# Patient Record
Sex: MALE | Race: WHITE | NOT HISPANIC OR LATINO | Employment: OTHER | URBAN - METROPOLITAN AREA
[De-identification: names, ages, dates, MRNs, and addresses within clinical notes are randomized per-mention and may not be internally consistent; named-entity substitution may affect disease eponyms.]

---

## 2017-07-05 ENCOUNTER — HOSPITAL ENCOUNTER (EMERGENCY)
Facility: MEDICAL CENTER | Age: 72
End: 2017-07-05
Attending: EMERGENCY MEDICINE
Payer: MEDICARE

## 2017-07-05 VITALS
HEART RATE: 86 BPM | BODY MASS INDEX: 20.2 KG/M2 | SYSTOLIC BLOOD PRESSURE: 113 MMHG | WEIGHT: 141.09 LBS | DIASTOLIC BLOOD PRESSURE: 68 MMHG | RESPIRATION RATE: 16 BRPM | HEIGHT: 70 IN | OXYGEN SATURATION: 96 % | TEMPERATURE: 98 F

## 2017-07-05 DIAGNOSIS — T83.511A URINARY TRACT INFECTION ASSOCIATED WITH INDWELLING URETHRAL CATHETER, INITIAL ENCOUNTER (HCC): ICD-10-CM

## 2017-07-05 DIAGNOSIS — N39.0 URINARY TRACT INFECTION ASSOCIATED WITH INDWELLING URETHRAL CATHETER, INITIAL ENCOUNTER (HCC): ICD-10-CM

## 2017-07-05 LAB
APPEARANCE UR: ABNORMAL
COLOR UR AUTO: ABNORMAL
GLUCOSE UR QL STRIP.AUTO: NEGATIVE MG/DL
KETONES UR QL STRIP.AUTO: 15 MG/DL
LEUKOCYTE ESTERASE UR QL STRIP.AUTO: ABNORMAL
NITRITE UR QL STRIP.AUTO: POSITIVE
PH UR STRIP.AUTO: 8.5 [PH]
PROT UR QL STRIP: 100 MG/DL
RBC UR QL AUTO: ABNORMAL
SP GR UR: 1.01

## 2017-07-05 PROCEDURE — 81002 URINALYSIS NONAUTO W/O SCOPE: CPT

## 2017-07-05 PROCEDURE — A9270 NON-COVERED ITEM OR SERVICE: HCPCS | Performed by: EMERGENCY MEDICINE

## 2017-07-05 PROCEDURE — 99284 EMERGENCY DEPT VISIT MOD MDM: CPT

## 2017-07-05 PROCEDURE — 303105 HCHG CATHETER EXTRA

## 2017-07-05 PROCEDURE — 51702 INSERT TEMP BLADDER CATH: CPT

## 2017-07-05 PROCEDURE — 700102 HCHG RX REV CODE 250 W/ 637 OVERRIDE(OP): Performed by: EMERGENCY MEDICINE

## 2017-07-05 RX ORDER — CIPROFLOXACIN 500 MG/1
500 TABLET, FILM COATED ORAL ONCE
Status: COMPLETED | OUTPATIENT
Start: 2017-07-05 | End: 2017-07-05

## 2017-07-05 RX ORDER — CIPROFLOXACIN 500 MG/1
500 TABLET, FILM COATED ORAL 2 TIMES DAILY
Qty: 20 TAB | Refills: 0 | Status: SHIPPED | OUTPATIENT
Start: 2017-07-05 | End: 2017-07-15

## 2017-07-05 RX ADMIN — CIPROFLOXACIN HYDROCHLORIDE 500 MG: 500 TABLET, FILM COATED ORAL at 05:21

## 2017-07-05 ASSESSMENT — LIFESTYLE VARIABLES: DO YOU DRINK ALCOHOL: NO

## 2017-07-05 NOTE — ED AVS SNAPSHOT
Home Care Instructions                                                                                                                Hugh Núñez   MRN: [id number]    Department:  [facility], Emergency Dept   Date of Visit:  7/5/2017            [facility], Emergency Dept    [address]    [place] [address]    Phone:  [phone]      You were seen by     Curtis Garcia M.D.      Your Diagnosis Was     Urinary tract infection associated with indwelling urethral catheter, initial encounter (CMS-[facility])     T83.511A, N39.0       These are the medications you received during your hospitalization from 07/05/2017 0311 to 07/05/2017 0527     Date/Time Order Dose Route Action    07/05/2017 0521 ciprofloxacin (CIPRO) tablet 500 mg 500 mg Oral Given      Follow-up Information     1. Please follow up.    Why:  [place] [facility] as planned      Medication Information     Review all of your home medications and newly ordered medications with your primary doctor and/or pharmacist as soon as possible. Follow medication instructions as directed by your doctor and/or pharmacist.     Please keep your complete medication list with you and share with your physician. Update the information when medications are discontinued, doses are changed, or new medications (including over-the-counter products) are added; and carry medication information at all times in the event of emergency situations.               Medication List      START taking these medications        Instructions    Morning Afternoon Evening Bedtime    ciprofloxacin 500 MG Tabs   Last time this was given:  500 mg on 7/5/2017  5:21 AM   Commonly known as:  CIPRO        Take 1 Tab by mouth 2 times a day for 10 days.   Dose:  500 mg                          ASK your doctor about these medications        Instructions    Morning Afternoon Evening Bedtime    cefdinir 300 MG Caps   Commonly known as:  OMNICEF        Take 1 Cap by mouth  "2 times a day.   Dose:  300 mg                        finasteride 5 MG Tabs   Commonly known as:  PROSCAR        Take 1 Tab by mouth every day.   Dose:  5 mg                        * metformin 850 MG Tabs   Commonly known as:  GLUCOPHAGE        Take 1 Tab by mouth 2 times a day, with meals.   Dose:  850 mg                        * metformin 850 MG Tabs   Commonly known as:  GLUCOPHAGE        Take 1 Tab by mouth 2 times a day, with meals.   Dose:  850 mg                        tamsulosin 0.4 MG capsule   Commonly known as:  FLOMAX        Take 1 Cap by mouth ONE-HALF HOUR AFTER BREAKFAST.   Dose:  0.4 mg                        * Notice:  This list has 2 medication(s) that are the same as other medications prescribed for you. Read the directions carefully, and ask your doctor or other care provider to review them with you.         Where to Get Your Medications      You can get these medications from any pharmacy     Bring a paper prescription for each of these medications    - ciprofloxacin 500 MG Tabs            Procedures and tests performed during your visit     INSERTION CATHETER HERNANDEZ    NURSING COMMUNICATION    POC UA        Discharge Instructions       Catheter-Associated Urinary Tract Infection FAQs  What is \"catheter-associated urinary tract infection\"?  A urinary tract infection (also called \"UTI\") is an infection in the urinary system, which includes the bladder (which stores the urine) and the kidneys (which filter the blood to make urine). Germs (for example, bacteria or yeasts) do not normally live in these areas; but if germs are introduced, an infection can occur.  If you have a urinary catheter, germs can travel along the catheter and cause an infection in your bladder or your kidney; in that case it is called a catheter-associated urinary tract infection (or \"CA-UTI\").   What is a urinary catheter?  A urinary catheter is a thin tube placed in the bladder to drain urine. Urine drains through the tube " into a bag that collects the urine. A urinary catheter may be used:  · If you are not able to urinate on your own  · To measure the amount of urine that you make, for example, during intensive care  · During and after some types of surgery  · During some tests of the kidneys and bladder  People with urinary catheters have a much higher chance of getting a urinary tract infection than people who don't have a catheter.  How do I get a catheter-associated urinary tract infection (CA-UTI)?  If germs enter the urinary tract, they may cause an infection. Many of the germs that cause a catheter-associated urinary tract infection are common germs found in your intestines that do not usually cause an infection there. Germs can enter the urinary tract when the catheter is being put in or while the catheter remains in the bladder.   What are the symptoms of a urinary tract infection?  Some of the common symptoms of a urinary tract infection are:  · Burning or pain in the lower abdomen (that is, below the stomach)  · Fever  · Bloody urine may be a sign of infection, but is also caused by other problems  · Burning during urination or an increase in the frequency of urination after the catheter is removed.  Sometimes people with catheter-associated urinary tract infections do not have these symptoms of infection.  Can catheter-associated urinary tract infections be treated?  Yes, most catheter-associated urinary tract infections can be treated with antibiotics and removal or change of the catheter. Your doctor will determine which antibiotic is best for you.   What are some of the things that hospitals are doing to prevent catheter-associated urinary tract infections?  To prevent urinary tract infections, doctors and nurses take the following actions.   Catheter insertion  · External catheters in men (these look like condoms and are placed over the penis rather than into the penis)  · Putting a temporary catheter in to drain the  urine and removing it right away. This is called intermittent urethral catheterization.  Catheter care  What can I do to help prevent catheter-associated urinary tract infections if I have a catheter?  · Always clean your hands before and after doing catheter care.  · Always keep your urine bag below the level of your bladder.  · Do not tug or pull on the tubing.  · Do not twist or kink the catheter tubing.  · Ask your healthcare provider each day if you still need the catheter.  What do I need to do when I go home from the hospital?  · If you will be going home with a catheter, your doctor or nurse should explain everything you need to know about taking care of the catheter. Make sure you understand how to care for it before you leave the hospital.  · If you develop any of the symptoms of a urinary tract infection, such as burning or pain in the lower abdomen, fever, or an increase in the frequency of urination, contact your doctor or nurse immediately.  · Before you go home, make sure you know who to contact if you have questions or problems after you get home.  If you have questions, please ask your doctor or nurse.  Developed and co-sponsored by The Society for Healthcare Epidemiology of [place] (SHEA); Infectious Diseases Society of [place] (IDSA); American Hospital Association; Association for Professionals in Infection Control and Epidemiology (APIC); Centers for Disease Control and Prevention (CDC); and The Joint Commission.     This information is not intended to replace advice given to you by your health care provider. Make sure you discuss any questions you have with your health care provider.     Document Released: 09/11/2013 Document Revised: 05/03/2016 Document Reviewed: 03/02/2016  Elsevier Interactive Patient Education ©2016 [employer]            Patient Information     Patient Information    Following emergency treatment: all patient requiring follow-up care must return either to a private  physician or a clinic if your condition worsens before you are able to obtain further medical attention, please return to the emergency room.     Billing Information    At [facility], we work to make the billing process streamlined for our patients.  Our Representatives are here to answer any questions you may have regarding your hospital bill.  If you have insurance coverage and have supplied your insurance information to us, we will submit a claim to your insurer on your behalf.  Should you have any questions regarding your bill, we can be reached online or by phone as follows:  Online: You are able pay your bills online or live chat with our representatives about any billing questions you may have. We are here to help Monday - Friday from 8:00am to 7:30pm and 9:00am - 12:00pm on Saturdays.  Please visit [email or web address]  for more information.   Phone:  [phone] or [phone]    Please note that your emergency physician, surgeon, pathologist, radiologist, anesthesiologist, and other specialists are not employed by [facility] and will therefore bill separately for their services.  Please contact them directly for any questions concerning their bills at the numbers below:     Emergency Physician Services:  [phone]  [facility]:  [phone]  [facility]:  [phone]  [facility]:  [phone]    1. Your final bill may vary from the amount quoted upon discharge if all procedures are not complete at that time, or if your doctor has additional procedures of which we are not aware. You will receive an additional bill if you return to the Emergency Department at [facility] for suture removal regardless of the facility of which the sutures were placed.     2. Please arrange for settlement of this account at the emergency registration.    3. All self-pay accounts are due in full at the time of treatment.  If you are  unable to meet this obligation then payment is expected within 4-5 days.     4. If you have had radiology studies (CT, X-ray, Ultrasound, MRI), you have received a preliminary result during your emergency department visit. Please contact the radiology department [phone] to receive a copy of your final result. Please discuss the Final result with your primary physician or with the follow up physician provided.     Crisis Hotline:  [place] Crisis Hotline:  4-738-NPKRHBR or 1-463.602.8444  [place] Crisis Hotline:    1-591.822.7394 or 183-956-6400         ED Discharge Follow Up Questions    1. In order to provide you with very good care, we would like to follow up with a phone call in the next few days.  May we have your permission to contact you?     YES /  NO    2. What is the best phone number to call you? (       )_____-__________    3. What is the best time to call you?      Morning  /  Afternoon  /  Evening                   Patient Signature:  ____________________________________________________________    Date:  ____________________________________________________________

## 2017-07-05 NOTE — ED PROVIDER NOTES
"ED Provider Note    CHIEF COMPLAINT  Chief Complaint   Patient presents with   • Urinary Catheter Problem     \"quit functioning\"       HPI  Hugh Núñez is a 71 y.o. male who presents with a nonfunctioning indwelling Clement catheter. He has a chronic indwelling Clement catheter for urinary retention. It's usually changed every month. He is due for a change next week, but it stopped draining yesterday afternoon.. He feels an intense urge to urinate. Pressure nonradiating, constant. No fevers or chills. No nausea or vomiting.    REVIEW OF SYSTEMS  As per HPI, otherwise a 10 point review of systems is negative    PAST MEDICAL HISTORY  Past Medical History   Diagnosis Date   • Fall    • Polio Ag of 4   • Type II or unspecified type diabetes mellitus without mention of complication, not stated as uncontrolled        SOCIAL HISTORY  Social History   Substance Use Topics   • Smoking status: Former Smoker -- 5 years     Types: Cigarettes     Start date: 01/01/1955     Quit date: 09/18/1960   • Smokeless tobacco: None   • Alcohol Use: No       SURGICAL HISTORY  Past Surgical History   Procedure Laterality Date   • Pr appendectomy     • Tonsillectomy         CURRENT MEDICATIONS  Home Medications     **Home medications have not yet been reviewed for this encounter**          ALLERGIES  No Known Allergies    PHYSICAL EXAM  VITAL SIGNS: /68 mmHg  Pulse 86  Temp(Src) 36.7 °C (98 °F)  Resp 16  Ht 1.778 m (5' 10\")  Wt 64 kg (141 lb 1.5 oz)  BMI 20.24 kg/m2  SpO2 96%   Constitutional: Awake and alert  HENT:  Atraumatic, Normocephalic poor dentition, Nose normal inspection.   Eyes: Normal inspection  Neck: Supple  Cardiovascular: Normal heart rate, Normal rhythm.  Symmetric peripheral pulses.   Thorax & Lungs: No respiratory distress, No wheezing, No rales, No rhonchi, No chest tenderness.   Abdomen: Bowel sounds normal, soft, non-distended, tender suprapubic region  Skin: Warm, Dry, No rash.   Back: No tenderness, No " CVA tenderness.   Extremities: No clubbing, cyanosis, edema, no Homans or cords   Neurologic: Grossly normal     Labs:  Results for orders placed or performed during the hospital encounter of 07/05/17   POC UA   Result Value Ref Range    POC Color Lauren     POC Appearance Slightly Cloudy (A)     POC Glucose Negative Negative mg/dL    POC Ketones 15 (A) Negative mg/dL    POC Specific Gravity 1.015 1.005-1.030    POC Blood Moderate (A) Negative    POC Urine PH 8.5 (A) 5.0-8.0    POC Protein 100 (A) Negative mg/dL    POC Nitrites Positive (A) Negative    POC Leukocyte Esterase Large (A) Negative       COURSE & MEDICAL DECISION MAKING  Patient presents with urinary retention. Clement catheter was changed. His bladder was drained. His symptoms completely resolved. Urine was quite cloudy consistent with infectious process. Given that he has developed retention and has discomfort with associated abnormal urinalysis he will be treated with Cipro. He is given a prescription for this. He has an appointment next week with urology. He will keep this appointment. He should return to ER for recurrent symptoms or concern.    FINAL IMPRESSION  1. Catheter associated urinary tract infection  2. Clement catheter obstruction secondary to #1      This dictation was created using voice recognition software. The accuracy of the dictation is limited to the abilities of the software.  The nursing notes were reviewed and certain aspects of this information were incorporated into this note.      Electronically signed by: Curtis Garcia, 7/5/2017 11:08 AM

## 2017-07-05 NOTE — DISCHARGE INSTRUCTIONS
"Catheter-Associated Urinary Tract Infection FAQs  What is \"catheter-associated urinary tract infection\"?  A urinary tract infection (also called \"UTI\") is an infection in the urinary system, which includes the bladder (which stores the urine) and the kidneys (which filter the blood to make urine). Germs (for example, bacteria or yeasts) do not normally live in these areas; but if germs are introduced, an infection can occur.  If you have a urinary catheter, germs can travel along the catheter and cause an infection in your bladder or your kidney; in that case it is called a catheter-associated urinary tract infection (or \"CA-UTI\").   What is a urinary catheter?  A urinary catheter is a thin tube placed in the bladder to drain urine. Urine drains through the tube into a bag that collects the urine. A urinary catheter may be used:  · If you are not able to urinate on your own  · To measure the amount of urine that you make, for example, during intensive care  · During and after some types of surgery  · During some tests of the kidneys and bladder  People with urinary catheters have a much higher chance of getting a urinary tract infection than people who don't have a catheter.  How do I get a catheter-associated urinary tract infection (CA-UTI)?  If germs enter the urinary tract, they may cause an infection. Many of the germs that cause a catheter-associated urinary tract infection are common germs found in your intestines that do not usually cause an infection there. Germs can enter the urinary tract when the catheter is being put in or while the catheter remains in the bladder.   What are the symptoms of a urinary tract infection?  Some of the common symptoms of a urinary tract infection are:  · Burning or pain in the lower abdomen (that is, below the stomach)  · Fever  · Bloody urine may be a sign of infection, but is also caused by other problems  · Burning during urination or an increase in the frequency of " urination after the catheter is removed.  Sometimes people with catheter-associated urinary tract infections do not have these symptoms of infection.  Can catheter-associated urinary tract infections be treated?  Yes, most catheter-associated urinary tract infections can be treated with antibiotics and removal or change of the catheter. Your doctor will determine which antibiotic is best for you.   What are some of the things that hospitals are doing to prevent catheter-associated urinary tract infections?  To prevent urinary tract infections, doctors and nurses take the following actions.   Catheter insertion  · External catheters in men (these look like condoms and are placed over the penis rather than into the penis)  · Putting a temporary catheter in to drain the urine and removing it right away. This is called intermittent urethral catheterization.  Catheter care  What can I do to help prevent catheter-associated urinary tract infections if I have a catheter?  · Always clean your hands before and after doing catheter care.  · Always keep your urine bag below the level of your bladder.  · Do not tug or pull on the tubing.  · Do not twist or kink the catheter tubing.  · Ask your healthcare provider each day if you still need the catheter.  What do I need to do when I go home from the hospital?  · If you will be going home with a catheter, your doctor or nurse should explain everything you need to know about taking care of the catheter. Make sure you understand how to care for it before you leave the hospital.  · If you develop any of the symptoms of a urinary tract infection, such as burning or pain in the lower abdomen, fever, or an increase in the frequency of urination, contact your doctor or nurse immediately.  · Before you go home, make sure you know who to contact if you have questions or problems after you get home.  If you have questions, please ask your doctor or nurse.  Developed and co-sponsored by The  Society for Healthcare Epidemiology of [place] (SHEA); Infectious Diseases Society [place] (IDSA); American Hospital Association; Association for Professionals in Infection Control and Epidemiology (APIC); Centers for Disease Control and Prevention (CDC); and The Joint Commission.     This information is not intended to replace advice given to you by your health care provider. Make sure you discuss any questions you have with your health care provider.     Document Released: 09/11/2013 Document Revised: 05/03/2016 Document Reviewed: 03/02/2016  [employer] Interactive Patient Education ©2016 [employer]

## 2017-07-05 NOTE — ED NOTES
"Chief Complaint   Patient presents with   • Urinary Catheter Problem     \"quit functioning\"       Pt ambulatory to triage with cane c/o urinary catheter problem. Emptied it earlier today and now it is not flowing. Pt in moderate distress, can't stand still.    /68 mmHg  Pulse 110  Temp(Src) 36.7 °C (98 °F)  Resp 16  Ht 1.778 m (5' 10\")  Wt 64 kg (141 lb 1.5 oz)  BMI 20.24 kg/m2  SpO2 93%      Pt Informed regarding triage process and verbalized understanding to inform triage tech or RN for any changes in condition.  Pt to G 27.    "

## 2017-07-05 NOTE — ED AVS SNAPSHOT
Gogobeans Access Code: [email or web address]  Expires: 8/4/2017  5:27 AM    Your email address is not on file at [employer].  Email Addresses are required for you to sign up for [employer], please contact [phone] to verify your personal information and to provide your email address prior to attempting to register for Gogobe[employer].    Hugh Núñez  [facility] [place] [address]    Gogobeans  A secure, online tool to manage your health information     [employer]’s Gogobeans® is a secure, online tool that connects you to your personalized health information from the privacy of your home -- day or night - making it very easy for you to manage your healthcare. Once the activation process is completed, you can even access your medical information using the Gogobeans angela, which is available for free in the Apple Angela store or Google Play store.     To learn more about Gogobeans, visit [email or web address]    There are two levels of access available (as shown below):   My Chart Features  [facility] Primary Care Doctor [facility]  Specialists [facility]  Urgent  Care Non-[facility] Primary Care Doctor   Email your healthcare team securely and privately 24/7 X X X    Manage appointments: schedule your next appointment; view details of past/upcoming appointments X      Request prescription refills. X      View recent personal medical records, including lab and immunizations X X X X   View health record, including health history, allergies, medications X X X X   Read reports about your outpatient visits, procedures, consult and ER notes X X X X   See your discharge summary, which is a recap of your hospital and/or ER visit that includes your diagnosis, lab results, and care plan X X  X     How to register for Clicktivatedt:  Once your e-mail address has been verified, follow the following steps to sign up for Gogobeans.     1. Go to  [email or web address]  2. Click on the Sign Up Now box, which takes you to the New Member Sign Up page. You will need to  provide the following information:  a. Enter your [employer] Access Code exactly as it appears at the top of this page. (You will not need to use this code after you’ve completed the sign-up process. If you do not sign up before the expiration date, you must request a new code.)   b. Enter your date of birth.   c. Enter your home email address.   d. Click Submit, and follow the next screen’s instructions.  3. Create a BrightContext ID. This will be your BrightContext login ID and cannot be changed, so think of one that is secure and easy to remember.  4. Create a [employer] password. You can change your password at any time.  5. Enter your Password Reset Question and Answer. This can be used at a later time if you forget your password.   6. Enter your e-mail address. This allows you to receive e-mail notifications when new information is available in BrightContext.  7. Click Sign Up. You can now view your health information.    For assistance activating your [employer] account, call [phone]

## 2017-07-05 NOTE — ED AVS SNAPSHOT
7/5/2017    Hugh Núñez  [address]  [place] [address]    Dear Hugh ROSEN:    [facility] wants to ensure your discharge home is safe and you or your loved ones have had all of your questions answered regarding your care after you leave the hospital.    Below is a list of resources and contact information should you have any questions regarding your hospital stay, follow-up instructions, or active medical symptoms.    Questions or Concerns Regarding… Contact   Medical Questions Related to Your Discharge  (7 days a week, 8am-5pm) Contact a Nurse Care Coordinator   [phone]   Medical Questions Not Related to Your Discharge  (24 hours a day / 7 days a week)  Contact the Nurse Health Line   [phone]    Medications or Discharge Instructions Refer to your discharge packet   or contact your [facility] Primary Care Provider   [phone]   Follow-up Appointment(s) Schedule your appointment via [employer]   or contact Scheduling [phone]   Billing Review your statement via [employer]  or contact Billing [phone]   Medical Records Review your records via [employer]   or contact Medical Records [phone]     You may receive a telephone call within two days of discharge. This call is to make certain you understand your discharge instructions and have the opportunity to have any questions answered. You can also easily access your medical information, test results and upcoming appointments via the [employer] free online health management tool. You can learn more and sign up at [email or web address]. For assistance setting up your [employer] account, please call [phone].    Once again, we want to ensure your discharge home is safe and that you have a clear understanding of any next steps in your care. If you have any questions or concerns, please do not hesitate to contact us, we are here for you. Thank you for choosing [facility] for your healthcare needs.    Sincerely,    Your [facility] Healthcare Team

## 2017-12-20 ENCOUNTER — HOSPITAL ENCOUNTER (OUTPATIENT)
Dept: LAB | Facility: MEDICAL CENTER | Age: 72
End: 2017-12-20
Attending: PHYSICIAN ASSISTANT
Payer: MEDICARE

## 2017-12-20 LAB
ALBUMIN SERPL BCP-MCNC: 3.9 G/DL (ref 3.2–4.9)
ALBUMIN/GLOB SERPL: 1.1 G/DL
ALP SERPL-CCNC: 72 U/L (ref 30–99)
ALT SERPL-CCNC: 10 U/L (ref 2–50)
ANION GAP SERPL CALC-SCNC: 7 MMOL/L (ref 0–11.9)
AST SERPL-CCNC: 13 U/L (ref 12–45)
BASOPHILS # BLD AUTO: 0.7 % (ref 0–1.8)
BASOPHILS # BLD: 0.07 K/UL (ref 0–0.12)
BILIRUB SERPL-MCNC: 1 MG/DL (ref 0.1–1.5)
BUN SERPL-MCNC: 18 MG/DL (ref 8–22)
CALCIUM SERPL-MCNC: 9.1 MG/DL (ref 8.5–10.5)
CHLORIDE SERPL-SCNC: 105 MMOL/L (ref 96–112)
CHOLEST SERPL-MCNC: 159 MG/DL (ref 100–199)
CO2 SERPL-SCNC: 26 MMOL/L (ref 20–33)
CREAT SERPL-MCNC: 0.93 MG/DL (ref 0.5–1.4)
EOSINOPHIL # BLD AUTO: 0.29 K/UL (ref 0–0.51)
EOSINOPHIL NFR BLD: 2.9 % (ref 0–6.9)
ERYTHROCYTE [DISTWIDTH] IN BLOOD BY AUTOMATED COUNT: 41.6 FL (ref 35.9–50)
GFR SERPL CREATININE-BSD FRML MDRD: >60 ML/MIN/1.73 M 2
GLOBULIN SER CALC-MCNC: 3.5 G/DL (ref 1.9–3.5)
GLUCOSE SERPL-MCNC: 160 MG/DL (ref 65–99)
HCT VFR BLD AUTO: 38.2 % (ref 42–52)
HDLC SERPL-MCNC: 50 MG/DL
HGB BLD-MCNC: 12.3 G/DL (ref 14–18)
IMM GRANULOCYTES # BLD AUTO: 0.02 K/UL (ref 0–0.11)
IMM GRANULOCYTES NFR BLD AUTO: 0.2 % (ref 0–0.9)
IRON SATN MFR SERPL: 8 % (ref 15–55)
IRON SERPL-MCNC: 24 UG/DL (ref 50–180)
LDLC SERPL CALC-MCNC: 90 MG/DL
LYMPHOCYTES # BLD AUTO: 1.54 K/UL (ref 1–4.8)
LYMPHOCYTES NFR BLD: 15.6 % (ref 22–41)
MCH RBC QN AUTO: 28.1 PG (ref 27–33)
MCHC RBC AUTO-ENTMCNC: 32.2 G/DL (ref 33.7–35.3)
MCV RBC AUTO: 87.2 FL (ref 81.4–97.8)
MONOCYTES # BLD AUTO: 0.95 K/UL (ref 0–0.85)
MONOCYTES NFR BLD AUTO: 9.6 % (ref 0–13.4)
NEUTROPHILS # BLD AUTO: 7.03 K/UL (ref 1.82–7.42)
NEUTROPHILS NFR BLD: 71 % (ref 44–72)
NRBC # BLD AUTO: 0 K/UL
NRBC BLD-RTO: 0 /100 WBC
PLATELET # BLD AUTO: 185 K/UL (ref 164–446)
PMV BLD AUTO: 9.8 FL (ref 9–12.9)
POTASSIUM SERPL-SCNC: 3.9 MMOL/L (ref 3.6–5.5)
PROT SERPL-MCNC: 7.4 G/DL (ref 6–8.2)
RBC # BLD AUTO: 4.38 M/UL (ref 4.7–6.1)
SODIUM SERPL-SCNC: 138 MMOL/L (ref 135–145)
TIBC SERPL-MCNC: 293 UG/DL (ref 250–450)
TRIGL SERPL-MCNC: 95 MG/DL (ref 0–149)
WBC # BLD AUTO: 9.9 K/UL (ref 4.8–10.8)

## 2017-12-20 PROCEDURE — 36415 COLL VENOUS BLD VENIPUNCTURE: CPT

## 2017-12-20 PROCEDURE — 80061 LIPID PANEL: CPT

## 2017-12-20 PROCEDURE — 80053 COMPREHEN METABOLIC PANEL: CPT

## 2017-12-20 PROCEDURE — 85025 COMPLETE CBC W/AUTO DIFF WBC: CPT

## 2017-12-20 PROCEDURE — 83540 ASSAY OF IRON: CPT

## 2017-12-20 PROCEDURE — 83550 IRON BINDING TEST: CPT

## 2020-06-22 ENCOUNTER — APPOINTMENT (OUTPATIENT)
Dept: RADIOLOGY | Facility: MEDICAL CENTER | Age: 75
End: 2020-06-22
Attending: EMERGENCY MEDICINE
Payer: MEDICARE

## 2020-06-22 ENCOUNTER — HOSPITAL ENCOUNTER (EMERGENCY)
Facility: MEDICAL CENTER | Age: 75
End: 2020-06-22
Attending: EMERGENCY MEDICINE
Payer: MEDICARE

## 2020-06-22 VITALS
BODY MASS INDEX: 21.47 KG/M2 | HEART RATE: 91 BPM | TEMPERATURE: 98.6 F | SYSTOLIC BLOOD PRESSURE: 164 MMHG | WEIGHT: 150 LBS | OXYGEN SATURATION: 97 % | DIASTOLIC BLOOD PRESSURE: 77 MMHG | HEIGHT: 70 IN | RESPIRATION RATE: 18 BRPM
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
## 2020-06-22 DIAGNOSIS — S01.81XA FACIAL LACERATION, INITIAL ENCOUNTER: ICD-10-CM

## 2020-06-22 DIAGNOSIS — S02.40FA CLOSED TRIPOD FRACTURE OF LEFT ZYGOMATICOMAXILLARY COMPLEX (HCC): ICD-10-CM

## 2020-06-22 DIAGNOSIS — S02.40DA CLOSED TRIPOD FRACTURE OF LEFT ZYGOMATICOMAXILLARY COMPLEX (HCC): ICD-10-CM

## 2020-06-22 DIAGNOSIS — S02.85XA CLOSED FRACTURE OF ORBITAL WALL, INITIAL ENCOUNTER (HCC): ICD-10-CM

## 2020-06-22 DIAGNOSIS — S02.842A CLOSED TRIPOD FRACTURE OF LEFT ZYGOMATICOMAXILLARY COMPLEX (HCC): ICD-10-CM
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
## 2020-06-22 PROCEDURE — 99284 EMERGENCY DEPT VISIT MOD MDM: CPT | Mod: 25

## 2020-06-22 PROCEDURE — 72128 CT CHEST SPINE W/O DYE: CPT

## 2020-06-22 PROCEDURE — 72125 CT NECK SPINE W/O DYE: CPT

## 2020-06-22 PROCEDURE — 70486 CT MAXILLOFACIAL W/O DYE: CPT

## 2020-06-22 PROCEDURE — 70450 CT HEAD/BRAIN W/O DYE: CPT
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
## 2020-06-22 NOTE — ED NOTES
Patient resting on cart, awake, alert, repositions self on cart, educated on need to remain flat with precautions in place. Siderailsx2, cart in low, locked position. Assessment unchanged. Denies needs or questions, call light within reach, will continue to monitor. Patient remains on monitoring.

## 2020-06-22 NOTE — ED NOTES
Per report concern for bed bugs, CT notified patient bathed and ready for imaging at this time. Patient given full bath, all clothing double bagged, placed in gown, transfers independently. Patient provided with warm blankets, wounds to left face cleansed, transfers self to wheelchair without difficulty. Will continue to monitor.
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
## 2020-06-22 NOTE — ED NOTES
ERP at bedside, updated patient on results and plan of care. Pt agreeable, verbalizes understanding. Remains on monitoring, assessment unchanged except increased edema and ecchymosis to left eye, dried blood noted, small amount of oozing blood from site. Siderailsx2, will continue to monitor. Pt positions self on cart for comfort.

## 2020-06-23 NOTE — ED NOTES
Patient resting on cart, awake, alert, oriented x 4. Patient updated on POC, verbalizes understanding. Assessment unchanged. Denies needs or questions, call light within reach, will continue to monitor. Patient remains on monitoring. Sleeping with easy, unlabored respirs.

## 2020-06-23 NOTE — ED NOTES
Plastics at bedside at this time evaluating patient. Patient resting on cart, awake, alert, oriented x 4. Patient updated on POC, verbalizes understanding. Assessment unchanged. Denies needs or questions, call light within reach, will continue to monitor.
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
## 2020-06-23 NOTE — ED NOTES
Discharge instructions and follow up care discussed by day RN. Patient denies questions at this time. Cab called for pt. Pt assisted to WC and assisted into cab with d/c paperwork and all belongings.
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
## 2020-06-23 NOTE — DISCHARGE INSTRUCTIONS
According to consulting plastic surgeon Dr. Chadwick, your injury should heal without need for further intervention.  If you have concerns or questions, please contact him.

## 2020-11-13 ENCOUNTER — APPOINTMENT (OUTPATIENT)
Dept: RADIOLOGY | Facility: MEDICAL CENTER | Age: 75
DRG: 558 | End: 2020-11-13
Attending: EMERGENCY MEDICINE
Payer: MEDICARE

## 2020-11-13 ENCOUNTER — HOSPITAL ENCOUNTER (INPATIENT)
Facility: MEDICAL CENTER | Age: 75
LOS: 6 days | DRG: 558 | End: 2020-11-19
Attending: EMERGENCY MEDICINE | Admitting: INTERNAL MEDICINE
Payer: MEDICARE

## 2020-11-13 DIAGNOSIS — R62.7 FAILURE TO THRIVE IN ADULT: ICD-10-CM

## 2020-11-13 PROBLEM — M62.82 RHABDOMYOLYSIS: Status: ACTIVE | Noted: 2020-11-13

## 2020-11-13 PROBLEM — E83.42 HYPOMAGNESEMIA: Status: ACTIVE | Noted: 2020-11-13

## 2020-11-13 PROBLEM — D72.829 LEUKOCYTOSIS: Status: ACTIVE | Noted: 2020-11-13

## 2020-11-13 LAB
ALBUMIN SERPL BCP-MCNC: 3 G/DL (ref 3.2–4.9)
ALBUMIN/GLOB SERPL: 0.8 G/DL
ALP SERPL-CCNC: 182 U/L (ref 30–99)
ALT SERPL-CCNC: 28 U/L (ref 2–50)
AMPHET UR QL SCN: NEGATIVE
ANION GAP SERPL CALC-SCNC: 14 MMOL/L (ref 7–16)
APPEARANCE UR: ABNORMAL
AST SERPL-CCNC: 65 U/L (ref 12–45)
BACTERIA #/AREA URNS HPF: ABNORMAL /HPF
BARBITURATES UR QL SCN: NEGATIVE
BASOPHILS # BLD AUTO: 0.3 % (ref 0–1.8)
BASOPHILS # BLD: 0.12 K/UL (ref 0–0.12)
BENZODIAZ UR QL SCN: NEGATIVE
BILIRUB SERPL-MCNC: 0.6 MG/DL (ref 0.1–1.5)
BILIRUB UR QL STRIP.AUTO: NEGATIVE
BUN SERPL-MCNC: 22 MG/DL (ref 8–22)
BZE UR QL SCN: NEGATIVE
CALCIUM SERPL-MCNC: 8.7 MG/DL (ref 8.5–10.5)
CANNABINOIDS UR QL SCN: NEGATIVE
CHLORIDE SERPL-SCNC: 98 MMOL/L (ref 96–112)
CK SERPL-CCNC: 1940 U/L (ref 0–154)
CO2 SERPL-SCNC: 21 MMOL/L (ref 20–33)
COLOR UR: YELLOW
COVID ORDER STATUS COVID19: NORMAL
CREAT SERPL-MCNC: 1.9 MG/DL (ref 0.5–1.4)
EKG IMPRESSION: NORMAL
EOSINOPHIL # BLD AUTO: 0 K/UL (ref 0–0.51)
EOSINOPHIL NFR BLD: 0 % (ref 0–6.9)
EPI CELLS #/AREA URNS HPF: ABNORMAL /HPF
ERYTHROCYTE [DISTWIDTH] IN BLOOD BY AUTOMATED COUNT: 45.7 FL (ref 35.9–50)
ETHANOL BLD-MCNC: <10.1 MG/DL (ref 0–10)
GLOBULIN SER CALC-MCNC: 3.9 G/DL (ref 1.9–3.5)
GLUCOSE BLD-MCNC: 230 MG/DL (ref 65–99)
GLUCOSE SERPL-MCNC: 392 MG/DL (ref 65–99)
GLUCOSE UR STRIP.AUTO-MCNC: >=1000 MG/DL
HCT VFR BLD AUTO: 32.8 % (ref 42–52)
HGB BLD-MCNC: 10.1 G/DL (ref 14–18)
HYALINE CASTS #/AREA URNS LPF: ABNORMAL /LPF
IMM GRANULOCYTES # BLD AUTO: 0.49 K/UL (ref 0–0.11)
IMM GRANULOCYTES NFR BLD AUTO: 1.4 % (ref 0–0.9)
KETONES UR STRIP.AUTO-MCNC: NEGATIVE MG/DL
LEUKOCYTE ESTERASE UR QL STRIP.AUTO: ABNORMAL
LIPASE SERPL-CCNC: 6 U/L (ref 11–82)
LYMPHOCYTES # BLD AUTO: 0.75 K/UL (ref 1–4.8)
LYMPHOCYTES NFR BLD: 2.1 % (ref 22–41)
MAGNESIUM SERPL-MCNC: 1.4 MG/DL (ref 1.5–2.5)
MAGNESIUM SERPL-MCNC: 1.4 MG/DL (ref 1.5–2.5)
MCH RBC QN AUTO: 24 PG (ref 27–33)
MCHC RBC AUTO-ENTMCNC: 30.8 G/DL (ref 33.7–35.3)
MCV RBC AUTO: 78.1 FL (ref 81.4–97.8)
METHADONE UR QL SCN: NEGATIVE
MICRO URNS: ABNORMAL
MONOCYTES # BLD AUTO: 0.86 K/UL (ref 0–0.85)
MONOCYTES NFR BLD AUTO: 2.4 % (ref 0–13.4)
NEUTROPHILS # BLD AUTO: 33.05 K/UL (ref 1.82–7.42)
NEUTROPHILS NFR BLD: 93.8 % (ref 44–72)
NITRITE UR QL STRIP.AUTO: NEGATIVE
NRBC # BLD AUTO: 0 K/UL
NRBC BLD-RTO: 0 /100 WBC
OPIATES UR QL SCN: NEGATIVE
OXYCODONE UR QL SCN: NEGATIVE
PCP UR QL SCN: NEGATIVE
PH UR STRIP.AUTO: 6 [PH] (ref 5–8)
PHOSPHATE SERPL-MCNC: 3.6 MG/DL (ref 2.5–4.5)
PLATELET # BLD AUTO: 209 K/UL (ref 164–446)
PMV BLD AUTO: 9.8 FL (ref 9–12.9)
POTASSIUM SERPL-SCNC: 3.4 MMOL/L (ref 3.6–5.5)
PROPOXYPH UR QL SCN: NEGATIVE
PROT SERPL-MCNC: 6.9 G/DL (ref 6–8.2)
PROT UR QL STRIP: 30 MG/DL
RBC # BLD AUTO: 4.2 M/UL (ref 4.7–6.1)
RBC # URNS HPF: ABNORMAL /HPF
RBC UR QL AUTO: ABNORMAL
SODIUM SERPL-SCNC: 133 MMOL/L (ref 135–145)
SP GR UR STRIP.AUTO: 1.01
TROPONIN T SERPL-MCNC: 48 NG/L (ref 6–19)
TSH SERPL DL<=0.005 MIU/L-ACNC: 1.1 UIU/ML (ref 0.38–5.33)
UROBILINOGEN UR STRIP.AUTO-MCNC: 0.2 MG/DL
WBC # BLD AUTO: 35.3 K/UL (ref 4.8–10.8)
WBC #/AREA URNS HPF: ABNORMAL /HPF

## 2020-11-13 PROCEDURE — 83735 ASSAY OF MAGNESIUM: CPT

## 2020-11-13 PROCEDURE — U0003 INFECTIOUS AGENT DETECTION BY NUCLEIC ACID (DNA OR RNA); SEVERE ACUTE RESPIRATORY SYNDROME CORONAVIRUS 2 (SARS-COV-2) (CORONAVIRUS DISEASE [COVID-19]), AMPLIFIED PROBE TECHNIQUE, MAKING USE OF HIGH THROUGHPUT TECHNOLOGIES AS DESCRIBED BY CMS-2020-01-R: HCPCS

## 2020-11-13 PROCEDURE — 51798 US URINE CAPACITY MEASURE: CPT

## 2020-11-13 PROCEDURE — 71045 X-RAY EXAM CHEST 1 VIEW: CPT

## 2020-11-13 PROCEDURE — 99285 EMERGENCY DEPT VISIT HI MDM: CPT

## 2020-11-13 PROCEDURE — 85025 COMPLETE CBC W/AUTO DIFF WBC: CPT

## 2020-11-13 PROCEDURE — 93005 ELECTROCARDIOGRAM TRACING: CPT | Performed by: EMERGENCY MEDICINE

## 2020-11-13 PROCEDURE — 770006 HCHG ROOM/CARE - MED/SURG/GYN SEMI*

## 2020-11-13 PROCEDURE — 87040 BLOOD CULTURE FOR BACTERIA: CPT

## 2020-11-13 PROCEDURE — 82550 ASSAY OF CK (CPK): CPT

## 2020-11-13 PROCEDURE — 83690 ASSAY OF LIPASE: CPT

## 2020-11-13 PROCEDURE — 80307 DRUG TEST PRSMV CHEM ANLYZR: CPT

## 2020-11-13 PROCEDURE — 700102 HCHG RX REV CODE 250 W/ 637 OVERRIDE(OP): Performed by: INTERNAL MEDICINE

## 2020-11-13 PROCEDURE — 700105 HCHG RX REV CODE 258: Performed by: EMERGENCY MEDICINE

## 2020-11-13 PROCEDURE — 99223 1ST HOSP IP/OBS HIGH 75: CPT | Mod: AI | Performed by: INTERNAL MEDICINE

## 2020-11-13 PROCEDURE — 82962 GLUCOSE BLOOD TEST: CPT

## 2020-11-13 PROCEDURE — 96365 THER/PROPH/DIAG IV INF INIT: CPT

## 2020-11-13 PROCEDURE — 84484 ASSAY OF TROPONIN QUANT: CPT

## 2020-11-13 PROCEDURE — 80053 COMPREHEN METABOLIC PANEL: CPT

## 2020-11-13 PROCEDURE — 700111 HCHG RX REV CODE 636 W/ 250 OVERRIDE (IP): Performed by: EMERGENCY MEDICINE

## 2020-11-13 PROCEDURE — 84100 ASSAY OF PHOSPHORUS: CPT

## 2020-11-13 PROCEDURE — 84443 ASSAY THYROID STIM HORMONE: CPT

## 2020-11-13 PROCEDURE — 700105 HCHG RX REV CODE 258: Performed by: INTERNAL MEDICINE

## 2020-11-13 PROCEDURE — A9270 NON-COVERED ITEM OR SERVICE: HCPCS | Performed by: INTERNAL MEDICINE

## 2020-11-13 PROCEDURE — 700111 HCHG RX REV CODE 636 W/ 250 OVERRIDE (IP): Performed by: INTERNAL MEDICINE

## 2020-11-13 PROCEDURE — 81001 URINALYSIS AUTO W/SCOPE: CPT

## 2020-11-13 PROCEDURE — 302128 INFUSION PUMP W/POLE: Performed by: INTERNAL MEDICINE

## 2020-11-13 RX ORDER — AMOXICILLIN 250 MG
2 CAPSULE ORAL 2 TIMES DAILY
Status: DISCONTINUED | OUTPATIENT
Start: 2020-11-13 | End: 2020-11-19 | Stop reason: HOSPADM

## 2020-11-13 RX ORDER — HEPARIN SODIUM 5000 [USP'U]/ML
5000 INJECTION, SOLUTION INTRAVENOUS; SUBCUTANEOUS EVERY 8 HOURS
Status: DISCONTINUED | OUTPATIENT
Start: 2020-11-13 | End: 2020-11-19 | Stop reason: HOSPADM

## 2020-11-13 RX ORDER — DEXTROSE MONOHYDRATE 25 G/50ML
50 INJECTION, SOLUTION INTRAVENOUS
Status: DISCONTINUED | OUTPATIENT
Start: 2020-11-13 | End: 2020-11-19 | Stop reason: HOSPADM

## 2020-11-13 RX ORDER — PERMETHRIN 50 MG/G
CREAM TOPICAL ONCE
Status: DISCONTINUED | OUTPATIENT
Start: 2020-11-13 | End: 2020-11-13

## 2020-11-13 RX ORDER — TAMSULOSIN HYDROCHLORIDE 0.4 MG/1
0.4 CAPSULE ORAL
Status: DISCONTINUED | OUTPATIENT
Start: 2020-11-13 | End: 2020-11-19 | Stop reason: HOSPADM

## 2020-11-13 RX ORDER — SODIUM CHLORIDE 9 MG/ML
INJECTION, SOLUTION INTRAVENOUS CONTINUOUS
Status: DISCONTINUED | OUTPATIENT
Start: 2020-11-13 | End: 2020-11-19 | Stop reason: HOSPADM

## 2020-11-13 RX ORDER — ACETAMINOPHEN 325 MG/1
650 TABLET ORAL EVERY 6 HOURS PRN
Status: DISCONTINUED | OUTPATIENT
Start: 2020-11-13 | End: 2020-11-14

## 2020-11-13 RX ORDER — MAGNESIUM SULFATE HEPTAHYDRATE 40 MG/ML
2 INJECTION, SOLUTION INTRAVENOUS ONCE
Status: COMPLETED | OUTPATIENT
Start: 2020-11-13 | End: 2020-11-14

## 2020-11-13 RX ORDER — POLYETHYLENE GLYCOL 3350 17 G/17G
1 POWDER, FOR SOLUTION ORAL
Status: DISCONTINUED | OUTPATIENT
Start: 2020-11-13 | End: 2020-11-19 | Stop reason: HOSPADM

## 2020-11-13 RX ORDER — BISACODYL 10 MG
10 SUPPOSITORY, RECTAL RECTAL
Status: DISCONTINUED | OUTPATIENT
Start: 2020-11-13 | End: 2020-11-19 | Stop reason: HOSPADM

## 2020-11-13 RX ADMIN — INSULIN HUMAN 3 UNITS: 100 INJECTION, SOLUTION PARENTERAL at 22:17

## 2020-11-13 RX ADMIN — SODIUM CHLORIDE: 9 INJECTION, SOLUTION INTRAVENOUS at 22:34

## 2020-11-13 RX ADMIN — CEFTRIAXONE SODIUM 2 G: 2 INJECTION, POWDER, FOR SOLUTION INTRAMUSCULAR; INTRAVENOUS at 15:34

## 2020-11-13 RX ADMIN — TAMSULOSIN HYDROCHLORIDE 0.4 MG: 0.4 CAPSULE ORAL at 18:38

## 2020-11-13 RX ADMIN — HEPARIN SODIUM 5000 UNITS: 5000 INJECTION, SOLUTION INTRAVENOUS; SUBCUTANEOUS at 18:38

## 2020-11-13 ASSESSMENT — ENCOUNTER SYMPTOMS
TINGLING: 0
BACK PAIN: 0
CHILLS: 0
SENSORY CHANGE: 0
EYE PAIN: 0
SPEECH CHANGE: 0
TREMORS: 0
CONSTIPATION: 0
NECK PAIN: 0
SHORTNESS OF BREATH: 0
DIARRHEA: 0
DIZZINESS: 0
DOUBLE VISION: 0
FEVER: 0
NAUSEA: 0
FOCAL WEAKNESS: 0
BLURRED VISION: 0
HEADACHES: 0
COUGH: 0
ABDOMINAL PAIN: 0
MYALGIAS: 0
PHOTOPHOBIA: 0
WEIGHT LOSS: 0
VOMITING: 0
ORTHOPNEA: 0
HALLUCINATIONS: 0
PALPITATIONS: 0
SPUTUM PRODUCTION: 0
WEAKNESS: 1

## 2020-11-13 ASSESSMENT — LIFESTYLE VARIABLES: SUBSTANCE_ABUSE: 0

## 2020-11-13 NOTE — ED NOTES
Pharmacy Medication Reconciliation    Medication Reconciliation updated and complete per pt at bedside  Allergies reviewed           Patient reports NO Prescription or OTC medications

## 2020-11-13 NOTE — ED NOTES
Rakel from Lab called with critical result of CPK at 1940. Critical lab result read back to Rakel.   Dr. Koch notified of critical lab result at 1216.  Critical lab result read back by Dr. Koch.

## 2020-11-13 NOTE — ED NOTES
Sylvain from Lab called with critical result of WBC at 35.3. Critical lab result read back to Sylvain.   Dr. Koch notified of critical lab result at 1203.  Critical lab result read back by Dr. Koch.

## 2020-11-13 NOTE — ED PROVIDER NOTES
ED Provider Note    CHIEF COMPLAINT  Chief Complaint   Patient presents with   • Other     unable to care for self., possible fall?   • Failure to Thrive       HPI  Hugh Núñez is a 75 y.o. male who presents to the emergency department stating that he thinks he slipped out of his chair be a day or 2 ago is unable to get up because he is so weak.  The patient has not eaten for several days and states he is wasting away.  The only thing he was found by EMS was a chair in the room he states that he recently had the room sprayed for bedbugs.  There is one dead dried bedbug on his clothing.  The patient states he is feels extremely weak but does not have any focal loss of sensation or strength, denies pain to his upper or lower extremities, denies fever, cough, nausea, vomiting.  He states that he believes it is time for him to go to a senior living facility.    REVIEW OF SYSTEMS  Positives as above. Pertinent negatives include fever, cough, nausea, vomiting, dysuria, [place], melena, hematemesis, neck pain, headache  All other 10 review of systems are negative    PAST MEDICAL HISTORY  Past Medical History:   Diagnosis Date   • Fall    • Polio Ag of 4   • Type II or unspecified type diabetes mellitus without mention of complication, not stated as uncontrolled        FAMILY HISTORY  Noncontributory    SOCIAL HISTORY  Social History     Socioeconomic History   • Marital status:      Spouse name: Not on file   • Number of children: Not on file   • Years of education: Not on file   • Highest education level: Not on file   Occupational History   • Not on file   Social Needs   • Financial resource strain: Not on file   • Food insecurity     Worry: Not on file     Inability: Not on file   • Transportation needs     Medical: Not on file     Non-medical: Not on file   Tobacco Use   • Smoking status: Former Smoker     Years: 5.00     Types: Cigarettes     Start date: 1/1/1955     Quit date: 9/18/1960     Years since  "quittin.1   • Smokeless tobacco: Never Used   Substance and Sexual Activity   • Alcohol use: No   • Drug use: No   • Sexual activity: Not on file   Lifestyle   • Physical activity     Days per week: Not on file     Minutes per session: Not on file   • Stress: Not on file   Relationships   • Social connections     Talks on phone: Not on file     Gets together: Not on file     Attends [employer] service: Not on file     Active member of club or organization: Not on file     Attends meetings of clubs or organizations: Not on file     Relationship status: Not on file   • Intimate partner violence     Fear of current or ex partner: Not on file     Emotionally abused: Not on file     Physically abused: Not on file     Forced sexual activity: Not on file   Other Topics Concern   • Not on file   Social History Narrative   • Not on file       SURGICAL HISTORY  Past Surgical History:   Procedure Laterality Date   • PB APPENDECTOMY     • TONSILLECTOMY         CURRENT MEDICATIONS  Home Medications    **Home medications have not yet been reviewed for this encounter**         ALLERGIES  No Known Allergies    PHYSICAL EXAM  VITAL SIGNS: /45   Pulse 85   Temp 36.2 °C (97.2 °F) (Temporal)   Resp 16   Ht 1.778 m (5' 10\")   Wt 54.4 kg (120 lb)   SpO2 99%   BMI 17.22 kg/m²      Constitutional: Hectic male no acute distress, Non-toxic appearance.   Eyes: PERRLA, EOMI, Conjunctiva normal, No discharge.   HENT: Dry mucous membranes   cardiovascular: Normal heart rate, Normal rhythm, No murmurs, No rubs, No gallops, and intact distal pulses.   Thorax & Lungs:  No respiratory distress, no rales, no rhonchi, No wheezing, No chest wall tenderness.   Abdomen: Bowel sounds normal, Soft, No tenderness, No guarding, No rebound, No pulsatile masses.   Skin: Warm, Dry, no decubitus ulcer, no erythema, No rash.   Extremities: Full range of motion, no deformity, no edema.  Neurologic:  Alert & oriented to month and age, Normal " cognition, Cranial nerves II-XII are intact, No slurred speech, Negative finger to nose bilaterally, No pronator drift bilaterally,   strength 5/5 bilaterally, Leg raise strength 5/5 bilaterally, Plantarflexion strength 5/5 bilaterally, Dorsiflexion strength 5/5 bilaterally, Deep tendon reflexes 2/4 upper and lower extremities bilaterally, Sensation intact throughout, No Nystagmus.  Psychiatric: Affect normal for clinical presentation.      RADIOLOGY/PROCEDURES  DX-CHEST-LIMITED (1 VIEW)   Final Result      1.  No acute cardiac or pulmonary abnormalities are identified.        Results for orders placed or performed during the hospital encounter of 11/13/20   CBC WITH DIFFERENTIAL   Result Value Ref Range    WBC 35.3 (HH) 4.8 - 10.8 K/uL    RBC 4.20 (L) 4.70 - 6.10 M/uL    Hemoglobin 10.1 (L) 14.0 - 18.0 g/dL    Hematocrit 32.8 (L) 42.0 - 52.0 %    MCV 78.1 (L) 81.4 - 97.8 fL    MCH 24.0 (L) 27.0 - 33.0 pg    MCHC 30.8 (L) 33.7 - 35.3 g/dL    RDW 45.7 35.9 - 50.0 fL    Platelet Count 209 164 - 446 K/uL    MPV 9.8 9.0 - 12.9 fL    Neutrophils-Polys 93.80 (H) 44.00 - 72.00 %    Lymphocytes 2.10 (L) 22.00 - 41.00 %    Monocytes 2.40 0.00 - 13.40 %    Eosinophils 0.00 0.00 - 6.90 %    Basophils 0.30 0.00 - 1.80 %    Immature Granulocytes 1.40 (H) 0.00 - 0.90 %    Nucleated RBC 0.00 /100 WBC    Neutrophils (Absolute) 33.05 (H) 1.82 - 7.42 K/uL    Lymphs (Absolute) 0.75 (L) 1.00 - 4.80 K/uL    Monos (Absolute) 0.86 (H) 0.00 - 0.85 K/uL    Eos (Absolute) 0.00 0.00 - 0.51 K/uL    Baso (Absolute) 0.12 0.00 - 0.12 K/uL    Immature Granulocytes (abs) 0.49 (H) 0.00 - 0.11 K/uL    NRBC (Absolute) 0.00 K/uL   COMP METABOLIC PANEL   Result Value Ref Range    Sodium 133 (L) 135 - 145 mmol/L    Potassium 3.4 (L) 3.6 - 5.5 mmol/L    Chloride 98 96 - 112 mmol/L    Co2 21 20 - 33 mmol/L    Anion Gap 14.0 7.0 - 16.0    Glucose 392 (H) 65 - 99 mg/dL    Bun 22 8 - 22 mg/dL    Creatinine 1.90 (H) 0.50 - 1.40 mg/dL    Calcium 8.7 8.5 -  10.5 mg/dL    AST(SGOT) 65 (H) 12 - 45 U/L    ALT(SGPT) 28 2 - 50 U/L    Alkaline Phosphatase 182 (H) 30 - 99 U/L    Total Bilirubin 0.6 0.1 - 1.5 mg/dL    Albumin 3.0 (L) 3.2 - 4.9 g/dL    Total Protein 6.9 6.0 - 8.2 g/dL    Globulin 3.9 (H) 1.9 - 3.5 g/dL    A-G Ratio 0.8 g/dL   TROPONIN   Result Value Ref Range    Troponin T 48 (H) 6 - 19 ng/L   DIAGNOSTIC ALCOHOL   Result Value Ref Range    Diagnostic Alcohol <10.1 0.0 - 10.0 mg/dL   MAGNESIUM   Result Value Ref Range    Magnesium 1.4 (L) 1.5 - 2.5 mg/dL   PHOSPHORUS   Result Value Ref Range    Phosphorus 3.6 2.5 - 4.5 mg/dL   TSH   Result Value Ref Range    TSH 1.100 0.380 - 5.330 uIU/mL   COVID/SARS CoV-2 PCR    Specimen: Nasopharyngeal; Respirate   Result Value Ref Range    COVID Order Status Received    CREATINE KINASE   Result Value Ref Range    CPK Total 1940 (HH) 0 - 154 U/L   LIPASE   Result Value Ref Range    Lipase 6 (L) 11 - 82 U/L   ESTIMATED GFR   Result Value Ref Range    GFR If  42 (A) >60 mL/min/1.73 m 2    GFR If Non African American 35 (A) >60 mL/min/1.73 m 2   SARS-CoV-2, PCR (In-House)   Result Value Ref Range    SARS-CoV-2 Source NP Swab    EKG   Result Value Ref Range    Report       [facility]    Test Date:  2020  Pt Name:    JERAMIE NIX                Department: ER  MRN:        [id number]                      Room:       [facility]  Gender:     Male                         Technician: [id number]  :        1945                   Requested By:LEE SHEPARD  Order #:    [id number]                    Reading MD: LEE SHEPARD, DO    Measurements  Intervals                                Axis  Rate:       83                           P:          80  OK:         171                          QRS:        75  QRSD:       77                           T:          75  QT:         407  QTc:        479    Interpretive Statements  Sinus rhythm  ST elevation, consider inferior  injury  Borderline prolonged QT interval  Compared to ECG 11/03/2014 17:37:19  ST (T wave) deviation now present  Myocardial infarct finding now present  Electronically Signed On 11- 12:53:07 PST by LEE KOCH DO           COURSE & MEDICAL DECISION MAKING  Pertinent Labs & Imaging studies reviewed. (See chart for details)  The pleasant 75-year-old male presents with weakness, malnutrition, fatigue.  He did have a remarkable elevation of his white blood cell 35,000 and there is no evidence of infection on his chest x-ray, notes of cellulitis, waiting for urinalysis currently.  In addition, the patient does have evidence of rhabdomyolysis with elevated CPK of 1940 it may be causing his leukocytosis.  Patient has no evidence of acute kidney injury as well, hypoglycemia.  I am waiting for urinalysis as well as for the patient's Covid test to be completed.  I will be signing the patient out to my partner, Dr. Broussard, for further evaluation and disposition as I do believe the patient require hospitalization.  He received 1 L lactated Ringer's secondary to his elevated CPK responding appropriate now with moist mucous membranes.      FINAL IMPRESSION  Failure to thrive  Rhabdomyolysis  Global weakness  Leukocytosis         Electronically signed by: Lee Koch D.O., 11/13/2020 11:06 AM

## 2020-11-13 NOTE — DISCHARGE PLANNING
Care Transition Team Assessment    MSW tried to assess pt. Pt under isolation due to bed bugs. Pt's cell phone number [phone]-number is no longer in service. MSW did chart review on pt to complete assessment.     [facility] reported to bedside RN that pt was found the cement and is unable to care for himself. Pt does not have family here and a daughter that lives in [place]. Pt does not have a primary care doctor. Unknown at this time if pt has any DME or home O2.    MSW sent secure email to EPS for report. Pt will likely be a difficult discharge.        Information Source  Information Given By: (Chart Review)     Elopement Risk  Legal Hold: No    Interdisciplinary Discharge Planning  Does Admitting Nurse Feel This Could be a Complex Discharge?: Yes  Primary Care Physician: (No PCP)  Lives with - Patient's Self Care Capacity: Alone and Unable to Care For Self  Support Systems: None  Housing / Facility: Replaced by [facility]  Do You Take your Prescribed Medications Regularly: No  Mobility Issues: Yes    Discharge Preparedness  What is your plan after discharge?: Uncertain - pending medical team collaboration       Finances  Financial Barriers to Discharge: Yes  Prescription Coverage: No  Prescription Coverage Comments: (Pt only has Medicare)     Advance Directive  Advance Directive?: None    Domestic Abuse  Have you ever been the victim of abuse or violence?: No     Discharge Risks or Barriers  Discharge risks or barriers?: Post-acute placement / services, Lives alone, no community support, Complex medical needs, Non-adherence to medication or treatment  Patient risk factors: Active abuse / Neglect concerns, Complex medical needs, Lack of outside supports, Lives alone and no community support, Vulnerable adult

## 2020-11-13 NOTE — ED PROVIDER NOTES
ED Provider Note    3:53 PM  hospitalist called.   They will admit for iv fluids.  Elevated wbc count likely from laying on ground, but cultures will be obtained and one dose of abx given.

## 2020-11-13 NOTE — ED NOTES
Pt BIB EMS From [facility].  Pt was laying on cement floor.  Pt reports he was unable to get up.  Pt unsure of fall, no c/o pain, no obvious trauma.  Pt had bed bugs at some point that pt reports that they sprayed his living quarters and removed all his furniture.  EMS reports that there was just a chair in his room.  Pt had dead bed bug on his jacket and pants.  Clothing removed and double bagged.  Pt A/o x4, reports daughter in [place] and no family in [place].  Pt unable to care for self and reports that he doesn't know when he last ate or drank anything.  ERP to see.

## 2020-11-14 ENCOUNTER — APPOINTMENT (OUTPATIENT)
Dept: RADIOLOGY | Facility: MEDICAL CENTER | Age: 75
DRG: 558 | End: 2020-11-14
Attending: STUDENT IN AN ORGANIZED HEALTH CARE EDUCATION/TRAINING PROGRAM
Payer: MEDICARE

## 2020-11-14 LAB
ALBUMIN SERPL BCP-MCNC: 2.6 G/DL (ref 3.2–4.9)
ALBUMIN/GLOB SERPL: 0.8 G/DL
ALP SERPL-CCNC: 138 U/L (ref 30–99)
ALT SERPL-CCNC: 23 U/L (ref 2–50)
ANION GAP SERPL CALC-SCNC: 11 MMOL/L (ref 7–16)
AST SERPL-CCNC: 56 U/L (ref 12–45)
BASOPHILS # BLD AUTO: 0.2 % (ref 0–1.8)
BASOPHILS # BLD: 0.04 K/UL (ref 0–0.12)
BILIRUB SERPL-MCNC: 0.3 MG/DL (ref 0.1–1.5)
BUN SERPL-MCNC: 31 MG/DL (ref 8–22)
CALCIUM SERPL-MCNC: 8.3 MG/DL (ref 8.5–10.5)
CHLORIDE SERPL-SCNC: 103 MMOL/L (ref 96–112)
CHOLEST SERPL-MCNC: 90 MG/DL (ref 100–199)
CK SERPL-CCNC: 1117 U/L (ref 0–154)
CO2 SERPL-SCNC: 23 MMOL/L (ref 20–33)
CREAT SERPL-MCNC: 1.72 MG/DL (ref 0.5–1.4)
EOSINOPHIL # BLD AUTO: 0.07 K/UL (ref 0–0.51)
EOSINOPHIL NFR BLD: 0.3 % (ref 0–6.9)
ERYTHROCYTE [DISTWIDTH] IN BLOOD BY AUTOMATED COUNT: 44.1 FL (ref 35.9–50)
EST. AVERAGE GLUCOSE BLD GHB EST-MCNC: 332 MG/DL
GLOBULIN SER CALC-MCNC: 3.3 G/DL (ref 1.9–3.5)
GLUCOSE BLD-MCNC: 152 MG/DL (ref 65–99)
GLUCOSE BLD-MCNC: 175 MG/DL (ref 65–99)
GLUCOSE BLD-MCNC: 178 MG/DL (ref 65–99)
GLUCOSE BLD-MCNC: 185 MG/DL (ref 65–99)
GLUCOSE SERPL-MCNC: 115 MG/DL (ref 65–99)
HBA1C MFR BLD: 13.2 % (ref 0–5.6)
HCT VFR BLD AUTO: 28.1 % (ref 42–52)
HDLC SERPL-MCNC: 47 MG/DL
HGB BLD-MCNC: 8.7 G/DL (ref 14–18)
IMM GRANULOCYTES # BLD AUTO: 0.3 K/UL (ref 0–0.11)
IMM GRANULOCYTES NFR BLD AUTO: 1.5 % (ref 0–0.9)
LDLC SERPL CALC-MCNC: 30 MG/DL
LYMPHOCYTES # BLD AUTO: 1.2 K/UL (ref 1–4.8)
LYMPHOCYTES NFR BLD: 6 % (ref 22–41)
MAGNESIUM SERPL-MCNC: 2.3 MG/DL (ref 1.5–2.5)
MCH RBC QN AUTO: 23.6 PG (ref 27–33)
MCHC RBC AUTO-ENTMCNC: 31 G/DL (ref 33.7–35.3)
MCV RBC AUTO: 76.2 FL (ref 81.4–97.8)
MONOCYTES # BLD AUTO: 0.8 K/UL (ref 0–0.85)
MONOCYTES NFR BLD AUTO: 4 % (ref 0–13.4)
NEUTROPHILS # BLD AUTO: 17.62 K/UL (ref 1.82–7.42)
NEUTROPHILS NFR BLD: 88 % (ref 44–72)
NRBC # BLD AUTO: 0 K/UL
NRBC BLD-RTO: 0 /100 WBC
PLATELET # BLD AUTO: 203 K/UL (ref 164–446)
PMV BLD AUTO: 10.4 FL (ref 9–12.9)
POTASSIUM SERPL-SCNC: 3.4 MMOL/L (ref 3.6–5.5)
PROT SERPL-MCNC: 5.9 G/DL (ref 6–8.2)
RBC # BLD AUTO: 3.69 M/UL (ref 4.7–6.1)
SARS-COV-2 RNA RESP QL NAA+PROBE: NOTDETECTED
SODIUM SERPL-SCNC: 137 MMOL/L (ref 135–145)
SPECIMEN SOURCE: NORMAL
TRIGL SERPL-MCNC: 63 MG/DL (ref 0–149)
WBC # BLD AUTO: 20 K/UL (ref 4.8–10.8)

## 2020-11-14 PROCEDURE — 99233 SBSQ HOSP IP/OBS HIGH 50: CPT | Performed by: STUDENT IN AN ORGANIZED HEALTH CARE EDUCATION/TRAINING PROGRAM

## 2020-11-14 PROCEDURE — 97166 OT EVAL MOD COMPLEX 45 MIN: CPT

## 2020-11-14 PROCEDURE — 51798 US URINE CAPACITY MEASURE: CPT

## 2020-11-14 PROCEDURE — 700111 HCHG RX REV CODE 636 W/ 250 OVERRIDE (IP): Performed by: STUDENT IN AN ORGANIZED HEALTH CARE EDUCATION/TRAINING PROGRAM

## 2020-11-14 PROCEDURE — 82962 GLUCOSE BLOOD TEST: CPT

## 2020-11-14 PROCEDURE — 85025 COMPLETE CBC W/AUTO DIFF WBC: CPT

## 2020-11-14 PROCEDURE — 700105 HCHG RX REV CODE 258: Performed by: INTERNAL MEDICINE

## 2020-11-14 PROCEDURE — 700102 HCHG RX REV CODE 250 W/ 637 OVERRIDE(OP): Performed by: INTERNAL MEDICINE

## 2020-11-14 PROCEDURE — A9270 NON-COVERED ITEM OR SERVICE: HCPCS | Performed by: INTERNAL MEDICINE

## 2020-11-14 PROCEDURE — 80061 LIPID PANEL: CPT

## 2020-11-14 PROCEDURE — 80053 COMPREHEN METABOLIC PANEL: CPT

## 2020-11-14 PROCEDURE — 770006 HCHG ROOM/CARE - MED/SURG/GYN SEMI*

## 2020-11-14 PROCEDURE — 700111 HCHG RX REV CODE 636 W/ 250 OVERRIDE (IP): Performed by: INTERNAL MEDICINE

## 2020-11-14 PROCEDURE — 82550 ASSAY OF CK (CPK): CPT

## 2020-11-14 PROCEDURE — 74176 CT ABD & PELVIS W/O CONTRAST: CPT

## 2020-11-14 PROCEDURE — 83036 HEMOGLOBIN GLYCOSYLATED A1C: CPT

## 2020-11-14 PROCEDURE — 700105 HCHG RX REV CODE 258: Performed by: STUDENT IN AN ORGANIZED HEALTH CARE EDUCATION/TRAINING PROGRAM

## 2020-11-14 PROCEDURE — 83735 ASSAY OF MAGNESIUM: CPT

## 2020-11-14 PROCEDURE — 36415 COLL VENOUS BLD VENIPUNCTURE: CPT

## 2020-11-14 RX ORDER — POTASSIUM CHLORIDE 7.45 MG/ML
10 INJECTION INTRAVENOUS ONCE
Status: COMPLETED | OUTPATIENT
Start: 2020-11-14 | End: 2020-11-14

## 2020-11-14 RX ORDER — ONDANSETRON 2 MG/ML
4 INJECTION INTRAMUSCULAR; INTRAVENOUS EVERY 4 HOURS PRN
Status: DISCONTINUED | OUTPATIENT
Start: 2020-11-14 | End: 2020-11-19 | Stop reason: HOSPADM

## 2020-11-14 RX ORDER — OXYCODONE HYDROCHLORIDE AND ACETAMINOPHEN 5; 325 MG/1; MG/1
1 TABLET ORAL EVERY 4 HOURS PRN
Status: DISCONTINUED | OUTPATIENT
Start: 2020-11-14 | End: 2020-11-19 | Stop reason: HOSPADM

## 2020-11-14 RX ORDER — ACETAMINOPHEN 325 MG/1
650 TABLET ORAL EVERY 4 HOURS PRN
Status: DISCONTINUED | OUTPATIENT
Start: 2020-11-14 | End: 2020-11-19 | Stop reason: HOSPADM

## 2020-11-14 RX ADMIN — TAMSULOSIN HYDROCHLORIDE 0.4 MG: 0.4 CAPSULE ORAL at 09:16

## 2020-11-14 RX ADMIN — HEPARIN SODIUM 5000 UNITS: 5000 INJECTION, SOLUTION INTRAVENOUS; SUBCUTANEOUS at 22:01

## 2020-11-14 RX ADMIN — HEPARIN SODIUM 5000 UNITS: 5000 INJECTION, SOLUTION INTRAVENOUS; SUBCUTANEOUS at 13:52

## 2020-11-14 RX ADMIN — SODIUM CHLORIDE: 9 INJECTION, SOLUTION INTRAVENOUS at 10:18

## 2020-11-14 RX ADMIN — MAGNESIUM SULFATE 2 G: 2 INJECTION INTRAVENOUS at 00:01

## 2020-11-14 RX ADMIN — POTASSIUM CHLORIDE 10 MEQ: 7.46 INJECTION, SOLUTION INTRAVENOUS at 17:19

## 2020-11-14 RX ADMIN — INSULIN HUMAN 2 UNITS: 100 INJECTION, SOLUTION PARENTERAL at 09:16

## 2020-11-14 RX ADMIN — CEFTRIAXONE SODIUM 1 G: 1 INJECTION, POWDER, FOR SOLUTION INTRAMUSCULAR; INTRAVENOUS at 10:16

## 2020-11-14 RX ADMIN — HEPARIN SODIUM 5000 UNITS: 5000 INJECTION, SOLUTION INTRAVENOUS; SUBCUTANEOUS at 05:04

## 2020-11-14 RX ADMIN — INSULIN HUMAN 2 UNITS: 100 INJECTION, SOLUTION PARENTERAL at 11:43

## 2020-11-14 ASSESSMENT — ENCOUNTER SYMPTOMS
FEVER: 0
HEMOPTYSIS: 0
DIZZINESS: 0
HEARTBURN: 0
SPUTUM PRODUCTION: 0
HEADACHES: 0
ORTHOPNEA: 0
COUGH: 0
CHILLS: 0
DEPRESSION: 0
PALPITATIONS: 0
NAUSEA: 0

## 2020-11-14 ASSESSMENT — ACTIVITIES OF DAILY LIVING (ADL): TOILETING: INDEPENDENT

## 2020-11-14 ASSESSMENT — COGNITIVE AND FUNCTIONAL STATUS - GENERAL
HELP NEEDED FOR BATHING: A LOT
PERSONAL GROOMING: A LITTLE
TOILETING: A LITTLE
SUGGESTED CMS G CODE MODIFIER DAILY ACTIVITY: CK
DRESSING REGULAR LOWER BODY CLOTHING: A LOT
EATING MEALS: A LITTLE
DRESSING REGULAR UPPER BODY CLOTHING: A LITTLE
DAILY ACTIVITIY SCORE: 16

## 2020-11-14 ASSESSMENT — LIFESTYLE VARIABLES
HAVE YOU EVER FELT YOU SHOULD CUT DOWN ON YOUR DRINKING: NO
AVERAGE NUMBER OF DAYS PER WEEK YOU HAVE A DRINK CONTAINING ALCOHOL: 0
DOES PATIENT WANT TO STOP DRINKING: NO
ON A TYPICAL DAY WHEN YOU DRINK ALCOHOL HOW MANY DRINKS DO YOU HAVE: 0
CONSUMPTION TOTAL: NEGATIVE
HAVE PEOPLE ANNOYED YOU BY CRITICIZING YOUR DRINKING: NO
TOTAL SCORE: 0
ALCOHOL_USE: NO
HOW MANY TIMES IN THE PAST YEAR HAVE YOU HAD 5 OR MORE DRINKS IN A DAY: 0
TOTAL SCORE: 0
EVER FELT BAD OR GUILTY ABOUT YOUR DRINKING: NO
EVER HAD A DRINK FIRST THING IN THE MORNING TO STEADY YOUR NERVES TO GET RID OF A HANGOVER: NO
SUBSTANCE_ABUSE: 0
TOTAL SCORE: 0

## 2020-11-14 ASSESSMENT — PATIENT HEALTH QUESTIONNAIRE - PHQ9
SUM OF ALL RESPONSES TO PHQ QUESTIONS 1-9: 5
2. FEELING DOWN, DEPRESSED, IRRITABLE, OR HOPELESS: SEVERAL DAYS
3. TROUBLE FALLING OR STAYING ASLEEP OR SLEEPING TOO MUCH: SEVERAL DAYS
4. FEELING TIRED OR HAVING LITTLE ENERGY: SEVERAL DAYS
SUM OF ALL RESPONSES TO PHQ9 QUESTIONS 1 AND 2: 1
1. LITTLE INTEREST OR PLEASURE IN DOING THINGS: NOT AT ALL
5. POOR APPETITE OR OVEREATING: SEVERAL DAYS
9. THOUGHTS THAT YOU WOULD BE BETTER OFF DEAD, OR OF HURTING YOURSELF: NOT AT ALL
6. FEELING BAD ABOUT YOURSELF - OR THAT YOU ARE A FAILURE OR HAVE LET YOURSELF OR YOUR FAMILY DOWN: SEVERAL DAYS
7. TROUBLE CONCENTRATING ON THINGS, SUCH AS READING THE NEWSPAPER OR WATCHING TELEVISION: NOT AT ALL
8. MOVING OR SPEAKING SO SLOWLY THAT OTHER PEOPLE COULD HAVE NOTICED. OR THE OPPOSITE, BEING SO FIGETY OR RESTLESS THAT YOU HAVE BEEN MOVING AROUND A LOT MORE THAN USUAL: NOT AT ALL

## 2020-11-14 ASSESSMENT — PAIN DESCRIPTION - PAIN TYPE: TYPE: ACUTE PAIN

## 2020-11-14 ASSESSMENT — FIBROSIS 4 INDEX: FIB4 SCORE: 4.37

## 2020-11-14 NOTE — CARE PLAN
"  Problem: Bowel/Gastric:  Goal: Will not experience complications related to bowel motility  Outcome: PROGRESSING SLOWER THAN EXPECTED  Intervention: Assess baseline bowel pattern  Note: Pt barely able to eat full meals and only eats <25% d/t feeling \"full\", but no nausea. Pt had looser BM yesterday 11/13/20 according to pt and chart but was bent over for lunch and unable to finish. Pt put back to bed to relieve pressure off abdomen. Notified hospitalist --> will assess intake for dinner and proceed with abd CT as indicated.     "

## 2020-11-14 NOTE — PROGRESS NOTES
[facility] Medicine Daily Progress Note    Date of Service  11/14/2020    Chief Complaint  75 y.o. male admitted 11/13/2020 with weakness and failure to thrive    Hospital Course  75 y.o. male with past medical history of diabetes mellitus who presented to the hospital on 11/13/2020 with complaint of weakness and failure to thrive.  He reported he has been having severe weakness and he was unable to walk and perform daily activities.  He also reported he has been having poor oral intake.  He has not been taking his medication as prescribed.  He denies having fall and any trauma to his head or anywhere else.  He denies any other acute complaints or residual symptoms.  He does not smoke or drink alcohol.  He denies any known COVID-19 exposure.  He denies any recent travel. Upon admission to the ED, he was found to have elevation of CPK and elevated blood glucose and dehydration.  He was started on IVF.  He also was found to have elevated white blood cell count and ER physician ordered IV antibiotic and obtain blood cultures.  He does not have any specific source of infection.  Rapid COVID-19 test is currently pending.    Interval Problem Update  Patient examined at bedside this morning  In no acute distress  No overnight complaints    Consultants/Specialty  None    Code Status  Full Code    Disposition  Home    Review of Systems  Review of Systems   Constitutional: Negative for chills and fever.   Respiratory: Negative for cough, hemoptysis and sputum production.    Cardiovascular: Negative for chest pain, palpitations and orthopnea.   Gastrointestinal: Negative for heartburn and nausea.   Genitourinary: Negative for dysuria and urgency.   Skin: Negative for rash.   Neurological: Negative for dizziness and headaches.   Psychiatric/Behavioral: Negative for depression, substance abuse and suicidal ideas.        Physical Exam  Temp:  [36.2 °C (97.1 °F)-36.9 °C (98.5 °F)] 36.9 °C (98.4 °F)  Pulse:  [79-88] 85  Resp:   [12-17] 14  BP: ()/(45-89) 113/58  SpO2:  [94 %-99 %] 99 %    Physical Exam  Constitutional:       Appearance: He is ill-appearing.   HENT:      Head: Normocephalic and atraumatic.      Right Ear: External ear normal.      Left Ear: External ear normal.      Nose: Nose normal.      Mouth/Throat:      Mouth: Mucous membranes are moist.      Pharynx: Oropharynx is clear.   Eyes:      Conjunctiva/sclera: Conjunctivae normal.      Pupils: Pupils are equal, round, and reactive to light.   Neck:      Musculoskeletal: Normal range of motion.   Cardiovascular:      Rate and Rhythm: Normal rate and regular rhythm.      Pulses: Normal pulses.      Heart sounds: Normal heart sounds.   Pulmonary:      Effort: Pulmonary effort is normal.      Breath sounds: Normal breath sounds.   Abdominal:      General: Abdomen is flat. Bowel sounds are normal.      Palpations: Abdomen is soft.   Musculoskeletal: Normal range of motion.   Skin:     General: Skin is warm.      Capillary Refill: Capillary refill takes less than 2 seconds.   Neurological:      General: No focal deficit present.      Mental Status: He is oriented to person, place, and time.   Psychiatric:         Mood and Affect: Mood normal.         Fluids    Intake/Output Summary (Last 24 hours) at 11/14/2020 0846  Last data filed at 11/13/2020 2234  Gross per 24 hour   Intake 200 ml   Output 950 ml   Net -750 ml       Laboratory  Recent Labs     11/13/20  1120 11/14/20  0237   WBC 35.3* 20.0*   RBC 4.20* 3.69*   HEMOGLOBIN 10.1* 8.7*   HEMATOCRIT 32.8* 28.1*   MCV 78.1* 76.2*   MCH 24.0* 23.6*   MCHC 30.8* 31.0*   RDW 45.7 44.1   PLATELETCT 209 203   MPV 9.8 10.4     Recent Labs     11/13/20  1120 11/14/20  0237   SODIUM 133* 137   POTASSIUM 3.4* 3.4*   CHLORIDE 98 103   CO2 21 23   GLUCOSE 392* 115*   BUN 22 31*   CREATININE 1.90* 1.72*   CALCIUM 8.7 8.3*                   Imaging  DX-CHEST-LIMITED (1 VIEW)   Final Result      1.  No acute cardiac or pulmonary  abnormalities are identified.           Assessment/Plan  Failure to thrive in adult  Assessment & Plan  He reported that he has been having difficulty mobilizing and he is unable to eat and drink.  I requested physical therapy and Occupational Therapy evaluation.  I requested nutrition consult due to poor oral intake.  Follow up Nutrition Consult  Follow up PT/OT Recs        DM (diabetes mellitus) (Former[facility])- (present on admission)  Assessment & Plan  He has not been taking his Metformin or any other prescription medications per  I started him on medium intensity insulin sliding scale  Follow up HbA1c  Follow up Lipid Panel     Hypomagnesemia  Assessment & Plan  He found to have hypomagnesemia I order magnesium replacement  Mg today 2.3  Repeat BMP tomorrow morning     Leukocytosis  Assessment & Plan  He found to have leukocytosis and there is no specific source of infection.  It could be reactionary pain  ER physician ordered blood culture and he received 1 dose of antibiotic  Follow up Blood Cultures   WBC count today is 20.0  Will continue Ceftriaxone 1g Daily  Monitor CBC    Rhabdomyolysis  Assessment & Plan  Patient found to have elevated CPK to 1117  He denies any social history of trauma.   I started him on IV fluids initially ordered repeat CPK for tomorrow  Follow up CPK         VTE prophylaxis: Heparin

## 2020-11-14 NOTE — PROGRESS NOTES
Monitor summary: SR 80-89, NC 0.18, QRS 0.08, QT 0.36, with rare PVCs and rare PACs per strip from monitor room.

## 2020-11-14 NOTE — H&P
Hospital Medicine History & Physical Note    Date of Service  11/13/2020    Primary Care Physician  Pcp Pt States None    Consultants  None    Code Status  Full Code    Chief Complaint  Chief Complaint   Patient presents with   • Other     unable to care for self., possible fall?   • Failure to Thrive       History of Presenting Illness  75 y.o. male with past medical history of diabetes mellitus who presented to the hospital on 11/13/2020 with complaint of weakness and failure to thrive.  He reported he has been having severe weakness and he was unable to walk and perform daily activities.  He also reported he has been having poor oral intake.  He has not been taking his medication as prescribed.  He denies having fall and any trauma to his head or anywhere else.  He denies any other acute complaints or residual symptoms.  He does not smoke or drink alcohol.  He denies any known COVID-19 exposure.  He denies any recent travel.    I discussed about this admission with the ER physician Dr. Chapin.    ER course: He found to have elevation of CPK and elevated blood glucose and dehydration.  Started him on IV fluid.  He also found to have elevated white blood cell count and ER physician ordered IV antibiotic and obtain blood cultures.  He does not have any specific source of infection.  Rapid COVID-19 test is currently pending.    Review of Systems  Review of Systems   Constitutional: Positive for malaise/fatigue. Negative for chills, fever and weight loss.   HENT: Negative for hearing loss and tinnitus.    Eyes: Negative for blurred vision, double vision, photophobia and pain.   Respiratory: Negative for cough, sputum production and shortness of breath.    Cardiovascular: Negative for chest pain, palpitations, orthopnea and leg swelling.   Gastrointestinal: Negative for abdominal pain, constipation, diarrhea, nausea and vomiting.   Genitourinary: Negative for dysuria, frequency and urgency.   Musculoskeletal:  Negative for back pain, joint pain, myalgias and neck pain.   Skin: Negative for rash.   Neurological: Positive for weakness. Negative for dizziness, tingling, tremors, sensory change, speech change, focal weakness and headaches.   Psychiatric/Behavioral: Negative for hallucinations and substance abuse.   All other systems reviewed and are negative.      Past Medical History   has a past medical history of Fall, Polio (Ag of 5), and Type II or unspecified type diabetes mellitus without mention of complication, not stated as uncontrolled.    Surgical History   has a past surgical history that includes pr appendectomy and tonsillectomy.     Family History  Family history not pertinent    Social History   reports that he quit smoking about 60 years ago. His smoking use included cigarettes. He started smoking about 65 years ago. He quit after 5.00 years of use. He has never used smokeless tobacco. He reports that he does not drink alcohol or use drugs.    Allergies  No Known Allergies    Medications  Prior to Admission Medications   Prescriptions Last Dose Informant Patient Reported? Taking?   cefdinir (OMNICEF) 300 MG Cap Not Taking at Unknown time  No No   Sig: Take 1 Cap by mouth 2 times a day.   Patient not taking: Reported on 11/13/2020   finasteride (PROSCAR) 5 MG TABS Not Taking at Unknown time Patient No No   Sig: Take 1 Tab by mouth every day.   Patient not taking: Reported on 11/13/2020   metformin (GLUCOPHAGE) 850 MG TABS Not Taking at Unknown time Patient No No   Sig: Take 1 Tab by mouth 2 times a day, with meals.   Patient not taking: Reported on 11/13/2020   metformin (GLUCOPHAGE) 850 MG Tab Not Taking at Unknown time  No No   Sig: Take 1 Tab by mouth 2 times a day, with meals.   Patient not taking: Reported on 11/13/2020   tamsulosin (FLOMAX) 0.4 MG capsule Not Taking at Unknown time Patient No No   Sig: Take 1 Cap by mouth ONE-HALF HOUR AFTER BREAKFAST.   Patient not taking: Reported on 11/13/2020       Facility-Administered Medications: None       Physical Exam  Temp:  [36.2 °C (97.2 °F)] 36.2 °C (97.2 °F)  Pulse:  [82-85] 84  Resp:  [16-17] 17  BP: (113-119)/(45-89) 117/89  SpO2:  [99 %] 99 %    Physical Exam  Vitals signs reviewed.   Constitutional:       General: He is not in acute distress.     Appearance: He is ill-appearing.   HENT:      Head: Normocephalic and atraumatic. No contusion.      Right Ear: External ear normal.      Left Ear: External ear normal.      Nose: Nose normal.      Mouth/Throat:      Mouth: Mucous membranes are dry.      Pharynx: No oropharyngeal exudate.   Eyes:      General:         Right eye: No discharge.         Left eye: No discharge.      Pupils: Pupils are equal, round, and reactive to light.   Neck:      Musculoskeletal: No neck rigidity or muscular tenderness.   Cardiovascular:      Rate and Rhythm: Normal rate and regular rhythm.      Heart sounds: No murmur. No friction rub. No gallop.    Pulmonary:      Effort: Pulmonary effort is normal.      Breath sounds: No wheezing or rhonchi.   Abdominal:      General: Bowel sounds are normal. There is no distension.      Palpations: Abdomen is soft.      Tenderness: There is no abdominal tenderness. There is no rebound.   Musculoskeletal: Normal range of motion.         General: No swelling or tenderness.   Skin:     General: Skin is warm and dry.      Coloration: Skin is not jaundiced.   Neurological:      General: No focal deficit present.      Mental Status: He is alert and oriented to person, place, and time.      Cranial Nerves: No cranial nerve deficit.      Sensory: No sensory deficit.      Comments: No focal neurological deficit moving all his extremities.   Psychiatric:         Mood and Affect: Mood normal.         Laboratory:  Recent Labs     11/13/20  1120   WBC 35.3*   RBC 4.20*   HEMOGLOBIN 10.1*   HEMATOCRIT 32.8*   MCV 78.1*   MCH 24.0*   MCHC 30.8*   RDW 45.7   PLATELETCT 209   MPV 9.8     Recent Labs      11/13/20  1120   SODIUM 133*   POTASSIUM 3.4*   CHLORIDE 98   CO2 21   GLUCOSE 392*   BUN 22   CREATININE 1.90*   CALCIUM 8.7     Recent Labs     11/13/20  1120   ALTSGPT 28   ASTSGOT 65*   ALKPHOSPHAT 182*   TBILIRUBIN 0.6   LIPASE 6*   GLUCOSE 392*         No results for input(s): NTPROBNP in the last 72 hours.      Recent Labs     11/13/20  1120   TROPONINT 48*       Imaging:  DX-CHEST-LIMITED (1 VIEW)   Final Result      1.  No acute cardiac or pulmonary abnormalities are identified.            Assessment/Plan:  I anticipate this patient will require at least two midnights for appropriate medical management, necessitating inpatient admission.    Failure to thrive in adult  Assessment & Plan  He reported that he has been having difficulty mobilizing and he is unable to eat and drink.  I requested physical therapy and Occupational Therapy evaluation.  I requested nutrition consult due to poor oral intake.  Admitted to hospital for close monitoring.      DM (diabetes mellitus) ([facility])- (present on admission)  Assessment & Plan  He has not been taking his Metformin or any other prescription medications per  I started him on medium intensity insulin sliding scale  I ordered HbA1c.    Hypomagnesemia  Assessment & Plan  He found to have hypomagnesemia I order magnesium replacement  Continue to monitor in place as needed.    Leukocytosis  Assessment & Plan  He found to have leukocytosis and there is no specific source of infection.  It could be reactionary pain  ER physician ordered blood culture and he received 1 dose of antibiotic  I am holding further antibiotic administration as he received ceftriaxone and its good for next 24 hours.  If he develops signs of infection he will need further antibiotic.    Rhabdomyolysis  Assessment & Plan  Patient found to have elevated CPK  He denies any social history of trauma.   I started him on IV fluids initially ordered repeat CPK for tomorrow    DVT prophylaxis: Heparin ( no  signs of active bleeding.  Hemoglobin 10.1 and platelet count of 209).

## 2020-11-14 NOTE — ASSESSMENT & PLAN NOTE
He reported that he has been having difficulty mobilizing and he is unable to eat and drink.  I requested physical therapy and Occupational Therapy evaluation.  I requested nutrition consult due to poor oral intake.  Nutrition consulted-Moderate to Severe Malnutrtion, C/w Diabetic Diet and add eva glucose control to meals  Dietician consul-Pending  Follow up PT/OT Recs for SNF order placed and pending placement accepted but awaiting bed

## 2020-11-14 NOTE — PROGRESS NOTES
Patient continuing to retain urine after flomax administration; bladder scan showing 775mL. BRITTA Gomez paged and notified. Per Jason, okay to straight cath now; verbal orders received to straight cath x2 for bladder scan greater than 400mL and insert lazo catheter for third scan above 400mL.

## 2020-11-14 NOTE — CARE PLAN
Problem: Communication  Goal: The ability to communicate needs accurately and effectively will improve  Outcome: PROGRESSING AS EXPECTED  Note: Patient A&Ox4, able to make needs known however not using call light to make needs known. Call light within reach; hourly rounding in place.      Problem: Safety  Goal: Will remain free from injury  Outcome: PROGRESSING AS EXPECTED  Note: Safety education provided; patient encouraged to use call light prior to ambulating to avoid injury or falls. Patient expressing understanding however not using call light for assistance. Bed locked and in lowest position, bed alarm on. [place] light and belongings within reach; hourly rounding in place.

## 2020-11-14 NOTE — ASSESSMENT & PLAN NOTE
He found to have leukocytosis and there is no specific source of infection.  Cultures with no growth to date   procal down trending  Stop Abx

## 2020-11-14 NOTE — THERAPY
"Occupational Therapy   Initial Evaluation     Patient Name: Hugh Núñez  Age:  75 y.o., Sex:  male  Medical Record #: [id number]  Today's Date: 11/14/2020     Precautions  Precautions: Fall Risk    Assessment  Patient is 75 y.o. male with a diagnosis of failure to thrive, rhabdomyolysis. PMH significant for DM.  Pt demonstrates decreased activity tolerance, decreased strength, and decreased balance limiting pt's safety/indep with ADLs/IADLs/mobility. Pt would benefit from skilled OT services in the acute care setting to address these deficits.    Plan    Recommend Occupational Therapy 4 times per week until therapy goals are met for the following treatments:  Adaptive Equipment, Cognitive Skill Development, Community Re-integration, Neuro Re-Education / Balance, Self Care/Activities of Daily Living, Therapeutic Activities and Therapeutic Exercises.    DC Equipment Recommendations: Unable to determine at this time  Discharge Recommendations: Recommend post-acute placement for additional occupational therapy services prior to discharge home     - pt reporting he wants to d/c to a group home    Subjective    \"I want to go to a group home\"     Objective       11/14/20 1104   Prior Living Situation   Prior Services None   Housing / Facility Motel   Lives with - Patient's Self Care Capacity Alone and Unable to Care For Self   Comments Pt reports residing in a motel for 4 years, but also reports that he likely won't go back to that motel. When this therapist asked pt where he wants to go, pt reported he wants to go to a group home   Prior Level of ADL Function   Self Feeding Independent   Grooming / Hygiene Independent   Bathing Independent   Dressing Independent   Toileting Independent   Comments Per pt report, but d/t dx of failure to thrive this therapist unsure   Prior Level of IADL Function   Medication Management Unable To Determine At This Time   Laundry Independent   Kitchen Mobility   (n/a, motel " room had a microwave per pt report)   Finances Independent   Home Management Independent   Shopping Independent   Prior Level Of Mobility Independent With Device in Home;Independent With Device in Community  (SPC)   Driving / Transportation Driving Independent   Comments Per pt report, however d/t dx of failure to thrive this therapist unusre if pt was appropriately managing IADLs   History of Falls   History of Falls Yes   Date of Last Fall   (~ 2 days PTA per emergency room note)   Precautions   Precautions Fall Risk   Cognition    Level of Consciousness Alert   Comments pleasant and cooprative, appears to be good historian. Currently on antibiotics for UA and pt was unable to care for self at home PTA   Strength Upper Body   Upper Body Strength  X   Gross Strength Generalized Weakness, Equal Bilaterally.    Balance Assessment   Sitting Balance (Static) Fair   Sitting Balance (Dynamic) Fair -   Standing Balance (Static) Fair -   Standing Balance (Dynamic) Fair -   Weight Shift Sitting Fair   Weight Shift Standing Fair   Comments FWW   Bed Mobility    Supine to Sit Supervised   Sit to Supine   (seated in chair at end of session)   Scooting Supervised   Comments HOB elevated, increased time needed   ADL Assessment   Grooming Supervision;Standing  (mouth wash, wash face/hands)   Upper Body Dressing   (declined gown change)   Lower Body Dressing Moderate Assist  (assist to thread BLE through underwear, SPV to hike)   Toileting   (pt reported no need)   Comments Pt moves slowly, increased time needed   Functional Mobility   Sit to Stand Minimal Assist  (for safety, physical assist not needed)   Bed, Chair, Wheelchair Transfer Minimal Assist  (FWW)   Transfer Method Stand Step  (FWW)   Mobility bed>chair, FWW, min A   Activity Tolerance   Sitting in Chair seated in chair at end of session   Sitting Edge of Bed 10 mins   Standing 2-3 mins total   Patient / Family Goals   Patient / Family Goal #1 To get better   Short  Term Goals   Short Term Goal # 1 Pt will complete UB/LB dressing at supervision with AE PRN x 5 sessions   Short Term Goal # 2 Pt will complete hygiene/grooming tasks standing at sink at supervision x 5 sessions   Short Term Goal # 3 Pt will complete toileting tasks at supervision x 5 sessions   Anticipated Discharge Equipment and Recommendations   DC Equipment Recommendations Unable to determine at this time   Discharge Recommendations Recommend post-acute placement for additional occupational therapy services prior to discharge home

## 2020-11-14 NOTE — ED NOTES
Spoke via telephone with Adult Protective Services:    Emmanuelle Arizmendi  Cell: [phone]    States that she is actively following the patient's open case and will call back on Monday to follow-up and help facilitate safe discharge planning.    Discussed with WISAM Knox.

## 2020-11-14 NOTE — DISCHARGE PLANNING
MSW spoke to pt's APS worker Krista Arizmendi ([phone]). Krista stated pt she checked on pt last week and he said he was fine. He would not let Krista check his house though. Krista will call back Monday and check in on pt.

## 2020-11-14 NOTE — CARE PLAN
Problem: Urinary Elimination:  Goal: Ability to reestablish a normal urinary elimination pattern will improve  Outcome: NOT MET  Intervention: Assess and monitor for signs and symptoms of urinary retention  Flowsheets (Taken 11/14/2020 6486)  Urinary Retention:   Bladder Area Distended   Discomfort / Pressure   Has Not Voided (Annotate Length Of Time)   Bladder Scan per Protocol  Note: Bladder scanned pt with >950 on scan. Straight cathed with ~500cc output. Rescan showed ~233 still left. Pt has no discomfort at this time and is sleeping comfortably. Will reassess following protocol.

## 2020-11-15 LAB
ANION GAP SERPL CALC-SCNC: 12 MMOL/L (ref 7–16)
BUN SERPL-MCNC: 27 MG/DL (ref 8–22)
CALCIUM SERPL-MCNC: 7.9 MG/DL (ref 8.5–10.5)
CHLORIDE SERPL-SCNC: 105 MMOL/L (ref 96–112)
CK SERPL-CCNC: 372 U/L (ref 0–154)
CO2 SERPL-SCNC: 18 MMOL/L (ref 20–33)
CREAT SERPL-MCNC: 1.2 MG/DL (ref 0.5–1.4)
ERYTHROCYTE [DISTWIDTH] IN BLOOD BY AUTOMATED COUNT: 45 FL (ref 35.9–50)
GLUCOSE BLD-MCNC: 168 MG/DL (ref 65–99)
GLUCOSE BLD-MCNC: 175 MG/DL (ref 65–99)
GLUCOSE BLD-MCNC: 182 MG/DL (ref 65–99)
GLUCOSE SERPL-MCNC: 185 MG/DL (ref 65–99)
HCT VFR BLD AUTO: 27 % (ref 42–52)
HGB BLD-MCNC: 8.3 G/DL (ref 14–18)
MCH RBC QN AUTO: 23.7 PG (ref 27–33)
MCHC RBC AUTO-ENTMCNC: 30.7 G/DL (ref 33.7–35.3)
MCV RBC AUTO: 77.1 FL (ref 81.4–97.8)
PLATELET # BLD AUTO: 194 K/UL (ref 164–446)
PMV BLD AUTO: 10.4 FL (ref 9–12.9)
POTASSIUM SERPL-SCNC: 3.5 MMOL/L (ref 3.6–5.5)
RBC # BLD AUTO: 3.5 M/UL (ref 4.7–6.1)
SODIUM SERPL-SCNC: 135 MMOL/L (ref 135–145)
WBC # BLD AUTO: 15 K/UL (ref 4.8–10.8)

## 2020-11-15 PROCEDURE — 82962 GLUCOSE BLOOD TEST: CPT | Mod: 91

## 2020-11-15 PROCEDURE — 80048 BASIC METABOLIC PNL TOTAL CA: CPT

## 2020-11-15 PROCEDURE — 700105 HCHG RX REV CODE 258: Performed by: STUDENT IN AN ORGANIZED HEALTH CARE EDUCATION/TRAINING PROGRAM

## 2020-11-15 PROCEDURE — 700102 HCHG RX REV CODE 250 W/ 637 OVERRIDE(OP): Performed by: INTERNAL MEDICINE

## 2020-11-15 PROCEDURE — 51798 US URINE CAPACITY MEASURE: CPT

## 2020-11-15 PROCEDURE — 700111 HCHG RX REV CODE 636 W/ 250 OVERRIDE (IP): Performed by: STUDENT IN AN ORGANIZED HEALTH CARE EDUCATION/TRAINING PROGRAM

## 2020-11-15 PROCEDURE — 700105 HCHG RX REV CODE 258: Performed by: INTERNAL MEDICINE

## 2020-11-15 PROCEDURE — 36415 COLL VENOUS BLD VENIPUNCTURE: CPT

## 2020-11-15 PROCEDURE — A9270 NON-COVERED ITEM OR SERVICE: HCPCS | Performed by: INTERNAL MEDICINE

## 2020-11-15 PROCEDURE — 99233 SBSQ HOSP IP/OBS HIGH 50: CPT | Performed by: STUDENT IN AN ORGANIZED HEALTH CARE EDUCATION/TRAINING PROGRAM

## 2020-11-15 PROCEDURE — 82550 ASSAY OF CK (CPK): CPT

## 2020-11-15 PROCEDURE — 770006 HCHG ROOM/CARE - MED/SURG/GYN SEMI*

## 2020-11-15 PROCEDURE — 97161 PT EVAL LOW COMPLEX 20 MIN: CPT

## 2020-11-15 PROCEDURE — 85027 COMPLETE CBC AUTOMATED: CPT

## 2020-11-15 PROCEDURE — 700111 HCHG RX REV CODE 636 W/ 250 OVERRIDE (IP): Performed by: INTERNAL MEDICINE

## 2020-11-15 RX ORDER — TAMSULOSIN HYDROCHLORIDE 0.4 MG/1
0.4 CAPSULE ORAL
Status: DISCONTINUED | OUTPATIENT
Start: 2020-11-15 | End: 2020-11-15

## 2020-11-15 RX ADMIN — DOCUSATE SODIUM 50 MG AND SENNOSIDES 8.6 MG 2 TABLET: 8.6; 5 TABLET, FILM COATED ORAL at 16:22

## 2020-11-15 RX ADMIN — INSULIN HUMAN 2 UNITS: 100 INJECTION, SOLUTION PARENTERAL at 08:19

## 2020-11-15 RX ADMIN — HEPARIN SODIUM 5000 UNITS: 5000 INJECTION, SOLUTION INTRAVENOUS; SUBCUTANEOUS at 21:28

## 2020-11-15 RX ADMIN — SODIUM CHLORIDE: 9 INJECTION, SOLUTION INTRAVENOUS at 14:57

## 2020-11-15 RX ADMIN — INSULIN HUMAN 3 UNITS: 100 INJECTION, SOLUTION PARENTERAL at 21:32

## 2020-11-15 RX ADMIN — TAMSULOSIN HYDROCHLORIDE 0.4 MG: 0.4 CAPSULE ORAL at 08:22

## 2020-11-15 RX ADMIN — HEPARIN SODIUM 5000 UNITS: 5000 INJECTION, SOLUTION INTRAVENOUS; SUBCUTANEOUS at 04:56

## 2020-11-15 RX ADMIN — HEPARIN SODIUM 5000 UNITS: 5000 INJECTION, SOLUTION INTRAVENOUS; SUBCUTANEOUS at 14:37

## 2020-11-15 RX ADMIN — INSULIN HUMAN 2 UNITS: 100 INJECTION, SOLUTION PARENTERAL at 11:34

## 2020-11-15 RX ADMIN — CEFTRIAXONE SODIUM 1 G: 1 INJECTION, POWDER, FOR SOLUTION INTRAMUSCULAR; INTRAVENOUS at 04:56

## 2020-11-15 RX ADMIN — INSULIN HUMAN 2 UNITS: 100 INJECTION, SOLUTION PARENTERAL at 16:22

## 2020-11-15 RX ADMIN — SODIUM CHLORIDE: 9 INJECTION, SOLUTION INTRAVENOUS at 05:01

## 2020-11-15 RX ADMIN — DOCUSATE SODIUM 50 MG AND SENNOSIDES 8.6 MG 2 TABLET: 8.6; 5 TABLET, FILM COATED ORAL at 04:57

## 2020-11-15 ASSESSMENT — GAIT ASSESSMENTS
ASSISTIVE DEVICE: FRONT WHEEL WALKER
DISTANCE (FEET): 15
DEVIATION: OTHER (COMMENT)
GAIT LEVEL OF ASSIST: MINIMAL ASSIST

## 2020-11-15 ASSESSMENT — PATIENT HEALTH QUESTIONNAIRE - PHQ9
1. LITTLE INTEREST OR PLEASURE IN DOING THINGS: NOT AT ALL
SUM OF ALL RESPONSES TO PHQ9 QUESTIONS 1 AND 2: 0

## 2020-11-15 ASSESSMENT — COGNITIVE AND FUNCTIONAL STATUS - GENERAL
MOVING FROM LYING ON BACK TO SITTING ON SIDE OF FLAT BED: UNABLE
CLIMB 3 TO 5 STEPS WITH RAILING: A LOT
WALKING IN HOSPITAL ROOM: A LITTLE
STANDING UP FROM CHAIR USING ARMS: A LITTLE
MOVING TO AND FROM BED TO CHAIR: UNABLE
SUGGESTED CMS G CODE MODIFIER MOBILITY: CL
MOBILITY SCORE: 14

## 2020-11-15 ASSESSMENT — ENCOUNTER SYMPTOMS
DIZZINESS: 0
PALPITATIONS: 0
HEADACHES: 0
COUGH: 0
ORTHOPNEA: 0
SPUTUM PRODUCTION: 0
HEARTBURN: 0
DEPRESSION: 0
FEVER: 0
CHILLS: 0
HEMOPTYSIS: 0
NAUSEA: 0

## 2020-11-15 ASSESSMENT — LIFESTYLE VARIABLES: SUBSTANCE_ABUSE: 0

## 2020-11-15 NOTE — PROGRESS NOTES
Monitor summary: SR ~80-90, NC 0.16, QRS 0.08, QT 0.36, with rare PVCs and rare PACs per strip from monitor room.

## 2020-11-15 NOTE — CARE PLAN
Problem: Communication  Goal: The ability to communicate needs accurately and effectively will improve  Outcome: PROGRESSING AS EXPECTED     Problem: Safety  Goal: Will remain free from injury  Outcome: PROGRESSING AS EXPECTED     Problem: Infection  Goal: Will remain free from infection  Outcome: PROGRESSING AS EXPECTED  Intervention: Assess signs and symptoms of infection  Note: Iv abx given
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
## 2020-11-15 NOTE — PROGRESS NOTES
Patient arrived to T435-1 with his watch and cane.   Assessment completed.   A&O x 4. Patient calls appropriately.  Patient mobilizes with one person assist with a cane or walker. Bed alarm on.   Patient has 0/10 pain. Declines pain or interventions needed at this time.  Denies N&V. Tolerating regular diabetic diet.  + void, + flatus, + BM.  Patient denies SOB.  SCD's refused.  Review plan with of care with patient. Call light and personal belongings with in reach. Hourly rounding in place. All needs met at this time.      2 RN Skin Check Completed:  Generalized skin discoloration.   Spine and sacrum redness, however blanchable.   All other skin intact.

## 2020-11-15 NOTE — PROGRESS NOTES
[facility] Medicine Daily Progress Note    Date of Service  11/15/2020    Chief Complaint  75 y.o. male admitted 11/13/2020 with weakness and failure to thrive    Hospital Course  75 y.o. male with past medical history of diabetes mellitus who presented to the hospital on 11/13/2020 with complaint of weakness and failure to thrive.  He reported he has been having severe weakness and he was unable to walk and perform daily activities.  He also reported he has been having poor oral intake.  He has not been taking his medication as prescribed.  He denies having fall and any trauma to his head or anywhere else.  He denies any other acute complaints or residual symptoms.  He does not smoke or drink alcohol.  He denies any known COVID-19 exposure.  He denies any recent travel. Upon admission to the ED, he was found to have elevation of CPK and elevated blood glucose and dehydration.  He was started on IVF.  He also was found to have elevated white blood cell count and ER physician ordered IV antibiotic and obtain blood cultures.  He does not have any specific source of infection.  Rapid COVID-19 test is currently pending.    Interval Problem Update  Patient examined at bedside this morning  Complained of abdominal distension and lower abdominal pain  CT Abdomen revealed Bladder wall obstruction most likely 2/2 to BPH  Bladder Scan showed 600cc's of retained urine  Clement catheter placed  Tamsulosin started     Consultants/Specialty  None    Code Status  Full Code    Disposition  Discharge to Group Home     Review of Systems  Review of Systems   Constitutional: Negative for chills and fever.   Respiratory: Negative for cough, hemoptysis and sputum production.    Cardiovascular: Negative for chest pain, palpitations and orthopnea.   Gastrointestinal: Negative for heartburn and nausea.   Genitourinary: Negative for dysuria and urgency.   Skin: Negative for rash.   Neurological: Negative for dizziness and headaches.    Psychiatric/Behavioral: Negative for depression, substance abuse and suicidal ideas.        Physical Exam  Temp:  [36.3 °C (97.3 °F)-36.8 °C (98.3 °F)] 36.8 °C (98.3 °F)  Pulse:  [70-96] 94  Resp:  [16-19] 19  BP: (108-137)/(65-73) 108/69  SpO2:  [99 %] 99 %    Physical Exam  Constitutional:       Appearance: Normal appearance.   HENT:      Head: Normocephalic and atraumatic.      Right Ear: External ear normal.      Left Ear: External ear normal.      Nose: Nose normal.      Mouth/Throat:      Mouth: Mucous membranes are moist.      Pharynx: Oropharynx is clear.   Eyes:      Conjunctiva/sclera: Conjunctivae normal.      Pupils: Pupils are equal, round, and reactive to light.   Neck:      Musculoskeletal: Normal range of motion.   Cardiovascular:      Rate and Rhythm: Normal rate and regular rhythm.      Pulses: Normal pulses.      Heart sounds: Normal heart sounds.   Pulmonary:      Effort: Pulmonary effort is normal.      Breath sounds: Normal breath sounds.   Abdominal:      General: Abdomen is flat. Bowel sounds are normal.      Palpations: Abdomen is soft.   Musculoskeletal: Normal range of motion.   Skin:     General: Skin is warm.      Capillary Refill: Capillary refill takes less than 2 seconds.   Neurological:      General: No focal deficit present.      Mental Status: He is oriented to person, place, and time.   Psychiatric:         Mood and Affect: Mood normal.         Behavior: Behavior normal.         Fluids    Intake/Output Summary (Last 24 hours) at 11/15/2020 1101  Last data filed at 11/15/2020 0815  Gross per 24 hour   Intake 500 ml   Output 1200 ml   Net -700 ml       Laboratory  Recent Labs     11/13/20  1120 11/14/20  0237 11/15/20  0441   WBC 35.3* 20.0* 15.0*   RBC 4.20* 3.69* 3.50*   HEMOGLOBIN 10.1* 8.7* 8.3*   HEMATOCRIT 32.8* 28.1* 27.0*   MCV 78.1* 76.2* 77.1*   MCH 24.0* 23.6* 23.7*   MCHC 30.8* 31.0* 30.7*   RDW 45.7 44.1 45.0   PLATELETCT 209 203 194   MPV 9.8 10.4 10.4     Recent  Labs     11/13/20  1120 11/14/20  0237 11/15/20  0441   SODIUM 133* 137 135   POTASSIUM 3.4* 3.4* 3.5*   CHLORIDE 98 103 105   CO2 21 23 18*   GLUCOSE 392* 115* 185*   BUN 22 31* 27*   CREATININE 1.90* 1.72* 1.20   CALCIUM 8.7 8.3* 7.9*             Recent Labs     11/14/20  0237   TRIGLYCERIDE 63   HDL 47   LDL 30       Imaging  CT-ABDOMEN-PELVIS W/O   Final Result      1.  Gallbladder findings consistent with cholecystitis/cholelithiasis. This likely accounts for the trace amount of free fluid which is present in the abdomen and pelvis.   2.  Right hydronephrosis with no opaque stone visualized.   3.  Unusual curvilinear calcification dependently within the bladder lumen as discussed above.   4.  Bladder wall thickening consistent with chronic bladder outlet obstruction, likely due to prostatic enlargement.      DX-CHEST-LIMITED (1 VIEW)   Final Result      1.  No acute cardiac or pulmonary abnormalities are identified.           Assessment/Plan  Failure to thrive in adult  Assessment & Plan  He reported that he has been having difficulty mobilizing and he is unable to eat and drink.  I requested physical therapy and Occupational Therapy evaluation.  I requested nutrition consult due to poor oral intake.  Nutrition consulted-Moderate to Severe Malnutrtion, C/w Diabetic Diet and add eva glucose control to meals  Dietician consul-Pending  Follow up PT/OT Recs        Bladder obstruction  Assessment & Plan  Complained of abdominal distension and lower abdominal pain  CT Abdomen revealed Bladder wall obstruction most likely 2/2 to BPH  Bladder Scan showed 600cc's of retained urine  Clement catheter placed  Tamsulosin 0.4mg QHS     DM (diabetes mellitus) (HCC)- (present on admission)  Assessment & Plan  He has not been taking his Metformin or any other prescription medications per  I started him on medium intensity insulin sliding scale  Follow up HbA1c  Follow up Lipid Panel     Hypomagnesemia  Assessment & Plan  He  found to have hypomagnesemia I order magnesium replacement  Mg today 2.3  Repeat BMP tomorrow morning     Leukocytosis  Assessment & Plan  He found to have leukocytosis and there is no specific source of infection.  It could be reactionary pain  ER physician ordered blood culture and he received 1 dose of antibiotic  Follow up Blood Cultures   WBC count today is 20.0  Will continue Ceftriaxone 1g Daily  Monitor CBC    Rhabdomyolysis  Assessment & Plan  Patient found to have elevated CPK to 1117  He denies any social history of trauma.   I started him on IV fluids initially ordered repeat CPK for tomorrow  Follow up CPK         VTE prophylaxis: Heparin

## 2020-11-15 NOTE — THERAPY
"Physical Therapy   Initial Evaluation     Patient Name: Hugh Núñez  Age:  75 y.o., Sex:  male  Medical Record #: [id number]  Today's Date: 11/15/2020     Precautions: Fall Risk    Assessment  Patient is 75 y.o. male that presented to acute with c/o weakness and failure to thrive. PMH significant for DM and noncompliance with medications. Patient presented to acute PT with impairments in activity tolerance, LE strength, and balance that limit his ability to mobilize at prior level of function. Patient was self limiting, reported he could not even turn in bed however was able to perform all mobility with minimal assist and VC for safety. Patient improved standing balance with FWW and ambulated 2x10ft with min assist. Patient demonstrated increased UE reliance with all mobility and safe technique with transfers and FWW. Education was provided on seated LE exercises; pt demonstrated appropriately and verbalized willingness to perform on his own time. Patient reported he lived in a [facility] but would like to live in an assisted living facility after discharge.  Recommend mobilization OOB with RN assist 3x a day. Recommend post- acute placement due to current functional mobility and lack of social support however anticipate patient may progress to safe discharge to assisted living facility. Will follow to address activity tolerance, LE strength, and balance.     Plan    Recommend Physical Therapy 3 times per week until therapy goals are met for the following treatments:  Bed Mobility, Community Re-integration, Gait Training, Neuro Re-Education / Balance, Therapeutic Activities and Therapeutic Exercises    DC Equipment Recommendations: Front-Wheel Walker  Discharge Recommendations: Recommend post-acute placement for additional physical therapy services prior to discharge home       Subjective    \"I'm too weak to move; I can't even move over in bed right now.\"     Objective       11/15/20 0903   Initial Contact Note "    Initial Contact Note Order Received and Verified, Physical Therapy Evaluation in Progress with Full Report to Follow.   Precautions   Precautions Fall Risk   Vitals   O2 Delivery Device None - Room Air   Vitals Comments desats to 87% intermittently w mobility, recovers quickly w rest and VC for slow, deep breathing   Pain 0 - 10 Group   Therapist Pain Assessment During Activity;Post Activity Pain Same as Prior to Activity;Nurse Notified  (Pt reported generalized soreness but no pain)   Prior Living Situation   Prior Services None   Housing / Facility Motel   Equipment Owned Single Point Cane   Lives with - Patient's Self Care Capacity Alone and Unable to Care For Self   Comments Pt reports living in motel PTA and reports he would like to go to an assisted living facility after discharge.    Prior Level of Functional Mobility   Bed Mobility Independent   Transfer Status Independent   Ambulation Independent   Distance Ambulation (Feet)   (household)   Assistive Devices Used Single Point Cane   Stairs Unable To Determine At This Time   Comments Pt reports he was independent with mobility but has significantly weakened over the past few days.   History of Falls   History of Falls Yes   Date of Last Fall   (2 days PTA per chart)   Cognition    Cognition / Consciousness X   Speech/ Communication Hard of Hearing   Level of Consciousness Alert   Comments pleasant, self limiting but cooperative to work with therapy. Follows 1 step commands but hard of hearing.   Passive ROM Lower Body   Passive ROM Lower Body X   Comments limited B knee ext approx 10-15 degrees but pt reports at baseline, WFL for mobility   Active ROM Lower Body    Active ROM Lower Body  X   Comments limited B knee ext at bedside approx 10-15 degrees, WFL for mobility   Strength Lower Body   Lower Body Strength  X   Comments gross B LE weakness, increased UE reliance with mobility   Sensation Lower Body   Lower Extremity Sensation   WDL   Comments patient  reported no altered sensation   Lower Body Muscle Tone   Lower Body Muscle Tone  WDL   Comments NT formally, WFL for mobility   Neurological Concerns   Neurological Concerns No   Comments no observed neurological impairments   Coordination Lower Body    Coordination Lower Body  WDL   Comments NT formally, WFL for mobility   Balance Assessment   Sitting Balance (Static) Fair   Sitting Balance (Dynamic) Fair -   Standing Balance (Static) Fair -   Standing Balance (Dynamic) Fair -   Weight Shift Sitting Fair   Weight Shift Standing Fair   Comments w FWW. No LOB. Posterior lean and unable to reach full ext w/o FWW   Gait Analysis   Gait Level Of Assist Minimal Assist   Assistive Device Front Wheel Walker   Distance (Feet) 15   # of Times Distance was Traveled 2   Deviation Other (Comment)  (decreased radha, foot clearance, kyphotic)   # of Stairs Climbed 0   Weight Bearing Status FWB   Vision Deficits Impacting Mobility NT   Comments kyphotic w FWW, pt reports at baseline, trunk flexion that improves slightly w VC. CGA for safety   Bed Mobility    Supine to Sit Minimal Assist   Sit to Supine   (NT, pt in chair end of session)   Scooting Supervised   Comments bed features used, Pt used therapist to pull self to sit but otherwise no physical assist needed   Functional Mobility   Sit to Stand Minimal Assist   Bed, Chair, Wheelchair Transfer Minimal Assist   Toilet Transfers Minimal Assist  (w FWW and grab bar)   Transfer Method Stand Step   Comments safe technique w FWW during transfers, min VC needed. Increased reliance on UE during transfers. CGA for safety but no physical assist needed.     How much difficulty does the patient currently have...   Turning over in bed (including adjusting bedclothes, sheets and blankets)? 4   Sitting down on and standing up from a chair with arms (e.g., wheelchair, bedside commode, etc.) 1   Moving from lying on back to sitting on the side of the bed? 1   How much help from another  person does the patient currently need...   Moving to and from a bed to a chair (including a wheelchair)? 3   Need to walk in a hospital room? 3   Climbing 3-5 steps with a railing? 2   6 clicks Mobility Score 14   Activity Tolerance   Sitting in Chair pt in chair end of session   Sitting Edge of Bed 8 min   Standing 8 min   Comments limited by fatigue   Edema / Skin Assessment   Edema / Skin  Not Assessed   Patient / Family Goals    Patient / Family Goal #1 go home   Short Term Goals    Short Term Goal # 1 Patient will perform bed mobility with no bed features and supervision within 6tx in order to get in/out of bed.   Short Term Goal # 2 Patient will perform all transfers with FWW and supervision within 6tx in order to initiate gait.   Short Term Goal # 3 Patient will ambulate 150ft w FWW and supervision within 6tx in order to access his environment.    Education Group   Education Provided Exercises - Seated;Role of Physical Therapist   Role of Physical Therapist Patient Response Patient;Acceptance;Explanation;Verbal Demonstration   Exercises - Seated Patient Response Patient;Acceptance;Explanation;Demonstration;Action Demonstration;Verbal Demonstration   Additional Comments education re: OOB mobility w RN staff, sitting in chair for mealtimes. Pt agreeable.    Problem List    Problems Impaired Bed Mobility;Impaired Transfers;Impaired Ambulation;Functional Strength Deficit;Impaired Balance;Decreased Activity Tolerance   Anticipated Discharge Equipment and Recommendations   DC Equipment Recommendations Front-Wheel Walker   Discharge Recommendations Recommend post-acute placement for additional physical therapy services prior to discharge home   Interdisciplinary Plan of Care Collaboration   IDT Collaboration with  Nursing   Patient Position at End of Therapy Seated;Chair Alarm On;Call Light within Reach;Tray Table within Reach;Phone within Reach   Collaboration Comments RN aware of visit, response   Session  Information   Date / Session Number  11/15- 1(1/3, 11/21)   Priority 2

## 2020-11-16 PROBLEM — E87.6 HYPOKALEMIA: Status: ACTIVE | Noted: 2020-11-16

## 2020-11-16 LAB
ALBUMIN SERPL BCP-MCNC: 2.2 G/DL (ref 3.2–4.9)
ALBUMIN/GLOB SERPL: 0.7 G/DL
ALP SERPL-CCNC: 255 U/L (ref 30–99)
ALT SERPL-CCNC: 25 U/L (ref 2–50)
ANION GAP SERPL CALC-SCNC: 8 MMOL/L (ref 7–16)
APPEARANCE UR: ABNORMAL
AST SERPL-CCNC: 44 U/L (ref 12–45)
BACTERIA #/AREA URNS HPF: NEGATIVE /HPF
BASOPHILS # BLD AUTO: 0.2 % (ref 0–1.8)
BASOPHILS # BLD: 0.02 K/UL (ref 0–0.12)
BILIRUB SERPL-MCNC: 0.2 MG/DL (ref 0.1–1.5)
BILIRUB UR QL STRIP.AUTO: NEGATIVE
BUN SERPL-MCNC: 15 MG/DL (ref 8–22)
CALCIUM SERPL-MCNC: 7.6 MG/DL (ref 8.5–10.5)
CHLORIDE SERPL-SCNC: 106 MMOL/L (ref 96–112)
CO2 SERPL-SCNC: 24 MMOL/L (ref 20–33)
COLOR UR: YELLOW
CREAT SERPL-MCNC: 0.83 MG/DL (ref 0.5–1.4)
EOSINOPHIL # BLD AUTO: 0.05 K/UL (ref 0–0.51)
EOSINOPHIL NFR BLD: 0.4 % (ref 0–6.9)
EPI CELLS #/AREA URNS HPF: NEGATIVE /HPF
ERYTHROCYTE [DISTWIDTH] IN BLOOD BY AUTOMATED COUNT: 44.2 FL (ref 35.9–50)
GLOBULIN SER CALC-MCNC: 3.1 G/DL (ref 1.9–3.5)
GLUCOSE BLD-MCNC: 155 MG/DL (ref 65–99)
GLUCOSE BLD-MCNC: 176 MG/DL (ref 65–99)
GLUCOSE BLD-MCNC: 210 MG/DL (ref 65–99)
GLUCOSE BLD-MCNC: 217 MG/DL (ref 65–99)
GLUCOSE BLD-MCNC: 219 MG/DL (ref 65–99)
GLUCOSE SERPL-MCNC: 171 MG/DL (ref 65–99)
GLUCOSE UR STRIP.AUTO-MCNC: 100 MG/DL
HCT VFR BLD AUTO: 26.4 % (ref 42–52)
HGB BLD-MCNC: 8 G/DL (ref 14–18)
HYALINE CASTS #/AREA URNS LPF: ABNORMAL /LPF
IMM GRANULOCYTES # BLD AUTO: 0.09 K/UL (ref 0–0.11)
IMM GRANULOCYTES NFR BLD AUTO: 0.8 % (ref 0–0.9)
KETONES UR STRIP.AUTO-MCNC: ABNORMAL MG/DL
LEUKOCYTE ESTERASE UR QL STRIP.AUTO: ABNORMAL
LYMPHOCYTES # BLD AUTO: 0.62 K/UL (ref 1–4.8)
LYMPHOCYTES NFR BLD: 5.3 % (ref 22–41)
MCH RBC QN AUTO: 23.2 PG (ref 27–33)
MCHC RBC AUTO-ENTMCNC: 30.3 G/DL (ref 33.7–35.3)
MCV RBC AUTO: 76.5 FL (ref 81.4–97.8)
MICRO URNS: ABNORMAL
MONOCYTES # BLD AUTO: 0.83 K/UL (ref 0–0.85)
MONOCYTES NFR BLD AUTO: 7.1 % (ref 0–13.4)
NEUTROPHILS # BLD AUTO: 10.07 K/UL (ref 1.82–7.42)
NEUTROPHILS NFR BLD: 86.2 % (ref 44–72)
NITRITE UR QL STRIP.AUTO: NEGATIVE
NRBC # BLD AUTO: 0 K/UL
NRBC BLD-RTO: 0 /100 WBC
PH UR STRIP.AUTO: 5 [PH] (ref 5–8)
PLATELET # BLD AUTO: 180 K/UL (ref 164–446)
PMV BLD AUTO: 10.4 FL (ref 9–12.9)
POTASSIUM SERPL-SCNC: 3 MMOL/L (ref 3.6–5.5)
PROCALCITONIN SERPL-MCNC: 28.29 NG/ML
PROT SERPL-MCNC: 5.3 G/DL (ref 6–8.2)
PROT UR QL STRIP: NEGATIVE MG/DL
RBC # BLD AUTO: 3.45 M/UL (ref 4.7–6.1)
RBC # URNS HPF: ABNORMAL /HPF
RBC UR QL AUTO: ABNORMAL
SODIUM SERPL-SCNC: 138 MMOL/L (ref 135–145)
SP GR UR STRIP.AUTO: 1.01
UROBILINOGEN UR STRIP.AUTO-MCNC: 0.2 MG/DL
WBC # BLD AUTO: 11.7 K/UL (ref 4.8–10.8)
WBC #/AREA URNS HPF: ABNORMAL /HPF

## 2020-11-16 PROCEDURE — 700111 HCHG RX REV CODE 636 W/ 250 OVERRIDE (IP): Performed by: STUDENT IN AN ORGANIZED HEALTH CARE EDUCATION/TRAINING PROGRAM

## 2020-11-16 PROCEDURE — 700105 HCHG RX REV CODE 258: Performed by: INTERNAL MEDICINE

## 2020-11-16 PROCEDURE — 82962 GLUCOSE BLOOD TEST: CPT

## 2020-11-16 PROCEDURE — 770006 HCHG ROOM/CARE - MED/SURG/GYN SEMI*

## 2020-11-16 PROCEDURE — 700105 HCHG RX REV CODE 258: Performed by: STUDENT IN AN ORGANIZED HEALTH CARE EDUCATION/TRAINING PROGRAM

## 2020-11-16 PROCEDURE — 700102 HCHG RX REV CODE 250 W/ 637 OVERRIDE(OP): Performed by: INTERNAL MEDICINE

## 2020-11-16 PROCEDURE — A9270 NON-COVERED ITEM OR SERVICE: HCPCS | Performed by: INTERNAL MEDICINE

## 2020-11-16 PROCEDURE — 80053 COMPREHEN METABOLIC PANEL: CPT

## 2020-11-16 PROCEDURE — 700105 HCHG RX REV CODE 258: Performed by: HOSPITALIST

## 2020-11-16 PROCEDURE — 700111 HCHG RX REV CODE 636 W/ 250 OVERRIDE (IP): Performed by: INTERNAL MEDICINE

## 2020-11-16 PROCEDURE — 85025 COMPLETE CBC W/AUTO DIFF WBC: CPT

## 2020-11-16 PROCEDURE — 99232 SBSQ HOSP IP/OBS MODERATE 35: CPT | Performed by: HOSPITALIST

## 2020-11-16 PROCEDURE — 36415 COLL VENOUS BLD VENIPUNCTURE: CPT

## 2020-11-16 PROCEDURE — 81001 URINALYSIS AUTO W/SCOPE: CPT

## 2020-11-16 PROCEDURE — 84145 PROCALCITONIN (PCT): CPT

## 2020-11-16 PROCEDURE — 87086 URINE CULTURE/COLONY COUNT: CPT

## 2020-11-16 PROCEDURE — 700102 HCHG RX REV CODE 250 W/ 637 OVERRIDE(OP): Performed by: HOSPITALIST

## 2020-11-16 PROCEDURE — A9270 NON-COVERED ITEM OR SERVICE: HCPCS | Performed by: HOSPITALIST

## 2020-11-16 PROCEDURE — 87040 BLOOD CULTURE FOR BACTERIA: CPT

## 2020-11-16 PROCEDURE — 700111 HCHG RX REV CODE 636 W/ 250 OVERRIDE (IP): Performed by: HOSPITALIST

## 2020-11-16 RX ORDER — POTASSIUM CHLORIDE 20 MEQ/1
40 TABLET, EXTENDED RELEASE ORAL 2 TIMES DAILY
Status: COMPLETED | OUTPATIENT
Start: 2020-11-16 | End: 2020-11-16

## 2020-11-16 RX ADMIN — INSULIN HUMAN 2 UNITS: 100 INJECTION, SOLUTION PARENTERAL at 20:17

## 2020-11-16 RX ADMIN — DOCUSATE SODIUM 50 MG AND SENNOSIDES 8.6 MG 2 TABLET: 8.6; 5 TABLET, FILM COATED ORAL at 06:25

## 2020-11-16 RX ADMIN — SODIUM CHLORIDE: 9 INJECTION, SOLUTION INTRAVENOUS at 17:22

## 2020-11-16 RX ADMIN — HEPARIN SODIUM 5000 UNITS: 5000 INJECTION, SOLUTION INTRAVENOUS; SUBCUTANEOUS at 06:36

## 2020-11-16 RX ADMIN — ONDANSETRON 4 MG: 2 INJECTION INTRAMUSCULAR; INTRAVENOUS at 00:39

## 2020-11-16 RX ADMIN — TAMSULOSIN HYDROCHLORIDE 0.4 MG: 0.4 CAPSULE ORAL at 08:22

## 2020-11-16 RX ADMIN — INSULIN HUMAN 3 UNITS: 100 INJECTION, SOLUTION PARENTERAL at 06:30

## 2020-11-16 RX ADMIN — HEPARIN SODIUM 5000 UNITS: 5000 INJECTION, SOLUTION INTRAVENOUS; SUBCUTANEOUS at 14:53

## 2020-11-16 RX ADMIN — INSULIN HUMAN 3 UNITS: 100 INJECTION, SOLUTION PARENTERAL at 17:27

## 2020-11-16 RX ADMIN — POTASSIUM CHLORIDE 40 MEQ: 1500 TABLET, EXTENDED RELEASE ORAL at 12:51

## 2020-11-16 RX ADMIN — CEFTRIAXONE SODIUM 1 G: 1 INJECTION, POWDER, FOR SOLUTION INTRAMUSCULAR; INTRAVENOUS at 06:24

## 2020-11-16 RX ADMIN — INSULIN HUMAN 2 UNITS: 100 INJECTION, SOLUTION PARENTERAL at 12:45

## 2020-11-16 RX ADMIN — HEPARIN SODIUM 5000 UNITS: 5000 INJECTION, SOLUTION INTRAVENOUS; SUBCUTANEOUS at 20:18

## 2020-11-16 RX ADMIN — CEFTRIAXONE SODIUM 2 G: 2 INJECTION, POWDER, FOR SOLUTION INTRAMUSCULAR; INTRAVENOUS at 14:53

## 2020-11-16 RX ADMIN — SODIUM CHLORIDE: 9 INJECTION, SOLUTION INTRAVENOUS at 00:44

## 2020-11-16 RX ADMIN — POTASSIUM CHLORIDE 40 MEQ: 1500 TABLET, EXTENDED RELEASE ORAL at 17:30

## 2020-11-16 ASSESSMENT — ENCOUNTER SYMPTOMS
ORTHOPNEA: 0
PALPITATIONS: 0
HEADACHES: 0
SPUTUM PRODUCTION: 0
FEVER: 0
DIZZINESS: 0
HEMOPTYSIS: 0
DEPRESSION: 0
NAUSEA: 0
CHILLS: 0
HEARTBURN: 0
COUGH: 0

## 2020-11-16 ASSESSMENT — PAIN DESCRIPTION - PAIN TYPE: TYPE: ACUTE PAIN

## 2020-11-16 ASSESSMENT — LIFESTYLE VARIABLES: SUBSTANCE_ABUSE: 0

## 2020-11-16 NOTE — PROGRESS NOTES
AA&Ox4.    SpO2 90% on RA. Denies SOB.    Reporting 2/10 pain. Medicated per MAR.    SKIN blanchable redness to spine and sacrum. All other skin is intact.     Tolerating Diabetic diet. Denies N/V.    + void.     LBM was prior to arrival.    Pt ambulates/up with 1 person stand by assist.    All needs met at this time. Call light within reach. Pt calls appropriately.

## 2020-11-16 NOTE — CARE PLAN
Problem: Nutritional:  Goal: Achieve adequate nutritional intake  Description: Patient will tolerate po diet with at least 50% intake of meals/supplements.    Outcome: PROGRESSING AS EXPECTED  PO >50% for most recent two meals.  RD will continue to monitor.

## 2020-11-16 NOTE — PROGRESS NOTES
Bedside report received.  Assessment complete.  A&O x 4. Patient calls appropriately.  Patient ambulates with x1 assist with FWW.   Patient has 0/10 pain. Declines pain meds.   Denies N&V. Tolerating DM diet with boost.  + void via lazo, + flatus, - BM.  Patient denies SOB.  SCD's off.  Review plan with of care with patient. Call light and personal belongings with in reach. Hourly rounding in place. All needs met at this time.

## 2020-11-16 NOTE — PROGRESS NOTES
[facility] Medicine Daily Progress Note    Date of Service  11/16/2020    Chief Complaint  75 y.o. male admitted 11/13/2020 with weakness and failure to thrive    Hospital Course  75 y.o. male with past medical history of diabetes mellitus who presented to the hospital on 11/13/2020 with complaint of weakness and failure to thrive.  He reported he has been having severe weakness and he was unable to walk and perform daily activities.  He also reported he has been having poor oral intake.  He has not been taking his medication as prescribed.  He denies having fall and any trauma to his head or anywhere else.  He denies any other acute complaints or residual symptoms.  He does not smoke or drink alcohol.  He denies any known COVID-19 exposure.  He denies any recent travel. Upon admission to the ED, he was found to have elevation of CPK and elevated blood glucose and dehydration.  He was started on IVF.  He also was found to have elevated white blood cell count and ER physician ordered IV antibiotic and obtain blood cultures.  He does not have any specific source of infection.  Rapid COVID-19 test is currently pending.    Interval Problem Update  No abd pain this am   Clement in place  VS stable   No acute events overnight  SNF placement order placed  Tamsulosin started     Consultants/Specialty  None    Code Status  Full Code    Disposition  Discharge to Group Home     Review of Systems  Review of Systems   Constitutional: Negative for chills and fever.   Respiratory: Negative for cough, hemoptysis and sputum production.    Cardiovascular: Negative for chest pain, palpitations and orthopnea.   Gastrointestinal: Negative for heartburn and nausea.   Genitourinary: Negative for dysuria and urgency.   Skin: Negative for rash.   Neurological: Negative for dizziness and headaches.   Psychiatric/Behavioral: Negative for depression, substance abuse and suicidal ideas.        Physical Exam  Temp:  [36.4 °C (97.5 °F)-37.7 °C (99.9  °F)] 36.6 °C (97.8 °F)  Pulse:  [77-94] 77  Resp:  [18-20] 20  BP: ()/(49-67) 91/54  SpO2:  [90 %-99 %] 94 %    Physical Exam  Constitutional:       Appearance: Normal appearance.   HENT:      Head: Normocephalic and atraumatic.      Right Ear: External ear normal.      Left Ear: External ear normal.      Nose: Nose normal.      Mouth/Throat:      Mouth: Mucous membranes are moist.      Pharynx: Oropharynx is clear.   Eyes:      Conjunctiva/sclera: Conjunctivae normal.      Pupils: Pupils are equal, round, and reactive to light.   Neck:      Musculoskeletal: Normal range of motion.   Cardiovascular:      Rate and Rhythm: Normal rate and regular rhythm.      Pulses: Normal pulses.      Heart sounds: Normal heart sounds.   Pulmonary:      Effort: Pulmonary effort is normal.      Breath sounds: Normal breath sounds.   Abdominal:      General: Abdomen is flat. Bowel sounds are normal.      Palpations: Abdomen is soft.   Musculoskeletal: Normal range of motion.   Skin:     General: Skin is warm.      Capillary Refill: Capillary refill takes less than 2 seconds.   Neurological:      General: No focal deficit present.      Mental Status: He is oriented to person, place, and time.   Psychiatric:         Mood and Affect: Mood normal.         Behavior: Behavior normal.         Fluids    Intake/Output Summary (Last 24 hours) at 11/16/2020 1038  Last data filed at 11/16/2020 0800  Gross per 24 hour   Intake 360 ml   Output 2500 ml   Net -2140 ml       Laboratory  Recent Labs     11/14/20  0237 11/15/20  0441 11/16/20  0846   WBC 20.0* 15.0* 11.7*   RBC 3.69* 3.50* 3.45*   HEMOGLOBIN 8.7* 8.3* 8.0*   HEMATOCRIT 28.1* 27.0* 26.4*   MCV 76.2* 77.1* 76.5*   MCH 23.6* 23.7* 23.2*   MCHC 31.0* 30.7* 30.3*   RDW 44.1 45.0 44.2   PLATELETCT 203 194 180   MPV 10.4 10.4 10.4     Recent Labs     11/14/20  0237 11/15/20  0441 11/16/20  0846   SODIUM 137 135 138   POTASSIUM 3.4* 3.5* 3.0*   CHLORIDE 103 105 106   CO2 23 18* 24    GLUCOSE 115* 185* 171*   BUN 31* 27* 15   CREATININE 1.72* 1.20 0.83   CALCIUM 8.3* 7.9* 7.6*             Recent Labs     11/14/20  0237   TRIGLYCERIDE 63   HDL 47   LDL 30       Imaging  CT-ABDOMEN-PELVIS W/O   Final Result      1.  Gallbladder findings consistent with cholecystitis/cholelithiasis. This likely accounts for the trace amount of free fluid which is present in the abdomen and pelvis.   2.  Right hydronephrosis with no opaque stone visualized.   3.  Unusual curvilinear calcification dependently within the bladder lumen as discussed above.   4.  Bladder wall thickening consistent with chronic bladder outlet obstruction, likely due to prostatic enlargement.      DX-CHEST-LIMITED (1 VIEW)   Final Result      1.  No acute cardiac or pulmonary abnormalities are identified.           Assessment/Plan  Failure to thrive in adult  Assessment & Plan  He reported that he has been having difficulty mobilizing and he is unable to eat and drink.  I requested physical therapy and Occupational Therapy evaluation.  I requested nutrition consult due to poor oral intake.  Nutrition consulted-Moderate to Severe Malnutrtion, C/w Diabetic Diet and add eva glucose control to meals  Dietician consul-Pending  Follow up PT/OT Recs for SNF order placed        Bladder obstruction  Assessment & Plan  Clement catheter placed  Tamsulosin 0.4mg QHS     DM (diabetes mellitus) (HCC)- (present on admission)  Assessment & Plan  SSI   accu checks    Hypokalemia  Assessment & Plan  Replace and recheck labs    Hypomagnesemia  Assessment & Plan  Cont to monitor    Leukocytosis  Assessment & Plan  He found to have leukocytosis and there is no specific source of infection.  Cultures with no growth to date   Stop abx for now  Leukocytosis is no improving    Rhabdomyolysis  Assessment & Plan  Improved          VTE prophylaxis: Heparin

## 2020-11-17 LAB
ALBUMIN SERPL BCP-MCNC: 2.2 G/DL (ref 3.2–4.9)
ALBUMIN/GLOB SERPL: 0.7 G/DL
ALP SERPL-CCNC: 255 U/L (ref 30–99)
ALT SERPL-CCNC: 23 U/L (ref 2–50)
ANION GAP SERPL CALC-SCNC: 9 MMOL/L (ref 7–16)
AST SERPL-CCNC: 28 U/L (ref 12–45)
BASOPHILS # BLD AUTO: 0.3 % (ref 0–1.8)
BASOPHILS # BLD: 0.03 K/UL (ref 0–0.12)
BILIRUB SERPL-MCNC: 0.2 MG/DL (ref 0.1–1.5)
BUN SERPL-MCNC: 14 MG/DL (ref 8–22)
CALCIUM SERPL-MCNC: 7.9 MG/DL (ref 8.5–10.5)
CHLORIDE SERPL-SCNC: 106 MMOL/L (ref 96–112)
CO2 SERPL-SCNC: 23 MMOL/L (ref 20–33)
CREAT SERPL-MCNC: 0.86 MG/DL (ref 0.5–1.4)
EOSINOPHIL # BLD AUTO: 0.11 K/UL (ref 0–0.51)
EOSINOPHIL NFR BLD: 1.2 % (ref 0–6.9)
ERYTHROCYTE [DISTWIDTH] IN BLOOD BY AUTOMATED COUNT: 44.9 FL (ref 35.9–50)
GLOBULIN SER CALC-MCNC: 3.1 G/DL (ref 1.9–3.5)
GLUCOSE BLD-MCNC: 128 MG/DL (ref 65–99)
GLUCOSE BLD-MCNC: 167 MG/DL (ref 65–99)
GLUCOSE BLD-MCNC: 173 MG/DL (ref 65–99)
GLUCOSE BLD-MCNC: 202 MG/DL (ref 65–99)
GLUCOSE SERPL-MCNC: 171 MG/DL (ref 65–99)
HCT VFR BLD AUTO: 26.9 % (ref 42–52)
HGB BLD-MCNC: 8.3 G/DL (ref 14–18)
IMM GRANULOCYTES # BLD AUTO: 0.05 K/UL (ref 0–0.11)
IMM GRANULOCYTES NFR BLD AUTO: 0.5 % (ref 0–0.9)
LYMPHOCYTES # BLD AUTO: 0.76 K/UL (ref 1–4.8)
LYMPHOCYTES NFR BLD: 8.1 % (ref 22–41)
MCH RBC QN AUTO: 23.9 PG (ref 27–33)
MCHC RBC AUTO-ENTMCNC: 30.9 G/DL (ref 33.7–35.3)
MCV RBC AUTO: 77.3 FL (ref 81.4–97.8)
MONOCYTES # BLD AUTO: 0.81 K/UL (ref 0–0.85)
MONOCYTES NFR BLD AUTO: 8.6 % (ref 0–13.4)
NEUTROPHILS # BLD AUTO: 7.67 K/UL (ref 1.82–7.42)
NEUTROPHILS NFR BLD: 81.3 % (ref 44–72)
NRBC # BLD AUTO: 0 K/UL
NRBC BLD-RTO: 0 /100 WBC
PLATELET # BLD AUTO: 189 K/UL (ref 164–446)
PMV BLD AUTO: 9.9 FL (ref 9–12.9)
POTASSIUM SERPL-SCNC: 3.3 MMOL/L (ref 3.6–5.5)
PROCALCITONIN SERPL-MCNC: 17.83 NG/ML
PROT SERPL-MCNC: 5.3 G/DL (ref 6–8.2)
RBC # BLD AUTO: 3.48 M/UL (ref 4.7–6.1)
SODIUM SERPL-SCNC: 138 MMOL/L (ref 135–145)
WBC # BLD AUTO: 9.4 K/UL (ref 4.8–10.8)

## 2020-11-17 PROCEDURE — 80053 COMPREHEN METABOLIC PANEL: CPT

## 2020-11-17 PROCEDURE — 84145 PROCALCITONIN (PCT): CPT

## 2020-11-17 PROCEDURE — 770006 HCHG ROOM/CARE - MED/SURG/GYN SEMI*

## 2020-11-17 PROCEDURE — 85025 COMPLETE CBC W/AUTO DIFF WBC: CPT

## 2020-11-17 PROCEDURE — 700105 HCHG RX REV CODE 258: Performed by: HOSPITALIST

## 2020-11-17 PROCEDURE — 700111 HCHG RX REV CODE 636 W/ 250 OVERRIDE (IP): Performed by: INTERNAL MEDICINE

## 2020-11-17 PROCEDURE — A9270 NON-COVERED ITEM OR SERVICE: HCPCS | Performed by: INTERNAL MEDICINE

## 2020-11-17 PROCEDURE — 99232 SBSQ HOSP IP/OBS MODERATE 35: CPT | Performed by: HOSPITALIST

## 2020-11-17 PROCEDURE — 36415 COLL VENOUS BLD VENIPUNCTURE: CPT

## 2020-11-17 PROCEDURE — 82962 GLUCOSE BLOOD TEST: CPT | Mod: 91

## 2020-11-17 PROCEDURE — 700111 HCHG RX REV CODE 636 W/ 250 OVERRIDE (IP): Performed by: HOSPITALIST

## 2020-11-17 PROCEDURE — 700105 HCHG RX REV CODE 258: Performed by: INTERNAL MEDICINE

## 2020-11-17 PROCEDURE — 700102 HCHG RX REV CODE 250 W/ 637 OVERRIDE(OP): Performed by: INTERNAL MEDICINE

## 2020-11-17 RX ADMIN — HEPARIN SODIUM 5000 UNITS: 5000 INJECTION, SOLUTION INTRAVENOUS; SUBCUTANEOUS at 05:20

## 2020-11-17 RX ADMIN — INSULIN HUMAN 3 UNITS: 100 INJECTION, SOLUTION PARENTERAL at 20:51

## 2020-11-17 RX ADMIN — INSULIN HUMAN 2 UNITS: 100 INJECTION, SOLUTION PARENTERAL at 06:51

## 2020-11-17 RX ADMIN — INSULIN HUMAN 2 UNITS: 100 INJECTION, SOLUTION PARENTERAL at 16:50

## 2020-11-17 RX ADMIN — TAMSULOSIN HYDROCHLORIDE 0.4 MG: 0.4 CAPSULE ORAL at 08:18

## 2020-11-17 RX ADMIN — CEFTRIAXONE SODIUM 2 G: 2 INJECTION, POWDER, FOR SOLUTION INTRAMUSCULAR; INTRAVENOUS at 05:20

## 2020-11-17 RX ADMIN — HEPARIN SODIUM 5000 UNITS: 5000 INJECTION, SOLUTION INTRAVENOUS; SUBCUTANEOUS at 20:56

## 2020-11-17 RX ADMIN — HEPARIN SODIUM 5000 UNITS: 5000 INJECTION, SOLUTION INTRAVENOUS; SUBCUTANEOUS at 15:33

## 2020-11-17 RX ADMIN — DOCUSATE SODIUM 50 MG AND SENNOSIDES 8.6 MG 2 TABLET: 8.6; 5 TABLET, FILM COATED ORAL at 05:20

## 2020-11-17 RX ADMIN — SODIUM CHLORIDE: 9 INJECTION, SOLUTION INTRAVENOUS at 05:20

## 2020-11-17 ASSESSMENT — ENCOUNTER SYMPTOMS
HEMOPTYSIS: 0
ORTHOPNEA: 0
COUGH: 0
FEVER: 0
DEPRESSION: 0
DIZZINESS: 0
SPUTUM PRODUCTION: 0
CHILLS: 0
HEARTBURN: 0
HEADACHES: 0
NAUSEA: 0
PALPITATIONS: 0

## 2020-11-17 ASSESSMENT — PAIN DESCRIPTION - PAIN TYPE
TYPE: ACUTE PAIN
TYPE: ACUTE PAIN

## 2020-11-17 ASSESSMENT — LIFESTYLE VARIABLES: SUBSTANCE_ABUSE: 0

## 2020-11-17 NOTE — DISCHARGE PLANNING
Anticipated Discharge Disposition: SNF    Action: Per MD, this patient is medically cleared for transfer to SNF. LSW met with the patient to issue choice for SNF. The patient agreed to a blanket and stated he will transfer to the first accepting facility, choice form signed by the patient and faxed to [facility].    The patient reported he receives $1,100.00 / monthly on SSI. The patient stated his goal after SNF is to discharge to  [facility].    PASRR Submitted in File # [id number].    Barriers to Discharge: None.    Plan: SNF

## 2020-11-17 NOTE — PROGRESS NOTES
Bedside report received.  Assessment complete.  A&O x 4. Patient calls appropriately.  Patient ambulates with x1 assist with FWW.   Patient has 0/10 pain. Declines pain meds.   Denies N&V. Tolerating DM diet with boost.  + void via lazo particles noted in lazo tubing, + flatus, +11/12 BM.  Patient denies SOB.  SCD's off.  Review plan with of care with patient. Call light and personal belongings with in reach. Hourly rounding in place. All needs met at this time.

## 2020-11-17 NOTE — PROGRESS NOTES
[facility] Medicine Daily Progress Note    Date of Service  11/17/2020    Chief Complaint  75 y.o. male admitted 11/13/2020 with weakness and failure to thrive    Hospital Course  75 y.o. male with past medical history of diabetes mellitus who presented to the hospital on 11/13/2020 with complaint of weakness and failure to thrive.  He reported he has been having severe weakness and he was unable to walk and perform daily activities.  He also reported he has been having poor oral intake.  He has not been taking his medication as prescribed.  He denies having fall and any trauma to his head or anywhere else.  He denies any other acute complaints or residual symptoms.  He does not smoke or drink alcohol.  He denies any known COVID-19 exposure.  He denies any recent travel. Upon admission to the ED, he was found to have elevation of CPK and elevated blood glucose and dehydration.  He was started on IVF.  He also was found to have elevated white blood cell count and ER physician ordered IV antibiotic and obtain blood cultures.  He does not have any specific source of infection.  Rapid COVID-19 test is currently pending.    Interval Problem Update  procal downtrending on IV abx   Clement in place  VS stable   No acute events overnight  No abd pain   afebrile    Consultants/Specialty  None    Code Status  Full Code    Disposition  Discharge to Group Home     Review of Systems  Review of Systems   Constitutional: Negative for chills and fever.   Respiratory: Negative for cough, hemoptysis and sputum production.    Cardiovascular: Negative for chest pain, palpitations and orthopnea.   Gastrointestinal: Negative for heartburn and nausea.   Genitourinary: Negative for dysuria and urgency.   Skin: Negative for rash.   Neurological: Negative for dizziness and headaches.   Psychiatric/Behavioral: Negative for depression, substance abuse and suicidal ideas.        Physical Exam  Temp:  [36.2 °C (97.1 °F)-37.1 °C (98.8 °F)] 37 °C  (98.6 °F)  Pulse:  [80-89] 80  Resp:  [16-18] 18  BP: (102-138)/(53-65) 111/59  SpO2:  [96 %-98 %] 96 %    Physical Exam  Constitutional:       Appearance: Normal appearance.   HENT:      Head: Normocephalic and atraumatic.      Right Ear: External ear normal.      Left Ear: External ear normal.      Nose: Nose normal.      Mouth/Throat:      Mouth: Mucous membranes are moist.      Pharynx: Oropharynx is clear.   Eyes:      Conjunctiva/sclera: Conjunctivae normal.      Pupils: Pupils are equal, round, and reactive to light.   Neck:      Musculoskeletal: Normal range of motion.   Cardiovascular:      Rate and Rhythm: Normal rate and regular rhythm.      Pulses: Normal pulses.      Heart sounds: Normal heart sounds.   Pulmonary:      Effort: Pulmonary effort is normal.      Breath sounds: Normal breath sounds.   Abdominal:      General: Abdomen is flat. Bowel sounds are normal.      Palpations: Abdomen is soft.   Musculoskeletal: Normal range of motion.   Skin:     General: Skin is warm.      Capillary Refill: Capillary refill takes less than 2 seconds.   Neurological:      General: No focal deficit present.      Mental Status: He is oriented to person, place, and time.   Psychiatric:         Mood and Affect: Mood normal.         Behavior: Behavior normal.         Fluids    Intake/Output Summary (Last 24 hours) at 11/17/2020 1147  Last data filed at 11/17/2020 0845  Gross per 24 hour   Intake 5480 ml   Output 1800 ml   Net 3680 ml       Laboratory  Recent Labs     11/15/20  0441 11/16/20  0846 11/17/20  0436   WBC 15.0* 11.7* 9.4   RBC 3.50* 3.45* 3.48*   HEMOGLOBIN 8.3* 8.0* 8.3*   HEMATOCRIT 27.0* 26.4* 26.9*   MCV 77.1* 76.5* 77.3*   MCH 23.7* 23.2* 23.9*   MCHC 30.7* 30.3* 30.9*   RDW 45.0 44.2 44.9   PLATELETCT 194 180 189   MPV 10.4 10.4 9.9     Recent Labs     11/15/20  0441 11/16/20  0846 11/17/20  0436   SODIUM 135 138 138   POTASSIUM 3.5* 3.0* 3.3*   CHLORIDE 105 106 106   CO2 18* 24 23   GLUCOSE 185* 171*  171*   BUN 27* 15 14   CREATININE 1.20 0.83 0.86   CALCIUM 7.9* 7.6* 7.9*                   Imaging  CT-ABDOMEN-PELVIS W/O   Final Result      1.  Gallbladder findings consistent with cholecystitis/cholelithiasis. This likely accounts for the trace amount of free fluid which is present in the abdomen and pelvis.   2.  Right hydronephrosis with no opaque stone visualized.   3.  Unusual curvilinear calcification dependently within the bladder lumen as discussed above.   4.  Bladder wall thickening consistent with chronic bladder outlet obstruction, likely due to prostatic enlargement.      DX-CHEST-LIMITED (1 VIEW)   Final Result      1.  No acute cardiac or pulmonary abnormalities are identified.           Assessment/Plan  Failure to thrive in adult  Assessment & Plan  He reported that he has been having difficulty mobilizing and he is unable to eat and drink.  I requested physical therapy and Occupational Therapy evaluation.  I requested nutrition consult due to poor oral intake.  Nutrition consulted-Moderate to Severe Malnutrtion, C/w Diabetic Diet and add eva glucose control to meals  Dietician consul-Pending  Follow up PT/OT Recs for SNF order placed and pending placement        Bladder obstruction  Assessment & Plan  Clement catheter placed  Tamsulosin 0.4mg QHS     DM (diabetes mellitus) (HCC)- (present on admission)  Assessment & Plan  SSI   accu checks    Hypokalemia  Assessment & Plan  Replace and recheck labs    Hypomagnesemia  Assessment & Plan  Cont to monitor    Leukocytosis  Assessment & Plan  He found to have leukocytosis and there is no specific source of infection.  Cultures with no growth to date    markedly elevated procal now improving on IV abx   Cont IV abx recheck procal in am     Rhabdomyolysis  Assessment & Plan  Improved          VTE prophylaxis: Heparin

## 2020-11-17 NOTE — DISCHARGE PLANNING
Received Choice form at 1024  Ag[facility]Facility Name: [facility]  Referral sent per Choice form @ 1031      @1135  Agency[facility]Facility Name: [facility]  Spoke To: Mariya  Outcome: Pt. Accepted     @1138  Spoke to Marika @ [facility]    Transport is scheduled for 11/18 @1300 going to [facility].  LUIS Meraz notified  CATHIE Andrews notified @ 4313

## 2020-11-18 LAB
ALBUMIN SERPL BCP-MCNC: 2.2 G/DL (ref 3.2–4.9)
ALBUMIN/GLOB SERPL: 0.6 G/DL
ALP SERPL-CCNC: 261 U/L (ref 30–99)
ALT SERPL-CCNC: 19 U/L (ref 2–50)
ANION GAP SERPL CALC-SCNC: 10 MMOL/L (ref 7–16)
AST SERPL-CCNC: 16 U/L (ref 12–45)
BACTERIA BLD CULT: NORMAL
BACTERIA UR CULT: NORMAL
BASOPHILS # BLD AUTO: 0.2 % (ref 0–1.8)
BASOPHILS # BLD: 0.02 K/UL (ref 0–0.12)
BILIRUB SERPL-MCNC: 0.3 MG/DL (ref 0.1–1.5)
BUN SERPL-MCNC: 14 MG/DL (ref 8–22)
CALCIUM SERPL-MCNC: 8.1 MG/DL (ref 8.5–10.5)
CHLORIDE SERPL-SCNC: 104 MMOL/L (ref 96–112)
CO2 SERPL-SCNC: 24 MMOL/L (ref 20–33)
CREAT SERPL-MCNC: 0.84 MG/DL (ref 0.5–1.4)
EOSINOPHIL # BLD AUTO: 0.2 K/UL (ref 0–0.51)
EOSINOPHIL NFR BLD: 2.3 % (ref 0–6.9)
ERYTHROCYTE [DISTWIDTH] IN BLOOD BY AUTOMATED COUNT: 43.8 FL (ref 35.9–50)
GLOBULIN SER CALC-MCNC: 3.5 G/DL (ref 1.9–3.5)
GLUCOSE BLD-MCNC: 159 MG/DL (ref 65–99)
GLUCOSE BLD-MCNC: 167 MG/DL (ref 65–99)
GLUCOSE BLD-MCNC: 181 MG/DL (ref 65–99)
GLUCOSE BLD-MCNC: 214 MG/DL (ref 65–99)
GLUCOSE SERPL-MCNC: 161 MG/DL (ref 65–99)
HCT VFR BLD AUTO: 26.9 % (ref 42–52)
HGB BLD-MCNC: 8.4 G/DL (ref 14–18)
IMM GRANULOCYTES # BLD AUTO: 0.05 K/UL (ref 0–0.11)
IMM GRANULOCYTES NFR BLD AUTO: 0.6 % (ref 0–0.9)
LYMPHOCYTES # BLD AUTO: 0.96 K/UL (ref 1–4.8)
LYMPHOCYTES NFR BLD: 11 % (ref 22–41)
MCH RBC QN AUTO: 23.7 PG (ref 27–33)
MCHC RBC AUTO-ENTMCNC: 31.2 G/DL (ref 33.7–35.3)
MCV RBC AUTO: 75.8 FL (ref 81.4–97.8)
MONOCYTES # BLD AUTO: 0.9 K/UL (ref 0–0.85)
MONOCYTES NFR BLD AUTO: 10.3 % (ref 0–13.4)
NEUTROPHILS # BLD AUTO: 6.63 K/UL (ref 1.82–7.42)
NEUTROPHILS NFR BLD: 75.6 % (ref 44–72)
NRBC # BLD AUTO: 0 K/UL
NRBC BLD-RTO: 0 /100 WBC
PLATELET # BLD AUTO: 225 K/UL (ref 164–446)
PMV BLD AUTO: 9.8 FL (ref 9–12.9)
POTASSIUM SERPL-SCNC: 3.4 MMOL/L (ref 3.6–5.5)
PROCALCITONIN SERPL-MCNC: 8.69 NG/ML
PROT SERPL-MCNC: 5.7 G/DL (ref 6–8.2)
RBC # BLD AUTO: 3.55 M/UL (ref 4.7–6.1)
SIGNIFICANT IND 70042: NORMAL
SIGNIFICANT IND 70042: NORMAL
SITE SITE: NORMAL
SITE SITE: NORMAL
SODIUM SERPL-SCNC: 138 MMOL/L (ref 135–145)
SOURCE SOURCE: NORMAL
SOURCE SOURCE: NORMAL
WBC # BLD AUTO: 8.8 K/UL (ref 4.8–10.8)

## 2020-11-18 PROCEDURE — 80053 COMPREHEN METABOLIC PANEL: CPT

## 2020-11-18 PROCEDURE — 700105 HCHG RX REV CODE 258: Performed by: HOSPITALIST

## 2020-11-18 PROCEDURE — 97110 THERAPEUTIC EXERCISES: CPT

## 2020-11-18 PROCEDURE — 85025 COMPLETE CBC W/AUTO DIFF WBC: CPT

## 2020-11-18 PROCEDURE — 700105 HCHG RX REV CODE 258: Performed by: INTERNAL MEDICINE

## 2020-11-18 PROCEDURE — 99232 SBSQ HOSP IP/OBS MODERATE 35: CPT | Performed by: HOSPITALIST

## 2020-11-18 PROCEDURE — 84145 PROCALCITONIN (PCT): CPT

## 2020-11-18 PROCEDURE — 97535 SELF CARE MNGMENT TRAINING: CPT

## 2020-11-18 PROCEDURE — 700111 HCHG RX REV CODE 636 W/ 250 OVERRIDE (IP): Performed by: INTERNAL MEDICINE

## 2020-11-18 PROCEDURE — 36415 COLL VENOUS BLD VENIPUNCTURE: CPT

## 2020-11-18 PROCEDURE — 82962 GLUCOSE BLOOD TEST: CPT

## 2020-11-18 PROCEDURE — 700102 HCHG RX REV CODE 250 W/ 637 OVERRIDE(OP): Performed by: INTERNAL MEDICINE

## 2020-11-18 PROCEDURE — A9270 NON-COVERED ITEM OR SERVICE: HCPCS | Performed by: INTERNAL MEDICINE

## 2020-11-18 PROCEDURE — 770006 HCHG ROOM/CARE - MED/SURG/GYN SEMI*

## 2020-11-18 PROCEDURE — 700111 HCHG RX REV CODE 636 W/ 250 OVERRIDE (IP): Performed by: HOSPITALIST

## 2020-11-18 RX ADMIN — SODIUM CHLORIDE: 9 INJECTION, SOLUTION INTRAVENOUS at 20:45

## 2020-11-18 RX ADMIN — INSULIN HUMAN 2 UNITS: 100 INJECTION, SOLUTION PARENTERAL at 11:49

## 2020-11-18 RX ADMIN — CEFTRIAXONE SODIUM 2 G: 2 INJECTION, POWDER, FOR SOLUTION INTRAMUSCULAR; INTRAVENOUS at 06:48

## 2020-11-18 RX ADMIN — HEPARIN SODIUM 5000 UNITS: 5000 INJECTION, SOLUTION INTRAVENOUS; SUBCUTANEOUS at 20:25

## 2020-11-18 RX ADMIN — INSULIN HUMAN 2 UNITS: 100 INJECTION, SOLUTION PARENTERAL at 20:25

## 2020-11-18 RX ADMIN — INSULIN HUMAN 2 UNITS: 100 INJECTION, SOLUTION PARENTERAL at 06:57

## 2020-11-18 RX ADMIN — INSULIN HUMAN 3 UNITS: 100 INJECTION, SOLUTION PARENTERAL at 16:57

## 2020-11-18 RX ADMIN — TAMSULOSIN HYDROCHLORIDE 0.4 MG: 0.4 CAPSULE ORAL at 08:13

## 2020-11-18 RX ADMIN — HEPARIN SODIUM 5000 UNITS: 5000 INJECTION, SOLUTION INTRAVENOUS; SUBCUTANEOUS at 06:48

## 2020-11-18 RX ADMIN — DOCUSATE SODIUM 50 MG AND SENNOSIDES 8.6 MG 2 TABLET: 8.6; 5 TABLET, FILM COATED ORAL at 06:48

## 2020-11-18 RX ADMIN — HEPARIN SODIUM 5000 UNITS: 5000 INJECTION, SOLUTION INTRAVENOUS; SUBCUTANEOUS at 14:26

## 2020-11-18 ASSESSMENT — COGNITIVE AND FUNCTIONAL STATUS - GENERAL
HELP NEEDED FOR BATHING: A LITTLE
STANDING UP FROM CHAIR USING ARMS: A LITTLE
SUGGESTED CMS G CODE MODIFIER DAILY ACTIVITY: CK
MOBILITY SCORE: 15
PERSONAL GROOMING: A LITTLE
DRESSING REGULAR LOWER BODY CLOTHING: A LOT
TURNING FROM BACK TO SIDE WHILE IN FLAT BAD: A LITTLE
TOILETING: A LOT
DAILY ACTIVITIY SCORE: 17
MOVING TO AND FROM BED TO CHAIR: A LOT
MOVING FROM LYING ON BACK TO SITTING ON SIDE OF FLAT BED: A LOT
CLIMB 3 TO 5 STEPS WITH RAILING: A LOT
WALKING IN HOSPITAL ROOM: A LITTLE
SUGGESTED CMS G CODE MODIFIER MOBILITY: CK
DRESSING REGULAR UPPER BODY CLOTHING: A LITTLE

## 2020-11-18 ASSESSMENT — ENCOUNTER SYMPTOMS
HEADACHES: 0
ABDOMINAL PAIN: 1
DIZZINESS: 0
ORTHOPNEA: 0
COUGH: 0
PALPITATIONS: 0
HEARTBURN: 0
DEPRESSION: 0
FEVER: 0
HEMOPTYSIS: 0
SPUTUM PRODUCTION: 0
NAUSEA: 0
CHILLS: 0

## 2020-11-18 ASSESSMENT — GAIT ASSESSMENTS
ASSISTIVE DEVICE: FRONT WHEEL WALKER
DISTANCE (FEET): 150
GAIT LEVEL OF ASSIST: MINIMAL ASSIST
DEVIATION: BRADYKINETIC;OTHER (COMMENT)

## 2020-11-18 ASSESSMENT — PAIN DESCRIPTION - PAIN TYPE
TYPE: ACUTE PAIN

## 2020-11-18 ASSESSMENT — LIFESTYLE VARIABLES: SUBSTANCE_ABUSE: 0

## 2020-11-18 NOTE — PROGRESS NOTES
Bedside report received. Assessment completed.  Pt is A&O x4. Pt on room air.   Pt denies pain at this time  Denies nausea.   - numbness, - tingling.  Skin has spine and sacrum with blanching redness. All other skin is intact   LDA RFA and LFA IV, lazo catheter in place for retention   Last BM 11/17 . +flatus,   +void.  Diabetic diet with boost supplements. Tolerates well.   Pt up x1 with a FWW.  Call light and belongings within reach. All needs met at this time. Fall Precautions and hourly rounding in place.

## 2020-11-18 NOTE — PROGRESS NOTES
Bedside report received.  Assessment complete.  A&O x 4. Patient calls appropriately.  Patient ambulates with x1 assist with FWW.   Patient has 1/10 pain. Declines pain meds.   Denies N&V. Tolerating DM diet with boost.  + void via lazo particles noted in lazo tubing, + flatus, +11/17 BM.  Patient denies SOB.  SCD's off.  Pt to transfer to [facility] @ 1300.  Review plan with of care with patient. Call light and personal belongings with in reach. Hourly rounding in place. All needs met at this time.

## 2020-11-18 NOTE — THERAPY
"Occupational Therapy  Daily Treatment     Patient Name: Hugh Núñez  Age:  75 y.o., Sex:  male  Medical Record #: [id number]  Today's Date: 11/18/2020     Precautions  Precautions: Fall Risk    Assessment    Pt seen for OT treatment. Pt limited most by generalized weakness and decreased activity tolerance. He would continue to benefit from OT services.     Plan    Continue current treatment plan.    DC Equipment Recommendations: Unable to determine at this time  Discharge Recommendations: Recommend post-acute placement for additional occupational therapy services prior to discharge home    Subjective    \"I'm 75 years old you know.\"     Objective       11/18/20 1505   Cognition    Cognition / Consciousness WDL   Speech/ Communication Hard of Hearing   Level of Consciousness Alert   Comments very pleasant and cooperative    Bed Mobility    Supine to Sit Minimal Assist   Comments pt attempted to get to the edge of the bed on his own for several minutes but did require min assist in the end    Activities of Daily Living   Eating Independent  (poor appetitie but did drink his coffee and eat fruit)   Grooming Supervision;Seated   Bathing Minimal Assist  (seated sponge bath )   Upper Body Dressing Minimal Assist  (gown, assist due to lines )   Toileting   (lazo, denied need for BM )   Functional Mobility   Sit to Stand Minimal Assist   Bed, Chair, Wheelchair Transfer Minimal Assist   Mobility walked a few feet from bed to chair with FWW   Activity Tolerance   Comments fatigues easily with activity    Patient / Family Goals   Patient / Family Goal #1 To get better   Short Term Goals   Short Term Goal # 1 Pt will complete UB/LB dressing at supervision with AE PRN x 5 sessions   Goal Outcome # 1 Progressing as expected   Short Term Goal # 2 Pt will complete hygiene/grooming tasks standing at sink at supervision x 5 sessions   Goal Outcome # 2 Progressing slower than expected   Short Term Goal # 3 Pt will complete " toileting tasks at supervision x 5 sessions   Goal Outcome # 3 Progressing as expected

## 2020-11-18 NOTE — PROGRESS NOTES
[facility] Medicine Daily Progress Note    Date of Service  11/18/2020    Chief Complaint  75 y.o. male admitted 11/13/2020 with weakness and failure to thrive    Hospital Course  75 y.o. male with past medical history of diabetes mellitus who presented to the hospital on 11/13/2020 with complaint of weakness and failure to thrive.  He reported he has been having severe weakness and he was unable to walk and perform daily activities.  He also reported he has been having poor oral intake.  He has not been taking his medication as prescribed.  He denies having fall and any trauma to his head or anywhere else.  He denies any other acute complaints or residual symptoms.  He does not smoke or drink alcohol.  He denies any known COVID-19 exposure.  He denies any recent travel. Upon admission to the ED, he was found to have elevation of CPK and elevated blood glucose and dehydration.  He was started on IVF.  He also was found to have elevated white blood cell count and ER physician ordered IV antibiotic and obtain blood cultures.  He does not have any specific source of infection.  Rapid COVID-19 test is currently pending.    Interval Problem Update  procal downtrending, now off IV abx  Clement in place and draining well  VS stable   No acute events overnight  No abd pain   afebrile    Consultants/Specialty  None    Code Status  Full Code    Disposition  Discharge to Group Home     Review of Systems  Review of Systems   Constitutional: Negative for chills and fever.   Respiratory: Negative for cough, hemoptysis and sputum production.    Cardiovascular: Negative for chest pain, palpitations and orthopnea.   Gastrointestinal: Positive for abdominal pain. Negative for heartburn and nausea.   Genitourinary: Negative for dysuria and urgency.   Skin: Negative for rash.   Neurological: Negative for dizziness and headaches.   Psychiatric/Behavioral: Negative for depression, substance abuse and suicidal ideas.        Physical  Exam  Temp:  [37 °C (98.6 °F)-37.4 °C (99.3 °F)] 37 °C (98.6 °F)  Pulse:  [84-90] 87  Resp:  [17-18] 18  BP: (120-127)/(62-68) 120/66  SpO2:  [95 %-97 %] 95 %    Physical Exam  Constitutional:       Appearance: Normal appearance. He is ill-appearing.   HENT:      Head: Normocephalic and atraumatic.      Right Ear: External ear normal.      Left Ear: External ear normal.      Nose: Nose normal.      Mouth/Throat:      Mouth: Mucous membranes are dry.      Pharynx: Oropharynx is clear.   Eyes:      Conjunctiva/sclera: Conjunctivae normal.      Pupils: Pupils are equal, round, and reactive to light.   Neck:      Musculoskeletal: Normal range of motion.   Cardiovascular:      Rate and Rhythm: Normal rate and regular rhythm.      Pulses: Normal pulses.      Heart sounds: Normal heart sounds.   Pulmonary:      Effort: Pulmonary effort is normal.      Breath sounds: Normal breath sounds.   Abdominal:      General: Abdomen is flat. Bowel sounds are normal.      Palpations: Abdomen is soft.      Tenderness: There is abdominal tenderness.      Comments: Mild suprapubic ttp    Musculoskeletal: Normal range of motion.   Skin:     General: Skin is warm.      Capillary Refill: Capillary refill takes 2 to 3 seconds.   Neurological:      General: No focal deficit present.      Mental Status: He is oriented to person, place, and time.   Psychiatric:         Mood and Affect: Mood normal.         Behavior: Behavior normal.         Fluids    Intake/Output Summary (Last 24 hours) at 11/18/2020 1137  Last data filed at 11/18/2020 0803  Gross per 24 hour   Intake 4000 ml   Output 1200 ml   Net 2800 ml       Laboratory  Recent Labs     11/16/20  0846 11/17/20  0436 11/18/20  0704   WBC 11.7* 9.4 8.8   RBC 3.45* 3.48* 3.55*   HEMOGLOBIN 8.0* 8.3* 8.4*   HEMATOCRIT 26.4* 26.9* 26.9*   MCV 76.5* 77.3* 75.8*   MCH 23.2* 23.9* 23.7*   MCHC 30.3* 30.9* 31.2*   RDW 44.2 44.9 43.8   PLATELETCT 180 189 225   MPV 10.4 9.9 9.8     Recent Labs      11/16/20  0846 11/17/20  0436 11/18/20  0704   SODIUM 138 138 138   POTASSIUM 3.0* 3.3* 3.4*   CHLORIDE 106 106 104   CO2 24 23 24   GLUCOSE 171* 171* 161*   BUN 15 14 14   CREATININE 0.83 0.86 0.84   CALCIUM 7.6* 7.9* 8.1*                   Imaging  CT-ABDOMEN-PELVIS W/O   Final Result      1.  Gallbladder findings consistent with cholecystitis/cholelithiasis. This likely accounts for the trace amount of free fluid which is present in the abdomen and pelvis.   2.  Right hydronephrosis with no opaque stone visualized.   3.  Unusual curvilinear calcification dependently within the bladder lumen as discussed above.   4.  Bladder wall thickening consistent with chronic bladder outlet obstruction, likely due to prostatic enlargement.      DX-CHEST-LIMITED (1 VIEW)   Final Result      1.  No acute cardiac or pulmonary abnormalities are identified.           Assessment/Plan  Failure to thrive in adult  Assessment & Plan  He reported that he has been having difficulty mobilizing and he is unable to eat and drink.  I requested physical therapy and Occupational Therapy evaluation.  I requested nutrition consult due to poor oral intake.  Nutrition consulted-Moderate to Severe Malnutrtion, C/w Diabetic Diet and add eva glucose control to meals  Dietician consul-Pending  Follow up PT/OT Recs for SNF order placed and pending placement accepted but awaiting bed        Bladder obstruction  Assessment & Plan  Clement catheter placed  Tamsulosin 0.4mg QHS   Will need outpatient urology evaluation    DM (diabetes mellitus) (HCC)- (present on admission)  Assessment & Plan  SSI   accu checks    Hypokalemia  Assessment & Plan  Replace and recheck labs    Hypomagnesemia  Assessment & Plan  Cont to monitor    Leukocytosis  Assessment & Plan  He found to have leukocytosis and there is no specific source of infection.  Cultures with no growth to date   procal down trending  Stop Abx    Rhabdomyolysis  Assessment & Plan  Improved          VTE  prophylaxis: Heparin

## 2020-11-18 NOTE — DISCHARGE PLANNING
Agency/Facility Name: [facility]  Spoke To: Marika  Outcome: Facility states [facility] has not cleared, [facility] informed facility that [facility] did clear and was told that there are no beds today. Pt. Will transport tomorrow. LUIS Meraz notified    @1039  Spoke to Marika @ [facility]    Transport is scheduled for 11/19 @1400 going to [facility].    LUIS Meraz notified  BSALISA Andrews notified at 1041

## 2020-11-18 NOTE — DISCHARGE PLANNING
Anticipated Discharge Disposition: SNF    Action: Per [facility], [facility] accepted this referral and the bed may be available tomorrow. Per [facility], the transfer to [facility] is tentatively schedule for tomorrow 11/19/20 at 2:00pm, MD aware.    Barriers to Discharge: Bed Availability.    Plan: SNF, pending confirming the bed.

## 2020-11-19 VITALS
HEIGHT: 70 IN | SYSTOLIC BLOOD PRESSURE: 117 MMHG | HEART RATE: 77 BPM | BODY MASS INDEX: 16.92 KG/M2 | DIASTOLIC BLOOD PRESSURE: 73 MMHG | RESPIRATION RATE: 17 BRPM | WEIGHT: 118.17 LBS | TEMPERATURE: 98 F | OXYGEN SATURATION: 97 %

## 2020-11-19 PROBLEM — E87.6 HYPOKALEMIA: Status: RESOLVED | Noted: 2020-11-16 | Resolved: 2020-11-19

## 2020-11-19 PROBLEM — D72.829 LEUKOCYTOSIS: Status: RESOLVED | Noted: 2020-11-13 | Resolved: 2020-11-19

## 2020-11-19 PROBLEM — M62.82 RHABDOMYOLYSIS: Status: RESOLVED | Noted: 2020-11-13 | Resolved: 2020-11-19

## 2020-11-19 PROBLEM — E83.42 HYPOMAGNESEMIA: Status: RESOLVED | Noted: 2020-11-13 | Resolved: 2020-11-19

## 2020-11-19 LAB
ALBUMIN SERPL BCP-MCNC: 2 G/DL (ref 3.2–4.9)
ALBUMIN/GLOB SERPL: 0.6 G/DL
ALP SERPL-CCNC: 256 U/L (ref 30–99)
ALT SERPL-CCNC: 18 U/L (ref 2–50)
ANION GAP SERPL CALC-SCNC: 8 MMOL/L (ref 7–16)
ANISOCYTOSIS BLD QL SMEAR: ABNORMAL
AST SERPL-CCNC: 18 U/L (ref 12–45)
BASOPHILS # BLD AUTO: 0.9 % (ref 0–1.8)
BASOPHILS # BLD: 0.06 K/UL (ref 0–0.12)
BILIRUB SERPL-MCNC: 0.2 MG/DL (ref 0.1–1.5)
BUN SERPL-MCNC: 12 MG/DL (ref 8–22)
CALCIUM SERPL-MCNC: 7.8 MG/DL (ref 8.5–10.5)
CHLORIDE SERPL-SCNC: 106 MMOL/L (ref 96–112)
CO2 SERPL-SCNC: 24 MMOL/L (ref 20–33)
CREAT SERPL-MCNC: 0.78 MG/DL (ref 0.5–1.4)
EOSINOPHIL # BLD AUTO: 0.06 K/UL (ref 0–0.51)
EOSINOPHIL NFR BLD: 0.9 % (ref 0–6.9)
ERYTHROCYTE [DISTWIDTH] IN BLOOD BY AUTOMATED COUNT: 44.6 FL (ref 35.9–50)
GLOBULIN SER CALC-MCNC: 3.4 G/DL (ref 1.9–3.5)
GLUCOSE BLD-MCNC: 144 MG/DL (ref 65–99)
GLUCOSE BLD-MCNC: 162 MG/DL (ref 65–99)
GLUCOSE SERPL-MCNC: 161 MG/DL (ref 65–99)
HCT VFR BLD AUTO: 26.6 % (ref 42–52)
HGB BLD-MCNC: 8 G/DL (ref 14–18)
HYPOCHROMIA BLD QL SMEAR: ABNORMAL
LYMPHOCYTES # BLD AUTO: 0.6 K/UL (ref 1–4.8)
LYMPHOCYTES NFR BLD: 9.6 % (ref 22–41)
MANUAL DIFF BLD: NORMAL
MCH RBC QN AUTO: 23.3 PG (ref 27–33)
MCHC RBC AUTO-ENTMCNC: 30.1 G/DL (ref 33.7–35.3)
MCV RBC AUTO: 77.3 FL (ref 81.4–97.8)
MICROCYTES BLD QL SMEAR: ABNORMAL
MONOCYTES # BLD AUTO: 0.49 K/UL (ref 0–0.85)
MONOCYTES NFR BLD AUTO: 7.8 % (ref 0–13.4)
MORPHOLOGY BLD-IMP: NORMAL
NEUTROPHILS # BLD AUTO: 5.1 K/UL (ref 1.82–7.42)
NEUTROPHILS NFR BLD: 80.9 % (ref 44–72)
NRBC # BLD AUTO: 0 K/UL
NRBC BLD-RTO: 0 /100 WBC
PLATELET # BLD AUTO: 232 K/UL (ref 164–446)
PLATELET BLD QL SMEAR: NORMAL
PMV BLD AUTO: 9.2 FL (ref 9–12.9)
POTASSIUM SERPL-SCNC: 3.1 MMOL/L (ref 3.6–5.5)
PROCALCITONIN SERPL-MCNC: 5.27 NG/ML
PROT SERPL-MCNC: 5.4 G/DL (ref 6–8.2)
RBC # BLD AUTO: 3.44 M/UL (ref 4.7–6.1)
RBC BLD AUTO: PRESENT
SODIUM SERPL-SCNC: 138 MMOL/L (ref 135–145)
WBC # BLD AUTO: 6.3 K/UL (ref 4.8–10.8)

## 2020-11-19 PROCEDURE — 36415 COLL VENOUS BLD VENIPUNCTURE: CPT

## 2020-11-19 PROCEDURE — 700111 HCHG RX REV CODE 636 W/ 250 OVERRIDE (IP): Performed by: INTERNAL MEDICINE

## 2020-11-19 PROCEDURE — 82962 GLUCOSE BLOOD TEST: CPT

## 2020-11-19 PROCEDURE — 80053 COMPREHEN METABOLIC PANEL: CPT

## 2020-11-19 PROCEDURE — 85007 BL SMEAR W/DIFF WBC COUNT: CPT

## 2020-11-19 PROCEDURE — A9270 NON-COVERED ITEM OR SERVICE: HCPCS | Performed by: INTERNAL MEDICINE

## 2020-11-19 PROCEDURE — 99239 HOSP IP/OBS DSCHRG MGMT >30: CPT | Performed by: HOSPITALIST

## 2020-11-19 PROCEDURE — 84145 PROCALCITONIN (PCT): CPT

## 2020-11-19 PROCEDURE — 85027 COMPLETE CBC AUTOMATED: CPT

## 2020-11-19 PROCEDURE — 700102 HCHG RX REV CODE 250 W/ 637 OVERRIDE(OP): Performed by: INTERNAL MEDICINE

## 2020-11-19 RX ADMIN — DOCUSATE SODIUM 50 MG AND SENNOSIDES 8.6 MG 2 TABLET: 8.6; 5 TABLET, FILM COATED ORAL at 06:14

## 2020-11-19 RX ADMIN — HEPARIN SODIUM 5000 UNITS: 5000 INJECTION, SOLUTION INTRAVENOUS; SUBCUTANEOUS at 06:14

## 2020-11-19 RX ADMIN — TAMSULOSIN HYDROCHLORIDE 0.4 MG: 0.4 CAPSULE ORAL at 07:59

## 2020-11-19 RX ADMIN — INSULIN HUMAN 2 UNITS: 100 INJECTION, SOLUTION PARENTERAL at 11:13

## 2020-11-19 ASSESSMENT — PAIN DESCRIPTION - PAIN TYPE
TYPE: ACUTE PAIN
TYPE: ACUTE PAIN

## 2020-11-19 NOTE — PROGRESS NOTES
Bedside report received. Assessment completed.  Pt is A&O x4. Pt on room air.   Pt denies pain at this time  Denies nausea.   - numbness, - tingling.  Skin has spine and sacrum with blanching redness. All other skin is intact    LDA RFA and LFA IV, laoz catheter in place for retention   Last BM 11/18/20 . +flatus,   +void.  Diabetic diet with boost supplements. Tolerates well.   Pt up x1 with a FWW.  Call light and belongings within reach. All needs met at this time. Fall Precautions and hourly rounding in place.

## 2020-11-19 NOTE — CARE PLAN
Problem: Nutritional:  Goal: Achieve adequate nutritional intake  Description: Patient will tolerate po diet with at least 50% intake of meals/supplements.    Outcome: MET   Improvement in PO intake noted where pt consuming closer to ~50% or > of meals.  RD to monitor per department policy.

## 2020-11-19 NOTE — DISCHARGE PLANNING
Anticipated Discharge Disposition: SNF    Action: Per [facility] confirmed the bed and scheduled the transfer for today at 1400 via Wheelchair MD Abhishek and RN notified via [employer].    LSW copied the patient's chart and completed the [employer] Form. LSW spoke with the patient via phone and he provided Telephone Authorization for this LSW to sign the [employer] and the 2nd IMM.    Barriers to Discharge: None.    Plan: SNF

## 2020-11-19 NOTE — DISCHARGE PLANNING
Agency/Facility Name: [facility]  Spoke To: Admissions  Outcome: Facility confirmed transport for 1400. [facility] waiting for DC summary.

## 2020-11-19 NOTE — DISCHARGE INSTRUCTIONS
Discharge Instructions    Discharged to other by medical transportation with escort. Discharged via wheelchair, hospital escort: Yes.  Special equipment needed: Not Applicable    Be sure to schedule a follow-up appointment with your primary care doctor or any specialists as instructed.     Discharge Plan:   Diet Plan: Discussed  Activity Level: Discussed  Confirmed Follow up Appointment: Patient to Call and Schedule Appointment  Confirmed Symptoms Management: Discussed  Medication Reconciliation Updated: Yes  Influenza Vaccine Indication: Patient Refuses    I understand that a diet low in cholesterol, fat, and sodium is recommended for good health. Unless I have been given specific instructions below for another diet, I accept this instruction as my diet prescription.   Other diet: Diabetic diet as tolerated.    Special Instructions: None    · Is patient discharged on Warfarin / Coumadin?   No     Depression / Suicide Risk    As you are discharged from this [facility] facility, it is important to learn how to keep safe from harming yourself.    Recognize the warning signs:  · Abrupt changes in personality, positive or negative- including increase in energy   · Giving away possessions  · Change in eating patterns- significant weight changes-  positive or negative  · Change in sleeping patterns- unable to sleep or sleeping all the time   · Unwillingness or inability to communicate  · Depression  · Unusual sadness, discouragement and loneliness  · Talk of wanting to die  · Neglect of personal appearance   · Rebelliousness- reckless behavior  · Withdrawal from people/activities they love  · Confusion- inability to concentrate     If you or a loved one observes any of these behaviors or has concerns about self-harm, here's what you can do:  · Talk about it- your feelings and reasons for harming yourself  · Remove any means that you might use to hurt yourself (examples: pills, rope, extension cords, firearm)  · Get  professional help from the community (Mental Health, Substance Abuse, psychological counseling)  · Do not be alone:Call your Safe Contact- someone whom you trust who will be there for you.  · Call your local CRISIS HOTLINE 964-0777 or 246-983-6840  · Call your local Children's Mobile Crisis Response Team [place] (316) 656-1597 or www.[employer]  · Call the toll free National Suicide Prevention Hotlines   · National Suicide Prevention Lifeline 210-980-RYQI (4466)  · [employer] Hope Line Network 800-SUICIDE (263-4661)        Failure to Thrive, Adult  Failure to thrive is a group of symptoms that affect adults, including the elderly. These symptoms include eating too little and losing weight. People who have this may not want to be with friends, or they may not want to eat or drink. This condition is not a normal part of getting older.  What are the causes?  · A disease, such as dementia, diabetes, cancer, or lung disease.  · A health problem, such as a vitamin deficiency or a heart problem.  · A disorder, such as sadness (depression).  · A disability.  · Medicines.  · Having tooth or mouth problems.  · Neglect or being treated badly.  · In some cases, the cause may not be known.  What are the signs or symptoms?  · Loss of more than 5% of your body weight.  · Being more tired than normal after an activity.  · Having trouble getting up after sitting.  · Not feeling hungry.  · Not getting out of bed.  · Not wanting to do your normal activities.  · Sadness.  · Getting infections often.  · Bedsores.  · Taking a long time to get better after an infection, injury, or a surgery.  · Weakness.  How is this treated?  Treatment for this condition depends on the cause. It may be treated by:  · Treating a disease or disorder that is causing symptoms.  · Having talk therapy or taking medicine to treat sadness.  · Eating better, such as eating more often or taking nutritional supplements.  · Changing or stopping a  medicine.  · Having physical or occupational therapy.  It often takes a team of doctors to find the right treatment.  Follow these instructions at home:    · Take over-the-counter and prescription medicines only as told by your doctor.  · Eat a healthy diet. Make sure that you eat enough. Ask your doctor how much you should eat.  · Be active. Do exercises that make you stronger (strength training). A physical therapist can help to set up a program that fits you.  · Make sure that you are safe at home.  · Have a plan for what to do if you cannot make decisions for yourself.  Contact a doctor if you:  · Are not able to eat well.  · Are not able to move around.  · Feel very sad.  · Feel very hopeless.  Get help right away if:  · You think about ending your life.  · You cannot eat or drink.  · You do not get out of bed.  · Staying at home is not safe.  · You have a fever.  Summary  · Failure to thrive is a group of symptoms that affect adults, including the elderly.  · Symptoms include eating too little and losing weight.  · Take all medicines only as told by your doctor.  · Eat a healthy diet. Make sure that you eat enough. Ask your doctor how much you should eat.  · Be active. Do exercises that make you stronger. A physical therapist can help to set up a program that is right for you.  This information is not intended to replace advice given to you by your health care provider. Make sure you discuss any questions you have with your health care provider.  Document Released: 12/06/2012 Document Revised: 08/20/2019 Document Reviewed: 08/20/2019  Elsevier Patient Education © 2020 [employer]

## 2020-11-19 NOTE — DISCHARGE SUMMARY
Discharge Summary    CHIEF COMPLAINT ON ADMISSION  Chief Complaint   Patient presents with   • Other     unable to care for self., possible fall?   • Failure to Thrive       Reason for Admission  EMS     Admission Date  11/13/2020    CODE STATUS  Full Code    HPI & HOSPITAL COURSE  75 y.o. male with past medical history of diabetes mellitus who presented to the hospital on 11/13/2020 with complaint of weakness and failure to thrive.  He reported he has been having severe weakness and he was unable to walk and perform daily activities.  He also reported he has been having poor oral intake.  He has not been taking his medication as prescribed.  He denies having fall and any trauma to his head or anywhere else.  He denies any other acute complaints or residual symptoms.   Upon admission to the ED, he was found to have elevation of CPK and elevated blood glucose and dehydration.  He was started on IVF.  He also was found to have elevated white blood cell count and ER physician ordered IV antibiotic and obtain blood cultures. He was also found to have urinary retention. Started on flomax and lazo placed with improvement of symptoms. He had markedly elevated procal suspect luekocytosis and infection was likely due to Urinary tract infection. Treated with rocephin and cultures remained negative, leukocytosis improved. Discharge to SNF with lazo in place and needs outpatient urology follow up.     Therefore, he is discharged in good and stable condition to skilled nursing facility.    The patient met 2-midnight criteria for an inpatient stay at the time of discharge.    Discharge Date  11/19/20    FOLLOW UP ITEMS POST DISCHARGE  pcp x 1 week   urology    DISCHARGE DIAGNOSES  Active Problems:    Bladder obstruction POA: Unknown      Overview: IMO load March 2020    Failure to thrive in adult POA: Unknown    DM (diabetes mellitus) ([facility]) POA: Yes  Resolved Problems:    Rhabdomyolysis POA: Unknown    Leukocytosis POA:  Unknown    Hypomagnesemia POA: Unknown    Hypokalemia POA: Unknown      FOLLOW UP  No future appointments.  primary care physician     In 1 week        MEDICATIONS ON DISCHARGE     Medication List      CHANGE how you take these medications      Instructions   metFORMIN 850 MG Tabs  What changed: Another medication with the same name was removed. Continue taking this medication, and follow the directions you see here.  Commonly known as: GLUCOPHAGE   Take 1 Tab by mouth 2 times a day, with meals.  Dose: 850 mg        CONTINUE taking these medications      Instructions   finasteride 5 MG Tabs  Commonly known as: PROSCAR   Take 1 Tab by mouth every day.  Dose: 5 mg     tamsulosin 0.4 MG capsule  Commonly known as: FLOMAX   Take 1 Cap by mouth ONE-HALF HOUR AFTER BREAKFAST.  Dose: 0.4 mg        STOP taking these medications    cefdinir 300 MG Caps  Commonly known as: OMNICEF            Allergies  No Known Allergies    DIET  Orders Placed This Encounter   Procedures   • Diet Order Diet: Consistent CHO (Diabetic)     Standing Status:   Standing     Number of Occurrences:   1     Order Specific Question:   Diet:     Answer:   Consistent CHO (Diabetic) [4]       ACTIVITY  As tolerated.  Weight bearing as tolerated    CONSULTATIONS  none    PROCEDURES  none    LABORATORY  Lab Results   Component Value Date    SODIUM 138 11/19/2020    POTASSIUM 3.1 (L) 11/19/2020    CHLORIDE 106 11/19/2020    CO2 24 11/19/2020    GLUCOSE 161 (H) 11/19/2020    BUN 12 11/19/2020    CREATININE 0.78 11/19/2020        Lab Results   Component Value Date    WBC 6.3 11/19/2020    HEMOGLOBIN 8.0 (L) 11/19/2020    HEMATOCRIT 26.6 (L) 11/19/2020    PLATELETCT 232 11/19/2020        Total time of the discharge process exceeds 40 minutes.

## 2020-11-19 NOTE — PROGRESS NOTES
Assumed care of pt.  AAOx4.  No c/o pain this morning.  Skin intact.  Diabetic diet in place, no c/o n/v.  LBM 11/18, + flatus, + void.  POC reviewed with pt.  Call light within reach, pt educated to call for assistance as needed.  Hourly rounding in place.

## 2020-11-19 NOTE — THERAPY
Physical Therapy   Daily Treatment     Patient Name: Hugh Núñez  Age:  75 y.o., Sex:  male  Medical Record #: [id number]  Today's Date: 11/18/2020     Precautions: Fall Risk    Assessment    Multiple safety concerns during ambulation with pt not keeping FWW close to him and instances where he would wheel the FWW too far out in front of him where he would no longer have grasp of it. Needed VC for safety. Good tolerance to ambulation even with safety concerns. Will follow with acute PT if pt stays to continue to address mobility and safety. Recommend placement given current LOF, will follow.     Plan    Continue current treatment plan.    DC Equipment Recommendations: Front-Wheel Walker  Discharge Recommendations: Recommend post-acute placement for additional physical therapy services prior to discharge home         Objective       11/18/20 8387   Precautions   Precautions Fall Risk   Vitals   O2 (LPM) 0   O2 Delivery Device None - Room Air  (as found)   Pain 0 - 10 Group   Therapist Pain Assessment Post Activity Pain Same as Prior to Activity;Nurse Notified  (agreeable to session)   Cognition    Cognition / Consciousness X   Speech/ Communication Hard of Hearing   Level of Consciousness Alert   Comments pleasant and cooperative, soft-spoken   Passive ROM Lower Body   Passive ROM Lower Body X   Comments BL HS tightness, R>L. however pt reports it is better than last time he tried it. WFL for activity   Strength Lower Body   Lower Body Strength  X   Comments generalized weakness, WFL for activity   Sensation Lower Body   Lower Extremity Sensation   Not Tested   Balance   Sitting Balance (Static) Fair +   Sitting Balance (Dynamic) Fair   Standing Balance (Static) Fair   Standing Balance (Dynamic) Fair -   Weight Shift Sitting Fair   Weight Shift Standing Fair   Skilled Intervention Verbal Cuing   Comments No LOB in session. Multiple safety concerns with ambulation where pt would not walk inside FWW, multiple  instances where he rolled it away from him and needed reminder to hold onto FWW.    Gait Analysis   Gait Level Of Assist Minimal Assist   Assistive Device Front Wheel Walker   Distance (Feet) 150   # of Times Distance was Traveled 1   Deviation Bradykinetic;Other (Comment)  (kyphotic posture)   Weight Bearing Status full   Vision Deficits Impacting Mobility denies   Skilled Intervention Verbal Cuing;Compensatory Strategies   Comments kypohotic posture with FWW, multiple safety concerns    Bed Mobility    Supine to Sit   (NT, found in chair)   Sit to Supine Minimal Assist   Functional Mobility   Sit to Stand Minimal Assist  (FWW)   Patient / Family Goals    Patient / Family Goal #1 go home   Short Term Goals    Short Term Goal # 1 Patient will perform bed mobility with no bed features and supervision within 6tx in order to get in/out of bed.   Goal Outcome # 1 Progressing as expected   Short Term Goal # 2 Patient will perform sit<>stand transfers with FWW and supervision within 6tx in order to initiate gait.   Goal Outcome # 2 Progressing as expected   Short Term Goal # 3 Patient will ambulate 150ft w FWW and supervision within 6tx in order to access his environment.    Goal Outcome # 3 Progressing as expected

## 2020-11-20 LAB — GLUCOSE BLD-MCNC: 168 MG/DL (ref 65–99)

## 2020-11-21 LAB
BACTERIA BLD CULT: NORMAL
BACTERIA BLD CULT: NORMAL
SIGNIFICANT IND 70042: NORMAL
SIGNIFICANT IND 70042: NORMAL
SITE SITE: NORMAL
SITE SITE: NORMAL
SOURCE SOURCE: NORMAL
SOURCE SOURCE: NORMAL

## 2021-01-14 DIAGNOSIS — Z23 NEED FOR VACCINATION: ICD-10-CM

## 2021-01-16 ENCOUNTER — APPOINTMENT (OUTPATIENT)
Dept: RADIOLOGY | Facility: MEDICAL CENTER | Age: 76
DRG: 071 | End: 2021-01-16
Attending: EMERGENCY MEDICINE
Payer: MEDICARE

## 2021-01-16 ENCOUNTER — HOSPITAL ENCOUNTER (EMERGENCY)
Facility: MEDICAL CENTER | Age: 76
DRG: 071 | End: 2021-01-16
Attending: EMERGENCY MEDICINE
Payer: MEDICARE

## 2021-01-16 VITALS
OXYGEN SATURATION: 98 % | DIASTOLIC BLOOD PRESSURE: 70 MMHG | HEART RATE: 87 BPM | RESPIRATION RATE: 18 BRPM | BODY MASS INDEX: 16.92 KG/M2 | HEIGHT: 70 IN | SYSTOLIC BLOOD PRESSURE: 147 MMHG | TEMPERATURE: 96.6 F | WEIGHT: 118.17 LBS

## 2021-01-16 DIAGNOSIS — S09.90XA CLOSED HEAD INJURY, INITIAL ENCOUNTER: ICD-10-CM
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
## 2021-01-16 LAB
ALBUMIN SERPL BCP-MCNC: 4 G/DL (ref 3.2–4.9)
ALBUMIN/GLOB SERPL: 1.3 G/DL
ALP SERPL-CCNC: 91 U/L (ref 30–99)
ALT SERPL-CCNC: 12 U/L (ref 2–50)
ANION GAP SERPL CALC-SCNC: 13 MMOL/L (ref 7–16)
AST SERPL-CCNC: 17 U/L (ref 12–45)
BASOPHILS # BLD AUTO: 0.5 % (ref 0–1.8)
BASOPHILS # BLD: 0.04 K/UL (ref 0–0.12)
BILIRUB SERPL-MCNC: 0.3 MG/DL (ref 0.1–1.5)
BUN SERPL-MCNC: 29 MG/DL (ref 8–22)
CALCIUM SERPL-MCNC: 9.2 MG/DL (ref 8.5–10.5)
CHLORIDE SERPL-SCNC: 104 MMOL/L (ref 96–112)
CO2 SERPL-SCNC: 22 MMOL/L (ref 20–33)
CREAT SERPL-MCNC: 1.13 MG/DL (ref 0.5–1.4)
EOSINOPHIL # BLD AUTO: 0.15 K/UL (ref 0–0.51)
EOSINOPHIL NFR BLD: 2 % (ref 0–6.9)
ERYTHROCYTE [DISTWIDTH] IN BLOOD BY AUTOMATED COUNT: 54.3 FL (ref 35.9–50)
GLOBULIN SER CALC-MCNC: 3.2 G/DL (ref 1.9–3.5)
GLUCOSE SERPL-MCNC: 228 MG/DL (ref 65–99)
HCT VFR BLD AUTO: 31 % (ref 42–52)
HGB BLD-MCNC: 9.5 G/DL (ref 14–18)
IMM GRANULOCYTES # BLD AUTO: 0.03 K/UL (ref 0–0.11)
IMM GRANULOCYTES NFR BLD AUTO: 0.4 % (ref 0–0.9)
LYMPHOCYTES # BLD AUTO: 1.14 K/UL (ref 1–4.8)
LYMPHOCYTES NFR BLD: 15.1 % (ref 22–41)
MCH RBC QN AUTO: 23.8 PG (ref 27–33)
MCHC RBC AUTO-ENTMCNC: 30.6 G/DL (ref 33.7–35.3)
MCV RBC AUTO: 77.7 FL (ref 81.4–97.8)
MONOCYTES # BLD AUTO: 0.56 K/UL (ref 0–0.85)
MONOCYTES NFR BLD AUTO: 7.4 % (ref 0–13.4)
NEUTROPHILS # BLD AUTO: 5.62 K/UL (ref 1.82–7.42)
NEUTROPHILS NFR BLD: 74.6 % (ref 44–72)
NRBC # BLD AUTO: 0 K/UL
NRBC BLD-RTO: 0 /100 WBC
PLATELET # BLD AUTO: 203 K/UL (ref 164–446)
PMV BLD AUTO: 9.1 FL (ref 9–12.9)
POTASSIUM SERPL-SCNC: 3.9 MMOL/L (ref 3.6–5.5)
PROT SERPL-MCNC: 7.2 G/DL (ref 6–8.2)
RBC # BLD AUTO: 3.99 M/UL (ref 4.7–6.1)
SODIUM SERPL-SCNC: 139 MMOL/L (ref 135–145)
WBC # BLD AUTO: 7.5 K/UL (ref 4.8–10.8)

## 2021-01-16 PROCEDURE — 700111 HCHG RX REV CODE 636 W/ 250 OVERRIDE (IP): Performed by: EMERGENCY MEDICINE

## 2021-01-16 PROCEDURE — 70450 CT HEAD/BRAIN W/O DYE: CPT

## 2021-01-16 PROCEDURE — 99284 EMERGENCY DEPT VISIT MOD MDM: CPT

## 2021-01-16 PROCEDURE — 85025 COMPLETE CBC W/AUTO DIFF WBC: CPT

## 2021-01-16 PROCEDURE — 90715 TDAP VACCINE 7 YRS/> IM: CPT | Performed by: EMERGENCY MEDICINE

## 2021-01-16 PROCEDURE — 90471 IMMUNIZATION ADMIN: CPT

## 2021-01-16 PROCEDURE — 72125 CT NECK SPINE W/O DYE: CPT

## 2021-01-16 PROCEDURE — 80053 COMPREHEN METABOLIC PANEL: CPT

## 2021-01-16 RX ADMIN — CLOSTRIDIUM TETANI TOXOID ANTIGEN (FORMALDEHYDE INACTIVATED), CORYNEBACTERIUM DIPHTHERIAE TOXOID ANTIGEN (FORMALDEHYDE INACTIVATED), BORDETELLA PERTUSSIS TOXOID ANTIGEN (GLUTARALDEHYDE INACTIVATED), BORDETELLA PERTUSSIS FILAMENTOUS HEMAGGLUTININ ANTIGEN (FORMALDEHYDE INACTIVATED), BORDETELLA PERTUSSIS PERTACTIN ANTIGEN, AND BORDETELLA PERTUSSIS FIMBRIAE 2/3 ANTIGEN 0.5 ML: 5; 2; 2.5; 5; 3; 5 INJECTION, SUSPENSION INTRAMUSCULAR at 11:46

## 2021-01-16 ASSESSMENT — ENCOUNTER SYMPTOMS: LOSS OF CONSCIOUSNESS: 0

## 2021-01-16 ASSESSMENT — FIBROSIS 4 INDEX: FIB4 SCORE: 1.39

## 2021-01-16 NOTE — ED PROVIDER NOTES
ED Provider Note    Scribed for Aravind Topete M.D. by Kade Jama. 1/16/2021, 10:59 AM.    Primary care provider: None noted  Means of arrival: Ambulance  History obtained from: EMS  History limited by: None    CHIEF COMPLAINT  TBI    HPI  Hugh Núñez is a 75 y.o. male with history of a enlarged prostate and is a type 2 diabetic who presents to the Emergency Department as for a possible TBI after a ground level fall prior to arrival. Per EMS, patient was walking across a crosswalk when he experienced a mechanical fall. Patient currently has a hematoma to the right forehead. Per EMS, patient denies any loss of consciousness. Also per EMS, patient denies taking any blood thinners or drinking any alcohol. EMS notes that the patient has recently had a series of falls with the most recent being approximately 2 weeks ago when he was hospitalized for a reason that the patient cannot recall at this time. Per EMS patient had a blood sugar of 258 en route to the hospital and received fluids. Per triage note, patient denies any history of smoking.   PPE Note: I personally donned full PPE for all patient encounters during this visit, including being clean-shaven with an N95 respirator mask and gloves.    Scribe remained outside the patient's room and did not have any contact with the patient for the duration of patient encounter.     REVIEW OF SYSTEMS  Review of Systems   HENT:        Positive for head trauma   Neurological: Negative for loss of consciousness.   All other systems reviewed and are negative.    PAST MEDICAL HISTORY   has a past medical history of Fall, Polio (Ag of 5), and Type II or unspecified type diabetes mellitus without mention of complication, not stated as uncontrolled.    SURGICAL HISTORY   has a past surgical history that includes appendectomy and tonsillectomy.    SOCIAL HISTORY  Social History     Tobacco Use   • Smoking status: Former Smoker     Years: 5.00     Types: Cigarettes      "Start date: 1955     Quit date: 1960     Years since quittin.3   • Smokeless tobacco: Never Used   Substance Use Topics   • Alcohol use: No   • Drug use: No      Social History     Substance and Sexual Activity   Drug Use No       FAMILY HISTORY  None noted    CURRENT MEDICATIONS  Current Outpatient Medications:   •  metformin (GLUCOPHAGE) 850 MG Tab, Take 1 Tab by mouth 2 times a day, with meals. (Patient not taking: Reported on 2020), Disp: 60 Tab, Rfl: 0  •  finasteride (PROSCAR) 5 MG TABS, Take 1 Tab by mouth every day. (Patient not taking: Reported on 2020), Disp: 30 Tab, Rfl: 1  •  tamsulosin (FLOMAX) 0.4 MG capsule, Take 1 Cap by mouth ONE-HALF HOUR AFTER BREAKFAST. (Patient not taking: Reported on 2020), Disp: 30 Cap, Rfl: 1    ALLERGIES  No Known Allergies    PHYSICAL EXAM  VITAL SIGNS: /70   Pulse 87   Temp 35.9 °C (96.6 °F) (Oral)   Resp 18   Ht 1.778 m (5' 10\")   Wt 53.6 kg (118 lb 2.7 oz)   SpO2 98%   BMI 16.96 kg/m²   Constitutional: Mild distress.  HENT: Contusion and abrasion to right forehead.   Eyes: PERRL.  Neck: Atraumatic. Trachea is midline.  Cardiovascular: Regular rate and regular rhythm, no murmurs.  Thorax & Lungs: Normal breath sounds, no respiratory distress. Chest wall atraumatic.  Abdomen: Atraumatic, Soft, Non-tender.   Skin: See HENT section.   Back: Atraumatic, No mid-line tenderness, no CVA tenderness.  Extremities: Atraumatic, no edema.  Vascular: Symmetric radial pulse.  Neurologic: Alert & oriented. Normal gross motor.     LABS  Labs Reviewed   CBC WITH DIFFERENTIAL - Abnormal; Notable for the following components:       Result Value    RBC 3.99 (*)     Hemoglobin 9.5 (*)     Hematocrit 31.0 (*)     MCV 77.7 (*)     MCH 23.8 (*)     MCHC 30.6 (*)     RDW 54.3 (*)     Neutrophils-Polys 74.60 (*)     Lymphocytes 15.10 (*)     All other components within normal limits   COMP METABOLIC PANEL - Abnormal; Notable for the following " components:    Glucose 228 (*)     Bun 29 (*)     All other components within normal limits   ESTIMATED GFR     All labs reviewed by me.    RADIOLOGY  CT-CSPINE WITHOUT PLUS RECONS   Final Result      1.  Multilevel degenerative change of cervical spine.   2.  No fracture or subluxation.      CT-HEAD W/O   Final Result      1.  Diffuse atrophy and white matter changes.   2.  No acute intracranial hemorrhage or territorial infarct.   3.  RIGHT forehead scalp swelling.        The radiologist's interpretation of all radiological studies have been reviewed by me.    COURSE & MEDICAL DECISION MAKING  Pertinent Labs & Imaging studies reviewed. (See chart for details)     10:59 AM - Patient seen and examined outside the trauma bay. Ordered labs and imaging to evaluate his symptoms.     11:20 AM Patient is to be treated with a Adacel injection 0.5 mL.     12:00 PM Patient's CT scan look good. Patient has noted anemia which review shows has improved from a month ago.    12:33 PM Patient is to be observed walking in order to further determine plan of care.     1:01 PM I was informed that the patient has been evicted and does not have a residence at this time. I am to page the Hospitalist.     1:30 PM I discussed the patient's case and the above findings with Dr. Angeles (Hospitalist) who recommended we speak to the  in order to further determine the plan of care.     1:43 PM After speaking with a , I was informed that the patient is to be discharged and moved to a local shelter.       The patient will return for new or worsening symptoms and is stable at the time of discharge.    The patient is referred to a primary physician for blood pressure management, diabetic screening, and for all other preventative health concerns.    DISPOSITION:  Patient will be discharged home in stable condition.    FOLLOW UP:  [address]  [address]  [phone]          OUTPATIENT MEDICATIONS:  Discharge Medication List as of 1/16/2021  2:10 PM          FINAL IMPRESSION  1. Fall, initial encounter    2. Closed head injury, initial encounter          IKade (Zonia), am scribing for, and in the presence of, Aravind Topete M.D..    Electronically signed by: Kade aJma (Zonia), 1/16/2021    IAravind M.D. personally performed the services described in this documentation, as scribed by Kade Jama in my presence, and it is both accurate and complete. C    The note accurately reflects work and decisions made by me.  Aravind Topete M.D.  1/16/2021  6:20 PM

## 2021-01-16 NOTE — PROGRESS NOTES
"      Spiritual Care Note    Patient Information     Patient's Name: Hugh Núñez   MRN: [id number]    YOB: 1945   Age and Gender: 75 y.o. male   Service Area: ED [facility]   Room (and Bed):  21/21 [facility]   Ethnicity or Nationality:     Primary Language: English   Oriental orthodox/Spiritual preference: \"Nondenominational, I guess.\"   Place of Residence: [place]   Family/Friends/Others Present: No   Clinical Team Present: No   Medical Diagnosis(-es)/Procedure(s): GLF   Code Status: Prior    Date of Admission: 1/16/2021   Length of Stay: 0 days        Spiritual Care Provider Information:  Name of Spiritual Care Provider: Maty Cat  Title of Spiritual Care Provider: Associate   Phone Number: [phone]  E-mail: [email or web address]  Total time : 5 minutes    Spiritual Screen Results:    Gen Nursing        Palliative Care         Encounter/Request Information  Encounter/Request Type   Visited With: Patient  Nature of the Visit: Initial, On shift  General Visit: Yes  Referral From/ Origin of Request: [place] rounds, Verbal patient    Religous Needs/Values  Oriental orthodox Needs Visit  Oriental orthodox Needs: Prayer    Spiritual Assessment     Spiritual Care Encounters    Observations/Symptoms: Accepting, Thankfulness    Interaction/Conversation: Pt requested prayer but was being d/c and left immediately after making request;  prayed for him silently.    Assessment: Need    Need: Seeking Spiritual Assistance and Support    Interventions: Compassionate pressence, prayer.    Outcomes: Spiritual Comfort    Plan: No Further Visits    Notes:            "

## 2021-01-16 NOTE — DISCHARGE PLANNING
MSW received call from ERP. Pt came in as GLF. Pt is currently homeless and want to see what housing/placement options are available MSW explained that if pt is able to care for self then he can d/c to shelter. If not he will need a higher level of care. Bedside RN updated MSW that pt ambulated and ERP is going to d/c pt. MSW provided cab voucher and shelter resources to pt.

## 2021-01-16 NOTE — ED NOTES
Chief Complaint   Patient presents with   • T-5000 FALL     glf, tripped fell while walking middle cross walk     Pt bib ems , per report pt had glf while crossing crosswalk. Denies loc, fsbs 285. He was given 250cc ivf pta. Hematoma to right forehead  Pt taken to ct, now to blue 21

## 2021-01-16 NOTE — ED NOTES
WISAM assist with obtaining a taxi voucher for patient to a temporary homeless shelter.  Patient given a boxed lunched and assisted to waiting room by this nurse to wait for taxi.  Patient discharge instructions reviewed and resources in hand upon DC.

## 2021-01-17 ENCOUNTER — APPOINTMENT (OUTPATIENT)
Dept: RADIOLOGY | Facility: MEDICAL CENTER | Age: 76
DRG: 071 | End: 2021-01-17
Attending: EMERGENCY MEDICINE
Payer: MEDICARE

## 2021-01-17 ENCOUNTER — HOSPITAL ENCOUNTER (INPATIENT)
Facility: MEDICAL CENTER | Age: 76
LOS: 17 days | DRG: 071 | End: 2021-02-04
Attending: EMERGENCY MEDICINE | Admitting: INTERNAL MEDICINE
Payer: MEDICARE
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
## 2021-01-17 DIAGNOSIS — S01.81XA FACIAL LACERATION, INITIAL ENCOUNTER: ICD-10-CM

## 2021-01-17 DIAGNOSIS — S09.90XA CLOSED HEAD INJURY, INITIAL ENCOUNTER: ICD-10-CM

## 2021-01-17 PROBLEM — M79.89 SWELLING OF LOWER EXTREMITY: Status: ACTIVE | Noted: 2021-01-17

## 2021-01-17 PROBLEM — R79.89 ELEVATED TROPONIN: Status: ACTIVE | Noted: 2021-01-17

## 2021-01-17 PROBLEM — R79.89 ELEVATED TROPONIN: Status: RESOLVED | Noted: 2021-01-17 | Resolved: 2021-01-17

## 2021-01-17 PROBLEM — G93.40 ENCEPHALOPATHY: Status: ACTIVE | Noted: 2021-01-17

## 2021-01-17 PROBLEM — E44.0 MODERATE PROTEIN-CALORIE MALNUTRITION (HCC): Status: ACTIVE | Noted: 2021-01-17

## 2021-01-17 PROBLEM — I10 HYPERTENSION: Status: ACTIVE | Noted: 2021-01-17

## 2021-01-17 PROBLEM — R33.9 URINARY RETENTION: Status: ACTIVE | Noted: 2021-01-17

## 2021-01-17 PROBLEM — R29.6 RECURRENT FALLS WHILE WALKING: Status: ACTIVE | Noted: 2021-01-17

## 2021-01-17 LAB
ALBUMIN SERPL BCP-MCNC: 4.1 G/DL (ref 3.2–4.9)
ALBUMIN/GLOB SERPL: 1.2 G/DL
ALP SERPL-CCNC: 104 U/L (ref 30–99)
ALT SERPL-CCNC: 17 U/L (ref 2–50)
ANION GAP SERPL CALC-SCNC: 12 MMOL/L (ref 7–16)
APPEARANCE UR: ABNORMAL
AST SERPL-CCNC: 22 U/L (ref 12–45)
BACTERIA #/AREA URNS HPF: NEGATIVE /HPF
BASOPHILS # BLD AUTO: 0.6 % (ref 0–1.8)
BASOPHILS # BLD: 0.05 K/UL (ref 0–0.12)
BILIRUB SERPL-MCNC: 0.5 MG/DL (ref 0.1–1.5)
BILIRUB UR QL STRIP.AUTO: NEGATIVE
BUN SERPL-MCNC: 25 MG/DL (ref 8–22)
CALCIUM SERPL-MCNC: 9.2 MG/DL (ref 8.5–10.5)
CHLORIDE SERPL-SCNC: 108 MMOL/L (ref 96–112)
CO2 SERPL-SCNC: 21 MMOL/L (ref 20–33)
COLOR UR: YELLOW
CREAT SERPL-MCNC: 1.12 MG/DL (ref 0.5–1.4)
EKG IMPRESSION: NORMAL
EOSINOPHIL # BLD AUTO: 0.09 K/UL (ref 0–0.51)
EOSINOPHIL NFR BLD: 1.1 % (ref 0–6.9)
EPI CELLS #/AREA URNS HPF: NEGATIVE /HPF
ERYTHROCYTE [DISTWIDTH] IN BLOOD BY AUTOMATED COUNT: 53.4 FL (ref 35.9–50)
EST. AVERAGE GLUCOSE BLD GHB EST-MCNC: 177 MG/DL
ETHANOL BLD-MCNC: <10.1 MG/DL (ref 0–10)
FERRITIN SERPL-MCNC: 32.5 NG/ML (ref 22–322)
FOLATE SERPL-MCNC: 8.1 NG/ML
GLOBULIN SER CALC-MCNC: 3.4 G/DL (ref 1.9–3.5)
GLUCOSE BLD-MCNC: 141 MG/DL (ref 65–99)
GLUCOSE BLD-MCNC: 180 MG/DL (ref 65–99)
GLUCOSE BLD-MCNC: 186 MG/DL (ref 65–99)
GLUCOSE SERPL-MCNC: 205 MG/DL (ref 65–99)
GLUCOSE UR STRIP.AUTO-MCNC: 500 MG/DL
HBA1C MFR BLD: 7.8 % (ref 0–5.6)
HCT VFR BLD AUTO: 31.2 % (ref 42–52)
HGB BLD-MCNC: 9.4 G/DL (ref 14–18)
HYALINE CASTS #/AREA URNS LPF: ABNORMAL /LPF
IMM GRANULOCYTES # BLD AUTO: 0.05 K/UL (ref 0–0.11)
IMM GRANULOCYTES NFR BLD AUTO: 0.6 % (ref 0–0.9)
IRON SATN MFR SERPL: 17 % (ref 15–55)
IRON SERPL-MCNC: 45 UG/DL (ref 50–180)
KETONES UR STRIP.AUTO-MCNC: NEGATIVE MG/DL
LEUKOCYTE ESTERASE UR QL STRIP.AUTO: ABNORMAL
LYMPHOCYTES # BLD AUTO: 0.82 K/UL (ref 1–4.8)
LYMPHOCYTES NFR BLD: 10.1 % (ref 22–41)
MAGNESIUM SERPL-MCNC: 1.9 MG/DL (ref 1.5–2.5)
MCH RBC QN AUTO: 23.3 PG (ref 27–33)
MCHC RBC AUTO-ENTMCNC: 30.1 G/DL (ref 33.7–35.3)
MCV RBC AUTO: 77.2 FL (ref 81.4–97.8)
MICRO URNS: ABNORMAL
MONOCYTES # BLD AUTO: 0.76 K/UL (ref 0–0.85)
MONOCYTES NFR BLD AUTO: 9.4 % (ref 0–13.4)
NEUTROPHILS # BLD AUTO: 6.35 K/UL (ref 1.82–7.42)
NEUTROPHILS NFR BLD: 78.2 % (ref 44–72)
NITRITE UR QL STRIP.AUTO: NEGATIVE
NRBC # BLD AUTO: 0 K/UL
NRBC BLD-RTO: 0 /100 WBC
PH UR STRIP.AUTO: 5.5 [PH] (ref 5–8)
PHOSPHATE SERPL-MCNC: 3.5 MG/DL (ref 2.5–4.5)
PLATELET # BLD AUTO: 230 K/UL (ref 164–446)
PMV BLD AUTO: 9.5 FL (ref 9–12.9)
POTASSIUM SERPL-SCNC: 3.9 MMOL/L (ref 3.6–5.5)
PROT SERPL-MCNC: 7.5 G/DL (ref 6–8.2)
PROT UR QL STRIP: 30 MG/DL
RBC # BLD AUTO: 4.04 M/UL (ref 4.7–6.1)
RBC # URNS HPF: ABNORMAL /HPF
RBC UR QL AUTO: ABNORMAL
SARS-COV-2 RNA RESP QL NAA+PROBE: NOTDETECTED
SODIUM SERPL-SCNC: 141 MMOL/L (ref 135–145)
SP GR UR STRIP.AUTO: 1.01
SPECIMEN SOURCE: NORMAL
TIBC SERPL-MCNC: 263 UG/DL (ref 250–450)
TROPONIN T SERPL-MCNC: 33 NG/L (ref 6–19)
TROPONIN T SERPL-MCNC: 34 NG/L (ref 6–19)
UIBC SERPL-MCNC: 218 UG/DL (ref 110–370)
UROBILINOGEN UR STRIP.AUTO-MCNC: 0.2 MG/DL
VIT B12 SERPL-MCNC: 202 PG/ML (ref 211–911)
WBC # BLD AUTO: 8.1 K/UL (ref 4.8–10.8)
WBC #/AREA URNS HPF: ABNORMAL /HPF

## 2021-01-17 PROCEDURE — 99285 EMERGENCY DEPT VISIT HI MDM: CPT

## 2021-01-17 PROCEDURE — G0378 HOSPITAL OBSERVATION PER HR: HCPCS

## 2021-01-17 PROCEDURE — U0005 INFEC AGEN DETEC AMPLI PROBE: HCPCS

## 2021-01-17 PROCEDURE — 83735 ASSAY OF MAGNESIUM: CPT

## 2021-01-17 PROCEDURE — 303747 HCHG EXTRA SUTURE

## 2021-01-17 PROCEDURE — 82607 VITAMIN B-12: CPT

## 2021-01-17 PROCEDURE — 51798 US URINE CAPACITY MEASURE: CPT

## 2021-01-17 PROCEDURE — 700111 HCHG RX REV CODE 636 W/ 250 OVERRIDE (IP): Performed by: STUDENT IN AN ORGANIZED HEALTH CARE EDUCATION/TRAINING PROGRAM

## 2021-01-17 PROCEDURE — 700102 HCHG RX REV CODE 250 W/ 637 OVERRIDE(OP): Performed by: INTERNAL MEDICINE

## 2021-01-17 PROCEDURE — 82728 ASSAY OF FERRITIN: CPT

## 2021-01-17 PROCEDURE — 70450 CT HEAD/BRAIN W/O DYE: CPT

## 2021-01-17 PROCEDURE — 0HQ1XZZ REPAIR FACE SKIN, EXTERNAL APPROACH: ICD-10-PCS | Performed by: EMERGENCY MEDICINE

## 2021-01-17 PROCEDURE — 83036 HEMOGLOBIN GLYCOSYLATED A1C: CPT

## 2021-01-17 PROCEDURE — 84100 ASSAY OF PHOSPHORUS: CPT

## 2021-01-17 PROCEDURE — 84153 ASSAY OF PSA TOTAL: CPT

## 2021-01-17 PROCEDURE — 82746 ASSAY OF FOLIC ACID SERUM: CPT

## 2021-01-17 PROCEDURE — 83540 ASSAY OF IRON: CPT

## 2021-01-17 PROCEDURE — 82962 GLUCOSE BLOOD TEST: CPT

## 2021-01-17 PROCEDURE — 80053 COMPREHEN METABOLIC PANEL: CPT

## 2021-01-17 PROCEDURE — A9270 NON-COVERED ITEM OR SERVICE: HCPCS | Performed by: STUDENT IN AN ORGANIZED HEALTH CARE EDUCATION/TRAINING PROGRAM

## 2021-01-17 PROCEDURE — 82077 ASSAY SPEC XCP UR&BREATH IA: CPT

## 2021-01-17 PROCEDURE — 71045 X-RAY EXAM CHEST 1 VIEW: CPT

## 2021-01-17 PROCEDURE — 700105 HCHG RX REV CODE 258: Performed by: STUDENT IN AN ORGANIZED HEALTH CARE EDUCATION/TRAINING PROGRAM

## 2021-01-17 PROCEDURE — 304217 HCHG IRRIGATION SYSTEM

## 2021-01-17 PROCEDURE — 85025 COMPLETE CBC W/AUTO DIFF WBC: CPT

## 2021-01-17 PROCEDURE — 84484 ASSAY OF TROPONIN QUANT: CPT

## 2021-01-17 PROCEDURE — U0003 INFECTIOUS AGENT DETECTION BY NUCLEIC ACID (DNA OR RNA); SEVERE ACUTE RESPIRATORY SYNDROME CORONAVIRUS 2 (SARS-COV-2) (CORONAVIRUS DISEASE [COVID-19]), AMPLIFIED PROBE TECHNIQUE, MAKING USE OF HIGH THROUGHPUT TECHNOLOGIES AS DESCRIBED BY CMS-2020-01-R: HCPCS

## 2021-01-17 PROCEDURE — 96372 THER/PROPH/DIAG INJ SC/IM: CPT

## 2021-01-17 PROCEDURE — 93005 ELECTROCARDIOGRAM TRACING: CPT | Performed by: EMERGENCY MEDICINE

## 2021-01-17 PROCEDURE — 304999 HCHG REPAIR-SIMPLE/INTERMED LEVEL 1

## 2021-01-17 PROCEDURE — 36415 COLL VENOUS BLD VENIPUNCTURE: CPT

## 2021-01-17 PROCEDURE — A9270 NON-COVERED ITEM OR SERVICE: HCPCS | Performed by: INTERNAL MEDICINE

## 2021-01-17 PROCEDURE — 700101 HCHG RX REV CODE 250: Performed by: EMERGENCY MEDICINE

## 2021-01-17 PROCEDURE — 99218 PR INITIAL OBSERVATION CARE,LEVL I: CPT | Performed by: STUDENT IN AN ORGANIZED HEALTH CARE EDUCATION/TRAINING PROGRAM

## 2021-01-17 PROCEDURE — 83550 IRON BINDING TEST: CPT

## 2021-01-17 PROCEDURE — 96365 THER/PROPH/DIAG IV INF INIT: CPT

## 2021-01-17 PROCEDURE — 700102 HCHG RX REV CODE 250 W/ 637 OVERRIDE(OP): Performed by: STUDENT IN AN ORGANIZED HEALTH CARE EDUCATION/TRAINING PROGRAM

## 2021-01-17 PROCEDURE — 81001 URINALYSIS AUTO W/SCOPE: CPT

## 2021-01-17 RX ORDER — BISACODYL 10 MG
10 SUPPOSITORY, RECTAL RECTAL
Status: DISCONTINUED | OUTPATIENT
Start: 2021-01-17 | End: 2021-02-04 | Stop reason: HOSPADM

## 2021-01-17 RX ORDER — POLYETHYLENE GLYCOL 3350 17 G/17G
1 POWDER, FOR SOLUTION ORAL
Status: DISCONTINUED | OUTPATIENT
Start: 2021-01-17 | End: 2021-02-04 | Stop reason: HOSPADM

## 2021-01-17 RX ORDER — FINASTERIDE 5 MG/1
5 TABLET, FILM COATED ORAL DAILY
Status: DISCONTINUED | OUTPATIENT
Start: 2021-01-17 | End: 2021-02-04 | Stop reason: HOSPADM

## 2021-01-17 RX ORDER — AMOXICILLIN 250 MG
2 CAPSULE ORAL 2 TIMES DAILY
Status: DISCONTINUED | OUTPATIENT
Start: 2021-01-17 | End: 2021-02-04 | Stop reason: HOSPADM

## 2021-01-17 RX ORDER — ONDANSETRON 4 MG/1
4 TABLET, ORALLY DISINTEGRATING ORAL EVERY 4 HOURS PRN
Status: DISCONTINUED | OUTPATIENT
Start: 2021-01-17 | End: 2021-02-04 | Stop reason: HOSPADM

## 2021-01-17 RX ORDER — LIDOCAINE HYDROCHLORIDE AND EPINEPHRINE BITARTRATE 20; .01 MG/ML; MG/ML
10 INJECTION, SOLUTION SUBCUTANEOUS ONCE
Status: COMPLETED | OUTPATIENT
Start: 2021-01-17 | End: 2021-01-17

## 2021-01-17 RX ORDER — ACETAMINOPHEN 325 MG/1
650 TABLET ORAL EVERY 6 HOURS PRN
Status: DISCONTINUED | OUTPATIENT
Start: 2021-01-17 | End: 2021-02-04 | Stop reason: HOSPADM

## 2021-01-17 RX ORDER — DEXTROSE MONOHYDRATE 25 G/50ML
50 INJECTION, SOLUTION INTRAVENOUS
Status: DISCONTINUED | OUTPATIENT
Start: 2021-01-17 | End: 2021-01-19

## 2021-01-17 RX ORDER — ONDANSETRON 2 MG/ML
4 INJECTION INTRAMUSCULAR; INTRAVENOUS EVERY 4 HOURS PRN
Status: DISCONTINUED | OUTPATIENT
Start: 2021-01-17 | End: 2021-02-04 | Stop reason: HOSPADM

## 2021-01-17 RX ORDER — LABETALOL HYDROCHLORIDE 5 MG/ML
10 INJECTION, SOLUTION INTRAVENOUS EVERY 4 HOURS PRN
Status: DISCONTINUED | OUTPATIENT
Start: 2021-01-17 | End: 2021-02-04 | Stop reason: HOSPADM

## 2021-01-17 RX ORDER — AMLODIPINE BESYLATE 10 MG/1
5 TABLET ORAL
Status: DISCONTINUED | OUTPATIENT
Start: 2021-01-17 | End: 2021-01-17

## 2021-01-17 RX ORDER — TAMSULOSIN HYDROCHLORIDE 0.4 MG/1
0.4 CAPSULE ORAL
Status: DISCONTINUED | OUTPATIENT
Start: 2021-01-17 | End: 2021-02-04 | Stop reason: HOSPADM

## 2021-01-17 RX ADMIN — AMLODIPINE BESYLATE 5 MG: 10 TABLET ORAL at 07:59

## 2021-01-17 RX ADMIN — INSULIN HUMAN 1 UNITS: 100 INJECTION, SOLUTION PARENTERAL at 08:03

## 2021-01-17 RX ADMIN — FINASTERIDE 5 MG: 5 TABLET, FILM COATED ORAL at 08:30

## 2021-01-17 RX ADMIN — INSULIN HUMAN 1 UNITS: 100 INJECTION, SOLUTION PARENTERAL at 20:32

## 2021-01-17 RX ADMIN — TAMSULOSIN HYDROCHLORIDE 0.4 MG: 0.4 CAPSULE ORAL at 08:30

## 2021-01-17 RX ADMIN — LIDOCAINE HYDROCHLORIDE AND EPINEPHRINE 10 ML: 20; 10 INJECTION, SOLUTION INFILTRATION; PERINEURAL at 03:15

## 2021-01-17 RX ADMIN — ENOXAPARIN SODIUM 40 MG: 40 INJECTION SUBCUTANEOUS at 07:59

## 2021-01-17 RX ADMIN — CEFTRIAXONE SODIUM 1 G: 1 INJECTION, POWDER, FOR SOLUTION INTRAMUSCULAR; INTRAVENOUS at 07:59

## 2021-01-17 RX ADMIN — ACETAMINOPHEN 650 MG: 325 TABLET ORAL at 11:03

## 2021-01-17 ASSESSMENT — LIFESTYLE VARIABLES
HAVE PEOPLE ANNOYED YOU BY CRITICIZING YOUR DRINKING: NO
HOW MANY TIMES IN THE PAST YEAR HAVE YOU HAD 5 OR MORE DRINKS IN A DAY: 0
AVERAGE NUMBER OF DAYS PER WEEK YOU HAVE A DRINK CONTAINING ALCOHOL: 0
EVER FELT BAD OR GUILTY ABOUT YOUR DRINKING: NO
TOTAL SCORE: 0
ON A TYPICAL DAY WHEN YOU DRINK ALCOHOL HOW MANY DRINKS DO YOU HAVE: 0
TOTAL SCORE: 0
EVER HAD A DRINK FIRST THING IN THE MORNING TO STEADY YOUR NERVES TO GET RID OF A HANGOVER: NO
DOES PATIENT WANT TO STOP DRINKING: NO
HAVE YOU EVER FELT YOU SHOULD CUT DOWN ON YOUR DRINKING: NO
TOTAL SCORE: 0
ALCOHOL_USE: NO
CONSUMPTION TOTAL: NEGATIVE

## 2021-01-17 ASSESSMENT — FIBROSIS 4 INDEX: FIB4 SCORE: 1.84

## 2021-01-17 ASSESSMENT — PATIENT HEALTH QUESTIONNAIRE - PHQ9
SUM OF ALL RESPONSES TO PHQ9 QUESTIONS 1 AND 2: 0
2. FEELING DOWN, DEPRESSED, IRRITABLE, OR HOPELESS: NOT AT ALL
1. LITTLE INTEREST OR PLEASURE IN DOING THINGS: NOT AT ALL

## 2021-01-17 ASSESSMENT — ENCOUNTER SYMPTOMS
SHORTNESS OF BREATH: 0
FOCAL WEAKNESS: 0
NAUSEA: 0
CHILLS: 0
HEADACHES: 0
ABDOMINAL PAIN: 1
FEVER: 0
COUGH: 0
PHOTOPHOBIA: 0
FALLS: 1
HALLUCINATIONS: 0
LOSS OF CONSCIOUSNESS: 0
WEAKNESS: 1
EYE PAIN: 0
PALPITATIONS: 0
BLURRED VISION: 0
VOMITING: 0
NECK PAIN: 0
SEIZURES: 0
BACK PAIN: 0

## 2021-01-17 ASSESSMENT — COGNITIVE AND FUNCTIONAL STATUS - GENERAL
HELP NEEDED FOR BATHING: A LITTLE
TOILETING: A LITTLE
DRESSING REGULAR LOWER BODY CLOTHING: A LITTLE
MOBILITY SCORE: 18
DAILY ACTIVITIY SCORE: 20
DRESSING REGULAR UPPER BODY CLOTHING: A LITTLE
WALKING IN HOSPITAL ROOM: A LITTLE
CLIMB 3 TO 5 STEPS WITH RAILING: A LITTLE
SUGGESTED CMS G CODE MODIFIER MOBILITY: CK
STANDING UP FROM CHAIR USING ARMS: A LITTLE
MOVING TO AND FROM BED TO CHAIR: A LITTLE
MOVING FROM LYING ON BACK TO SITTING ON SIDE OF FLAT BED: A LITTLE
TURNING FROM BACK TO SIDE WHILE IN FLAT BAD: A LITTLE
SUGGESTED CMS G CODE MODIFIER DAILY ACTIVITY: CJ

## 2021-01-17 ASSESSMENT — PAIN DESCRIPTION - PAIN TYPE: TYPE: ACUTE PAIN

## 2021-01-17 NOTE — PROGRESS NOTES
Bedside report received.  Assessment complete.  A&O x 4. Patient calls appropriately.  Patient mobilizes with one person assist. Bed alarm on.   Patient has 0/10 pain. Declines at this time.  Denies N&V. Tolerating regular diet.  Sutures to left eye, dressing in place around head.  + void, + flatus, - BM.  Patient denies SOB.  SCD's off.  Review plan with of care with patient. Call light and personal belongings with in reach. Hourly rounding in place. All needs met at this time.

## 2021-01-17 NOTE — PROGRESS NOTES
Hospital Medicine Daily Progress Note    Date of Service  1/17/2021    Chief Complaint  75 y.o. male admitted 1/17/2021 with h/o FTT and prior stroke with chronic homelesness comes in with FTT, recurrent falls and worsening confusion    Hospital Course  No notes on file    Interval Problem Update  Confusion-patient knows his name and that he is in a hospital, otherwise cannot glean any other data at this time. Afebrile    BPH-retaining urine, straigh cath x1 last night    Consultants/Specialty  none    Code Status  DNAR/DNI    Disposition  Medical then likely SNF    Review of Systems  Review of Systems   Unable to perform ROS: Acuity of condition        Physical Exam  Temp:  [36.3 °C (97.4 °F)-37.2 °C (98.9 °F)] 37 °C (98.6 °F)  Pulse:  [80-94] 80  Resp:  [13-20] 18  BP: (125-171)/(70-93) 125/70  SpO2:  [95 %-100 %] 98 %    Physical Exam  Vitals signs and nursing note reviewed.   Constitutional:       General: He is not in acute distress.     Appearance: He is well-developed.      Comments: Lehtargic, confused, mumbles mostly  Cachetic   HENT:      Head: Normocephalic and atraumatic.      Comments: Wrapped in gauze, C/D/I     Mouth/Throat:      Pharynx: No oropharyngeal exudate.   Eyes:      General: No scleral icterus.     Pupils: Pupils are equal, round, and reactive to light.   Neck:      Musculoskeletal: Normal range of motion and neck supple.      Thyroid: No thyromegaly.   Cardiovascular:      Rate and Rhythm: Normal rate and regular rhythm.      Heart sounds: Normal heart sounds. No murmur.   Pulmonary:      Effort: Pulmonary effort is normal.      Breath sounds: Normal breath sounds. No wheezing.   Abdominal:      General: Bowel sounds are normal. There is no distension.      Palpations: Abdomen is soft.      Tenderness: There is no abdominal tenderness.   Musculoskeletal: Normal range of motion.         General: No tenderness.      Right lower leg: Edema present.      Left lower leg: Edema present.       "Comments: 2+ pitting edema B/L LE's   Skin:     General: Skin is warm and dry.      Findings: No rash.   Neurological:      Mental Status: He is alert.      Cranial Nerves: No cranial nerve deficit.      Comments: A&OX2 at best, sleepy  ?weakness of the LUE, hard to get a good physical exam         Fluids    Intake/Output Summary (Last 24 hours) at 1/17/2021 1209  Last data filed at 1/17/2021 1147  Gross per 24 hour   Intake 100 ml   Output 580 ml   Net -480 ml       Laboratory  Recent Labs     01/16/21  1120 01/17/21  0217   WBC 7.5 8.1   RBC 3.99* 4.04*   HEMOGLOBIN 9.5* 9.4*   HEMATOCRIT 31.0* 31.2*   MCV 77.7* 77.2*   MCH 23.8* 23.3*   MCHC 30.6* 30.1*   RDW 54.3* 53.4*   PLATELETCT 203 230   MPV 9.1 9.5     Recent Labs     01/16/21  1120 01/17/21  0217   SODIUM 139 141   POTASSIUM 3.9 3.9   CHLORIDE 104 108   CO2 22 21   GLUCOSE 228* 205*   BUN 29* 25*   CREATININE 1.13 1.12   CALCIUM 9.2 9.2                   Imaging  DX-CHEST-PORTABLE (1 VIEW)   Final Result         1. No acute cardiopulmonary abnormalities are identified.      CT-HEAD W/O   Final Result         1.  No evidence of acute intracranial hemorrhage or mass lesion.      2. Cerebral atrophy.         US-EXTREMITY VENOUS LOWER BILAT    (Results Pending)        Assessment/Plan  * Encephalopathy- (present on admission)  Assessment & Plan  -Unknown baseline, the patient is a very poor historian and essentially answers \"I do not know\" to 90% of the questions asked regarding his recent or remote past.  -I am suspecting this is secondary to the patient's UTI, continue to monitor, possibly underlying dementia?  -consider EEG  -consider MRI brain  -once infection is treated, consider cog eval  -avoid unnecessary sedatives/hypnotics, etc.      Recurrent falls while walking- (present on admission)  Assessment & Plan  -Patient has been having increasing falls for the past 2 weeks, was seen in our ED yesterday for similar fall and head injury.  -Today comes in " with another fall and head injury, large hematoma and laceration to the patient's scalp requiring repair by ERP.  -PT OT-likely will need placement  -Possible the patient's urinary tract infection has been causing weakness and confusion with fall, patient does have some mild confusion on exam.  -Additionally patient has uncontrolled diabetes and some neuropathy which also could be impacting patient's ability to walk.  -check Vitamin b12 and folate    UTI (urinary tract infection)- (present on admission)  Assessment & Plan  -Patient with history of urinary retention, was complaining of urinary retention in the ED  -Urinalysis indicates infection, started on ceftriaxone day#2  -prior cultures with Kaley hernandezii  -F/U final culture  -consider lazo    Urinary retention- (present on admission)  Assessment & Plan  -Patient reports history of requiring indwelling Lazo catheter with leg bag, was discontinued unknown how long ago unknown for what reason.  -straight cath x1 last night, last time admitted was D/C to SNF with lazo  -bladder scan daily  -might need to place lazo catheter  -restart flomax and prosca, check orthstatics  -check PSA with reflex    DM (diabetes mellitus) (HCC)- (present on admission)  Assessment & Plan  -Last A1c 13.2 in November 2020  -Blood sugar 205 on admission  -Insulin sliding scale and POC glucose before every meal at bedtime    Moderate protein-calorie malnutrition (HCC)- (present on admission)  Assessment & Plan  -dietary consult, BMI 17.22    Swelling of lower extremity- (present on admission)  Assessment & Plan  US both legs    Hypertension- (present on admission)  Assessment & Plan  BP better today, hold norvasc while initiating BPH medications, adjust PRN       VTE prophylaxis: lovenox

## 2021-01-17 NOTE — ED NOTES
Pt states that he has a hx of BPH. Pt states at one point he had a catheter that he had changed out every month.     Straight cath done- 700 cc  Urine collected and sent

## 2021-01-17 NOTE — PROGRESS NOTES
Lazo placed per order, patient was straight catheterized in ER and was unable to void after. Bladder scan above 500 mL. 580 mL emptied apon insertion of lazo.

## 2021-01-17 NOTE — ASSESSMENT & PLAN NOTE
Avoid benzodiazepines and anticholinergics  Frequent orientation  Avoid early morning labs  Avoid vital signs during sleep  Ambulate if possible  -resolved and back at his baseline

## 2021-01-17 NOTE — H&P
"Hospital Medicine History & Physical Note    Date of Service  1/17/2021    Primary Care Physician  Pcp Pt States None    Consultants  None    Code Status  DNAR/DNI    Chief Complaint  Chief Complaint   Patient presents with   • T-5000 GLF     Code TBI pt had multiple GLFs unsure of how he fell -LOC -blood thinners -ETOH       History of Presenting Illness  75 y.o. male past medical history of hypertension, BPH with urinary retention, diabetes mellitus type 2, recently recurrent falls who presented 1/17/2021 with another fall with injury to his scalp.    Mr. Núñez reports for the past few weeks he has been having increased falls while walking.  Patient is a poor historian and essentially answers \"I do not know\" to almost all questions when asked.  He is able to tell me that he just has not been feeling too good lately, has not been eating or drinking much. He is unable to provide many details even when asked very specific questions regarding his medical history or even the last few days.  He does say that he was in the hospital yesterday for a fall, appears similar story he came to the ED after suffering a ground-level fall resulting in large hematoma to the right forehead.  He was discharged from the ED and went to a homeless shelter.  Today he reports falling twice today, denies tripping on anything or passing out or loss of consciousness, he says that these are mechanical falls.  He is rather confused but really his only complaint at the time of my interview is bladder pain from urinary retention.    On arrival patient's blood pressure is 147/70, heart rate was 87, respiratory rate 18 oxygen saturation 98% on room air, the patient was afebrile.  Initial work-up showed anemia 9.4, glucose 205, BUN 25, alk phos 104 otherwise normal electrolytes, renal, and liver function.  Cough is nonproductive, troponin T 33, UA indicates infection.  Patient subsequently admitted for recurrent falls and weakness, urinary tract " infection, encephalopathy.    Review of Systems  Review of Systems   Constitutional: Positive for malaise/fatigue. Negative for chills and fever.   HENT: Negative for congestion and nosebleeds.    Eyes: Negative for photophobia and pain.   Respiratory: Negative for cough and shortness of breath.    Cardiovascular: Negative for chest pain and palpitations.   Gastrointestinal: Positive for abdominal pain. Negative for nausea and vomiting.   Genitourinary: Positive for dysuria. Negative for urgency.   Musculoskeletal: Positive for falls. Negative for back pain and neck pain.   Neurological: Positive for weakness. Negative for focal weakness and seizures.   Psychiatric/Behavioral: Negative for hallucinations and suicidal ideas.       Past Medical History   has a past medical history of Fall, Polio (Ag of 5), and Type II or unspecified type diabetes mellitus without mention of complication, not stated as uncontrolled.    Surgical History   has a past surgical history that includes pr appendectomy and tonsillectomy.     Family History  family history is not on file.     Social History   reports that he quit smoking about 60 years ago. His smoking use included cigarettes. He started smoking about 66 years ago. He quit after 5.00 years of use. He has never used smokeless tobacco. He reports that he does not drink alcohol or use drugs.    Allergies  No Known Allergies    Medications  Prior to Admission Medications   Prescriptions Last Dose Informant Patient Reported? Taking?   finasteride (PROSCAR) 5 MG TABS  Patient No No   Sig: Take 1 Tab by mouth every day.   Patient not taking: Reported on 11/13/2020   metformin (GLUCOPHAGE) 850 MG Tab   No No   Sig: Take 1 Tab by mouth 2 times a day, with meals.   Patient not taking: Reported on 11/13/2020   tamsulosin (FLOMAX) 0.4 MG capsule  Patient No No   Sig: Take 1 Cap by mouth ONE-HALF HOUR AFTER BREAKFAST.   Patient not taking: Reported on 11/13/2020      Facility-Administered  Medications: None       Physical Exam  Temp:  [35.9 °C (96.6 °F)-36.3 °C (97.4 °F)] 36.3 °C (97.4 °F)  Pulse:  [87-94] 90  Resp:  [13-20] 20  BP: (147-171)/(70-93) 148/74  SpO2:  [95 %-100 %] 95 %    Physical Exam  Vitals signs and nursing note reviewed.   Constitutional:       General: He is not in acute distress.     Appearance: He is normal weight.      Comments: 75-year-old male appears stated age, appears chronically ill, alert and conversant, able speak full sentences without becoming breathless   HENT:      Head:      Comments: Hematoma to the right forehead, laceration repair and hematoma to the left forehead     Nose: Nose normal. No rhinorrhea.      Mouth/Throat:      Mouth: Mucous membranes are dry.      Pharynx: Oropharynx is clear.   Eyes:      General: No scleral icterus.     Extraocular Movements: Extraocular movements intact.      Pupils: Pupils are equal, round, and reactive to light.   Neck:      Musculoskeletal: Normal range of motion and neck supple. No muscular tenderness.   Cardiovascular:      Rate and Rhythm: Normal rate and regular rhythm.      Pulses: Normal pulses.      Heart sounds: No murmur.   Pulmonary:      Effort: Pulmonary effort is normal. No respiratory distress.      Breath sounds: No wheezing.   Abdominal:      General: Bowel sounds are normal. There is no distension.      Palpations: Abdomen is soft.      Tenderness: There is abdominal tenderness (suprapubic, distended urinary bladder). There is no right CVA tenderness, left CVA tenderness, guarding or rebound.   Musculoskeletal: Normal range of motion.         General: No deformity.   Skin:     General: Skin is warm and dry.      Capillary Refill: Capillary refill takes less than 2 seconds.   Neurological:      General: No focal deficit present.      Mental Status: He is alert and oriented to person, place, and time.      Cranial Nerves: No cranial nerve deficit.      Sensory: No sensory deficit.      Motor: No weakness.  "        Laboratory:  Recent Labs     01/16/21  1120 01/17/21 0217   WBC 7.5 8.1   RBC 3.99* 4.04*   HEMOGLOBIN 9.5* 9.4*   HEMATOCRIT 31.0* 31.2*   MCV 77.7* 77.2*   MCH 23.8* 23.3*   MCHC 30.6* 30.1*   RDW 54.3* 53.4*   PLATELETCT 203 230   MPV 9.1 9.5     Recent Labs     01/16/21  1120 01/17/21 0217   SODIUM 139 141   POTASSIUM 3.9 3.9   CHLORIDE 104 108   CO2 22 21   GLUCOSE 228* 205*   BUN 29* 25*   CREATININE 1.13 1.12   CALCIUM 9.2 9.2     Recent Labs     01/16/21  1120 01/17/21 0217   ALTSGPT 12 17   ASTSGOT 17 22   ALKPHOSPHAT 91 104*   TBILIRUBIN 0.3 0.5   GLUCOSE 228* 205*         No results for input(s): NTPROBNP in the last 72 hours.      Recent Labs     01/17/21 0217   TROPONINT 33*       Imaging:  DX-CHEST-PORTABLE (1 VIEW)   Final Result         1. No acute cardiopulmonary abnormalities are identified.      CT-HEAD W/O   Final Result         1.  No evidence of acute intracranial hemorrhage or mass lesion.      2. Cerebral atrophy.               Assessment/Plan:  I anticipate this patient is appropriate for observation status at this time.    * Encephalopathy- (present on admission)  Assessment & Plan  -Unknown baseline, the patient is a very poor historian and essentially answers \"I do not know\" to 90% of the questions asked regarding his recent or remote past.  -I am suspecting this is secondary to the patient's UTI, continue to monitor.  -attempt to minimize risk of delirium such as avoiding day time napping and promote night time sleep, monitor for constipation, remove lines/tubing that is not needed, avoid early lab draws and vital checks, limit polypharmacy as able, and keep close to the window      Recurrent falls while walking- (present on admission)  Assessment & Plan  -Patient has been having increasing falls for the past 2 weeks, was seen in our ED yesterday for similar fall and head injury.  -Today comes in with another fall and head injury, large hematoma and laceration to the " patient's scalp requiring repair by ERP.  -PT OT.  -Possible the patient's urinary tract infection has been causing weakness and confusion with fall, patient does have some mild confusion on exam.  -Additionally patient has uncontrolled diabetes and some neuropathy which also could be impacting patient's ability to walk.    UTI (urinary tract infection)- (present on admission)  Assessment & Plan  -Patient with history of urinary retention, was complaining of urinary retention in the ED  -Urinalysis indicates infection, started on ceftriaxone  -Follow-up urine culture    Elevated troponin- (present on admission)  Assessment & Plan  -Initially 33, patient is complaining of no chest pain, EKG does not have any ST elevations or depressions.  -Trend troponin, repeat EKG if the patient has any chest pains.      Urinary retention- (present on admission)  Assessment & Plan  -Patient reports history of requiring indwelling Clement catheter with leg bag, was discontinued unknown how long ago unknown for what reason.  -Patient was complaining of significant suprapubic pain and had a distended urinary bladder, bladder scan showed greater than 700 cc urine straight cath was performed that empty the bladder.  -On med rec review it appears the patient used to be on Proscar and Flomax however given his recurrent falls and head injuries I am hesitant to restart this right now.  If the patient has continued urinary retention consider restarting.    DM (diabetes mellitus) ([facility])- (present on admission)  Assessment & Plan  -Last A1c 13.2 in November 2020  -Blood sugar 205 on admission  -Insulin sliding scale and POC glucose before every meal at bedtime    Hypertension- (present on admission)  Assessment & Plan  -Patient's blood pressures in the 140s to 180s, will start amlodipine p.o. daily

## 2021-01-17 NOTE — ED TRIAGE NOTES
"Chief Complaint   Patient presents with   • T-5000 GLF     Code TBI pt had multiple GLFs unsure of how he fell -LOC -blood thinners -ETOH     Pt BIB EMS for above complaint. Pt has laceration above Lt eye bleeding controlled on arrival. Pt is alert and oriented. CMS intact.     BP (!) 165/93   Pulse 94   Resp 16   Ht 1.778 m (5' 10\")   Wt 54.4 kg (120 lb)   SpO2 100%   BMI 17.22 kg/m²     "

## 2021-01-17 NOTE — ASSESSMENT & PLAN NOTE
PT and OT, needs group home [facility], working with CM/SW, limited options at this time  Bed alarm per unit policy

## 2021-01-17 NOTE — ED PROVIDER NOTES
ED Provider Note    Scribed for Isis Suero M.D. by Maggie Berman. 2021, 1:50 AM.    Means of arrival: EMS  History obtained from: Patient  History limited by: None    CHIEF COMPLAINT  Chief Complaint   Patient presents with   • T-5000 GLF     Code TBI pt had multiple GLFs unsure of how he fell -LOC -blood thinners -ETOH       HPI  Hugh Núñez is a 75 y.o. male who presents to the Emergency Department via EMS for evaluation as a code TBI after a fall. He reportedly did not lose consciousness but did sustain a laceration to his left eyebrow. He is not on blood thinners. He fell twice today and reports that he has been falling frequently over the last 2 weeks but is unsure why. He denies tripping on anything. EMS reports that he has had a GCS of 15 and been alert and oriented x 4. He denies any associated neck or back pain. He has not been drinking alcohol tonight.  Patient seems intermittently confused and asks about random things such as a milk bottle or violin.  Unclear what his baseline is.    Per chart review, patient was also seen here yesterday after a fall.  Was subsequently discharged.    REVIEW OF SYSTEMS  Review of Systems   HENT:        Positive for laceration to left eyebrow.   Eyes: Negative for blurred vision.   Musculoskeletal: Positive for falls. Negative for back pain and neck pain.   Neurological: Negative for loss of consciousness and headaches.   All other systems reviewed and are negative.      PAST MEDICAL HISTORY   has a past medical history of Fall, Polio (Ag of 5), and Type II or unspecified type diabetes mellitus without mention of complication, not stated as uncontrolled.    SURGICAL HISTORY   has a past surgical history that includes appendectomy and tonsillectomy.    SOCIAL HISTORY  Social History     Tobacco Use   • Smoking status: Former Smoker     Years: 5.00     Types: Cigarettes     Start date: 1955     Quit date: 1960     Years since quittin.3   •  "Smokeless tobacco: Never Used   Substance Use Topics   • Alcohol use: No   • Drug use: No      Social History     Substance and Sexual Activity   Drug Use No       FAMILY HISTORY  No family history pertinent.    CURRENT MEDICATIONS  Current Outpatient Medications   Medication Instructions   • finasteride (PROSCAR) 5 mg, Oral, DAILY   • metFORMIN (GLUCOPHAGE) 850 mg, Oral, 2 TIMES DAILY WITH MEALS   • tamsulosin (FLOMAX) 0.4 mg, Oral, AFTER BREAKFAST       ALLERGIES  No Known Allergies    PHYSICAL EXAM  VITAL SIGNS: BP (!) 165/93   Pulse 94   Resp 16   Ht 1.778 m (5' 10\")   Wt 54.4 kg (120 lb)   SpO2 100%   BMI 17.22 kg/m²   Vitals reviewed by myself.  Nursing note and vitals reviewed.  Constitutional: Well-developed and well-nourished. No acute distress.   HENT: 4 cm laceration to left eyebrow with scant bleeding. Old hematoma and abrasion to right forehead from fall yesterday. Head is normocephalic.  Eyes: extra-ocular movements intact  Cardiovascular: Regular rate and regular rhythm. No murmur heard.  Pulmonary/Chest: Breath sounds normal. No wheezes or rales.   Abdominal: Soft and non-tender. No distention.    Musculoskeletal: Extremities exhibit normal range of motion without edema or tenderness.   Neurological: Awake and alert.  Alert and oriented to person, place and time, although has episodes where he is intermittently confused.  Cranial nerves II through XII intact.  5 out of 5 strength in all extremities.  Skin: Skin is warm and dry. No rash.       DIAGNOSTIC STUDIES /  LABS  Labs Reviewed   CBC WITH DIFFERENTIAL - Abnormal; Notable for the following components:       Result Value    RBC 4.04 (*)     Hemoglobin 9.4 (*)     Hematocrit 31.2 (*)     MCV 77.2 (*)     MCH 23.3 (*)     MCHC 30.1 (*)     RDW 53.4 (*)     Neutrophils-Polys 78.20 (*)     Lymphocytes 10.10 (*)     Lymphs (Absolute) 0.82 (*)     All other components within normal limits   COMP METABOLIC PANEL - Abnormal; Notable for the " following components:    Glucose 205 (*)     Bun 25 (*)     Alkaline Phosphatase 104 (*)     All other components within normal limits   TROPONIN - Abnormal; Notable for the following components:    Troponin T 33 (*)     All other components within normal limits   URINALYSIS - Abnormal; Notable for the following components:    Character Turbid (*)     Glucose 500 (*)     Protein 30 (*)     Leukocyte Esterase Large (*)     Occult Blood Small (*)     All other components within normal limits   URINE MICROSCOPIC (W/UA) - Abnormal; Notable for the following components:    WBC Packed (*)     RBC 10-20 (*)     All other components within normal limits   DIAGNOSTIC ALCOHOL   ESTIMATED GFR   SARS-COV-2, PCR (IN-HOUSE)    Narrative:     Have you been in close contact with a person who is suspected  or known to be positive for COVID-19 within the last 30 days  (e.g. last seen that person < 30 days ago)->Unknown   MAGNESIUM   PHOSPHORUS   TROPONIN       All labs reviewed by me.    EKG Interpretation:  Interpreted by myself    12 Lead EKG interpreted by me to show:  EKG at 1:55 AM: Normal sinus rhythm, heart rate 93, normal axis, intervals notable for borderline prolonged QT of 478, , QRS 94, no acute ST-T segment changes, no evidence of acute arrhythmia or ischemia  My impression of this EKG: Does not indicate ischemia or arrhythmia at this time.    RADIOLOGY  DX-CHEST-PORTABLE (1 VIEW)   Final Result         1. No acute cardiopulmonary abnormalities are identified.      CT-HEAD W/O   Final Result         1.  No evidence of acute intracranial hemorrhage or mass lesion.      2. Cerebral atrophy.           The radiologist's interpretation of all radiological studies have been reviewed by me.      PROCEDURES  Laceration Repair Procedure Note    Indication: Laceration    Procedure: The patient was placed in the appropriate position and anesthesia around the laceration was obtained by infiltration using 2% Lidocaine with  epinephrine. The area was then cleansed with betadine and draped in a sterile fashion. The laceration was closed with 5-0 rapid absorbing gut using interrupted sutures. There were no additional lacerations requiring repair. The wound area was then dressed with a sterile dressing.      Total repaired wound length: 4 cm.     Other Items: Suture count: 10    The patient tolerated the procedure well.    Complications: None      REASSESSMENT    1:50 AM - Patient seen and examined at charge desk as a code TBI. Discussed plan of care, including imaging and labs. Patient agrees to the plan of care. Ordered for CT-head and labs to evaluate his symptoms.     2:49 AM - Patient was reevaluated at bedside. Informed him that his laceration will require sutures and that he needs to be admitted due to his numerous falls. He is amenable to this plan.    3:04 AM - Performed laceration repair procedure. See above for details.  Patient's laceration is closed with rapid absorbing gut, sutures will not need to be removed as they will absorb    3:50 AM - I discussed the patient's case and the above findings with Dr. Mooney (Hospitalist) who agrees to evaluate the patient for hospitalization.      COURSE & MEDICAL DECISION MAKING  Nursing notes, VS, PMSFHx reviewed in chart.    Patient is a 75-year-old male who comes in for evaluation of frequent falls.  Differential diagnosis includes debility, syncope, electrolyte disturbance, arrhythmia, intracranial hematoma.  Diagnostic work-up includes labs, EKG, CT of the head.    Patient's initial vitals notable for hypertension.  EKG returns and demonstrates no evidence of acute arrhythmia or ischemia.  Labs returned and are notable for mildly elevated troponin of 33, unclear etiology.  EKG is nonischemic at this time and he has a prior history of troponin elevation.  This can continue to be monitored.  Patient also noted to be anemic with a hemoglobin of 9.4, this appears to be around his  baseline.  CT of the head returns and demonstrates no acute abnormalities.  Chest x-ray demonstrates no acute cardiopulmonary processes.  Given patient's frequent falls and confusion I feel that he is unsafe for discharge, therefore I will hospitalize him for ongoing syncope work-up and likely he will need safe placement.  I discussed the case with Dr. Mooney who has accepted patient for hospitalization.  Patient is in guarded condition at time of hospitalization.    Of note urinalysis was pending at time of hospitalization, urine results were positive for infection, this was discussed with Dr. Mooney who started patient on antibiotics.      DISPOSITION:  Patient will be hospitalized by Dr. Mooney in guarded condition.    FINAL IMPRESSION  1. Fall, initial encounter    2. Closed head injury, initial encounter    3. Facial laceration, initial encounter         Laceration repair performed by ERP.     Maggie CARDENAS (Scribe), am scribing for, and in the presence of, Isis Suero M.D..    Electronically signed by: Maggie Berman (Scribe), 1/17/2021    Isis CARDENAS M.D. personally performed the services described in this documentation, as scribed by Maggie Berman in my presence, and it is both accurate and complete.      The note accurately reflects work and decisions made by me.  Isis Suero M.D.  1/17/2021  5:08 AM

## 2021-01-17 NOTE — ED NOTES
Pt being transported to [facility] in stable condition by transport, pts two belongings bags and cane being sent up

## 2021-01-17 NOTE — ASSESSMENT & PLAN NOTE
SSI, more controlled with addition of lispro  -lispro 3 units TIDAC  -continue Lantus 15 units daily  -Hypoglycemic protocol  -Diabetic diet   -FS qAC&HS

## 2021-01-17 NOTE — PROGRESS NOTES
Patient arrived from ED via gurney at 0619. Patient has gauze wrappen around head secured with Kerlix.  Report of laceration above the left eye sutured in ED.  Patient was alert and oriented x 4 during assessment and quickly fell to sleep by 0645. Patient skin is otherwise intact.  Patient has scabs to the left LE at the lateral shin area.  Patient has BLE edema +2 pitting.  Patient presented in good mood and talkative prior to falling asleep.  Will pass along report to day RN.

## 2021-01-17 NOTE — ASSESSMENT & PLAN NOTE
-Initially 33, patient is complaining of no chest pain, EKG does not have any ST elevations or depressions.  -Trend troponin, repeat EKG if the patient has any chest pains.

## 2021-01-17 NOTE — ED NOTES
ERP at bedside suturing pts lac    Pt is oriented being in the hospital, his name and year, but pt cannot say how he fell. When ERP was suturing pts laceration, pt would  Mumble random words

## 2021-01-18 ENCOUNTER — APPOINTMENT (OUTPATIENT)
Dept: RADIOLOGY | Facility: MEDICAL CENTER | Age: 76
DRG: 071 | End: 2021-01-18
Attending: INTERNAL MEDICINE
Payer: MEDICARE

## 2021-01-18 LAB
ALBUMIN SERPL BCP-MCNC: 3.3 G/DL (ref 3.2–4.9)
ALBUMIN/GLOB SERPL: 1.1 G/DL
ALP SERPL-CCNC: 77 U/L (ref 30–99)
ALT SERPL-CCNC: 15 U/L (ref 2–50)
ANION GAP SERPL CALC-SCNC: 7 MMOL/L (ref 7–16)
ANISOCYTOSIS BLD QL SMEAR: ABNORMAL
AST SERPL-CCNC: 12 U/L (ref 12–45)
BASOPHILS # BLD AUTO: 0.9 % (ref 0–1.8)
BASOPHILS # BLD: 0.07 K/UL (ref 0–0.12)
BILIRUB SERPL-MCNC: 0.6 MG/DL (ref 0.1–1.5)
BUN SERPL-MCNC: 22 MG/DL (ref 8–22)
CALCIUM SERPL-MCNC: 8.5 MG/DL (ref 8.5–10.5)
CHLORIDE SERPL-SCNC: 109 MMOL/L (ref 96–112)
CO2 SERPL-SCNC: 24 MMOL/L (ref 20–33)
CREAT SERPL-MCNC: 0.92 MG/DL (ref 0.5–1.4)
EOSINOPHIL # BLD AUTO: 0.07 K/UL (ref 0–0.51)
EOSINOPHIL NFR BLD: 0.9 % (ref 0–6.9)
ERYTHROCYTE [DISTWIDTH] IN BLOOD BY AUTOMATED COUNT: 54.6 FL (ref 35.9–50)
GLOBULIN SER CALC-MCNC: 2.9 G/DL (ref 1.9–3.5)
GLUCOSE BLD-MCNC: 153 MG/DL (ref 65–99)
GLUCOSE BLD-MCNC: 199 MG/DL (ref 65–99)
GLUCOSE BLD-MCNC: 218 MG/DL (ref 65–99)
GLUCOSE BLD-MCNC: 240 MG/DL (ref 65–99)
GLUCOSE SERPL-MCNC: 164 MG/DL (ref 65–99)
HCT VFR BLD AUTO: 28.8 % (ref 42–52)
HGB BLD-MCNC: 8.6 G/DL (ref 14–18)
HYPOCHROMIA BLD QL SMEAR: ABNORMAL
LYMPHOCYTES # BLD AUTO: 1.34 K/UL (ref 1–4.8)
LYMPHOCYTES NFR BLD: 18.3 % (ref 22–41)
MACROCYTES BLD QL SMEAR: ABNORMAL
MANUAL DIFF BLD: NORMAL
MCH RBC QN AUTO: 23.6 PG (ref 27–33)
MCHC RBC AUTO-ENTMCNC: 29.9 G/DL (ref 33.7–35.3)
MCV RBC AUTO: 78.9 FL (ref 81.4–97.8)
MICROCYTES BLD QL SMEAR: ABNORMAL
MONOCYTES # BLD AUTO: 0.07 K/UL (ref 0–0.85)
MONOCYTES NFR BLD AUTO: 0.9 % (ref 0–13.4)
MORPHOLOGY BLD-IMP: NORMAL
NEUTROPHILS # BLD AUTO: 5.77 K/UL (ref 1.82–7.42)
NEUTROPHILS NFR BLD: 79.1 % (ref 44–72)
NRBC # BLD AUTO: 0 K/UL
NRBC BLD-RTO: 0 /100 WBC
OVALOCYTES BLD QL SMEAR: NORMAL
PLATELET # BLD AUTO: 196 K/UL (ref 164–446)
PLATELET BLD QL SMEAR: NORMAL
PMV BLD AUTO: 9.4 FL (ref 9–12.9)
POLYCHROMASIA BLD QL SMEAR: NORMAL
POTASSIUM SERPL-SCNC: 3.6 MMOL/L (ref 3.6–5.5)
PROT SERPL-MCNC: 6.2 G/DL (ref 6–8.2)
RBC # BLD AUTO: 3.65 M/UL (ref 4.7–6.1)
RBC BLD AUTO: PRESENT
SODIUM SERPL-SCNC: 140 MMOL/L (ref 135–145)
WBC # BLD AUTO: 7.3 K/UL (ref 4.8–10.8)

## 2021-01-18 PROCEDURE — 97166 OT EVAL MOD COMPLEX 45 MIN: CPT

## 2021-01-18 PROCEDURE — A9270 NON-COVERED ITEM OR SERVICE: HCPCS | Performed by: STUDENT IN AN ORGANIZED HEALTH CARE EDUCATION/TRAINING PROGRAM

## 2021-01-18 PROCEDURE — 99232 SBSQ HOSP IP/OBS MODERATE 35: CPT | Performed by: INTERNAL MEDICINE

## 2021-01-18 PROCEDURE — G0378 HOSPITAL OBSERVATION PER HR: HCPCS

## 2021-01-18 PROCEDURE — 96372 THER/PROPH/DIAG INJ SC/IM: CPT

## 2021-01-18 PROCEDURE — 82962 GLUCOSE BLOOD TEST: CPT | Mod: 91

## 2021-01-18 PROCEDURE — 97161 PT EVAL LOW COMPLEX 20 MIN: CPT

## 2021-01-18 PROCEDURE — 85027 COMPLETE CBC AUTOMATED: CPT

## 2021-01-18 PROCEDURE — 85007 BL SMEAR W/DIFF WBC COUNT: CPT

## 2021-01-18 PROCEDURE — 700111 HCHG RX REV CODE 636 W/ 250 OVERRIDE (IP): Performed by: STUDENT IN AN ORGANIZED HEALTH CARE EDUCATION/TRAINING PROGRAM

## 2021-01-18 PROCEDURE — 700102 HCHG RX REV CODE 250 W/ 637 OVERRIDE(OP): Performed by: INTERNAL MEDICINE

## 2021-01-18 PROCEDURE — 700102 HCHG RX REV CODE 250 W/ 637 OVERRIDE(OP): Performed by: STUDENT IN AN ORGANIZED HEALTH CARE EDUCATION/TRAINING PROGRAM

## 2021-01-18 PROCEDURE — A9270 NON-COVERED ITEM OR SERVICE: HCPCS | Performed by: INTERNAL MEDICINE

## 2021-01-18 PROCEDURE — 700105 HCHG RX REV CODE 258: Performed by: STUDENT IN AN ORGANIZED HEALTH CARE EDUCATION/TRAINING PROGRAM

## 2021-01-18 PROCEDURE — 36415 COLL VENOUS BLD VENIPUNCTURE: CPT

## 2021-01-18 PROCEDURE — 700111 HCHG RX REV CODE 636 W/ 250 OVERRIDE (IP): Performed by: INTERNAL MEDICINE

## 2021-01-18 PROCEDURE — 93970 EXTREMITY STUDY: CPT

## 2021-01-18 PROCEDURE — 770006 HCHG ROOM/CARE - MED/SURG/GYN SEMI*

## 2021-01-18 PROCEDURE — 80053 COMPREHEN METABOLIC PANEL: CPT

## 2021-01-18 RX ORDER — CYANOCOBALAMIN 1000 UG/ML
1000 INJECTION, SOLUTION INTRAMUSCULAR; SUBCUTANEOUS
Status: DISCONTINUED | OUTPATIENT
Start: 2021-01-18 | End: 2021-01-21

## 2021-01-18 RX ADMIN — INSULIN HUMAN 1 UNITS: 100 INJECTION, SOLUTION PARENTERAL at 07:55

## 2021-01-18 RX ADMIN — INSULIN HUMAN 2 UNITS: 100 INJECTION, SOLUTION PARENTERAL at 16:58

## 2021-01-18 RX ADMIN — DOCUSATE SODIUM 50 MG AND SENNOSIDES 8.6 MG 2 TABLET: 8.6; 5 TABLET, FILM COATED ORAL at 16:59

## 2021-01-18 RX ADMIN — DOCUSATE SODIUM 50 MG AND SENNOSIDES 8.6 MG 2 TABLET: 8.6; 5 TABLET, FILM COATED ORAL at 06:32

## 2021-01-18 RX ADMIN — TAMSULOSIN HYDROCHLORIDE 0.4 MG: 0.4 CAPSULE ORAL at 07:59

## 2021-01-18 RX ADMIN — INSULIN HUMAN 2 UNITS: 100 INJECTION, SOLUTION PARENTERAL at 20:03

## 2021-01-18 RX ADMIN — CYANOCOBALAMIN 1000 MCG: 1000 INJECTION, SOLUTION INTRAMUSCULAR; SUBCUTANEOUS at 15:09

## 2021-01-18 RX ADMIN — ACETAMINOPHEN 650 MG: 325 TABLET ORAL at 11:34

## 2021-01-18 RX ADMIN — INSULIN HUMAN 1 UNITS: 100 INJECTION, SOLUTION PARENTERAL at 11:34

## 2021-01-18 RX ADMIN — FINASTERIDE 5 MG: 5 TABLET, FILM COATED ORAL at 06:31

## 2021-01-18 RX ADMIN — CEFTRIAXONE SODIUM 1 G: 1 INJECTION, POWDER, FOR SOLUTION INTRAMUSCULAR; INTRAVENOUS at 06:32

## 2021-01-18 ASSESSMENT — COGNITIVE AND FUNCTIONAL STATUS - GENERAL
TOILETING: A LOT
PERSONAL GROOMING: A LITTLE
TURNING FROM BACK TO SIDE WHILE IN FLAT BAD: A LITTLE
DRESSING REGULAR LOWER BODY CLOTHING: A LOT
STANDING UP FROM CHAIR USING ARMS: A LITTLE
SUGGESTED CMS G CODE MODIFIER DAILY ACTIVITY: CK
HELP NEEDED FOR BATHING: A LOT
DRESSING REGULAR UPPER BODY CLOTHING: A LITTLE
MOBILITY SCORE: 17
MOVING TO AND FROM BED TO CHAIR: A LITTLE
DAILY ACTIVITIY SCORE: 16
WALKING IN HOSPITAL ROOM: A LITTLE
CLIMB 3 TO 5 STEPS WITH RAILING: A LOT
SUGGESTED CMS G CODE MODIFIER MOBILITY: CK
MOVING FROM LYING ON BACK TO SITTING ON SIDE OF FLAT BED: A LITTLE

## 2021-01-18 ASSESSMENT — GAIT ASSESSMENTS
DISTANCE (FEET): 75
ASSISTIVE DEVICE: FRONT WHEEL WALKER

## 2021-01-18 ASSESSMENT — ENCOUNTER SYMPTOMS
HEADACHES: 1
VOMITING: 0
CHILLS: 0
ABDOMINAL PAIN: 0
FALLS: 1
WEAKNESS: 1
FEVER: 0
NAUSEA: 0

## 2021-01-18 ASSESSMENT — ACTIVITIES OF DAILY LIVING (ADL): TOILETING: INDEPENDENT

## 2021-01-18 ASSESSMENT — PAIN DESCRIPTION - PAIN TYPE: TYPE: ACUTE PAIN

## 2021-01-18 NOTE — DIETARY
"Nutrition services: Day 0 of admit.  Hugh Núñez is a 75 y.o. male with admitting DX of Recurrent falls while walking.     Hospital Problem List:  1. Recurrent falls while walking  2. Encephalopathy  3. UTI  4. Urinary retention  5. DM  6. Swelling of lower extremity  7. Moderate protein-calorie malnutrition  8. HTN    Pt seen for low BMI. Pt reports good intake while at recent SNF stay a couple days ago but then had to leave due to financial issues; pt reports he did not have a place to go to but did have access to food. Pt states appetite is good and ate 100% of breakfast and always ate 100% of meals at SNF. Pt reports gradual wt loss likely related to aging but unsure of UBW now. Discussed importance of adequate nutrition, pt verbalized undersanding and expressed interest in adding snacks to meals. Pt sometimes had trouble staying focused, kept discussing things not related to nutrition.       Assessment:  Height: 177.8 cm (5' 10\")  Weight: 54.4 kg (120 lb)  Body mass index is 17.22 kg/m²., BMI classification: Underweight.   Diet/Intake: Consistent CHO.     Evaluation:   1. Pt with hx of DM, Failure to Thrive, Noncompliance, Stroke, Constipation, Hyperglycemia, Vit D defiency, Fall, Weakness, Acute delirium.  2. Pertinent Labs: FSBS of 141-186 (1/7-1/18).  3. Pertinent Meds: Rocephin, SSI, Zofran PRN, Bowel protocol.   4. Wt per chart review: 53.6 kg/118 lbs per bedscale on 11/14, appears stable with current wt.   5. Per MD notes, pt with chronic homelessness.    Malnutrition Risk: Pt with signs of fat and muscle loss (hollowing at temple area, squared shoulders); however, this is likely related to aging process vs malnutrition. Unable to confirm malnutrition diagnosis at this time based on pt's report of PO intake and wt trends per chart review.    Recommendations/Plan:  1. Will add high protein snacks three times daily.   2. Encourage PO and document all intake as % taken in ADL's to provide " interdisciplinary communication across all shifts.   3. Monitor weight.  4. Nutrition rep will continue to see patient for ongoing meal and snack preferences.     RD will screen weekly for adequate intake.

## 2021-01-18 NOTE — THERAPY
Physical Therapy   Initial Evaluation     Patient Name: Hugh Núñez  Age:  75 y.o., Sex:  male  Medical Record #: [id number]  Today's Date: 1/18/2021     Precautions: Fall Risk    Assessment  Patient is 75 y.o. male admitted for failure to thrive with confusion and recent falls presenting to PT tangential but able to perform x75' of gait at SBA with FWW. Pt required the most amount of assist during bed mobility and sit to stand where he was initially unsteady. Pt was oriented, but overall appeared somewhat confused. PT will follow to address instability and limited activity tolerance. At current level, recommend placement. If he does progress his functionality while here, he will likely require a housing situation where he can be supervised due to cognition. Will continue to assess.       Plan    Recommend Physical Therapy 3 times per week until therapy goals are met for the following treatments:  Bed Mobility, Equipment, Gait Training, Neuro Re-Education / Balance, Self Care/Home Evaluation, Stair Training, Therapeutic Activities and Therapeutic Exercises    DC Equipment Recommendations: Unable to determine at this time  Discharge Recommendations: Recommend post-acute placement for additional physical therapy services prior to discharge home        Objective       01/18/21 0848   Prior Living Situation   Prior Services None   Housing / Facility Motel   Steps Into Home 0   Equipment Owned Front-Wheel Walker;Single Point Cane   Lives with - Patient's Self Care Capacity Alone and Unable to Care For Self   Prior Level of Functional Mobility   Bed Mobility Independent   Transfer Status Independent   Ambulation Independent   Distance Ambulation (Feet)   (community)   Assistive Devices Used Single Point Cane   Balance Assessment   Comments seated at spv; standing at SBA   Gait Analysis   Gait Level Of Assist   (Stand By Assist)   Assistive Device Front Wheel Walker   Distance (Feet) 75   Comments distance limited by  fatigued and generalized discomfort.    Bed Mobility    Supine to Sit Minimal Assist  (HHA for leverage)   Scooting Supervised   Functional Mobility   Sit to Stand Minimal Assist   Bed, Chair, Wheelchair Transfer   (Stand By Assist)   Transfer Method Stand Step   Short Term Goals    Short Term Goal # 1 Pt will perform bed mob at spv in 6tx to improve functional independence.    Short Term Goal # 2 Pt will perform gait x150' with FWW at spv in 6tx to improve functional mobility.    Anticipated Discharge Equipment and Recommendations   DC Equipment Recommendations Unable to determine at this time   Discharge Recommendations Recommend post-acute placement for additional physical therapy services prior to discharge home

## 2021-01-18 NOTE — THERAPY
Occupational Therapy   Initial Evaluation     Patient Name: Hugh Núñez  Age:  75 y.o., Sex:  male  Medical Record #: [id number]  Today's Date: 1/18/2021     Precautions  Precautions: Fall Risk    Assessment  Patient is 75 y.o. male admitted for FTT, confusion, fall and BLE edema, found to have scalp lacerative req repair, encephalopathy, and UTI/retention. PMHx of CVA, homelessness, recurrent falls, DMII and HTN. Pt confused req mod v/cs for safety and cogniton. Pt appears to be inaccurate historian; unclear if has assist at motel where pt lives. Currently limited by decreased functional mobility, activity tolerance, cognition, strength, balance, and pain which are currently affecting pt's ability to complete ADLs/IADLs at baseline. Will continue to follow.     Plan    Recommend Occupational Therapy 3 times per week until therapy goals are met for the following treatments:  Adaptive Equipment, Cognitive Skill Development, Neuro Re-Education / Balance, Self Care/Activities of Daily Living, Therapeutic Activities and Therapeutic Exercises.    DC Equipment Recommendations: Unable to determine at this time  Discharge Recommendations: Recommend post-acute placement for additional occupational therapy services prior to discharge home      Objective     01/18/21 0831   Prior Living Situation   Prior Services None   Housing / Facility Motel   Steps Into Home 0   Bathroom Set up Bathtub / Shower Combination   Equipment Owned Front-Wheel Walker;Single Point Cane  (SPC in room)   Lives with - Patient's Self Care Capacity Alone and Unable to Care For Self   Comments pt is a questionable historian. unclear if info is accurate   Prior Level of ADL Function   Self Feeding Independent   Grooming / Hygiene Independent   Bathing Requires Assist   Dressing Independent   Toileting Independent   Prior Level of IADL Function   Medication Management Unable To Determine At This Time   Laundry Unable To Determine At This Time  "  Kitchen Mobility Unable To Determine At This Time   Finances Unable To Determine At This Time   Home Management Unable To Determine At This Time   Shopping Unable To Determine At This Time   Prior Level Of Mobility Unable to Determine At This Time   Driving / Transportation Unable To Determine At This Time   History of Falls   Date of Last Fall   (frequent falls per chart and pt)   Vitals   O2 Delivery Device None - Room Air   Pain 0 - 10 Group   Location Generalized   Therapist Pain Assessment Post Activity Pain Same as Prior to Activity;During Activity;Nurse Notified  (c/o pain \"everywhere\" with all mobility. not quantified)   Cognition    Cognition / Consciousness X   Speech/ Communication Delayed Responses;Hard of Hearing   Level of Consciousness Confused   Safety Awareness Impaired   Attention Impaired   Comments cooperative, but tangential req repeated instructions and redirection   Passive ROM Upper Body   Passive ROM Upper Body WDL   Active ROM Upper Body   Active ROM Upper Body  WDL   Dominant Hand Right   Comments functional for ADLs   Strength Upper Body   Upper Body Strength  X   Gross Strength Generalized Weakness, Equal Bilaterally.    Comments per pt report   Balance Assessment   Sitting Balance (Static) Good   Sitting Balance (Dynamic) Fair +   Standing Balance (Static) Fair   Standing Balance (Dynamic) Fair -   Weight Shift Sitting Good   Weight Shift Standing Fair   Comments w/FWW    Bed Mobility    Supine to Sit Minimal Assist  (use of therapist hand; HOB elevated)   Sit to Supine   (left in chair)   Scooting Supervised   Comments HOB elevated   ADL Assessment   Eating Minimal Assist  (for setup)   Grooming Standing;Minimal Assist  (for water mgmt. wiping face and hands)   Toileting   (declined need)   Functional Mobility   Sit to Stand Minimal Assist   Bed, Chair, Wheelchair Transfer Minimal Assist  (for safety)   Toilet Transfers   (declined need)   Transfer Method Stand Step   Mobility w/ FWW " to sink then short distance in hallway   Edema / Skin Assessment   Edema / Skin  Not Assessed   Activity Tolerance   Sitting in Chair 6+ min left in chair   Sitting Edge of Bed 8 min   Standing 8 min   Comments limited by fatigue   Patient / Family Goals   Patient / Family Goal #1 to get stronger   Short Term Goals   Short Term Goal # 1 will complete LB dressing with SPV   Short Term Goal # 2 will complete toilet txf with SPV   Short Term Goal # 3 will complete standing g/h with SPV and proper safety with FWW   Anticipated Discharge Equipment and Recommendations   DC Equipment Recommendations Unable to determine at this time   Discharge Recommendations Recommend post-acute placement for additional occupational therapy services prior to discharge home

## 2021-01-18 NOTE — PROGRESS NOTES
Bedside report received.  Assessment complete.  A&O x 4. Patient calls appropriately.  Patient mobilizes with one person assist. Bed alarm on.   Patient has 7/10 pain. Denies intervention at this time.  Denies N&V. Tolerating diabetic diet.  Suture to the left eye, open to air.  + void, + flatus, - BM.  Patient denies SOB.  SCD's off.  Review plan with of care with patient. Call light and personal belongings with in reach. Hourly rounding in place. All needs met at this time.

## 2021-01-18 NOTE — PROGRESS NOTES
Hospital Medicine Daily Progress Note    Date of Service  1/18/2021    Chief Complaint  75 y.o. male admitted 1/17/2021 with h/o FTT and prior stroke with chronic homelesness comes in with FTT, recurrent falls and worsening confusion    Hospital Course  No notes on file    Interval Problem Update  Confusion-much improved, told me fascinating ghost stories today    BPH-lazo catheter placed, good urine output. Urine culture results pending, remains afebrile    Consultants/Specialty  none    Code Status  DNAR/DNI    Disposition  Medical then likely SNF, PT confirmed, will place orders today    Review of Systems  Review of Systems   Constitutional: Positive for malaise/fatigue. Negative for chills and fever.   Gastrointestinal: Negative for abdominal pain, nausea and vomiting.   Musculoskeletal: Positive for falls.   Neurological: Positive for weakness and headaches.   All other systems reviewed and are negative.       Physical Exam  Temp:  [36.4 °C (97.5 °F)-36.9 °C (98.5 °F)] 36.6 °C (97.9 °F)  Pulse:  [78-88] 82  Resp:  [16-17] 17  BP: ()/(55-57) 95/55  SpO2:  [96 %-97 %] 97 %    Physical Exam  Vitals signs and nursing note reviewed.   Constitutional:       General: He is not in acute distress.     Appearance: He is well-developed.      Comments: Much more awake today, interactive  Sitting upright in chair   HENT:      Head: Normocephalic and atraumatic.      Comments: Superficial wound b/l forehead, sutures in place     Mouth/Throat:      Pharynx: No oropharyngeal exudate.   Eyes:      General: No scleral icterus.     Pupils: Pupils are equal, round, and reactive to light.   Neck:      Musculoskeletal: Normal range of motion and neck supple.      Thyroid: No thyromegaly.   Cardiovascular:      Rate and Rhythm: Normal rate and regular rhythm.      Heart sounds: Normal heart sounds. No murmur.   Pulmonary:      Effort: Pulmonary effort is normal.      Breath sounds: Normal breath sounds. No wheezing.    Abdominal:      General: Bowel sounds are normal. There is no distension.      Palpations: Abdomen is soft.      Tenderness: There is no abdominal tenderness.   Musculoskeletal: Normal range of motion.         General: No tenderness.      Right lower leg: Edema present.      Left lower leg: Edema present.      Comments: 2+ pitting edema B/L LE's   Skin:     General: Skin is warm and dry.      Findings: No rash.   Neurological:      Mental Status: He is alert and oriented to person, place, and time.      Cranial Nerves: No cranial nerve deficit.      Comments: Weak         Fluids    Intake/Output Summary (Last 24 hours) at 1/18/2021 1350  Last data filed at 1/18/2021 1125  Gross per 24 hour   Intake 360 ml   Output 1000 ml   Net -640 ml       Laboratory  Recent Labs     01/16/21  1120 01/17/21  0217 01/18/21  0508   WBC 7.5 8.1 7.3   RBC 3.99* 4.04* 3.65*   HEMOGLOBIN 9.5* 9.4* 8.6*   HEMATOCRIT 31.0* 31.2* 28.8*   MCV 77.7* 77.2* 78.9*   MCH 23.8* 23.3* 23.6*   MCHC 30.6* 30.1* 29.9*   RDW 54.3* 53.4* 54.6*   PLATELETCT 203 230 196   MPV 9.1 9.5 9.4     Recent Labs     01/16/21  1120 01/17/21  0217 01/18/21  0508   SODIUM 139 141 140   POTASSIUM 3.9 3.9 3.6   CHLORIDE 104 108 109   CO2 22 21 24   GLUCOSE 228* 205* 164*   BUN 29* 25* 22   CREATININE 1.13 1.12 0.92   CALCIUM 9.2 9.2 8.5                   Imaging  US-EXTREMITY VENOUS LOWER BILAT   Final Result      DX-CHEST-PORTABLE (1 VIEW)   Final Result         1. No acute cardiopulmonary abnormalities are identified.      CT-HEAD W/O   Final Result         1.  No evidence of acute intracranial hemorrhage or mass lesion.      2. Cerebral atrophy.              Assessment/Plan  * Encephalopathy- (present on admission)  Assessment & Plan  -much improved, likely metabolic and infection related      Recurrent falls while walking- (present on admission)  Assessment & Plan  -Patient has been having increasing falls for the past 2 weeks, was seen in our ED yesterday for  similar fall and head injury.  -Today comes in with another fall and head injury, large hematoma and laceration to the patient's scalp requiring repair by ERP.  -PT OT-likely will need placement  -Possible the patient's urinary tract infection has been causing weakness and confusion with fall, patient does have some mild confusion on exam.  -Additionally patient has uncontrolled diabetes and some neuropathy which also could be impacting patient's ability to walk.  -check Vitamin b12 is low, folate levels are ok, will replace former  -h/o polio when 4 but does not exhibit true post polio syndromes    UTI (urinary tract infection)- (present on admission)  Assessment & Plan  -Patient with history of urinary retention, was complaining of urinary retention in the ED  -Urinalysis indicates infection, started on ceftriaxone day#2  -prior cultures with Kaley diaz  -F/U final culture  -consider lazo    Urinary retention- (present on admission)  Assessment & Plan  -lazo catheter in place, working well  -restart flomax and prosca, check orthstatics are negative, but SBP soft, so need to watch closely  -check PSA with reflex    DM (diabetes mellitus) (HCC)- (present on admission)  Assessment & Plan  -Last A1c 13.2 in November 2020  -Blood sugar 205 on admission  -Insulin sliding scale and POC glucose before every meal at bedtime    Moderate protein-calorie malnutrition (HCC)- (present on admission)  Assessment & Plan  -dietary consult, BMI 17.22    Swelling of lower extremity- (present on admission)  Assessment & Plan  US both legs negative for DVT    Hypertension- (present on admission)  Assessment & Plan  BP are on the lower side now       VTE prophylaxis: lovenox

## 2021-01-19 PROBLEM — E53.8 VITAMIN B12 DEFICIENCY: Status: ACTIVE | Noted: 2021-01-19

## 2021-01-19 PROBLEM — R54 FRAILTY SYNDROME IN GERIATRIC PATIENT: Status: ACTIVE | Noted: 2020-11-13

## 2021-01-19 LAB
ANION GAP SERPL CALC-SCNC: 8 MMOL/L (ref 7–16)
BASOPHILS # BLD AUTO: 0.5 % (ref 0–1.8)
BASOPHILS # BLD: 0.04 K/UL (ref 0–0.12)
BUN SERPL-MCNC: 20 MG/DL (ref 8–22)
CALCIUM SERPL-MCNC: 8.5 MG/DL (ref 8.5–10.5)
CHLORIDE SERPL-SCNC: 104 MMOL/L (ref 96–112)
CO2 SERPL-SCNC: 25 MMOL/L (ref 20–33)
CREAT SERPL-MCNC: 1.08 MG/DL (ref 0.5–1.4)
EOSINOPHIL # BLD AUTO: 0.24 K/UL (ref 0–0.51)
EOSINOPHIL NFR BLD: 3.3 % (ref 0–6.9)
ERYTHROCYTE [DISTWIDTH] IN BLOOD BY AUTOMATED COUNT: 53.2 FL (ref 35.9–50)
GLUCOSE BLD-MCNC: 153 MG/DL (ref 65–99)
GLUCOSE BLD-MCNC: 166 MG/DL (ref 65–99)
GLUCOSE BLD-MCNC: 177 MG/DL (ref 65–99)
GLUCOSE BLD-MCNC: 232 MG/DL (ref 65–99)
GLUCOSE SERPL-MCNC: 185 MG/DL (ref 65–99)
HCT VFR BLD AUTO: 28.1 % (ref 42–52)
HGB BLD-MCNC: 8.4 G/DL (ref 14–18)
IMM GRANULOCYTES # BLD AUTO: 0.03 K/UL (ref 0–0.11)
IMM GRANULOCYTES NFR BLD AUTO: 0.4 % (ref 0–0.9)
LYMPHOCYTES # BLD AUTO: 1.52 K/UL (ref 1–4.8)
LYMPHOCYTES NFR BLD: 20.8 % (ref 22–41)
MCH RBC QN AUTO: 23.3 PG (ref 27–33)
MCHC RBC AUTO-ENTMCNC: 29.9 G/DL (ref 33.7–35.3)
MCV RBC AUTO: 78.1 FL (ref 81.4–97.8)
MONOCYTES # BLD AUTO: 0.8 K/UL (ref 0–0.85)
MONOCYTES NFR BLD AUTO: 10.9 % (ref 0–13.4)
NEUTROPHILS # BLD AUTO: 4.69 K/UL (ref 1.82–7.42)
NEUTROPHILS NFR BLD: 64.1 % (ref 44–72)
NRBC # BLD AUTO: 0 K/UL
NRBC BLD-RTO: 0 /100 WBC
PLATELET # BLD AUTO: 196 K/UL (ref 164–446)
PMV BLD AUTO: 9.1 FL (ref 9–12.9)
POTASSIUM SERPL-SCNC: 4 MMOL/L (ref 3.6–5.5)
PSA FREE MFR SERPL: NORMAL %
PSA FREE SERPL-MCNC: NORMAL NG/ML
PSA SERPL-MCNC: 0.4 NG/ML (ref 0–4)
RBC # BLD AUTO: 3.6 M/UL (ref 4.7–6.1)
SODIUM SERPL-SCNC: 137 MMOL/L (ref 135–145)
WBC # BLD AUTO: 7.3 K/UL (ref 4.8–10.8)

## 2021-01-19 PROCEDURE — 90662 IIV NO PRSV INCREASED AG IM: CPT | Performed by: FAMILY MEDICINE

## 2021-01-19 PROCEDURE — 99233 SBSQ HOSP IP/OBS HIGH 50: CPT | Performed by: FAMILY MEDICINE

## 2021-01-19 PROCEDURE — 85025 COMPLETE CBC W/AUTO DIFF WBC: CPT

## 2021-01-19 PROCEDURE — 82962 GLUCOSE BLOOD TEST: CPT | Mod: 91

## 2021-01-19 PROCEDURE — 3E02340 INTRODUCTION OF INFLUENZA VACCINE INTO MUSCLE, PERCUTANEOUS APPROACH: ICD-10-PCS | Performed by: STUDENT IN AN ORGANIZED HEALTH CARE EDUCATION/TRAINING PROGRAM

## 2021-01-19 PROCEDURE — 700111 HCHG RX REV CODE 636 W/ 250 OVERRIDE (IP): Performed by: STUDENT IN AN ORGANIZED HEALTH CARE EDUCATION/TRAINING PROGRAM

## 2021-01-19 PROCEDURE — 700105 HCHG RX REV CODE 258: Performed by: STUDENT IN AN ORGANIZED HEALTH CARE EDUCATION/TRAINING PROGRAM

## 2021-01-19 PROCEDURE — 700102 HCHG RX REV CODE 250 W/ 637 OVERRIDE(OP): Performed by: STUDENT IN AN ORGANIZED HEALTH CARE EDUCATION/TRAINING PROGRAM

## 2021-01-19 PROCEDURE — 700111 HCHG RX REV CODE 636 W/ 250 OVERRIDE (IP): Performed by: FAMILY MEDICINE

## 2021-01-19 PROCEDURE — 90471 IMMUNIZATION ADMIN: CPT | Mod: XU

## 2021-01-19 PROCEDURE — 770006 HCHG ROOM/CARE - MED/SURG/GYN SEMI*

## 2021-01-19 PROCEDURE — 36415 COLL VENOUS BLD VENIPUNCTURE: CPT

## 2021-01-19 PROCEDURE — A9270 NON-COVERED ITEM OR SERVICE: HCPCS | Performed by: STUDENT IN AN ORGANIZED HEALTH CARE EDUCATION/TRAINING PROGRAM

## 2021-01-19 PROCEDURE — 80048 BASIC METABOLIC PNL TOTAL CA: CPT

## 2021-01-19 PROCEDURE — 700102 HCHG RX REV CODE 250 W/ 637 OVERRIDE(OP): Performed by: INTERNAL MEDICINE

## 2021-01-19 PROCEDURE — A9270 NON-COVERED ITEM OR SERVICE: HCPCS | Performed by: INTERNAL MEDICINE

## 2021-01-19 RX ORDER — DEXTROSE MONOHYDRATE 25 G/50ML
50 INJECTION, SOLUTION INTRAVENOUS
Status: DISCONTINUED | OUTPATIENT
Start: 2021-01-19 | End: 2021-02-04 | Stop reason: HOSPADM

## 2021-01-19 RX ORDER — BACITRACIN ZINC 500 [USP'U]/G
OINTMENT TOPICAL 2 TIMES DAILY
Status: DISPENSED | OUTPATIENT
Start: 2021-01-19 | End: 2021-01-26

## 2021-01-19 RX ADMIN — FINASTERIDE 5 MG: 5 TABLET, FILM COATED ORAL at 04:32

## 2021-01-19 RX ADMIN — CEFTRIAXONE SODIUM 1 G: 1 INJECTION, POWDER, FOR SOLUTION INTRAMUSCULAR; INTRAVENOUS at 04:32

## 2021-01-19 RX ADMIN — ENOXAPARIN SODIUM 40 MG: 40 INJECTION SUBCUTANEOUS at 04:32

## 2021-01-19 RX ADMIN — TAMSULOSIN HYDROCHLORIDE 0.4 MG: 0.4 CAPSULE ORAL at 08:32

## 2021-01-19 RX ADMIN — INFLUENZA A VIRUS A/MICHIGAN/45/2015 X-275 (H1N1) ANTIGEN (FORMALDEHYDE INACTIVATED), INFLUENZA A VIRUS A/SINGAPORE/INFIMH-16-0019/2016 IVR-186 (H3N2) ANTIGEN (FORMALDEHYDE INACTIVATED), INFLUENZA B VIRUS B/PHUKET/3073/2013 ANTIGEN (FORMALDEHYDE INACTIVATED), AND INFLUENZA B VIRUS B/MARYLAND/15/2016 BX-69A ANTIGEN (FORMALDEHYDE INACTIVATED) 0.7 ML: 60; 60; 60; 60 INJECTION, SUSPENSION INTRAMUSCULAR at 22:06

## 2021-01-19 RX ADMIN — DOCUSATE SODIUM 50 MG AND SENNOSIDES 8.6 MG 2 TABLET: 8.6; 5 TABLET, FILM COATED ORAL at 17:18

## 2021-01-19 RX ADMIN — INSULIN HUMAN 1 UNITS: 100 INJECTION, SOLUTION PARENTERAL at 08:32

## 2021-01-19 RX ADMIN — BACITRACIN ZINC: 500 OINTMENT TOPICAL at 22:12

## 2021-01-19 ASSESSMENT — ENCOUNTER SYMPTOMS
DIARRHEA: 0
NERVOUS/ANXIOUS: 0
SPEECH CHANGE: 0
BACK PAIN: 0
BLURRED VISION: 0
CHILLS: 0
NAUSEA: 0
FOCAL WEAKNESS: 0
VOMITING: 0
WEAKNESS: 1
FLANK PAIN: 0
FEVER: 0
COUGH: 0
DIZZINESS: 0
PALPITATIONS: 0
DIAPHORESIS: 0
MYALGIAS: 0
ABDOMINAL PAIN: 0
SENSORY CHANGE: 0
SHORTNESS OF BREATH: 0
HEADACHES: 0
NECK PAIN: 0
FALLS: 1
HEARTBURN: 0
SORE THROAT: 0
WHEEZING: 0

## 2021-01-19 ASSESSMENT — PAIN DESCRIPTION - PAIN TYPE: TYPE: ACUTE PAIN

## 2021-01-19 NOTE — PROGRESS NOTES
[facility] Medicine Daily Progress Note    Date of Service  1/19/2021    Chief Complaint  75 y.o. male admitted 1/17/2021 with encephalopathy.    Hospital Course  Admitted with encephalopathy, known history of frailty syndrome and frequent falls.    Interval Problem Update  Encephalopathy -more alert and oriented  Falls -PT and OT evaluations none  Diabetes - 150-240  Pyuria -culture pending    Consultants/Specialty  None    Code Status  DNAR/DNI    Disposition  TBD    Review of Systems  Review of Systems   Constitutional: Positive for malaise/fatigue. Negative for chills, diaphoresis and fever.   HENT: Negative for congestion, hearing loss and sore throat.    Eyes: Negative for blurred vision.   Respiratory: Negative for cough, shortness of breath and wheezing.    Cardiovascular: Negative for chest pain, palpitations and leg swelling.   Gastrointestinal: Negative for abdominal pain, diarrhea, heartburn, nausea and vomiting.   Genitourinary: Negative for dysuria, flank pain and hematuria.   Musculoskeletal: Positive for falls. Negative for back pain, joint pain, myalgias and neck pain.   Skin: Negative for rash.   Neurological: Positive for weakness. Negative for dizziness, sensory change, speech change, focal weakness and headaches.   Psychiatric/Behavioral: The patient is not nervous/anxious.         Physical Exam  Temp:  [36.2 °C (97.2 °F)-37.1 °C (98.7 °F)] 36.4 °C (97.5 °F)  Pulse:  [56-93] 93  Resp:  [17] 17  BP: (102-130)/(62-73) 116/73  SpO2:  [90 %-96 %] 96 %    Physical Exam  Vitals signs and nursing note reviewed.   HENT:      Head: Normocephalic.      Comments: Laceration at forehead     Nose: No congestion.      Mouth/Throat:      Mouth: Mucous membranes are moist.   Eyes:      Extraocular Movements: Extraocular movements intact.      Conjunctiva/sclera: Conjunctivae normal.      Pupils: Pupils are equal, round, and reactive to light.   Neck:      Musculoskeletal: No muscular tenderness.   Cardiovascular:       Rate and Rhythm: Normal rate and regular rhythm.   Pulmonary:      Effort: Pulmonary effort is normal.      Breath sounds: Normal breath sounds.   Abdominal:      General: Bowel sounds are normal. There is no distension.      Palpations: Abdomen is soft.      Tenderness: There is no abdominal tenderness. There is no guarding or rebound.   Musculoskeletal:      Right lower leg: No edema.      Left lower leg: No edema.   Lymphadenopathy:      Cervical: No cervical adenopathy.   Skin:     General: Skin is warm and dry.   Neurological:      Mental Status: He is alert and oriented to person, place, and time.      Cranial Nerves: No cranial nerve deficit.      Motor: Weakness (MMT BUE 5/5, RLE 4+/5, LLE 4/5) present.         Fluids    Intake/Output Summary (Last 24 hours) at 1/19/2021 1003  Last data filed at 1/19/2021 0920  Gross per 24 hour   Intake 360 ml   Output 1750 ml   Net -1390 ml       Laboratory  Recent Labs     01/17/21  0217 01/18/21  0508 01/19/21  0424   WBC 8.1 7.3 7.3   RBC 4.04* 3.65* 3.60*   HEMOGLOBIN 9.4* 8.6* 8.4*   HEMATOCRIT 31.2* 28.8* 28.1*   MCV 77.2* 78.9* 78.1*   MCH 23.3* 23.6* 23.3*   MCHC 30.1* 29.9* 29.9*   RDW 53.4* 54.6* 53.2*   PLATELETCT 230 196 196   MPV 9.5 9.4 9.1     Recent Labs     01/17/21  0217 01/18/21  0508 01/19/21  0424   SODIUM 141 140 137   POTASSIUM 3.9 3.6 4.0   CHLORIDE 108 109 104   CO2 21 24 25   GLUCOSE 205* 164* 185*   BUN 25* 22 20   CREATININE 1.12 0.92 1.08   CALCIUM 9.2 8.5 8.5                   Imaging  US-EXTREMITY VENOUS LOWER BILAT   Final Result      DX-CHEST-PORTABLE (1 VIEW)   Final Result         1. No acute cardiopulmonary abnormalities are identified.      CT-HEAD W/O   Final Result         1.  No evidence of acute intracranial hemorrhage or mass lesion.      2. Cerebral atrophy.              Assessment/Plan  * Encephalopathy- (present on admission)  Assessment & Plan  Avoid benzodiazepines and anticholinergics  Frequent orientation  Avoid early  morning labs  Avoid vital signs during sleep  Ambulate if possible      Frequent falls- (present on admission)  Assessment & Plan  PT and OT  Check vitamin D level    Frailty syndrome in geriatric patient- (present on admission)  Assessment & Plan  PT and OT    Vitamin B12 deficiency- (present on admission)  Assessment & Plan  IM b12 given     Urinary retention- (present on admission)  Assessment & Plan  Flomax and Proscar    Noncompliance- (present on admission)  Assessment & Plan  counseling    Type 2 diabetes mellitus with hyperglycemia, without long-term current use of insulin (HCC)- (present on admission)  Assessment & Plan  Adjust SSI    Pyuria- (present on admission)  Assessment & Plan  Follow culture    Microcytic anemia- (present on admission)  Assessment & Plan  Follow cbc         VTE prophylaxis: Lovenox

## 2021-01-19 NOTE — DOCUMENTATION QUERY
[facility]                                                                       Query Response Note      PATIENT:               JERAMIE NIX  ACCT #:                  [id number]  MRN:                     [id number]  :                      1945  ADMIT DATE:       2021 1:48 AM  DISCH DATE:          RESPONDING  PROVIDER #:        [id number]           QUERY TEXT:    Urinary tract infection is documented in the H&P and - Progress Notes, and dropped from the problem list beginning with the  Progress Note.  Can this diagnosis be clarified?     NOTE: if an appropriate response is not listed below, please respond with a new note    The patient's clinical indicators include:  Per H&P and - Progress Notes:  Urinary tract infection: POA.     Progress Note: pyuria    urinalysis: packed wbc, large leukocyte esterase   Risk factors: urinary retention, diabetes  Treatment: Rocephin    Thank You,  Deanna Gaytan RN, BSN  Clinical    Connect via [employer]  Options provided:   -- Urinary tract infection resolved   -- Urinary tract infection ruled out   -- Urinary tract infection ruled in   -- Unable to determine      Query created by: Deanna Gaytan on 2021 10:37 AM    RESPONSE TEXT:    Unable to determine          Electronically signed by:  OLIVIA MAGANA MD 2021 2:15 PM

## 2021-01-19 NOTE — PROGRESS NOTES
Bedside report received.  Assessment complete.  A&O x 4. Patient calls appropriately.  Patient mobilizes with stand by assist and front wheel walker. Bed alarm on.   Patient has 5/10 pain. Denies any intervention at this time.  Denies N&V. Tolerating diabetic diet.  Suture to the left eye, open to room air.  + void, + flatus, - BM.  Patient denies SOB.  SCD's off.  Review plan with of care with patient. Call light and personal belongings with in reach. Hourly rounding in place. All needs met at this time.

## 2021-01-19 NOTE — DISCHARGE PLANNING
Anticipated Discharge Disposition: TBD-Long term bed     Action:   CM spoke with pt at bedside.   · Pt reports he is homeless. Pt was most recently discharged back to a motel room from [facility]. Pt reports he no longer has a motel room and has lost all his belongings.     · Pt reports he has a daughter in [place] (?) by the name of Nicky Marquez. Pt reports he is not estranged from her but does not have any contact info and is not sure of her whereabouts.     · Pt reports his SS income is $1100 per month.    · Pt reports he is no longer able to care for himself and would like assistance in placement. Options were discussed with pt-[facility] vs SNF long term bed. Pt agreeable to both but has no payor source for this at this time.  CM sent an email to [employer] to assist with application for Institutional Medicaid.  waiver application will be submitted for pt as well.     Barriers to Discharge: Medical clearance  PT OT recommending Post acute placement  Payor source-does not cover long term care    Plan: Care coordination will continue to follow and assist with placement in long term care as applicable.     Care Transition Team Assessment    Information Source  Information Given By: Patient  Who is responsible for making decisions for patient? : Patient    Readmission Evaluation  Is this a readmission?: Yes - unplanned readmission  Why do you think you were readmitted?: Falls  Was an appointment arranged for you prior to discharge?: No  Were there new prescriptions you were supposed to fill after you were discharged?: No  Did you understand your discharge instructions?: Yes  Did you have enough support after your last discharge?: No    Elopement Risk  Legal Hold: No  Ambulatory or Self Mobile in Wheelchair: No-Not an Elopement Risk    Interdisciplinary Discharge Planning  Primary Care Physician: None  Lives with - Patient's Self Care Capacity: Alone and Unable to Care For Self  Patient or legal guardian wants to designate  a caregiver: No  Housing / Facility: Motel, Homeless  Able to Return to Previous ADL's: No  Mobility Issues: Yes  Prior Services: Home-Independent  Assistance Needed: Yes  Durable Medical Equipment: Not Applicable    Discharge Preparedness  What is your plan after discharge?: Other (comment), Group home(Long term care)  What are your discharge supports?: (None)  Prior Functional Level: Needs Assist with Activities of Daily Living, Needs Assist with Medication Management  Difficulity with ADLs: Walking    Functional Assesment  Prior Functional Level: Needs Assist with Activities of Daily Living, Needs Assist with Medication Management    Finances  Financial Barriers to Discharge: Yes  Average Monthly Income: 1100 $  Source of Income: Social Security  Prescription Coverage: Yes    Advance Directive  Advance Directive?: None    Domestic Abuse  Have you ever been the victim of abuse or violence?: No  Physical Abuse or Sexual Abuse: No  Verbal Abuse or Emotional Abuse: No  Possible Abuse/Neglect Reported to:: Not Applicable    Discharge Risks or Barriers  Discharge risks or barriers?: Post-acute placement / services, Lives alone, no community support, Homeless / couch surfing, No PCP, Uninsured / underinsured  Patient risk factors: Active abuse / Neglect concerns, Homeless, Lack of outside supports, Lives alone and no community support, No PCP, Vulnerable adult, Uninsured or underinsured

## 2021-01-19 NOTE — PROGRESS NOTES
Report received from day shift RN, assumed Care.   Patient is AOx4, responds appropriately.      Pain controlled at this time.  Patient is tolerating diabetic diet, denies nausea/vomiting. + flatus  Up stand by assist with FWW with steady gait.    Plan of care discussed, all questions answered.    Educated on use of call light and importance of calling before getting out of bed. Pt verbalizes understanding.    Call light and belongings within reach, treaded slipper socks on, bed alarm in use, bed in lowest locked position.  All needs met at this time.

## 2021-01-20 LAB
25(OH)D3 SERPL-MCNC: 15 NG/ML (ref 30–100)
ANION GAP SERPL CALC-SCNC: 9 MMOL/L (ref 7–16)
BUN SERPL-MCNC: 22 MG/DL (ref 8–22)
CALCIUM SERPL-MCNC: 8.3 MG/DL (ref 8.5–10.5)
CHLORIDE SERPL-SCNC: 100 MMOL/L (ref 96–112)
CO2 SERPL-SCNC: 21 MMOL/L (ref 20–33)
CREAT SERPL-MCNC: 0.99 MG/DL (ref 0.5–1.4)
ERYTHROCYTE [DISTWIDTH] IN BLOOD BY AUTOMATED COUNT: 53.9 FL (ref 35.9–50)
GLUCOSE BLD-MCNC: 117 MG/DL (ref 65–99)
GLUCOSE BLD-MCNC: 120 MG/DL (ref 65–99)
GLUCOSE BLD-MCNC: 176 MG/DL (ref 65–99)
GLUCOSE BLD-MCNC: 178 MG/DL (ref 65–99)
GLUCOSE BLD-MCNC: 217 MG/DL (ref 65–99)
GLUCOSE BLD-MCNC: 312 MG/DL (ref 65–99)
GLUCOSE SERPL-MCNC: 280 MG/DL (ref 65–99)
HCT VFR BLD AUTO: 29 % (ref 42–52)
HGB BLD-MCNC: 8.8 G/DL (ref 14–18)
MCH RBC QN AUTO: 23.7 PG (ref 27–33)
MCHC RBC AUTO-ENTMCNC: 30.3 G/DL (ref 33.7–35.3)
MCV RBC AUTO: 78.2 FL (ref 81.4–97.8)
PLATELET # BLD AUTO: 200 K/UL (ref 164–446)
PMV BLD AUTO: 9.9 FL (ref 9–12.9)
POTASSIUM SERPL-SCNC: 3.9 MMOL/L (ref 3.6–5.5)
RBC # BLD AUTO: 3.71 M/UL (ref 4.7–6.1)
SODIUM SERPL-SCNC: 130 MMOL/L (ref 135–145)
WBC # BLD AUTO: 7.6 K/UL (ref 4.8–10.8)

## 2021-01-20 PROCEDURE — 85027 COMPLETE CBC AUTOMATED: CPT

## 2021-01-20 PROCEDURE — 770006 HCHG ROOM/CARE - MED/SURG/GYN SEMI*

## 2021-01-20 PROCEDURE — 80048 BASIC METABOLIC PNL TOTAL CA: CPT

## 2021-01-20 PROCEDURE — A9270 NON-COVERED ITEM OR SERVICE: HCPCS | Performed by: FAMILY MEDICINE

## 2021-01-20 PROCEDURE — A9270 NON-COVERED ITEM OR SERVICE: HCPCS | Performed by: INTERNAL MEDICINE

## 2021-01-20 PROCEDURE — 82306 VITAMIN D 25 HYDROXY: CPT

## 2021-01-20 PROCEDURE — 36415 COLL VENOUS BLD VENIPUNCTURE: CPT

## 2021-01-20 PROCEDURE — 82962 GLUCOSE BLOOD TEST: CPT | Mod: 91

## 2021-01-20 PROCEDURE — 700102 HCHG RX REV CODE 250 W/ 637 OVERRIDE(OP): Performed by: FAMILY MEDICINE

## 2021-01-20 PROCEDURE — 99233 SBSQ HOSP IP/OBS HIGH 50: CPT | Performed by: FAMILY MEDICINE

## 2021-01-20 PROCEDURE — 700102 HCHG RX REV CODE 250 W/ 637 OVERRIDE(OP): Performed by: INTERNAL MEDICINE

## 2021-01-20 PROCEDURE — 700111 HCHG RX REV CODE 636 W/ 250 OVERRIDE (IP): Performed by: STUDENT IN AN ORGANIZED HEALTH CARE EDUCATION/TRAINING PROGRAM

## 2021-01-20 RX ORDER — INSULIN GLARGINE 100 [IU]/ML
10 INJECTION, SOLUTION SUBCUTANEOUS
Status: DISCONTINUED | OUTPATIENT
Start: 2021-01-21 | End: 2021-01-22

## 2021-01-20 RX ORDER — VITAMIN B COMPLEX
1000 TABLET ORAL DAILY
Status: DISCONTINUED | OUTPATIENT
Start: 2021-01-20 | End: 2021-02-04 | Stop reason: HOSPADM

## 2021-01-20 RX ADMIN — TAMSULOSIN HYDROCHLORIDE 0.4 MG: 0.4 CAPSULE ORAL at 08:07

## 2021-01-20 RX ADMIN — Medication 1000 UNITS: at 14:38

## 2021-01-20 RX ADMIN — FINASTERIDE 5 MG: 5 TABLET, FILM COATED ORAL at 04:37

## 2021-01-20 RX ADMIN — BACITRACIN ZINC: 500 OINTMENT TOPICAL at 08:08

## 2021-01-20 RX ADMIN — BACITRACIN ZINC: 500 OINTMENT TOPICAL at 16:45

## 2021-01-20 RX ADMIN — ENOXAPARIN SODIUM 40 MG: 40 INJECTION SUBCUTANEOUS at 04:37

## 2021-01-20 ASSESSMENT — ENCOUNTER SYMPTOMS
FEVER: 0
DIAPHORESIS: 0
WEAKNESS: 1
HEADACHES: 0
ABDOMINAL PAIN: 0
FOCAL WEAKNESS: 0
FLANK PAIN: 0
NERVOUS/ANXIOUS: 0
SENSORY CHANGE: 0
NAUSEA: 0
HEARTBURN: 0
BLURRED VISION: 0
SHORTNESS OF BREATH: 0
DIARRHEA: 0
CHILLS: 0
PALPITATIONS: 0
DIZZINESS: 0
BACK PAIN: 0
SORE THROAT: 0
SPEECH CHANGE: 0
VOMITING: 0
COUGH: 0
WHEEZING: 0
MYALGIAS: 0
FALLS: 1
NECK PAIN: 0

## 2021-01-20 ASSESSMENT — PAIN DESCRIPTION - PAIN TYPE: TYPE: ACUTE PAIN

## 2021-01-20 NOTE — PROGRESS NOTES
Bedside report received.  Assessment complete.  A&O x 4. Patient calls appropriately.  Patient mobilizes with stand by assist and FFW. Bed alarm on.   Patient has 4/10 pain. Denies any intervention at this time.  Denies N&V. Tolerating diabetic diet.  Left eye laceration with sutures is clean, dry, intact, and open to room air.  + void, + flatus, + BM.  Patient denies SOB.  SCD's refused.  Review plan with of care with patient. Call light and personal belongings with in reach. Hourly rounding in place. All needs met at this time.

## 2021-01-20 NOTE — PROGRESS NOTES
[facility] Medicine Daily Progress Note    Date of Service  1/20/2021    Chief Complaint  75 y.o. male admitted 1/17/2021 with encephalopathy.    Hospital Course  Admitted with encephalopathy, known history of frailty syndrome and frequent falls.    Interval Problem Update  Encephalopathy - resolved  Falls -PT and OT evaluations done  Diabetes - 170-300  Pyuria - culture pending  Low vit D    Consultants/Specialty  None    Code Status  DNAR/DNI    Disposition  Long Term SNF vs Group Home    Review of Systems  Review of Systems   Constitutional: Positive for malaise/fatigue. Negative for chills, diaphoresis and fever.   HENT: Negative for congestion, hearing loss and sore throat.    Eyes: Negative for blurred vision.   Respiratory: Negative for cough, shortness of breath and wheezing.    Cardiovascular: Negative for chest pain, palpitations and leg swelling.   Gastrointestinal: Negative for abdominal pain, diarrhea, heartburn, nausea and vomiting.   Genitourinary: Negative for dysuria, flank pain and hematuria.   Musculoskeletal: Positive for falls. Negative for back pain, joint pain, myalgias and neck pain.   Skin: Negative for rash.   Neurological: Positive for weakness. Negative for dizziness, sensory change, speech change, focal weakness and headaches.   Psychiatric/Behavioral: The patient is not nervous/anxious.         Physical Exam  Temp:  [35.9 °C (96.7 °F)-36.7 °C (98.1 °F)] 36.3 °C (97.4 °F)  Pulse:  [60-86] 60  Resp:  [16-17] 17  BP: ()/(47-66) 98/48  SpO2:  [91 %-98 %] 97 %    Physical Exam  Vitals signs and nursing note reviewed.   HENT:      Head: Normocephalic.      Comments: Laceration at forehead     Nose: No congestion.      Mouth/Throat:      Mouth: Mucous membranes are moist.   Eyes:      Extraocular Movements: Extraocular movements intact.      Conjunctiva/sclera: Conjunctivae normal.      Pupils: Pupils are equal, round, and reactive to light.   Neck:      Musculoskeletal: No muscular  tenderness.   Cardiovascular:      Rate and Rhythm: Normal rate and regular rhythm.   Pulmonary:      Effort: Pulmonary effort is normal.      Breath sounds: Normal breath sounds.   Abdominal:      General: Bowel sounds are normal. There is no distension.      Palpations: Abdomen is soft.      Tenderness: There is no abdominal tenderness. There is no guarding or rebound.   Musculoskeletal:      Right lower leg: No edema.      Left lower leg: No edema.   Lymphadenopathy:      Cervical: No cervical adenopathy.   Skin:     General: Skin is warm and dry.   Neurological:      Mental Status: He is alert and oriented to person, place, and time.      Cranial Nerves: No cranial nerve deficit.      Motor: Weakness (MMT BUE 5/5, RLE 4+/5, LLE 4/5) present.         Fluids    Intake/Output Summary (Last 24 hours) at 1/20/2021 1303  Last data filed at 1/20/2021 1120  Gross per 24 hour   Intake 420 ml   Output 1375 ml   Net -955 ml       Laboratory  Recent Labs     01/18/21  0508 01/19/21  0424 01/20/21  1033   WBC 7.3 7.3 7.6   RBC 3.65* 3.60* 3.71*   HEMOGLOBIN 8.6* 8.4* 8.8*   HEMATOCRIT 28.8* 28.1* 29.0*   MCV 78.9* 78.1* 78.2*   MCH 23.6* 23.3* 23.7*   MCHC 29.9* 29.9* 30.3*   RDW 54.6* 53.2* 53.9*   PLATELETCT 196 196 200   MPV 9.4 9.1 9.9     Recent Labs     01/18/21  0508 01/19/21  0424 01/20/21  1033   SODIUM 140 137 130*   POTASSIUM 3.6 4.0 3.9   CHLORIDE 109 104 100   CO2 24 25 21   GLUCOSE 164* 185* 280*   BUN 22 20 22   CREATININE 0.92 1.08 0.99   CALCIUM 8.5 8.5 8.3*                   Imaging  US-EXTREMITY VENOUS LOWER BILAT   Final Result      DX-CHEST-PORTABLE (1 VIEW)   Final Result         1. No acute cardiopulmonary abnormalities are identified.      CT-HEAD W/O   Final Result         1.  No evidence of acute intracranial hemorrhage or mass lesion.      2. Cerebral atrophy.              Assessment/Plan  * Encephalopathy- (present on admission)  Assessment & Plan  Avoid benzodiazepines and  anticholinergics  Frequent orientation  Avoid early morning labs  Avoid vital signs during sleep  Ambulate if possible      Frequent falls- (present on admission)  Assessment & Plan  PT and OT    Frailty syndrome in geriatric patient- (present on admission)  Assessment & Plan  PT and OT    Vitamin B12 deficiency- (present on admission)  Assessment & Plan  IM b12 given     Urinary retention- (present on admission)  Assessment & Plan  Clement  Flomax and Proscar    Noncompliance- (present on admission)  Assessment & Plan  counseling    Type 2 diabetes mellitus with hyperglycemia, without long-term current use of insulin (HCC)- (present on admission)  Assessment & Plan  Adjusted SSI    Vitamin D deficiency- (present on admission)  Assessment & Plan  Start replacement    Pyuria- (present on admission)  Assessment & Plan  Follow culture    Microcytic anemia- (present on admission)  Assessment & Plan  Follow cbc       VTE prophylaxis: Lovenox

## 2021-01-20 NOTE — CARE PLAN
Problem: Communication  Goal: The ability to communicate needs accurately and effectively will improve  Outcome: PROGRESSING AS EXPECTED     Problem: Safety  Goal: Will remain free from injury  Outcome: PROGRESSING AS EXPECTED     Problem: Bowel/Gastric:  Goal: Normal bowel function is maintained or improved  Outcome: PROGRESSING AS EXPECTED     Problem: Discharge Barriers/Planning  Goal: Patient's continuum of care needs will be met  Outcome: PROGRESSING AS EXPECTED     Problem: Urinary Elimination:  Goal: Ability to reestablish a normal urinary elimination pattern will improve  Outcome: PROGRESSING SLOWER THAN EXPECTED

## 2021-01-21 LAB
GLUCOSE BLD-MCNC: 147 MG/DL (ref 65–99)
GLUCOSE BLD-MCNC: 187 MG/DL (ref 65–99)
GLUCOSE BLD-MCNC: 208 MG/DL (ref 65–99)
GLUCOSE BLD-MCNC: 237 MG/DL (ref 65–99)

## 2021-01-21 PROCEDURE — 97530 THERAPEUTIC ACTIVITIES: CPT

## 2021-01-21 PROCEDURE — 770006 HCHG ROOM/CARE - MED/SURG/GYN SEMI*

## 2021-01-21 PROCEDURE — A9270 NON-COVERED ITEM OR SERVICE: HCPCS | Performed by: STUDENT IN AN ORGANIZED HEALTH CARE EDUCATION/TRAINING PROGRAM

## 2021-01-21 PROCEDURE — 700111 HCHG RX REV CODE 636 W/ 250 OVERRIDE (IP): Performed by: STUDENT IN AN ORGANIZED HEALTH CARE EDUCATION/TRAINING PROGRAM

## 2021-01-21 PROCEDURE — 700102 HCHG RX REV CODE 250 W/ 637 OVERRIDE(OP): Performed by: INTERNAL MEDICINE

## 2021-01-21 PROCEDURE — 700102 HCHG RX REV CODE 250 W/ 637 OVERRIDE(OP): Performed by: STUDENT IN AN ORGANIZED HEALTH CARE EDUCATION/TRAINING PROGRAM

## 2021-01-21 PROCEDURE — 700102 HCHG RX REV CODE 250 W/ 637 OVERRIDE(OP): Performed by: FAMILY MEDICINE

## 2021-01-21 PROCEDURE — 99233 SBSQ HOSP IP/OBS HIGH 50: CPT | Performed by: FAMILY MEDICINE

## 2021-01-21 PROCEDURE — A9270 NON-COVERED ITEM OR SERVICE: HCPCS | Performed by: FAMILY MEDICINE

## 2021-01-21 PROCEDURE — 82962 GLUCOSE BLOOD TEST: CPT | Mod: 91

## 2021-01-21 PROCEDURE — A9270 NON-COVERED ITEM OR SERVICE: HCPCS | Performed by: INTERNAL MEDICINE

## 2021-01-21 RX ORDER — CHOLECALCIFEROL (VITAMIN D3) 125 MCG
1000 CAPSULE ORAL DAILY
Status: DISCONTINUED | OUTPATIENT
Start: 2021-01-21 | End: 2021-02-04 | Stop reason: HOSPADM

## 2021-01-21 RX ADMIN — BACITRACIN ZINC: 500 OINTMENT TOPICAL at 05:12

## 2021-01-21 RX ADMIN — CYANOCOBALAMIN TAB 500 MCG 1000 MCG: 500 TAB at 08:18

## 2021-01-21 RX ADMIN — INSULIN GLARGINE 10 UNITS: 100 INJECTION, SOLUTION SUBCUTANEOUS at 08:26

## 2021-01-21 RX ADMIN — ENOXAPARIN SODIUM 40 MG: 40 INJECTION SUBCUTANEOUS at 05:13

## 2021-01-21 RX ADMIN — DOCUSATE SODIUM 50 MG AND SENNOSIDES 8.6 MG 2 TABLET: 8.6; 5 TABLET, FILM COATED ORAL at 05:11

## 2021-01-21 RX ADMIN — FINASTERIDE 5 MG: 5 TABLET, FILM COATED ORAL at 05:12

## 2021-01-21 RX ADMIN — BACITRACIN ZINC: 500 OINTMENT TOPICAL at 18:26

## 2021-01-21 RX ADMIN — TAMSULOSIN HYDROCHLORIDE 0.4 MG: 0.4 CAPSULE ORAL at 08:18

## 2021-01-21 RX ADMIN — Medication 1000 UNITS: at 05:12

## 2021-01-21 ASSESSMENT — ENCOUNTER SYMPTOMS
DIARRHEA: 0
COUGH: 0
CHILLS: 0
NERVOUS/ANXIOUS: 0
PALPITATIONS: 0
WEAKNESS: 1
MYALGIAS: 0
WHEEZING: 0
HEARTBURN: 0
FEVER: 0
HEADACHES: 0
SHORTNESS OF BREATH: 0
FLANK PAIN: 0
ABDOMINAL PAIN: 0
SORE THROAT: 0
DIZZINESS: 0
FOCAL WEAKNESS: 0
SENSORY CHANGE: 0
SPEECH CHANGE: 0
BACK PAIN: 0
NAUSEA: 0
NECK PAIN: 0
DIAPHORESIS: 0
FALLS: 1
BLURRED VISION: 0
VOMITING: 0

## 2021-01-21 ASSESSMENT — COGNITIVE AND FUNCTIONAL STATUS - GENERAL
MOVING FROM LYING ON BACK TO SITTING ON SIDE OF FLAT BED: A LITTLE
CLIMB 3 TO 5 STEPS WITH RAILING: A LITTLE
MOBILITY SCORE: 19
STANDING UP FROM CHAIR USING ARMS: A LITTLE
WALKING IN HOSPITAL ROOM: A LITTLE
SUGGESTED CMS G CODE MODIFIER MOBILITY: CK
MOVING TO AND FROM BED TO CHAIR: A LITTLE

## 2021-01-21 ASSESSMENT — GAIT ASSESSMENTS
ASSISTIVE DEVICE: FRONT WHEEL WALKER
DISTANCE (FEET): 120

## 2021-01-21 ASSESSMENT — PAIN DESCRIPTION - PAIN TYPE
TYPE: ACUTE PAIN

## 2021-01-21 NOTE — PROGRESS NOTES
Clement d/c'd per order.  Patient tolerated well and educated on need to void within 6 hours.  Patient verbalized understanding.

## 2021-01-21 NOTE — PROGRESS NOTES
[facility] Medicine Daily Progress Note    Date of Service  1/21/2021    Chief Complaint  75 y.o. male admitted 1/17/2021 with encephalopathy.    Hospital Course  Admitted with encephalopathy, known history of frailty syndrome and frequent falls.    Interval Problem Update  Encephalopathy - resolved  Falls -PT and OT evaluations done  Diabetes - 120-210  Pyuria - culture pending  Urinary retention - patient willing to to discontinue Clement catheter today    Consultants/Specialty  None    Code Status  DNAR/DNI    Disposition  Long Term SNF vs Group Home    Review of Systems  Review of Systems   Constitutional: Positive for malaise/fatigue. Negative for chills, diaphoresis and fever.   HENT: Negative for congestion, hearing loss and sore throat.    Eyes: Negative for blurred vision.   Respiratory: Negative for cough, shortness of breath and wheezing.    Cardiovascular: Negative for chest pain, palpitations and leg swelling.   Gastrointestinal: Negative for abdominal pain, diarrhea, heartburn, nausea and vomiting.   Genitourinary: Negative for dysuria, flank pain and hematuria.   Musculoskeletal: Positive for falls. Negative for back pain, joint pain, myalgias and neck pain.   Skin: Negative for rash.   Neurological: Positive for weakness. Negative for dizziness, sensory change, speech change, focal weakness and headaches.   Psychiatric/Behavioral: The patient is not nervous/anxious.         Physical Exam  Temp:  [36.3 °C (97.4 °F)-37 °C (98.6 °F)] 37 °C (98.6 °F)  Pulse:  [65-77] 77  Resp:  [16-18] 16  BP: (110-119)/(59-71) 115/69  SpO2:  [92 %-97 %] 96 %    Physical Exam  Vitals signs and nursing note reviewed.   HENT:      Head: Normocephalic.      Comments: Laceration at forehead     Nose: No congestion.      Mouth/Throat:      Mouth: Mucous membranes are moist.   Eyes:      Extraocular Movements: Extraocular movements intact.      Conjunctiva/sclera: Conjunctivae normal.      Pupils: Pupils are equal, round, and  reactive to light.   Neck:      Musculoskeletal: No muscular tenderness.   Cardiovascular:      Rate and Rhythm: Normal rate and regular rhythm.   Pulmonary:      Effort: Pulmonary effort is normal.      Breath sounds: Normal breath sounds.   Abdominal:      General: Bowel sounds are normal. There is no distension.      Palpations: Abdomen is soft.      Tenderness: There is no abdominal tenderness. There is no guarding or rebound.   Musculoskeletal:      Right lower leg: No edema.      Left lower leg: No edema.   Lymphadenopathy:      Cervical: No cervical adenopathy.   Skin:     General: Skin is warm and dry.   Neurological:      Mental Status: He is alert and oriented to person, place, and time.      Cranial Nerves: No cranial nerve deficit.      Motor: Weakness (MMT BUE 5/5, RLE 4+/5, LLE 4/5) present.         Fluids    Intake/Output Summary (Last 24 hours) at 1/21/2021 1001  Last data filed at 1/21/2021 0300  Gross per 24 hour   Intake 240 ml   Output 1050 ml   Net -810 ml       Laboratory  Recent Labs     01/19/21  0424 01/20/21  1033   WBC 7.3 7.6   RBC 3.60* 3.71*   HEMOGLOBIN 8.4* 8.8*   HEMATOCRIT 28.1* 29.0*   MCV 78.1* 78.2*   MCH 23.3* 23.7*   MCHC 29.9* 30.3*   RDW 53.2* 53.9*   PLATELETCT 196 200   MPV 9.1 9.9     Recent Labs     01/19/21  0424 01/20/21  1033   SODIUM 137 130*   POTASSIUM 4.0 3.9   CHLORIDE 104 100   CO2 25 21   GLUCOSE 185* 280*   BUN 20 22   CREATININE 1.08 0.99   CALCIUM 8.5 8.3*                   Imaging  US-EXTREMITY VENOUS LOWER BILAT   Final Result      DX-CHEST-PORTABLE (1 VIEW)   Final Result         1. No acute cardiopulmonary abnormalities are identified.      CT-HEAD W/O   Final Result         1.  No evidence of acute intracranial hemorrhage or mass lesion.      2. Cerebral atrophy.              Assessment/Plan  * Encephalopathy- (present on admission)  Assessment & Plan  Avoid benzodiazepines and anticholinergics  Frequent orientation  Avoid early morning labs  Avoid vital  signs during sleep  Ambulate if possible      Frequent falls- (present on admission)  Assessment & Plan  PT and OT    Frailty syndrome in geriatric patient- (present on admission)  Assessment & Plan  PT and OT    Vitamin B12 deficiency- (present on admission)  Assessment & Plan  Start oral B12  IM b12 given     Urinary retention- (present on admission)  Assessment & Plan  Discontinue Clement  Bladder scan q 6, straight cath prn > 350 cc  Flomax and Proscar    Noncompliance- (present on admission)  Assessment & Plan  counseling    Type 2 diabetes mellitus with hyperglycemia, without long-term current use of insulin (HCC)- (present on admission)  Assessment & Plan  Started Lantus 10 units  SSI    Vitamin D deficiency- (present on admission)  Assessment & Plan  replacement    Pyuria- (present on admission)  Assessment & Plan  Follow culture    Microcytic anemia- (present on admission)  Assessment & Plan  Follow cbc       VTE prophylaxis: Lovenox

## 2021-01-21 NOTE — PROGRESS NOTES
Bedside report received.  Assessment complete.  A&O x 4. Patient intermittently confused. Calls appropriately  Patient ambulates with standby assist, and FWW. Bed alarm on.   Patient has 3/10 pain. Declines any interventions at this time.  Denies N&V. Tolerating diabetic diet.  Patient has left eye lac, well approximated with sutures. No overt signs of infection.   Abrasions on forehead and generalized, appropriate to fall.   + void lzao in place with active order, + flatus, + BM.  Patient denies SOB.  Patient pleasant with staff and resting in bed.  Review plan with of care with patient. Call light and personal belongings with in reach. Hourly rounding in place. All needs met at this time.

## 2021-01-21 NOTE — THERAPY
"Physical Therapy   Daily Treatment     Patient Name: Hugh Núñez  Age:  75 y.o., Sex:  male  Medical Record #: [id number]  Today's Date: 1/21/2021     Precautions: Fall Risk    Assessment    Pt has progressed his gait distance and today performed x120' with FWW at C[facility] for safety and verbal cues for proximity to walker. Pt appeared quite concerned that he is going to be \"thrown onto the street\" as he believed happened after his last SNF placement. Does appear to have reasonable social concerns. At current level, pt is a fall risk; recommend placement. If pt should further progress his mobility, he could be appropriate for group home.      Plan    Continue current treatment plan.    DC Equipment Recommendations: Unable to determine at this time  Discharge Recommendations: Recommend post-acute placement for additional physical therapy services prior to discharge home       Objective       01/21/21 1058   Balance   Comments Seated at spv; standing at CGA   Gait Analysis   Gait Level Of Assist   (Contact Guard Assist)   Assistive Device Front Wheel Walker   Distance (Feet) 120   Weight Bearing Status FWB   Comments CGA for safety and verbal cues for FWW proximity. Pt tends to have a flexed posture; unable to clarify if normal for him or not.    Bed Mobility    Supine to Sit Supervised   Scooting Supervised   Functional Mobility   Sit to Stand   (Contact Guard Assist)   Bed, Chair, Wheelchair Transfer   (Contact Guard Asssist)   Transfer Method Stand Step   Short Term Goals    Short Term Goal # 1 Pt will perform bed mob at spv in 6tx to improve functional independence.    Goal Outcome # 1 Goal met   Short Term Goal # 2 Pt will perform gait x150' with FWW at spv in 6tx to improve functional mobility.    Goal Outcome # 2 Goal not met   Anticipated Discharge Equipment and Recommendations   DC Equipment Recommendations Unable to determine at this time   Discharge Recommendations Recommend post-acute placement for " additional physical therapy services prior to discharge home

## 2021-01-22 LAB
ANION GAP SERPL CALC-SCNC: 11 MMOL/L (ref 7–16)
BUN SERPL-MCNC: 27 MG/DL (ref 8–22)
CALCIUM SERPL-MCNC: 9.3 MG/DL (ref 8.5–10.5)
CHLORIDE SERPL-SCNC: 97 MMOL/L (ref 96–112)
CO2 SERPL-SCNC: 25 MMOL/L (ref 20–33)
CREAT SERPL-MCNC: 1.13 MG/DL (ref 0.5–1.4)
ERYTHROCYTE [DISTWIDTH] IN BLOOD BY AUTOMATED COUNT: 54.4 FL (ref 35.9–50)
GLUCOSE BLD-MCNC: 120 MG/DL (ref 65–99)
GLUCOSE BLD-MCNC: 151 MG/DL (ref 65–99)
GLUCOSE BLD-MCNC: 361 MG/DL (ref 65–99)
GLUCOSE SERPL-MCNC: 220 MG/DL (ref 65–99)
HCT VFR BLD AUTO: 31.9 % (ref 42–52)
HGB BLD-MCNC: 9.4 G/DL (ref 14–18)
MCH RBC QN AUTO: 23.4 PG (ref 27–33)
MCHC RBC AUTO-ENTMCNC: 29.5 G/DL (ref 33.7–35.3)
MCV RBC AUTO: 79.6 FL (ref 81.4–97.8)
PLATELET # BLD AUTO: 206 K/UL (ref 164–446)
PMV BLD AUTO: 9.2 FL (ref 9–12.9)
POTASSIUM SERPL-SCNC: 3.8 MMOL/L (ref 3.6–5.5)
RBC # BLD AUTO: 4.01 M/UL (ref 4.7–6.1)
SODIUM SERPL-SCNC: 133 MMOL/L (ref 135–145)
WBC # BLD AUTO: 7.9 K/UL (ref 4.8–10.8)

## 2021-01-22 PROCEDURE — 99232 SBSQ HOSP IP/OBS MODERATE 35: CPT | Performed by: FAMILY MEDICINE

## 2021-01-22 PROCEDURE — 97535 SELF CARE MNGMENT TRAINING: CPT

## 2021-01-22 PROCEDURE — A9270 NON-COVERED ITEM OR SERVICE: HCPCS | Performed by: FAMILY MEDICINE

## 2021-01-22 PROCEDURE — 51798 US URINE CAPACITY MEASURE: CPT

## 2021-01-22 PROCEDURE — 700102 HCHG RX REV CODE 250 W/ 637 OVERRIDE(OP): Performed by: INTERNAL MEDICINE

## 2021-01-22 PROCEDURE — 85027 COMPLETE CBC AUTOMATED: CPT

## 2021-01-22 PROCEDURE — 82962 GLUCOSE BLOOD TEST: CPT | Mod: 91

## 2021-01-22 PROCEDURE — 770006 HCHG ROOM/CARE - MED/SURG/GYN SEMI*

## 2021-01-22 PROCEDURE — 80048 BASIC METABOLIC PNL TOTAL CA: CPT

## 2021-01-22 PROCEDURE — 700111 HCHG RX REV CODE 636 W/ 250 OVERRIDE (IP): Performed by: STUDENT IN AN ORGANIZED HEALTH CARE EDUCATION/TRAINING PROGRAM

## 2021-01-22 PROCEDURE — A9270 NON-COVERED ITEM OR SERVICE: HCPCS | Performed by: INTERNAL MEDICINE

## 2021-01-22 PROCEDURE — 36415 COLL VENOUS BLD VENIPUNCTURE: CPT

## 2021-01-22 PROCEDURE — 700102 HCHG RX REV CODE 250 W/ 637 OVERRIDE(OP): Performed by: FAMILY MEDICINE

## 2021-01-22 RX ORDER — INSULIN GLARGINE 100 [IU]/ML
20 INJECTION, SOLUTION SUBCUTANEOUS
Status: DISCONTINUED | OUTPATIENT
Start: 2021-01-23 | End: 2021-01-22

## 2021-01-22 RX ADMIN — Medication 1000 UNITS: at 05:32

## 2021-01-22 RX ADMIN — TAMSULOSIN HYDROCHLORIDE 0.4 MG: 0.4 CAPSULE ORAL at 08:16

## 2021-01-22 RX ADMIN — ENOXAPARIN SODIUM 40 MG: 40 INJECTION SUBCUTANEOUS at 05:31

## 2021-01-22 RX ADMIN — CYANOCOBALAMIN TAB 500 MCG 1000 MCG: 500 TAB at 05:32

## 2021-01-22 RX ADMIN — BACITRACIN ZINC: 500 OINTMENT TOPICAL at 05:31

## 2021-01-22 RX ADMIN — FINASTERIDE 5 MG: 5 TABLET, FILM COATED ORAL at 05:31

## 2021-01-22 RX ADMIN — BACITRACIN ZINC: 500 OINTMENT TOPICAL at 18:38

## 2021-01-22 ASSESSMENT — ENCOUNTER SYMPTOMS
NERVOUS/ANXIOUS: 0
COUGH: 0
ABDOMINAL PAIN: 0
SPEECH CHANGE: 0
WHEEZING: 0
FEVER: 0
NAUSEA: 0
WEAKNESS: 1
SHORTNESS OF BREATH: 0
BLURRED VISION: 0
HEARTBURN: 0
DIZZINESS: 0
FLANK PAIN: 0
CHILLS: 0
MYALGIAS: 0
DIAPHORESIS: 0
SORE THROAT: 0
DIARRHEA: 0
HEADACHES: 0
PALPITATIONS: 0
FALLS: 1
BACK PAIN: 0
FOCAL WEAKNESS: 0
SENSORY CHANGE: 0
VOMITING: 0
NECK PAIN: 0

## 2021-01-22 ASSESSMENT — COGNITIVE AND FUNCTIONAL STATUS - GENERAL
DRESSING REGULAR UPPER BODY CLOTHING: A LITTLE
TOILETING: A LITTLE
DRESSING REGULAR LOWER BODY CLOTHING: A LITTLE
SUGGESTED CMS G CODE MODIFIER DAILY ACTIVITY: CK
HELP NEEDED FOR BATHING: A LOT
DAILY ACTIVITIY SCORE: 18
PERSONAL GROOMING: A LITTLE

## 2021-01-22 ASSESSMENT — PAIN DESCRIPTION - PAIN TYPE
TYPE: ACUTE PAIN
TYPE: ACUTE PAIN

## 2021-01-22 ASSESSMENT — FIBROSIS 4 INDEX: FIB4 SCORE: 1.14

## 2021-01-22 NOTE — PROGRESS NOTES
Hospital Medicine Daily Progress Note    Date of Service  1/22/2021    Chief Complaint  75 y.o. male admitted 1/17/2021 with encephalopathy.    Hospital Course  Admitted with encephalopathy, known history of frailty syndrome and frequent falls.    Interval Problem Update  Encephalopathy - resolved  Falls - PT and OT evaluations done  Diabetes - 140-230, per RN diet was inadvertently changed to regular diet, change back to diabetic.  Urinary retention -Clement catheter discontinued yesterday, able to void freely, bladder scans pending    Consultants/Specialty  None    Code Status  DNAR/DNI    Disposition  Long Term SNF vs Group Home    Review of Systems  Review of Systems   Constitutional: Negative for chills, diaphoresis, fever and malaise/fatigue.   HENT: Negative for congestion, hearing loss and sore throat.    Eyes: Negative for blurred vision.   Respiratory: Negative for cough, shortness of breath and wheezing.    Cardiovascular: Negative for chest pain, palpitations and leg swelling.   Gastrointestinal: Negative for abdominal pain, diarrhea, heartburn, nausea and vomiting.   Genitourinary: Negative for dysuria, flank pain and hematuria.   Musculoskeletal: Positive for falls. Negative for back pain, joint pain, myalgias and neck pain.   Skin: Negative for rash.   Neurological: Positive for weakness. Negative for dizziness, sensory change, speech change, focal weakness and headaches.   Psychiatric/Behavioral: The patient is not nervous/anxious.         Physical Exam  Temp:  [36.2 °C (97.1 °F)-36.8 °C (98.2 °F)] 36.2 °C (97.1 °F)  Pulse:  [] 76  Resp:  [16-18] 18  BP: ()/(55-84) 107/55  SpO2:  [88 %-98 %] 98 %    Physical Exam  Vitals signs and nursing note reviewed.   HENT:      Head: Normocephalic.      Comments: Laceration at forehead     Nose: No congestion.      Mouth/Throat:      Mouth: Mucous membranes are moist.   Eyes:      Extraocular Movements: Extraocular movements intact.       Conjunctiva/sclera: Conjunctivae normal.      Pupils: Pupils are equal, round, and reactive to light.   Neck:      Musculoskeletal: No muscular tenderness.   Cardiovascular:      Rate and Rhythm: Normal rate and regular rhythm.   Pulmonary:      Effort: Pulmonary effort is normal.      Breath sounds: Normal breath sounds.   Abdominal:      General: Bowel sounds are normal. There is no distension.      Palpations: Abdomen is soft.      Tenderness: There is no abdominal tenderness. There is no guarding or rebound.   Musculoskeletal:      Right lower leg: No edema.      Left lower leg: No edema.   Lymphadenopathy:      Cervical: No cervical adenopathy.   Skin:     General: Skin is warm and dry.   Neurological:      Mental Status: He is alert and oriented to person, place, and time.      Cranial Nerves: No cranial nerve deficit.      Motor: Weakness (MMT BUE 5/5, RLE 4+/5, LLE 4/5) present.         Fluids    Intake/Output Summary (Last 24 hours) at 1/22/2021 0943  Last data filed at 1/22/2021 0531  Gross per 24 hour   Intake 240 ml   Output 1175 ml   Net -935 ml       Laboratory  Recent Labs     01/20/21  1033   WBC 7.6   RBC 3.71*   HEMOGLOBIN 8.8*   HEMATOCRIT 29.0*   MCV 78.2*   MCH 23.7*   MCHC 30.3*   RDW 53.9*   PLATELETCT 200   MPV 9.9     Recent Labs     01/20/21  1033   SODIUM 130*   POTASSIUM 3.9   CHLORIDE 100   CO2 21   GLUCOSE 280*   BUN 22   CREATININE 0.99   CALCIUM 8.3*                   Imaging  US-EXTREMITY VENOUS LOWER BILAT   Final Result      DX-CHEST-PORTABLE (1 VIEW)   Final Result         1. No acute cardiopulmonary abnormalities are identified.      CT-HEAD W/O   Final Result         1.  No evidence of acute intracranial hemorrhage or mass lesion.      2. Cerebral atrophy.              Assessment/Plan  * Encephalopathy- (present on admission)  Assessment & Plan  Avoid benzodiazepines and anticholinergics  Frequent orientation  Avoid early morning labs  Avoid vital signs during sleep  Ambulate if  possible      Frequent falls- (present on admission)  Assessment & Plan  PT and OT    Frailty syndrome in geriatric patient- (present on admission)  Assessment & Plan  PT and OT    Vitamin B12 deficiency- (present on admission)  Assessment & Plan  oral B12  IM b12 given     Urinary retention- (present on admission)  Assessment & Plan  Discontinued Clement  Bladder scan q 6, straight cath prn > 350 cc  Flomax and Proscar    Noncompliance- (present on admission)  Assessment & Plan  counseling    Type 2 diabetes mellitus with hyperglycemia, without long-term current use of insulin (HCC)- (present on admission)  Assessment & Plan  SSI  Diabetic diet    Vitamin D deficiency- (present on admission)  Assessment & Plan  replacement    Pyuria- (present on admission)  Assessment & Plan  Follow culture    Microcytic anemia- (present on admission)  Assessment & Plan  Follow cbc       VTE prophylaxis: Lovenox

## 2021-01-22 NOTE — CARE PLAN
Problem: Communication  Goal: The ability to communicate needs accurately and effectively will improve  Outcome: PROGRESSING AS EXPECTED     Problem: Safety  Goal: Will remain free from injury  Outcome: PROGRESSING AS EXPECTED     Problem: Bowel/Gastric:  Goal: Normal bowel function is maintained or improved  Outcome: PROGRESSING AS EXPECTED     Problem: Skin Integrity  Goal: Risk for impaired skin integrity will decrease  Outcome: PROGRESSING AS EXPECTED     Problem: Mobility  Goal: Risk for activity intolerance will decrease  Outcome: PROGRESSING AS EXPECTED

## 2021-01-22 NOTE — PROGRESS NOTES
Bedside report received.  Assessment complete.  A&O x 4. Patient intermittently confused.   Patient ambulates with standby assist, and FWW. Bed alarm on.   Patient has 0/10 pain.   Denies N&V. Tolerating diabetic diet.  Patient has left eye lac, well approximated with sutures. No overt signs of infection.   Abrasions on forehead and generalized, appropriate to fall.   + void  Patient denies SOB.  Patient pleasant with staff and up to chair for breakfast.  Review plan with of care with patient. Call light and personal belongings with in reach. Hourly rounding in place. All needs met at this time.

## 2021-01-22 NOTE — THERAPY
Occupational Therapy  Daily Treatment     Patient Name: Hugh Núñez  Age:  75 y.o., Sex:  male  Medical Record #: [id number]  Today's Date: 1/22/2021     Precautions  Precautions: Fall Risk    Assessment    Pt seen for OT session. Progressing with functional mobility and activity tolerance. Req min A and max v/cs for safety throughout session; poor carryover of education. Decreased cognition; tangential req max v/cs for attention to task and likely will req assist for IADLs. Continues to be limited by decreased functional mobility, activity tolerance, cognition, strength, balance, and pain which are currently affecting pt's ability to complete ADLs/IADLs at baseline. Will continue to follow.     Plan    Continue current treatment plan.    DC Equipment Recommendations: Unable to determine at this time  Discharge Recommendations: Recommend post-acute placement for additional occupational therapy services prior to discharge home (will likely benefit from long term placement for SPV/safety)     Objective       01/22/21 1107   Vitals   O2 Delivery Device None - Room Air   Pain 0 - 10 Group   Location Generalized   Therapist Pain Assessment Post Activity Pain Same as Prior to Activity;During Activity;Nurse Notified  (not quantified; stiffness)   Cognition    Cognition / Consciousness X   Speech/ Communication Delayed Responses;Hard of Hearing   Level of Consciousness Alert   Safety Awareness Impaired   Attention Impaired   Comments pleasant and cooperative; tangential req v/cs for redirection and attention to task, max v/cs for safety awareness, and poor problem solving   Passive ROM Upper Body   Passive ROM Upper Body WDL   Active ROM Upper Body   Active ROM Upper Body  WDL   Dominant Hand Right   Comments functional for ADLs   Strength Upper Body   Upper Body Strength  X   Gross Strength Generalized Weakness, Equal Bilaterally.    Balance   Sitting Balance (Static) Good   Sitting Balance (Dynamic) Good   Standing  Balance (Static) Fair   Standing Balance (Dynamic) Fair   Weight Shift Sitting Good   Weight Shift Standing Fair   Skilled Intervention Verbal Cuing;Compensatory Strategies   Comments w/fww   Bed Mobility    Scooting Supervised   Skilled Intervention Verbal Cuing   Comments found and left seated in chair. found sleeping in chair unsafely leaning anteriorly toward floor/table; Edu on safety that when falling asleep return to bed-pt refused education   Activities of Daily Living   Grooming Standing;Supervision  (wiping face and washing hands; CLOSE SPV)   Lower Body Dressing Minimal Assist   Toileting   (declined need)   Skilled Intervention Verbal Cuing;Compensatory Strategies   Comments req v/cs throughout for safety   Functional Mobility   Sit to Stand Minimal Assist   Bed, Chair, Wheelchair Transfer Minimal Assist   Toilet Transfers Minimal Assist   Transfer Method Stand Step   Mobility w/ FWW; req min A for safety awareness and sequencing and proper use with FWW   Skilled Intervention Verbal Cuing;Compensatory Strategies   Activity Tolerance   Sitting in Chair 14+ min on toilet and found/left in chair   Standing 10 min   Comments limited by fatigue, but improving   Short Term Goals   Short Term Goal # 1 will complete LB dressing with SPV   Goal Outcome # 1 Progressing as expected   Short Term Goal # 2 will complete toilet txf with SPV   Goal Outcome # 2 Progressing as expected   Short Term Goal # 3 will complete standing g/h with SPV and proper safety with FWW   Goal Outcome # 3 Progressing as expected   Anticipated Discharge Equipment and Recommendations   DC Equipment Recommendations Unable to determine at this time   Discharge Recommendations Recommend post-acute placement for additional occupational therapy services prior to discharge home  (will likely benefit from long term placement for SPV/safety)

## 2021-01-22 NOTE — DOCUMENTATION QUERY
[facility]                                                                       Query Response Note      PATIENT:               JERAMIE NIX  ACCT #:                  [id number]  MRN:                     [id number]  :                      1945  ADMIT DATE:       2021 1:48 AM  DISCH DATE:          RESPONDING  PROVIDER #:        [id number]           QUERY TEXT:    Encephalopathy is documented in the Medical Record. Please specify type.    NOTE:  If an appropriate response is not listed below, please respond with a new note.    The patient's Clinical Indicators include:  Admitted with encephalopathy  Risk Factors: age, UTI/ pyuria, diabetes  Treatment: avoid benzodiazapenes/ anticholinergics, frequent orientation, avoid early labs/ vitals during sleep, SSI, diabetic diet, Rocephin    Thank You,  Deanna Gaytan RN, BSN  Clinical    Connect via [employer]  Options provided:   -- Due to medications or drugs   -- Metabolic encephalopathy   -- Toxic encephalopathy   -- Other type of encephalopathy, -please specify if able   -- Unable to determine      Query created by: Deanna Gaytan on 2021 11:15 AM    RESPONSE TEXT:    Unable to determine          Electronically signed by:  OLIVIA MAGANA MD 2021 12:18 PM

## 2021-01-22 NOTE — PROGRESS NOTES
Received report from day shift RN.   Assessment completed.   A&Ox4.   Ambulates with standby assist-shuffles, steady.  Patient rates 0/10 pain.   Tolerating diabetic diet.   Left eye laceration - well approximated w/ sutures in place.   Forehead abrasion.   +void via urinal, bladder scan completed per order at 2030; 277mL, will re-assess at 0230.   Patient denies SOB, N/V.     Plan of care was reviewed with patient. Call light and personal belongings are within reach. All needs met at this time.

## 2021-01-23 LAB
GLUCOSE BLD-MCNC: 159 MG/DL (ref 65–99)
GLUCOSE BLD-MCNC: 164 MG/DL (ref 65–99)
GLUCOSE BLD-MCNC: 324 MG/DL (ref 65–99)

## 2021-01-23 PROCEDURE — 700102 HCHG RX REV CODE 250 W/ 637 OVERRIDE(OP): Performed by: INTERNAL MEDICINE

## 2021-01-23 PROCEDURE — 99232 SBSQ HOSP IP/OBS MODERATE 35: CPT | Performed by: FAMILY MEDICINE

## 2021-01-23 PROCEDURE — 700102 HCHG RX REV CODE 250 W/ 637 OVERRIDE(OP): Performed by: STUDENT IN AN ORGANIZED HEALTH CARE EDUCATION/TRAINING PROGRAM

## 2021-01-23 PROCEDURE — 700102 HCHG RX REV CODE 250 W/ 637 OVERRIDE(OP): Performed by: FAMILY MEDICINE

## 2021-01-23 PROCEDURE — A9270 NON-COVERED ITEM OR SERVICE: HCPCS | Performed by: STUDENT IN AN ORGANIZED HEALTH CARE EDUCATION/TRAINING PROGRAM

## 2021-01-23 PROCEDURE — 700111 HCHG RX REV CODE 636 W/ 250 OVERRIDE (IP): Performed by: STUDENT IN AN ORGANIZED HEALTH CARE EDUCATION/TRAINING PROGRAM

## 2021-01-23 PROCEDURE — A9270 NON-COVERED ITEM OR SERVICE: HCPCS | Performed by: INTERNAL MEDICINE

## 2021-01-23 PROCEDURE — A9270 NON-COVERED ITEM OR SERVICE: HCPCS | Performed by: FAMILY MEDICINE

## 2021-01-23 PROCEDURE — 770006 HCHG ROOM/CARE - MED/SURG/GYN SEMI*

## 2021-01-23 PROCEDURE — 82962 GLUCOSE BLOOD TEST: CPT

## 2021-01-23 RX ORDER — INSULIN GLARGINE 100 [IU]/ML
10 INJECTION, SOLUTION SUBCUTANEOUS
Status: DISCONTINUED | OUTPATIENT
Start: 2021-01-23 | End: 2021-01-25

## 2021-01-23 RX ADMIN — Medication 1000 UNITS: at 05:43

## 2021-01-23 RX ADMIN — ENOXAPARIN SODIUM 40 MG: 40 INJECTION SUBCUTANEOUS at 05:43

## 2021-01-23 RX ADMIN — BACITRACIN ZINC: 500 OINTMENT TOPICAL at 06:00

## 2021-01-23 RX ADMIN — FINASTERIDE 5 MG: 5 TABLET, FILM COATED ORAL at 05:43

## 2021-01-23 RX ADMIN — DOCUSATE SODIUM 50 MG AND SENNOSIDES 8.6 MG 2 TABLET: 8.6; 5 TABLET, FILM COATED ORAL at 17:02

## 2021-01-23 RX ADMIN — BACITRACIN ZINC 1 EACH: 500 OINTMENT TOPICAL at 17:01

## 2021-01-23 RX ADMIN — CYANOCOBALAMIN TAB 500 MCG 1000 MCG: 500 TAB at 05:42

## 2021-01-23 RX ADMIN — TAMSULOSIN HYDROCHLORIDE 0.4 MG: 0.4 CAPSULE ORAL at 08:02

## 2021-01-23 RX ADMIN — INSULIN GLARGINE 10 UNITS: 100 INJECTION, SOLUTION SUBCUTANEOUS at 08:33

## 2021-01-23 ASSESSMENT — ENCOUNTER SYMPTOMS
MYALGIAS: 0
FEVER: 0
NAUSEA: 0
HEADACHES: 0
CHILLS: 0
FLANK PAIN: 0
SENSORY CHANGE: 0
ABDOMINAL PAIN: 0
BLURRED VISION: 0
FALLS: 1
WEAKNESS: 1
NERVOUS/ANXIOUS: 0
DIAPHORESIS: 0
WHEEZING: 0
NECK PAIN: 0
BACK PAIN: 0
FOCAL WEAKNESS: 0
VOMITING: 0
SHORTNESS OF BREATH: 0
PALPITATIONS: 0
DIZZINESS: 0
COUGH: 0
HEARTBURN: 0
SPEECH CHANGE: 0
DIARRHEA: 0
SORE THROAT: 0

## 2021-01-23 ASSESSMENT — PATIENT HEALTH QUESTIONNAIRE - PHQ9
SUM OF ALL RESPONSES TO PHQ9 QUESTIONS 1 AND 2: 0
2. FEELING DOWN, DEPRESSED, IRRITABLE, OR HOPELESS: NOT AT ALL
SUM OF ALL RESPONSES TO PHQ9 QUESTIONS 1 AND 2: 0
2. FEELING DOWN, DEPRESSED, IRRITABLE, OR HOPELESS: NOT AT ALL
1. LITTLE INTEREST OR PLEASURE IN DOING THINGS: NOT AT ALL
1. LITTLE INTEREST OR PLEASURE IN DOING THINGS: NOT AT ALL

## 2021-01-23 ASSESSMENT — PAIN DESCRIPTION - PAIN TYPE: TYPE: ACUTE PAIN

## 2021-01-23 NOTE — PROGRESS NOTES
Bedside report received. Assessment completed.  Pt is A&O x4. Pt on room air.   Denies nausea.   - numbness, - tingling.  Skin left eye brow laceration, forehead abrasion   Last BM 1/22/21  +void.   diet. Tolerates well.   Pt up .  Call light and belongings within reach. All needs met at this time. Fall Precautions and hourly rounding in place.

## 2021-01-23 NOTE — PROGRESS NOTES
[facility] Medicine Daily Progress Note    Date of Service  1/23/2021    Chief Complaint  75 y.o. male admitted 1/17/2021 with encephalopathy.    Hospital Course  Admitted with encephalopathy, known history of frailty syndrome and frequent falls.    Interval Problem Update  Encephalopathy - resolved  Falls - PT and OT evaluations done  Diabetes - 120-320  Urinary retention - able to void, bladder scans 300 cc     Consultants/Specialty  None    Code Status  DNAR/DNI    Disposition  Long Term SNF vs Group Home    Review of Systems  Review of Systems   Constitutional: Negative for chills, diaphoresis, fever and malaise/fatigue.   HENT: Negative for congestion, hearing loss and sore throat.    Eyes: Negative for blurred vision.   Respiratory: Negative for cough, shortness of breath and wheezing.    Cardiovascular: Negative for chest pain, palpitations and leg swelling.   Gastrointestinal: Negative for abdominal pain, diarrhea, heartburn, nausea and vomiting.   Genitourinary: Positive for frequency. Negative for dysuria, flank pain and hematuria.   Musculoskeletal: Positive for falls. Negative for back pain, joint pain, myalgias and neck pain.   Skin: Negative for rash.   Neurological: Positive for weakness. Negative for dizziness, sensory change, speech change, focal weakness and headaches.   Psychiatric/Behavioral: The patient is not nervous/anxious.         Physical Exam  Temp:  [36.1 °C (97 °F)-36.9 °C (98.4 °F)] 36.9 °C (98.4 °F)  Pulse:  [78-87] 78  Resp:  [16-17] 16  BP: (104-125)/(54-65) 112/65  SpO2:  [98 %-99 %] 99 %    Physical Exam  Vitals signs and nursing note reviewed.   HENT:      Head: Normocephalic.      Comments: Laceration at forehead     Nose: No congestion.      Mouth/Throat:      Mouth: Mucous membranes are moist.   Eyes:      Extraocular Movements: Extraocular movements intact.      Conjunctiva/sclera: Conjunctivae normal.      Pupils: Pupils are equal, round, and reactive to light.   Neck:       Musculoskeletal: No muscular tenderness.   Cardiovascular:      Rate and Rhythm: Normal rate and regular rhythm.   Pulmonary:      Effort: Pulmonary effort is normal.      Breath sounds: Normal breath sounds.   Abdominal:      General: Bowel sounds are normal. There is no distension.      Palpations: Abdomen is soft.      Tenderness: There is no abdominal tenderness. There is no guarding or rebound.   Musculoskeletal:      Right lower leg: No edema.      Left lower leg: No edema.   Lymphadenopathy:      Cervical: No cervical adenopathy.   Skin:     General: Skin is warm and dry.   Neurological:      Mental Status: He is alert and oriented to person, place, and time.      Cranial Nerves: No cranial nerve deficit.      Motor: Weakness (MMT BUE 5/5, RLE 4+/5, LLE 4/5) present.         Fluids    Intake/Output Summary (Last 24 hours) at 1/23/2021 1010  Last data filed at 1/23/2021 0843  Gross per 24 hour   Intake 480 ml   Output 1400 ml   Net -920 ml       Laboratory  Recent Labs     01/20/21  1033 01/22/21  0928   WBC 7.6 7.9   RBC 3.71* 4.01*   HEMOGLOBIN 8.8* 9.4*   HEMATOCRIT 29.0* 31.9*   MCV 78.2* 79.6*   MCH 23.7* 23.4*   MCHC 30.3* 29.5*   RDW 53.9* 54.4*   PLATELETCT 200 206   MPV 9.9 9.2     Recent Labs     01/20/21  1033 01/22/21  0928   SODIUM 130* 133*   POTASSIUM 3.9 3.8   CHLORIDE 100 97   CO2 21 25   GLUCOSE 280* 220*   BUN 22 27*   CREATININE 0.99 1.13   CALCIUM 8.3* 9.3                   Imaging  US-EXTREMITY VENOUS LOWER BILAT   Final Result      DX-CHEST-PORTABLE (1 VIEW)   Final Result         1. No acute cardiopulmonary abnormalities are identified.      CT-HEAD W/O   Final Result         1.  No evidence of acute intracranial hemorrhage or mass lesion.      2. Cerebral atrophy.              Assessment/Plan  * Encephalopathy- (present on admission)  Assessment & Plan  Avoid benzodiazepines and anticholinergics  Frequent orientation  Avoid early morning labs  Avoid vital signs during sleep  Ambulate  if possible      Frequent falls- (present on admission)  Assessment & Plan  PT and OT    Frailty syndrome in geriatric patient- (present on admission)  Assessment & Plan  PT and OT    Vitamin B12 deficiency- (present on admission)  Assessment & Plan  oral B12  IM b12 given     Urinary retention- (present on admission)  Assessment & Plan  Discontinued Clement  Bladder scan q 6, straight cath prn > 400 cc  Flomax and Proscar    Noncompliance- (present on admission)  Assessment & Plan  counseling    Type 2 diabetes mellitus with hyperglycemia, without long-term current use of insulin (HCC)- (present on admission)  Assessment & Plan  Start Lantus 10 units  SSI      Vitamin D deficiency- (present on admission)  Assessment & Plan  replacement    Pyuria- (present on admission)  Assessment & Plan  Follow culture    Microcytic anemia- (present on admission)  Assessment & Plan  Follow cbc       VTE prophylaxis: Lovenox

## 2021-01-23 NOTE — PROGRESS NOTES
"This RN updated MD Alexandria of patients bladder scan and continuous requests to place lazo because its \"easier.\"  Orders received.   "

## 2021-01-24 LAB
GLUCOSE BLD-MCNC: 132 MG/DL (ref 65–99)
GLUCOSE BLD-MCNC: 194 MG/DL (ref 65–99)

## 2021-01-24 PROCEDURE — 700111 HCHG RX REV CODE 636 W/ 250 OVERRIDE (IP): Performed by: STUDENT IN AN ORGANIZED HEALTH CARE EDUCATION/TRAINING PROGRAM

## 2021-01-24 PROCEDURE — 99232 SBSQ HOSP IP/OBS MODERATE 35: CPT | Performed by: FAMILY MEDICINE

## 2021-01-24 PROCEDURE — 770006 HCHG ROOM/CARE - MED/SURG/GYN SEMI*

## 2021-01-24 PROCEDURE — 700102 HCHG RX REV CODE 250 W/ 637 OVERRIDE(OP): Performed by: INTERNAL MEDICINE

## 2021-01-24 PROCEDURE — 700102 HCHG RX REV CODE 250 W/ 637 OVERRIDE(OP): Performed by: STUDENT IN AN ORGANIZED HEALTH CARE EDUCATION/TRAINING PROGRAM

## 2021-01-24 PROCEDURE — 82962 GLUCOSE BLOOD TEST: CPT

## 2021-01-24 PROCEDURE — 700102 HCHG RX REV CODE 250 W/ 637 OVERRIDE(OP): Performed by: FAMILY MEDICINE

## 2021-01-24 PROCEDURE — A9270 NON-COVERED ITEM OR SERVICE: HCPCS | Performed by: FAMILY MEDICINE

## 2021-01-24 PROCEDURE — A9270 NON-COVERED ITEM OR SERVICE: HCPCS | Performed by: INTERNAL MEDICINE

## 2021-01-24 PROCEDURE — A9270 NON-COVERED ITEM OR SERVICE: HCPCS | Performed by: STUDENT IN AN ORGANIZED HEALTH CARE EDUCATION/TRAINING PROGRAM

## 2021-01-24 RX ADMIN — CYANOCOBALAMIN TAB 500 MCG 1000 MCG: 500 TAB at 04:58

## 2021-01-24 RX ADMIN — Medication 1000 UNITS: at 04:58

## 2021-01-24 RX ADMIN — BACITRACIN ZINC: 500 OINTMENT TOPICAL at 04:57

## 2021-01-24 RX ADMIN — BACITRACIN ZINC: 500 OINTMENT TOPICAL at 18:04

## 2021-01-24 RX ADMIN — DOCUSATE SODIUM 50 MG AND SENNOSIDES 8.6 MG 2 TABLET: 8.6; 5 TABLET, FILM COATED ORAL at 18:04

## 2021-01-24 RX ADMIN — ENOXAPARIN SODIUM 40 MG: 40 INJECTION SUBCUTANEOUS at 04:59

## 2021-01-24 RX ADMIN — TAMSULOSIN HYDROCHLORIDE 0.4 MG: 0.4 CAPSULE ORAL at 08:22

## 2021-01-24 RX ADMIN — INSULIN GLARGINE 10 UNITS: 100 INJECTION, SOLUTION SUBCUTANEOUS at 05:00

## 2021-01-24 RX ADMIN — FINASTERIDE 5 MG: 5 TABLET, FILM COATED ORAL at 04:58

## 2021-01-24 ASSESSMENT — ENCOUNTER SYMPTOMS
SENSORY CHANGE: 0
BACK PAIN: 0
CHILLS: 0
FALLS: 1
DIAPHORESIS: 0
MYALGIAS: 0
SHORTNESS OF BREATH: 0
SPEECH CHANGE: 0
SORE THROAT: 0
FEVER: 0
VOMITING: 0
FLANK PAIN: 0
HEARTBURN: 0
WEAKNESS: 1
PALPITATIONS: 0
NERVOUS/ANXIOUS: 0
BLURRED VISION: 0
NAUSEA: 0
FOCAL WEAKNESS: 0
WHEEZING: 0
COUGH: 0
DIZZINESS: 0
ABDOMINAL PAIN: 0
NECK PAIN: 0
HEADACHES: 0
DIARRHEA: 0

## 2021-01-24 ASSESSMENT — PAIN DESCRIPTION - PAIN TYPE
TYPE: ACUTE PAIN
TYPE: ACUTE PAIN;CHRONIC PAIN
TYPE: ACUTE PAIN

## 2021-01-24 NOTE — PROGRESS NOTES
Bedside report received.  Assessment complete.  A&O x 4. Patient intermittently confused.   Patient ambulates with standby assist, and FWW. Bed alarm on.   Patient has 0/10 pain.   Denies N&V. Tolerating diabetic diet. Patient eating caramel candies that he has at bedside, patient educated on necessity for sugar free candies and to stop eating them.  Patient has left eye lac, well approximated. No overt signs of infection.   Abrasions on forehead and generalized, appropriate to fall.   + void, +BM  Patient denies SOB.  Patient pleasant with staff and up to chair for breakfast.  Review plan with of care with patient. Call light and personal belongings with in reach. Hourly rounding in place. All needs met at this time.

## 2021-01-24 NOTE — CARE PLAN
Problem: Safety  Goal: Will remain free from injury  Outcome: PROGRESSING AS EXPECTED     Problem: Knowledge Deficit  Goal: Knowledge of disease process/condition, treatment plan, diagnostic tests, and medications will improve  Outcome: PROGRESSING AS EXPECTED     Problem: Skin Integrity  Goal: Risk for impaired skin integrity will decrease  Outcome: PROGRESSING AS EXPECTED     Problem: Mobility  Goal: Risk for activity intolerance will decrease  Outcome: PROGRESSING AS EXPECTED

## 2021-01-25 LAB
ANION GAP SERPL CALC-SCNC: 11 MMOL/L (ref 7–16)
BUN SERPL-MCNC: 29 MG/DL (ref 8–22)
CALCIUM SERPL-MCNC: 9 MG/DL (ref 8.5–10.5)
CHLORIDE SERPL-SCNC: 100 MMOL/L (ref 96–112)
CO2 SERPL-SCNC: 22 MMOL/L (ref 20–33)
CREAT SERPL-MCNC: 1.03 MG/DL (ref 0.5–1.4)
ERYTHROCYTE [DISTWIDTH] IN BLOOD BY AUTOMATED COUNT: 55.6 FL (ref 35.9–50)
GLUCOSE BLD-MCNC: 146 MG/DL (ref 65–99)
GLUCOSE BLD-MCNC: 183 MG/DL (ref 65–99)
GLUCOSE BLD-MCNC: 208 MG/DL (ref 65–99)
GLUCOSE BLD-MCNC: 230 MG/DL (ref 65–99)
GLUCOSE BLD-MCNC: 256 MG/DL (ref 65–99)
GLUCOSE BLD-MCNC: 269 MG/DL (ref 65–99)
GLUCOSE SERPL-MCNC: 232 MG/DL (ref 65–99)
HCT VFR BLD AUTO: 29 % (ref 42–52)
HGB BLD-MCNC: 8.7 G/DL (ref 14–18)
MCH RBC QN AUTO: 23.8 PG (ref 27–33)
MCHC RBC AUTO-ENTMCNC: 30 G/DL (ref 33.7–35.3)
MCV RBC AUTO: 79.5 FL (ref 81.4–97.8)
PLATELET # BLD AUTO: 207 K/UL (ref 164–446)
PMV BLD AUTO: 9.7 FL (ref 9–12.9)
POTASSIUM SERPL-SCNC: 4.5 MMOL/L (ref 3.6–5.5)
RBC # BLD AUTO: 3.65 M/UL (ref 4.7–6.1)
SODIUM SERPL-SCNC: 133 MMOL/L (ref 135–145)
WBC # BLD AUTO: 7.7 K/UL (ref 4.8–10.8)

## 2021-01-25 PROCEDURE — 700102 HCHG RX REV CODE 250 W/ 637 OVERRIDE(OP): Performed by: INTERNAL MEDICINE

## 2021-01-25 PROCEDURE — 82962 GLUCOSE BLOOD TEST: CPT

## 2021-01-25 PROCEDURE — A9270 NON-COVERED ITEM OR SERVICE: HCPCS | Performed by: STUDENT IN AN ORGANIZED HEALTH CARE EDUCATION/TRAINING PROGRAM

## 2021-01-25 PROCEDURE — A9270 NON-COVERED ITEM OR SERVICE: HCPCS | Performed by: FAMILY MEDICINE

## 2021-01-25 PROCEDURE — 80048 BASIC METABOLIC PNL TOTAL CA: CPT

## 2021-01-25 PROCEDURE — 97530 THERAPEUTIC ACTIVITIES: CPT

## 2021-01-25 PROCEDURE — 770006 HCHG ROOM/CARE - MED/SURG/GYN SEMI*

## 2021-01-25 PROCEDURE — A9270 NON-COVERED ITEM OR SERVICE: HCPCS | Performed by: INTERNAL MEDICINE

## 2021-01-25 PROCEDURE — 36415 COLL VENOUS BLD VENIPUNCTURE: CPT

## 2021-01-25 PROCEDURE — 99232 SBSQ HOSP IP/OBS MODERATE 35: CPT | Performed by: FAMILY MEDICINE

## 2021-01-25 PROCEDURE — 700102 HCHG RX REV CODE 250 W/ 637 OVERRIDE(OP): Performed by: FAMILY MEDICINE

## 2021-01-25 PROCEDURE — 85027 COMPLETE CBC AUTOMATED: CPT

## 2021-01-25 PROCEDURE — 700102 HCHG RX REV CODE 250 W/ 637 OVERRIDE(OP): Performed by: STUDENT IN AN ORGANIZED HEALTH CARE EDUCATION/TRAINING PROGRAM

## 2021-01-25 PROCEDURE — 700111 HCHG RX REV CODE 636 W/ 250 OVERRIDE (IP): Performed by: STUDENT IN AN ORGANIZED HEALTH CARE EDUCATION/TRAINING PROGRAM

## 2021-01-25 RX ORDER — INSULIN GLARGINE 100 [IU]/ML
15 INJECTION, SOLUTION SUBCUTANEOUS
Status: DISCONTINUED | OUTPATIENT
Start: 2021-01-26 | End: 2021-02-04 | Stop reason: HOSPADM

## 2021-01-25 RX ADMIN — TAMSULOSIN HYDROCHLORIDE 0.4 MG: 0.4 CAPSULE ORAL at 08:30

## 2021-01-25 RX ADMIN — INSULIN GLARGINE 10 UNITS: 100 INJECTION, SOLUTION SUBCUTANEOUS at 06:16

## 2021-01-25 RX ADMIN — ENOXAPARIN SODIUM 40 MG: 40 INJECTION SUBCUTANEOUS at 06:10

## 2021-01-25 RX ADMIN — BACITRACIN ZINC 1 EACH: 500 OINTMENT TOPICAL at 06:10

## 2021-01-25 RX ADMIN — DOCUSATE SODIUM 50 MG AND SENNOSIDES 8.6 MG 2 TABLET: 8.6; 5 TABLET, FILM COATED ORAL at 06:10

## 2021-01-25 RX ADMIN — Medication 1000 UNITS: at 06:10

## 2021-01-25 RX ADMIN — CYANOCOBALAMIN TAB 500 MCG 1000 MCG: 500 TAB at 06:10

## 2021-01-25 RX ADMIN — FINASTERIDE 5 MG: 5 TABLET, FILM COATED ORAL at 06:10

## 2021-01-25 RX ADMIN — DOCUSATE SODIUM 50 MG AND SENNOSIDES 8.6 MG 2 TABLET: 8.6; 5 TABLET, FILM COATED ORAL at 18:07

## 2021-01-25 RX ADMIN — BACITRACIN ZINC: 500 OINTMENT TOPICAL at 18:08

## 2021-01-25 ASSESSMENT — COGNITIVE AND FUNCTIONAL STATUS - GENERAL
MOBILITY SCORE: 23
CLIMB 3 TO 5 STEPS WITH RAILING: A LITTLE
SUGGESTED CMS G CODE MODIFIER MOBILITY: CI

## 2021-01-25 ASSESSMENT — PATIENT HEALTH QUESTIONNAIRE - PHQ9
SUM OF ALL RESPONSES TO PHQ9 QUESTIONS 1 AND 2: 0
1. LITTLE INTEREST OR PLEASURE IN DOING THINGS: NOT AT ALL
2. FEELING DOWN, DEPRESSED, IRRITABLE, OR HOPELESS: NOT AT ALL

## 2021-01-25 ASSESSMENT — ENCOUNTER SYMPTOMS
CHILLS: 0
VOMITING: 0
FALLS: 1
FLANK PAIN: 0
DIAPHORESIS: 0
ABDOMINAL PAIN: 0
SENSORY CHANGE: 0
SHORTNESS OF BREATH: 0
BACK PAIN: 0
SPEECH CHANGE: 0
HEADACHES: 0
FEVER: 0
PALPITATIONS: 0
MYALGIAS: 0
FOCAL WEAKNESS: 0
DIARRHEA: 0
DIZZINESS: 0
SORE THROAT: 0
COUGH: 0
WEAKNESS: 1
NERVOUS/ANXIOUS: 0
NECK PAIN: 0
WHEEZING: 0
BLURRED VISION: 0
HEARTBURN: 0
NAUSEA: 0

## 2021-01-25 ASSESSMENT — GAIT ASSESSMENTS
ASSISTIVE DEVICE: FRONT WHEEL WALKER
GAIT LEVEL OF ASSIST: SUPERVISED
DISTANCE (FEET): 250

## 2021-01-25 ASSESSMENT — PAIN DESCRIPTION - PAIN TYPE: TYPE: ACUTE PAIN

## 2021-01-25 NOTE — PROGRESS NOTES
[facility] Medicine Daily Progress Note    Date of Service  1/25/2021    Chief Complaint  75 y.o. male admitted 1/17/2021 with encephalopathy.    Hospital Course  Admitted with encephalopathy, known history of frailty syndrome and frequent falls.  Encephalopathy has resolved.  Physical therapy and Occupational Therapy have evaluated him and recommended supervision.  Social work working on discharge planning on long-term skilled nurse facility versus a group home.  Has known history of urinary retention, a Clement catheter was initially placed, he was restarted back on Flomax and Proscar, his Clement catheter was discontinued.    Interval Problem Update  Encephalopathy - resolved  Falls - requires supervision  Diabetes - 130-230    Consultants/Specialty  None    Code Status  DNAR/DNI    Disposition  Long Term SNF vs Group Home    Review of Systems  Review of Systems   Constitutional: Negative for chills, diaphoresis, fever and malaise/fatigue.   HENT: Negative for congestion, hearing loss and sore throat.    Eyes: Negative for blurred vision.   Respiratory: Negative for cough, shortness of breath and wheezing.    Cardiovascular: Negative for chest pain, palpitations and leg swelling.   Gastrointestinal: Negative for abdominal pain, diarrhea, heartburn, nausea and vomiting.   Genitourinary: Positive for frequency. Negative for dysuria, flank pain and hematuria.   Musculoskeletal: Positive for falls. Negative for back pain, joint pain, myalgias and neck pain.   Skin: Negative for rash.   Neurological: Positive for weakness. Negative for dizziness, sensory change, speech change, focal weakness and headaches.   Psychiatric/Behavioral: The patient is not nervous/anxious.         Physical Exam  Temp:  [36.1 °C (96.9 °F)-36.8 °C (98.3 °F)] 36.1 °C (96.9 °F)  Pulse:  [73-79] 73  Resp:  [16-19] 17  BP: (107-126)/(35-66) 118/50  SpO2:  [92 %-100 %] 100 %    Physical Exam  Vitals signs and nursing note reviewed.   HENT:      Head:  Normocephalic.      Comments: Laceration at forehead     Nose: No congestion.      Mouth/Throat:      Mouth: Mucous membranes are moist.   Eyes:      Extraocular Movements: Extraocular movements intact.      Conjunctiva/sclera: Conjunctivae normal.      Pupils: Pupils are equal, round, and reactive to light.   Neck:      Musculoskeletal: No muscular tenderness.   Cardiovascular:      Rate and Rhythm: Normal rate and regular rhythm.   Pulmonary:      Effort: Pulmonary effort is normal.      Breath sounds: Normal breath sounds.   Abdominal:      General: Bowel sounds are normal. There is no distension.      Palpations: Abdomen is soft.      Tenderness: There is no abdominal tenderness. There is no guarding or rebound.   Musculoskeletal:      Right lower leg: No edema.      Left lower leg: No edema.   Lymphadenopathy:      Cervical: No cervical adenopathy.   Skin:     General: Skin is warm and dry.   Neurological:      Mental Status: He is alert and oriented to person, place, and time.      Cranial Nerves: No cranial nerve deficit.      Motor: Weakness (MMT BUE 5/5, RLE 4+/5, LLE 4/5) present.         Fluids    Intake/Output Summary (Last 24 hours) at 1/25/2021 1022  Last data filed at 1/25/2021 0900  Gross per 24 hour   Intake 390 ml   Output 1100 ml   Net -710 ml       Laboratory  Recent Labs     01/25/21  0852   WBC 7.7   RBC 3.65*   HEMOGLOBIN 8.7*   HEMATOCRIT 29.0*   MCV 79.5*   MCH 23.8*   MCHC 30.0*   RDW 55.6*   PLATELETCT 207   MPV 9.7     Recent Labs     01/25/21  0852   SODIUM 133*   POTASSIUM 4.5   CHLORIDE 100   CO2 22   GLUCOSE 232*   BUN 29*   CREATININE 1.03   CALCIUM 9.0                   Imaging  US-EXTREMITY VENOUS LOWER BILAT   Final Result      DX-CHEST-PORTABLE (1 VIEW)   Final Result         1. No acute cardiopulmonary abnormalities are identified.      CT-HEAD W/O   Final Result         1.  No evidence of acute intracranial hemorrhage or mass lesion.      2. Cerebral atrophy.               Assessment/Plan  * Encephalopathy- (present on admission)  Assessment & Plan  Avoid benzodiazepines and anticholinergics  Frequent orientation  Avoid early morning labs  Avoid vital signs during sleep  Ambulate if possible      Frequent falls- (present on admission)  Assessment & Plan  PT and OT    Frailty syndrome in geriatric patient- (present on admission)  Assessment & Plan  PT and OT    Vitamin B12 deficiency- (present on admission)  Assessment & Plan  oral B12  IM b12 given     Urinary retention- (present on admission)  Assessment & Plan  Bladder scan q 6, straight cath prn > 400 cc  Flomax and Proscar    Noncompliance- (present on admission)  Assessment & Plan  counseling    Type 2 diabetes mellitus with hyperglycemia, without long-term current use of insulin (HCC)- (present on admission)  Assessment & Plan  SSI  Increase Lantus to 15 units      Vitamin D deficiency- (present on admission)  Assessment & Plan  replacement    Pyuria- (present on admission)  Assessment & Plan  No culture done    Microcytic anemia- (present on admission)  Assessment & Plan  Follow cbc       VTE prophylaxis: Lovenox

## 2021-01-25 NOTE — CARE PLAN
Problem: Communication  Goal: The ability to communicate needs accurately and effectively will improve  Outcome: PROGRESSING AS EXPECTED  Patient able to express need. Call light use demonstrated to patient. Chair alarm in place in case. Rounding frequently on patient.     Problem: Safety  Goal: Will remain free from injury  Outcome: PROGRESSING AS EXPECTED  LFR; chair alarm on/in place.     Problem: Bowel/Gastric:  Goal: Normal bowel function is maintained or improved  Outcome: PROGRESSING AS EXPECTED  Hypoactive BS x 4 noted. Patient tolerating diet; 100% meal ingested.     Problem: Pain Management  Goal: Pain level will decrease to patient's comfort goal  Outcome: PROGRESSING AS EXPECTED  Patient reported mild pain; refusing intervention.

## 2021-01-25 NOTE — PROGRESS NOTES
"Received report from AM RN; assumed care. A&O x 4. VSS. 93% on RA. Mild forgetfulness noted. Patient denied SOB, numbness, tingling, nausea, vomiting. Shallow breathing noted. Patient offered ambulation or to get back in bed. Patient refused. Left eye lacerations, scabbed; BRANDEE. Abdomen, round, distended. Hernia noted. BUE/BLE cool extremities. Scattered abrasions throughout body. + void, yellow urine. 100 mL at a time every hour. + flatus. Hypoactive BS x 4 noted. LBM 01/20/2021. Patient tolerating diet; ingested 100% dinner plus snacks. Coverage provided. Patient stated he felt like he was going to have a BM tonight after he finished. Patient stated will utilize call light when feels the need. Chair alarm on/in place in case forgets. When sounded off, patient joked \"the prisoners escaping\". Patient stated he just readjusted in the chair. POC discussed. Questions answered. Bed locked/lowest position. Call light on table in front of patient. All needs met/patient resting at present time.   "

## 2021-01-26 LAB
GLUCOSE BLD-MCNC: 113 MG/DL (ref 65–99)
GLUCOSE BLD-MCNC: 216 MG/DL (ref 65–99)
GLUCOSE BLD-MCNC: 266 MG/DL (ref 65–99)
GLUCOSE BLD-MCNC: 326 MG/DL (ref 65–99)

## 2021-01-26 PROCEDURE — 700102 HCHG RX REV CODE 250 W/ 637 OVERRIDE(OP): Performed by: STUDENT IN AN ORGANIZED HEALTH CARE EDUCATION/TRAINING PROGRAM

## 2021-01-26 PROCEDURE — A9270 NON-COVERED ITEM OR SERVICE: HCPCS | Performed by: FAMILY MEDICINE

## 2021-01-26 PROCEDURE — 770006 HCHG ROOM/CARE - MED/SURG/GYN SEMI*

## 2021-01-26 PROCEDURE — 99232 SBSQ HOSP IP/OBS MODERATE 35: CPT | Performed by: INTERNAL MEDICINE

## 2021-01-26 PROCEDURE — 700111 HCHG RX REV CODE 636 W/ 250 OVERRIDE (IP): Performed by: STUDENT IN AN ORGANIZED HEALTH CARE EDUCATION/TRAINING PROGRAM

## 2021-01-26 PROCEDURE — 700102 HCHG RX REV CODE 250 W/ 637 OVERRIDE(OP): Performed by: FAMILY MEDICINE

## 2021-01-26 PROCEDURE — 700102 HCHG RX REV CODE 250 W/ 637 OVERRIDE(OP): Performed by: INTERNAL MEDICINE

## 2021-01-26 PROCEDURE — A9270 NON-COVERED ITEM OR SERVICE: HCPCS | Performed by: STUDENT IN AN ORGANIZED HEALTH CARE EDUCATION/TRAINING PROGRAM

## 2021-01-26 PROCEDURE — A9270 NON-COVERED ITEM OR SERVICE: HCPCS | Performed by: INTERNAL MEDICINE

## 2021-01-26 PROCEDURE — 82962 GLUCOSE BLOOD TEST: CPT | Mod: 91

## 2021-01-26 RX ADMIN — Medication 1000 UNITS: at 05:58

## 2021-01-26 RX ADMIN — CYANOCOBALAMIN TAB 500 MCG 1000 MCG: 500 TAB at 05:58

## 2021-01-26 RX ADMIN — INSULIN GLARGINE 15 UNITS: 100 INJECTION, SOLUTION SUBCUTANEOUS at 06:00

## 2021-01-26 RX ADMIN — TAMSULOSIN HYDROCHLORIDE 0.4 MG: 0.4 CAPSULE ORAL at 08:34

## 2021-01-26 RX ADMIN — FINASTERIDE 5 MG: 5 TABLET, FILM COATED ORAL at 05:58

## 2021-01-26 RX ADMIN — DOCUSATE SODIUM 50 MG AND SENNOSIDES 8.6 MG 2 TABLET: 8.6; 5 TABLET, FILM COATED ORAL at 17:15

## 2021-01-26 ASSESSMENT — ENCOUNTER SYMPTOMS
NAUSEA: 0
NERVOUS/ANXIOUS: 1
VOMITING: 0
FALLS: 1
FEVER: 0
WEAKNESS: 1

## 2021-01-26 ASSESSMENT — PAIN DESCRIPTION - PAIN TYPE
TYPE: ACUTE PAIN
TYPE: ACUTE PAIN

## 2021-01-26 NOTE — PROGRESS NOTES
[facility] Medicine Daily Progress Note    Date of Service  1/26/2021    Chief Complaint  75 y.o. male admitted 1/17/2021 with encephalopathy.    Hospital Course  Admitted with encephalopathy, known history of frailty syndrome and frequent falls.  Encephalopathy has resolved.  Physical therapy and Occupational Therapy have evaluated him and recommended supervision.  Social work working on discharge planning on long-term skilled nurse facility versus a group home.  Has known history of urinary retention, a Clement catheter was initially placed, he was restarted back on Flomax and Proscar, his Clement catheter was discontinued.    Interval Problem Update  Falls-doing ok.   Social situation-unclear, SSI, not enough money for rent. Nervous about his dispo plan    Consultants/Specialty  None    Code Status  DNAR/DNI    Disposition  Long Term SNF vs Group Home, working with CM, needs payor source    Review of Systems  Review of Systems   Constitutional: Negative for fever.   Cardiovascular: Negative for chest pain.   Gastrointestinal: Negative for nausea and vomiting.   Genitourinary: Positive for frequency.   Musculoskeletal: Positive for falls.   Neurological: Positive for weakness.   Psychiatric/Behavioral: The patient is nervous/anxious.    All other systems reviewed and are negative.       Physical Exam  Temp:  [36.1 °C (97 °F)-37 °C (98.6 °F)] 36.1 °C (97 °F)  Pulse:  [81-89] 81  Resp:  [14-18] 17  BP: ()/(51-57) 95/56  SpO2:  [96 %-99 %] 97 %    Physical Exam  Vitals signs and nursing note reviewed.   HENT:      Head: Normocephalic.      Comments: Laceration at forehead     Nose: No congestion.      Mouth/Throat:      Mouth: Mucous membranes are moist.   Eyes:      Extraocular Movements: Extraocular movements intact.      Conjunctiva/sclera: Conjunctivae normal.      Pupils: Pupils are equal, round, and reactive to light.   Neck:      Musculoskeletal: No muscular tenderness.   Cardiovascular:      Rate and Rhythm:  Normal rate and regular rhythm.   Pulmonary:      Effort: Pulmonary effort is normal.      Breath sounds: Normal breath sounds.   Abdominal:      General: Bowel sounds are normal.      Palpations: Abdomen is soft.      Tenderness: There is no abdominal tenderness.   Musculoskeletal:      Right lower leg: No edema.      Left lower leg: No edema.   Lymphadenopathy:      Cervical: No cervical adenopathy.   Skin:     General: Skin is warm and dry.   Neurological:      Mental Status: He is alert and oriented to person, place, and time.      Cranial Nerves: No cranial nerve deficit.      Motor: Weakness (MMT BUE 5/5, RLE 4+/5, LLE 4/5) present.         Fluids    Intake/Output Summary (Last 24 hours) at 1/26/2021 1035  Last data filed at 1/26/2021 0900  Gross per 24 hour   Intake 240 ml   Output 400 ml   Net -160 ml       Laboratory  Recent Labs     01/25/21  0852   WBC 7.7   RBC 3.65*   HEMOGLOBIN 8.7*   HEMATOCRIT 29.0*   MCV 79.5*   MCH 23.8*   MCHC 30.0*   RDW 55.6*   PLATELETCT 207   MPV 9.7     Recent Labs     01/25/21  0852   SODIUM 133*   POTASSIUM 4.5   CHLORIDE 100   CO2 22   GLUCOSE 232*   BUN 29*   CREATININE 1.03   CALCIUM 9.0                   Imaging  US-EXTREMITY VENOUS LOWER BILAT   Final Result      DX-CHEST-PORTABLE (1 VIEW)   Final Result         1. No acute cardiopulmonary abnormalities are identified.      CT-HEAD W/O   Final Result         1.  No evidence of acute intracranial hemorrhage or mass lesion.      2. Cerebral atrophy.              Assessment/Plan  * Encephalopathy- (present on admission)  Assessment & Plan  Avoid benzodiazepines and anticholinergics  Frequent orientation  Avoid early morning labs  Avoid vital signs during sleep  Ambulate if possible  -resolved and back at his baseline      Frequent falls- (present on admission)  Assessment & Plan  PT and OT    Frailty syndrome in geriatric patient- (present on admission)  Assessment & Plan  PT and OT    Vitamin B12 deficiency- (present on  admission)  Assessment & Plan  oral B12  IM b12 given     Urinary retention- (present on admission)  Assessment & Plan  Bladder scan q 6, straight cath prn > 400 cc  Flomax and Proscar    Noncompliance- (present on admission)  Assessment & Plan  counseling    Type 2 diabetes mellitus with hyperglycemia, without long-term current use of insulin (HCC)- (present on admission)  Assessment & Plan  SSI, not well controlled  -add lispro 3 units TIDAC  -continue Lantus 15 units daily      Vitamin D deficiency- (present on admission)  Assessment & Plan  replacement    Pyuria- (present on admission)  Assessment & Plan  No culture done    Microcytic anemia- (present on admission)  Assessment & Plan  Follow cbc       VTE prophylaxis: Lovenox

## 2021-01-26 NOTE — CARE PLAN
Problem: Safety  Goal: Will remain free from falls  Outcome: PROGRESSING AS EXPECTED  Note: Educated patient on the importance of using the call light for ambulation to prevent falls.      Problem: Discharge Barriers/Planning  Goal: Patient's continuum of care needs will be met  Outcome: PROGRESSING AS EXPECTED  Note: Continuing to answer patients questions and concerns regarding discharge.

## 2021-01-26 NOTE — PROGRESS NOTES
"Assumed care of patient this evening. Assessment complete on room air. Patient is A&Ox4. /57   Pulse 88   Temp 36.6 °C (97.9 °F) (Temporal)   Resp 14   Ht 1.778 m (5' 10\")   Wt 56.1 kg (123 lb 10.9 oz)   SpO2 99%   BMI 17.75 kg/m² . Patient complains of 2/10 pain, declines intervention at this time.     Patient has a 20 in the R upper arm that is saline locked. On a diabetic diet and tolerating well.      Plan of care discussed for the day, bed is locked and in the lowest position, all questions answered at this time. Reinforced the use of the call light.   "

## 2021-01-26 NOTE — THERAPY
Physical Therapy   Daily Treatment     Patient Name: Hugh Núñez  Age:  75 y.o., Sex:  male  Medical Record #: [id number]  Today's Date: 1/25/2021     Precautions: Fall Risk    Assessment    Pt has progressed his mobility and today performed x250' of gait with FWW and managed x3 stairs at supervision. He required intermittent cues for proximity to FWW but did not demonstrate an overt LOB. At this point, pt has met his functional goals in the setting and should continue to mobilize with nursing staff. Due to cognition, anticipate pt to be well suited for a Group Home or Long Term placement. No further acute PT required.     Plan    Discharge secondary to goals met.    DC Equipment Recommendations: Front-Wheel Walker  Discharge Recommendations: Other -(Group Home or Long Term Placement)       Objective       01/25/21 1126   Balance   Comments seated indep; standing at spv with FWW   Gait Analysis   Gait Level Of Assist Supervised   Assistive Device Front Wheel Walker   Distance (Feet) 250   # of Times Distance was Traveled 1   # of Stairs Climbed 3   Level of Assist with Stairs Supervised   Weight Bearing Status FWB   Skilled Intervention Verbal Cuing   Functional Mobility   Sit to Stand Supervised   Bed, Chair, Wheelchair Transfer Supervised   Transfer Method Stand Step   Short Term Goals    Short Term Goal # 2 Pt will perform gait x150' with FWW at spv in 6tx to improve functional mobility.    Goal Outcome # 2 Goal met   Anticipated Discharge Equipment and Recommendations   DC Equipment Recommendations Front-Wheel Walker   Discharge Recommendations Other -  (Group Home or Long Term Placement)

## 2021-01-26 NOTE — CARE PLAN
Problem: Communication  Goal: The ability to communicate needs accurately and effectively will improve  Outcome: PROGRESSING AS EXPECTED     Problem: Safety  Goal: Will remain free from injury  Outcome: PROGRESSING AS EXPECTED  Goal: Will remain free from falls  Outcome: PROGRESSING AS EXPECTED     Problem: Discharge Barriers/Planning  Goal: Patient's continuum of care needs will be met  Outcome: PROGRESSING SLOWER THAN EXPECTED

## 2021-01-27 PROBLEM — F33.1 MODERATE EPISODE OF RECURRENT MAJOR DEPRESSIVE DISORDER (HCC): Status: ACTIVE | Noted: 2021-01-27

## 2021-01-27 LAB
GLUCOSE BLD-MCNC: 143 MG/DL (ref 65–99)
GLUCOSE BLD-MCNC: 167 MG/DL (ref 65–99)
GLUCOSE BLD-MCNC: 169 MG/DL (ref 65–99)
GLUCOSE BLD-MCNC: 223 MG/DL (ref 65–99)

## 2021-01-27 PROCEDURE — 700102 HCHG RX REV CODE 250 W/ 637 OVERRIDE(OP): Performed by: INTERNAL MEDICINE

## 2021-01-27 PROCEDURE — 770006 HCHG ROOM/CARE - MED/SURG/GYN SEMI*

## 2021-01-27 PROCEDURE — 700102 HCHG RX REV CODE 250 W/ 637 OVERRIDE(OP): Performed by: STUDENT IN AN ORGANIZED HEALTH CARE EDUCATION/TRAINING PROGRAM

## 2021-01-27 PROCEDURE — 99232 SBSQ HOSP IP/OBS MODERATE 35: CPT | Performed by: INTERNAL MEDICINE

## 2021-01-27 PROCEDURE — A9270 NON-COVERED ITEM OR SERVICE: HCPCS | Performed by: FAMILY MEDICINE

## 2021-01-27 PROCEDURE — A9270 NON-COVERED ITEM OR SERVICE: HCPCS | Performed by: INTERNAL MEDICINE

## 2021-01-27 PROCEDURE — 700102 HCHG RX REV CODE 250 W/ 637 OVERRIDE(OP): Performed by: FAMILY MEDICINE

## 2021-01-27 PROCEDURE — A9270 NON-COVERED ITEM OR SERVICE: HCPCS | Performed by: STUDENT IN AN ORGANIZED HEALTH CARE EDUCATION/TRAINING PROGRAM

## 2021-01-27 PROCEDURE — 82962 GLUCOSE BLOOD TEST: CPT | Mod: 91

## 2021-01-27 RX ADMIN — Medication 1000 UNITS: at 04:30

## 2021-01-27 RX ADMIN — INSULIN GLARGINE 15 UNITS: 100 INJECTION, SOLUTION SUBCUTANEOUS at 08:50

## 2021-01-27 RX ADMIN — DOCUSATE SODIUM 50 MG AND SENNOSIDES 8.6 MG 2 TABLET: 8.6; 5 TABLET, FILM COATED ORAL at 17:47

## 2021-01-27 RX ADMIN — FINASTERIDE 5 MG: 5 TABLET, FILM COATED ORAL at 04:30

## 2021-01-27 RX ADMIN — CYANOCOBALAMIN TAB 500 MCG 1000 MCG: 500 TAB at 04:30

## 2021-01-27 RX ADMIN — TAMSULOSIN HYDROCHLORIDE 0.4 MG: 0.4 CAPSULE ORAL at 08:50

## 2021-01-27 ASSESSMENT — ENCOUNTER SYMPTOMS
NERVOUS/ANXIOUS: 0
FALLS: 0
NAUSEA: 0
VOMITING: 0
WEAKNESS: 1
DEPRESSION: 1
CHILLS: 0

## 2021-01-27 ASSESSMENT — PAIN DESCRIPTION - PAIN TYPE
TYPE: ACUTE PAIN
TYPE: ACUTE PAIN

## 2021-01-27 NOTE — PROGRESS NOTES
"Assumed care of patient this evening, assessment complete on room air. BP (!) 97/59   Pulse 83   Temp 36.7 °C (98 °F) (Temporal)   Resp 18   Ht 1.778 m (5' 10\")   Wt 56.1 kg (123 lb 10.9 oz)   SpO2 98%   BMI 17.75 kg/m² . Patient is A&Ox4. Patient complains of 1/10 pain, declines intervention at this time.     Patient has a 20 in the R upper arm that is saline locked. On a diabetic diet and tolerating well.       Plan of care discussed for the day, bed is locked and in the lowest position, all questions answered at this time. Reinforced the use of the call light.           "

## 2021-01-27 NOTE — PROGRESS NOTES
[facility] Medicine Daily Progress Note    Date of Service  1/27/2021    Chief Complaint  75 y.o. male admitted 1/17/2021 with encephalopathy.    Hospital Course  Admitted with encephalopathy, known history of frailty syndrome and frequent falls.  Encephalopathy has resolved.  Physical therapy and Occupational Therapy have evaluated him and recommended supervision.  Social work working on discharge planning on long-term skilled nurse facility versus a group home.  Has known history of urinary retention, a Clement catheter was initially placed, he was restarted back on Flomax and Proscar, his Clement catheter was discontinued.    Interval Problem Update  Falls-doing ok. No recurrence  BPH-nursing staff found a bottle of finasteride under his bed. Nursing staff reported some cloudy urine but no overt symptoms and remains afebrile.    Social situation-still remains without definitive plan. Feels a bit depressed today    Consultants/Specialty  None    Code Status  DNAR/DNI    Disposition  Long Term SNF vs Group Home, working with CM, needs payor source  Discussing with our Long term APRN for acceptance to their service    Review of Systems  Review of Systems   Constitutional: Negative for chills.   Cardiovascular: Negative for chest pain.   Gastrointestinal: Negative for nausea and vomiting.   Genitourinary: Positive for frequency. Negative for dysuria and urgency.   Musculoskeletal: Negative for falls.   Neurological: Positive for weakness.   Psychiatric/Behavioral: Positive for depression. The patient is not nervous/anxious.    All other systems reviewed and are negative.       Physical Exam  Temp:  [36.3 °C (97.4 °F)-37.1 °C (98.8 °F)] 36.6 °C (97.9 °F)  Pulse:  [60-90] 75  Resp:  [16-18] 16  BP: ()/(55-61) 105/61  SpO2:  [93 %-98 %] 93 %    Physical Exam  Vitals signs and nursing note reviewed.   HENT:      Head: Normocephalic.      Comments: Laceration at forehead     Nose: No congestion.      Mouth/Throat:       Mouth: Mucous membranes are moist.   Eyes:      Extraocular Movements: Extraocular movements intact.      Conjunctiva/sclera: Conjunctivae normal.      Pupils: Pupils are equal, round, and reactive to light.   Neck:      Musculoskeletal: No muscular tenderness.   Cardiovascular:      Rate and Rhythm: Normal rate and regular rhythm.   Pulmonary:      Effort: Pulmonary effort is normal.      Breath sounds: Normal breath sounds.   Abdominal:      General: Bowel sounds are normal. There is no distension.      Palpations: Abdomen is soft.   Musculoskeletal:         General: No swelling.      Right lower leg: No edema.      Left lower leg: No edema.   Lymphadenopathy:      Cervical: No cervical adenopathy.   Skin:     General: Skin is warm and dry.   Neurological:      Mental Status: He is alert and oriented to person, place, and time.      Cranial Nerves: No cranial nerve deficit.      Motor: Weakness (MMT BUE 5/5, RLE 4+/5, LLE 4/5) present.   Psychiatric:      Comments: Depressed appearing         Fluids    Intake/Output Summary (Last 24 hours) at 1/27/2021 1302  Last data filed at 1/27/2021 0710  Gross per 24 hour   Intake 120 ml   Output 675 ml   Net -555 ml       Laboratory  Recent Labs     01/25/21  0852   WBC 7.7   RBC 3.65*   HEMOGLOBIN 8.7*   HEMATOCRIT 29.0*   MCV 79.5*   MCH 23.8*   MCHC 30.0*   RDW 55.6*   PLATELETCT 207   MPV 9.7     Recent Labs     01/25/21  0852   SODIUM 133*   POTASSIUM 4.5   CHLORIDE 100   CO2 22   GLUCOSE 232*   BUN 29*   CREATININE 1.03   CALCIUM 9.0                   Imaging  US-EXTREMITY VENOUS LOWER BILAT   Final Result      DX-CHEST-PORTABLE (1 VIEW)   Final Result         1. No acute cardiopulmonary abnormalities are identified.      CT-HEAD W/O   Final Result         1.  No evidence of acute intracranial hemorrhage or mass lesion.      2. Cerebral atrophy.              Assessment/Plan  * Encephalopathy- (present on admission)  Assessment & Plan  Avoid benzodiazepines and  anticholinergics  Frequent orientation  Avoid early morning labs  Avoid vital signs during sleep  Ambulate if possible  -resolved and back at his baseline      Frequent falls- (present on admission)  Assessment & Plan  PT and OT, needs group home verus LTP, working with CM/SW, limited options at this time    Frailty syndrome in geriatric patient- (present on admission)  Assessment & Plan  PT and OT    Vitamin B12 deficiency- (present on admission)  Assessment & Plan  oral B12  IM b12 given     Urinary retention- (present on admission)  Assessment & Plan  Bladder scan q 6, straight cath prn > 400 cc  Flomax and Proscar, tolerating well    Noncompliance- (present on admission)  Assessment & Plan  counseling    Type 2 diabetes mellitus with hyperglycemia, without long-term current use of insulin (HCC)- (present on admission)  Assessment & Plan  SSI, not well controlled  -add lispro 3 units TIDAC  -continue Lantus 15 units daily      Vitamin D deficiency- (present on admission)  Assessment & Plan  replacement    Pyuria- (present on admission)  Assessment & Plan  No culture done    Microcytic anemia- (present on admission)  Assessment & Plan  Follow cbc    Moderate episode of recurrent major depressive disorder (HCC)- (present on admission)  Assessment & Plan  -consider starting zoloft, re-assess tomorrow       VTE prophylaxis: Lovenox

## 2021-01-27 NOTE — CARE PLAN
Problem: Safety  Goal: Will remain free from injury  Outcome: PROGRESSING AS EXPECTED  Note: Encouraged patient to ambulate with staff present, reinforced the use of the call light.      Problem: Pain Management  Goal: Pain level will decrease to patient's comfort goal  Outcome: PROGRESSING AS EXPECTED  Note: Will continue to monitor patient for pain and provide interventions per MAR.

## 2021-01-28 LAB
GLUCOSE BLD-MCNC: 114 MG/DL (ref 65–99)
GLUCOSE BLD-MCNC: 115 MG/DL (ref 65–99)
GLUCOSE BLD-MCNC: 125 MG/DL (ref 65–99)
GLUCOSE BLD-MCNC: 168 MG/DL (ref 65–99)

## 2021-01-28 PROCEDURE — 770006 HCHG ROOM/CARE - MED/SURG/GYN SEMI*

## 2021-01-28 PROCEDURE — A9270 NON-COVERED ITEM OR SERVICE: HCPCS | Performed by: FAMILY MEDICINE

## 2021-01-28 PROCEDURE — 82962 GLUCOSE BLOOD TEST: CPT | Mod: 91

## 2021-01-28 PROCEDURE — A9270 NON-COVERED ITEM OR SERVICE: HCPCS | Performed by: INTERNAL MEDICINE

## 2021-01-28 PROCEDURE — 700102 HCHG RX REV CODE 250 W/ 637 OVERRIDE(OP): Performed by: FAMILY MEDICINE

## 2021-01-28 PROCEDURE — 700102 HCHG RX REV CODE 250 W/ 637 OVERRIDE(OP): Performed by: INTERNAL MEDICINE

## 2021-01-28 RX ADMIN — TAMSULOSIN HYDROCHLORIDE 0.4 MG: 0.4 CAPSULE ORAL at 08:18

## 2021-01-28 RX ADMIN — CYANOCOBALAMIN TAB 500 MCG 1000 MCG: 500 TAB at 05:31

## 2021-01-28 RX ADMIN — INSULIN GLARGINE 15 UNITS: 100 INJECTION, SOLUTION SUBCUTANEOUS at 08:14

## 2021-01-28 RX ADMIN — Medication 1000 UNITS: at 05:31

## 2021-01-28 RX ADMIN — FINASTERIDE 5 MG: 5 TABLET, FILM COATED ORAL at 05:31

## 2021-01-28 NOTE — PROGRESS NOTES
"Assumed care of patient this evening. Assessment complete on room air. /59   Pulse 85   Temp 36.6 °C (97.9 °F) (Temporal)   Resp 18   Ht 1.778 m (5' 10\")   Wt 56.1 kg (123 lb 10.9 oz)   SpO2 98%   BMI 17.75 kg/m² . Patient is A&Ox4.     Patient has a 20 in the R upper arm that is saline locked. On a diabetic diet and tolerating well. Blood sugars much more controlled this shift.      Plan of care discussed for the day, bed is locked and in the lowest position, all questions answered at this time. Reinforced the use of the call light.            "

## 2021-01-28 NOTE — CARE PLAN
Problem: Safety  Goal: Will remain free from injury  Outcome: PROGRESSING AS EXPECTED  Note: Pt calls appropriately for assistance. Bed alarm and chair alarm in use. Discussed safety edu. Pt using cane or walker with ambulation. Treaded slipper socks on.      Problem: Discharge Barriers/Planning  Goal: Patient's continuum of care needs will be met  Outcome: PROGRESSING AS EXPECTED  Note: SW/RN CM working on d/c planning, possible group home, payor source.

## 2021-01-28 NOTE — PROGRESS NOTES
Patient chart and plan have been evaluated and discussed with Physician, patient is currently medically cleared for discharge awaiting placement. Patient to transition to Long term list to be seen by APRN twice weekly while awaiting safe discharge placement. Transition as of 1/28/21.

## 2021-01-28 NOTE — PROGRESS NOTES
Pt A&Ox4.  Denies pain. Sitting up at edge of bed. No complaints this AM other than expressing feelings of being down about current situation. Spoke with RN YUN, working on group home.   Tolerating diet, denies n/v. + bowel sounds, + flatus, LBM 1/27 per report.  Saturating >90% on RA.  Pt ambulates SBA with FWW/cane.  Updated on plan of care. Safety education provided. Bed locked in low. Call light within reach. Rounding in place.

## 2021-01-28 NOTE — DISCHARGE PLANNING
Agency/Facility Name: [facility]  Outcome: L/M for Andrew requesting referral status

## 2021-01-28 NOTE — DISCHARGE PLANNING
Anticipated Discharge Disposition:   Long Term SNF bed vs GH (Institutional Medicaid)    Action:   This RN CM is following the case. Met with Pt today who expressed intention of discharging to a GH or SNF.  Pt collects $1100 per month from Social Security and has no family here. Per Pt his wife already  and he has not made contacts with his daughter who is in [place].    Will provide a List of GH in the [facility]. Pt is also willing to do  some calls.    Pt also agreed to send the referral to SNF . This could also be a long term bed.  Choice form was faxed to [place] [facility].    This RN CM spoke with Jeannie of [facility] and reminded her that an email was already sent to [facility] for request to screen the Pt for Medicaid and to assist Pt with application for Institutional Medicaid.    Per Jeannie , she will notify Screener to make sure Pt is screened today.     Pt is agreeable with the above plan,    Spoke with Mak Solis, Owner of [employer] [facility] who states that he can come and do an assessment for Pt once Pt has an Active Medicaid .    Per Mak , another option is if [facility] would like to pay the KIANA for GH fees until Pt's Institutional Medicaid is approved. Will send an email to leadership for inquiry on this case for possible [facility] KIANA .     Barriers to Discharge:   Pending long term SNF bed vs   GH(Institutionalized Medicaid) acceptance    Plan:   Will continue to assist Pt with discharge as needed.    Addendum:    This RN CM sent an email to Leadership addressed to Melanie Royal, on inquiry for Leadership assistance on the case if [facility] can pay KIANA for SNF vs GH while waiting for Pt to have active Medicaid /Medicaid waiver for GH/SNF.    Per Melanie, [facility] will not pay for an KIANA with SNF to prevent possible Medicaid Fraud.     GH is an option as Pt ambulates with SBA with FWW. Will talk to Pt on his options and if Pt can afford it because Pt only collects $1100 per month.    This RN CM gave  a list of Group Homes in the [place] area as Pt requested.

## 2021-01-28 NOTE — DISCHARGE PLANNING
Received Choice form at 1520  Agency/Facility Name: [facility], [place] [place]  Referral sent per Choice form @ [phone]

## 2021-01-28 NOTE — CARE PLAN
Problem: Discharge Barriers/Planning  Goal: Patient's continuum of care needs will be met  Outcome: PROGRESSING AS EXPECTED  Note: Communicating with patient regarding where patient will be discharged too.      Problem: Knowledge Deficit  Goal: Knowledge of disease process/condition, treatment plan, diagnostic tests, and medications will improve  Outcome: PROGRESSING SLOWER THAN EXPECTED  Note: Edcuating patient regarding diabetes management with medication and diet.

## 2021-01-29 LAB
GLUCOSE BLD-MCNC: 113 MG/DL (ref 65–99)
GLUCOSE BLD-MCNC: 124 MG/DL (ref 65–99)
GLUCOSE BLD-MCNC: 167 MG/DL (ref 65–99)
GLUCOSE BLD-MCNC: 84 MG/DL (ref 65–99)

## 2021-01-29 PROCEDURE — 82962 GLUCOSE BLOOD TEST: CPT | Mod: 91

## 2021-01-29 PROCEDURE — A9270 NON-COVERED ITEM OR SERVICE: HCPCS | Performed by: FAMILY MEDICINE

## 2021-01-29 PROCEDURE — A9270 NON-COVERED ITEM OR SERVICE: HCPCS | Performed by: INTERNAL MEDICINE

## 2021-01-29 PROCEDURE — 700111 HCHG RX REV CODE 636 W/ 250 OVERRIDE (IP): Performed by: STUDENT IN AN ORGANIZED HEALTH CARE EDUCATION/TRAINING PROGRAM

## 2021-01-29 PROCEDURE — 700102 HCHG RX REV CODE 250 W/ 637 OVERRIDE(OP): Performed by: FAMILY MEDICINE

## 2021-01-29 PROCEDURE — 770006 HCHG ROOM/CARE - MED/SURG/GYN SEMI*

## 2021-01-29 PROCEDURE — 97535 SELF CARE MNGMENT TRAINING: CPT

## 2021-01-29 PROCEDURE — 700102 HCHG RX REV CODE 250 W/ 637 OVERRIDE(OP): Performed by: INTERNAL MEDICINE

## 2021-01-29 RX ADMIN — TAMSULOSIN HYDROCHLORIDE 0.4 MG: 0.4 CAPSULE ORAL at 08:10

## 2021-01-29 RX ADMIN — FINASTERIDE 5 MG: 5 TABLET, FILM COATED ORAL at 05:08

## 2021-01-29 RX ADMIN — CYANOCOBALAMIN TAB 500 MCG 1000 MCG: 500 TAB at 05:08

## 2021-01-29 RX ADMIN — ENOXAPARIN SODIUM 40 MG: 40 INJECTION SUBCUTANEOUS at 05:08

## 2021-01-29 RX ADMIN — Medication 1000 UNITS: at 05:08

## 2021-01-29 RX ADMIN — INSULIN GLARGINE 15 UNITS: 100 INJECTION, SOLUTION SUBCUTANEOUS at 08:07

## 2021-01-29 ASSESSMENT — COGNITIVE AND FUNCTIONAL STATUS - GENERAL
DAILY ACTIVITIY SCORE: 19
SUGGESTED CMS G CODE MODIFIER DAILY ACTIVITY: CK
DRESSING REGULAR LOWER BODY CLOTHING: A LITTLE
HELP NEEDED FOR BATHING: A LITTLE
PERSONAL GROOMING: A LITTLE
TOILETING: A LITTLE
EATING MEALS: A LITTLE

## 2021-01-29 NOTE — DISCHARGE PLANNING
Agency/Facility Name: [facility]  Spoke To: Andrew  Outcome: Facility will accept pt. But will not have a male bed available until Monday. Facility is requesting name and number for public guardian. CCA will also confirm pt. Is medically clear.

## 2021-01-29 NOTE — CARE PLAN
Problem: Communication  Goal: The ability to communicate needs accurately and effectively will improve  Outcome: PROGRESSING AS EXPECTED     Problem: Safety  Goal: Will remain free from injury  Outcome: PROGRESSING AS EXPECTED  Goal: Will remain free from falls  Outcome: PROGRESSING AS EXPECTED     Problem: Infection  Goal: Will remain free from infection  Outcome: PROGRESSING AS EXPECTED     Problem: Venous Thromboembolism (VTW)/Deep Vein Thrombosis (DVT) Prevention:  Goal: Patient will participate in Venous Thrombosis (VTE)/Deep Vein Thrombosis (DVT)Prevention Measures  Outcome: PROGRESSING AS EXPECTED     Problem: Bowel/Gastric:  Goal: Normal bowel function is maintained or improved  Outcome: PROGRESSING AS EXPECTED  Goal: Will not experience complications related to bowel motility  Outcome: PROGRESSING AS EXPECTED     Problem: Knowledge Deficit  Goal: Knowledge of disease process/condition, treatment plan, diagnostic tests, and medications will improve  Outcome: PROGRESSING AS EXPECTED  Goal: Knowledge of the prescribed therapeutic regimen will improve  Outcome: PROGRESSING AS EXPECTED     Problem: Discharge Barriers/Planning  Goal: Patient's continuum of care needs will be met  Outcome: PROGRESSING AS EXPECTED     Problem: Respiratory:  Goal: Respiratory status will improve  Outcome: PROGRESSING AS EXPECTED     Problem: Skin Integrity  Goal: Risk for impaired skin integrity will decrease  Outcome: PROGRESSING AS EXPECTED     Problem: Pain Management  Goal: Pain level will decrease to patient's comfort goal  Outcome: PROGRESSING AS EXPECTED     Problem: Mobility  Goal: Risk for activity intolerance will decrease  Outcome: PROGRESSING AS EXPECTED     Problem: Urinary Elimination:  Goal: Ability to reestablish a normal urinary elimination pattern will improve  Outcome: PROGRESSING AS EXPECTED     Problem: Psychosocial Needs:  Goal: Level of anxiety will decrease  Outcome: PROGRESSING AS EXPECTED

## 2021-01-29 NOTE — DISCHARGE PLANNING
Anticipated Discharge Disposition:   Long Term SNF bed vs GH ( Institutional Medicaid)    Action:   This RN YUN spoke with Romi who states that she will meet with Pt this afternoon to assist Pt with Medicaid application and possible application for Medicaid waiver.     Barriers to Discharge:   Pending placement acceptance  Pt only has Medicare     Plan:   Will continue to assist Pt with discharge as needed.     Will also meet with Pt this afternoon as Pt requested    Addendum: Spoke with Raghavendra Astudillo at [facility] to look at the case if Pt can qualify for a Long Term Bed in one of their facilities.   Spoke with Pt again and introduced Romi to him. Again , Pt agreed to be referred to either a long term SNF bed or a Group Home thru Medicaid Waiver.

## 2021-01-29 NOTE — THERAPY
Occupational Therapy  Daily Treatment     Patient Name: Hugh Núñez  Age:  75 y.o., Sex:  male  Medical Record #: [id number]  Today's Date: 1/29/2021     Precautions  Precautions: Fall Risk  Comments: fall risk d/t cognition    Assessment    Pt seen for OT session. Pt progressing with activity tolerance and balance. Has met most OT goals, and functional deficits now appear related to cognition and safety awareness. Continues to demo decreased short term memory, safety awareness, attention, and sequencing req max v/cs throughout session for safety. Recommend 24/7 SPV at d/c for safety, and likely will benefit from more long term placement d/t deficits and lack of social support. Patient will not be actively followed for occupational therapy services at this time, however may be seen if requested by physician for 1 more visit within 30 days to address any discharge or equipment needs.     Plan    Treatment plan modified to d/c needs only for the following treatments:  none.    DC Equipment Recommendations: Tub / Shower Seat  Discharge Recommendations: Other -(rec 24/7 SPV for safety; long term placement d/t cognition)     Objective     01/29/21 1438   Vitals   O2 Delivery Device None - Room Air   Pain 0 - 10 Group   Therapist Pain Assessment Post Activity Pain Same as Prior to Activity;During Activity;Nurse Notified  (no c/o pain)   Cognition    Cognition / Consciousness X   Speech/ Communication Delayed Responses;Hard of Hearing   Level of Consciousness Alert   Safety Awareness Impaired   New Learning Impaired   Attention Impaired   Comments pleasant and cooperative; functional deficits related to cognition. decreased short term memory and safety awareness. Perseverative on dent on SPC and req v/cs for using soap for washing hands, flushing the toilet, and not ambulating BTB w/o AD.   Passive ROM Upper Body   Passive ROM Upper Body WDL   Active ROM Upper Body   Active ROM Upper Body  WDL   Dominant Hand Right    Strength Upper Body   Upper Body Strength  WDL   Comments functional for ADLs   Balance   Sitting Balance (Static) Good   Sitting Balance (Dynamic) Good   Standing Balance (Static) Fair +   Standing Balance (Dynamic) Fair   Weight Shift Sitting Good   Weight Shift Standing Good   Skilled Intervention Verbal Cuing;Compensatory Strategies   Comments w/SPC, declined FWW   Bed Mobility    Scooting Supervised   Skilled Intervention Verbal Cuing   Comments found and left at EOB eating   Activities of Daily Living   Eating Supervision   Grooming Supervision;Standing  (v/cs for thoroughness with hand washing)   Lower Body Dressing Supervision  (req extra time)   Toileting Supervision  (standing; close SPV)   Skilled Intervention Verbal Cuing;Compensatory Strategies   Comments req v/cs for safety throughout   Functional Mobility   Sit to Stand Supervised   Bed, Chair, Wheelchair Transfer Supervised   Toilet Transfers Supervised   Tub / Shower Transfers Minimal Assist  (tub txf simulation; edu on proper technique)   Transfer Method Stand Step   Mobility w/SPC, declined FWW. Req v/cs for safety throughout   Skilled Intervention Compensatory Strategies;Sequencing;Facilitation;Tactile Cuing;Verbal Cuing   Activity Tolerance   Sitting in Chair 1 min on toilet   Sitting Edge of Bed 10+ min found and left in chair   Standing 10 min   Comments limited by fatigue w/ further disatance   Short Term Goals   Short Term Goal # 1 will complete LB dressing with SPV   Goal Outcome # 1 Goal met   Short Term Goal # 2 will complete toilet txf with SPV   Goal Outcome # 2 Goal met   Short Term Goal # 3 will complete standing g/h with SPV and proper safety with FWW   Goal Outcome # 3 Progressing as expected   Anticipated Discharge Equipment and Recommendations   DC Equipment Recommendations Tub / Shower Seat   Discharge Recommendations Other -  (rec 24/7 SPV for safety; long term placement d/t cognition)

## 2021-01-29 NOTE — PROGRESS NOTES
Bedside report received. Assessment completed.  Pt is A&O x4. Pt on room air.   Denies pain.   Denies nausea.   - numbness, - tingling.  Skin intact and blanching over bony prominences. Old healed laceration over L eye.   LDA saline locked.   Last BM 1/28 . +flatus,   +void.  Consistent cho diet. Tolerates well.   Pt up SBA. Moderate fall risk, bed alarm is on.  Call light and belongings within reach. All needs met at this time. Fall Precautions and hourly rounding in place.

## 2021-01-29 NOTE — DISCHARGE PLANNING
Agency/Facility Name: [facility]  Spoke To: Andrew  Outcome: Facility will accept if pt. Has a solid DC plan (group home).     @[id number]  LUIS Cartagena notified this CCA that pt. Will have a public guardian and that the pt. Needs a long term bed. CCA notified Andrew @ [facility].

## 2021-01-29 NOTE — PROGRESS NOTES
Pt A&Ox4.  Awaiting safe d/c plan.  FSBS 84 this AM, clarified lantus admin, give per APRN.   Tolerating diet, denies n/v. + bowel sounds, + flatus, LBM 1/28.  Saturating >90% on RA.  Pt ambulates SBA with FWW/cane. Calls appropriately for assistance. Moderate fall risk, bed alarm on.   Updated on plan of care. Safety education provided. Bed locked in low. Call light within reach. Rounding in place.

## 2021-01-29 NOTE — CARE PLAN
Problem: Urinary Elimination:  Goal: Ability to reestablish a normal urinary elimination pattern will improve  Outcome: PROGRESSING AS EXPECTED  Note: UOP remains slightly cloudy, voiding adequate amounts. Flomax in use.     Problem: Safety  Goal: Will remain free from injury  Outcome: PROGRESSING AS EXPECTED  Note: Pt calls appropriately for assistance. Bed alarm and chair alarm in use. Discussed safety edu. Pt using cane or walker with ambulation. Treaded slipper socks on.      Problem: Discharge Barriers/Planning  Goal: Patient's continuum of care needs will be met  Outcome: PROGRESSING AS EXPECTED  Note: SW/RN CM working on d/c planning, possible group home, payor source.

## 2021-01-29 NOTE — DISCHARGE PLANNING
CCA requested information for public guardian from LUIS Cartagena to which she stated that this pt. Has no public guardian and is pending Medicaid and has no accepting group homes. [facility]

## 2021-01-29 NOTE — DISCHARGE PLANNING
Agency/Facility Name: [facility]  Outcome: L/M for Andrew requesting referral status

## 2021-01-29 NOTE — DISCHARGE PLANNING
Agency/Facility Name: [facility]  Spoke To: Andrew  Outcome: Facility will have to wait until Medicaid is accepted to take pt. Facility also notes that pt. Does not have a safe dc plan.

## 2021-01-30 LAB
GLUCOSE BLD-MCNC: 149 MG/DL (ref 65–99)
GLUCOSE BLD-MCNC: 172 MG/DL (ref 65–99)
GLUCOSE BLD-MCNC: 179 MG/DL (ref 65–99)
GLUCOSE BLD-MCNC: 75 MG/DL (ref 65–99)

## 2021-01-30 PROCEDURE — A9270 NON-COVERED ITEM OR SERVICE: HCPCS | Performed by: FAMILY MEDICINE

## 2021-01-30 PROCEDURE — 700111 HCHG RX REV CODE 636 W/ 250 OVERRIDE (IP): Performed by: STUDENT IN AN ORGANIZED HEALTH CARE EDUCATION/TRAINING PROGRAM

## 2021-01-30 PROCEDURE — 700102 HCHG RX REV CODE 250 W/ 637 OVERRIDE(OP): Performed by: FAMILY MEDICINE

## 2021-01-30 PROCEDURE — 700102 HCHG RX REV CODE 250 W/ 637 OVERRIDE(OP): Performed by: STUDENT IN AN ORGANIZED HEALTH CARE EDUCATION/TRAINING PROGRAM

## 2021-01-30 PROCEDURE — 700102 HCHG RX REV CODE 250 W/ 637 OVERRIDE(OP): Performed by: INTERNAL MEDICINE

## 2021-01-30 PROCEDURE — 770006 HCHG ROOM/CARE - MED/SURG/GYN SEMI*

## 2021-01-30 PROCEDURE — A9270 NON-COVERED ITEM OR SERVICE: HCPCS | Performed by: STUDENT IN AN ORGANIZED HEALTH CARE EDUCATION/TRAINING PROGRAM

## 2021-01-30 PROCEDURE — 82962 GLUCOSE BLOOD TEST: CPT

## 2021-01-30 PROCEDURE — A9270 NON-COVERED ITEM OR SERVICE: HCPCS | Performed by: INTERNAL MEDICINE

## 2021-01-30 RX ADMIN — FINASTERIDE 5 MG: 5 TABLET, FILM COATED ORAL at 04:38

## 2021-01-30 RX ADMIN — INSULIN GLARGINE 15 UNITS: 100 INJECTION, SOLUTION SUBCUTANEOUS at 08:01

## 2021-01-30 RX ADMIN — DOCUSATE SODIUM 50 MG AND SENNOSIDES 8.6 MG 2 TABLET: 8.6; 5 TABLET, FILM COATED ORAL at 04:38

## 2021-01-30 RX ADMIN — ENOXAPARIN SODIUM 40 MG: 40 INJECTION SUBCUTANEOUS at 04:38

## 2021-01-30 RX ADMIN — Medication 1000 UNITS: at 04:38

## 2021-01-30 RX ADMIN — CYANOCOBALAMIN TAB 500 MCG 1000 MCG: 500 TAB at 04:38

## 2021-01-30 RX ADMIN — TAMSULOSIN HYDROCHLORIDE 0.4 MG: 0.4 CAPSULE ORAL at 08:05

## 2021-01-30 ASSESSMENT — PAIN DESCRIPTION - PAIN TYPE: TYPE: ACUTE PAIN

## 2021-01-30 NOTE — PROGRESS NOTES
Pt AO x 4  Vitals signs stable  Pt denies chest pain or SOB  O2 sat >90% on RA breathing unlabored   Pt denies pain.   Pt denies N/V/D  + voiding, + flatus, + Bowel sounds  Pt ambulates with standby assistance and FWW.   Bed alarm on.     Updated plan of care discussed with pt. Safety education done. Falls precautions in place.   Pt safety maintained. Hourly rounding done.

## 2021-01-30 NOTE — PROGRESS NOTES
Bedside report received.  Assessment complete.  A&O x 4. Patient calls appropriately.  Patient mobilizes SBA with FWW. Bed alarm on.   Patient has 0/10 pain. No intervention needed at this time.  Denies N&V. Tolerating diabetic diet.  + void, + flatus, + BM.  Patient denies SOB.  SCD's off.  Review plan with of care with patient. Call light and personal belongings with in reach. Hourly rounding in place. All needs met at this time.

## 2021-01-30 NOTE — CARE PLAN
Problem: Safety  Goal: Will remain free from injury  Outcome: PROGRESSING AS EXPECTED  Note: Educated not to get out of bed without staff present.  Bed alarm in use     Problem: Pain Management  Goal: Pain level will decrease to patient's comfort goal  Outcome: PROGRESSING AS EXPECTED  Note: Medicate per MAR prn

## 2021-01-31 LAB
GLUCOSE BLD-MCNC: 125 MG/DL (ref 65–99)
GLUCOSE BLD-MCNC: 134 MG/DL (ref 65–99)
GLUCOSE BLD-MCNC: 159 MG/DL (ref 65–99)
GLUCOSE BLD-MCNC: 89 MG/DL (ref 65–99)

## 2021-01-31 PROCEDURE — 700111 HCHG RX REV CODE 636 W/ 250 OVERRIDE (IP): Performed by: STUDENT IN AN ORGANIZED HEALTH CARE EDUCATION/TRAINING PROGRAM

## 2021-01-31 PROCEDURE — 700102 HCHG RX REV CODE 250 W/ 637 OVERRIDE(OP): Performed by: STUDENT IN AN ORGANIZED HEALTH CARE EDUCATION/TRAINING PROGRAM

## 2021-01-31 PROCEDURE — 700102 HCHG RX REV CODE 250 W/ 637 OVERRIDE(OP): Performed by: FAMILY MEDICINE

## 2021-01-31 PROCEDURE — 99231 SBSQ HOSP IP/OBS SF/LOW 25: CPT | Performed by: NURSE PRACTITIONER

## 2021-01-31 PROCEDURE — A9270 NON-COVERED ITEM OR SERVICE: HCPCS | Performed by: FAMILY MEDICINE

## 2021-01-31 PROCEDURE — A9270 NON-COVERED ITEM OR SERVICE: HCPCS | Performed by: STUDENT IN AN ORGANIZED HEALTH CARE EDUCATION/TRAINING PROGRAM

## 2021-01-31 PROCEDURE — A9270 NON-COVERED ITEM OR SERVICE: HCPCS | Performed by: INTERNAL MEDICINE

## 2021-01-31 PROCEDURE — 770006 HCHG ROOM/CARE - MED/SURG/GYN SEMI*

## 2021-01-31 PROCEDURE — 700102 HCHG RX REV CODE 250 W/ 637 OVERRIDE(OP): Performed by: INTERNAL MEDICINE

## 2021-01-31 PROCEDURE — 82962 GLUCOSE BLOOD TEST: CPT

## 2021-01-31 RX ADMIN — ENOXAPARIN SODIUM 40 MG: 40 INJECTION SUBCUTANEOUS at 05:20

## 2021-01-31 RX ADMIN — FINASTERIDE 5 MG: 5 TABLET, FILM COATED ORAL at 05:20

## 2021-01-31 RX ADMIN — TAMSULOSIN HYDROCHLORIDE 0.4 MG: 0.4 CAPSULE ORAL at 08:30

## 2021-01-31 RX ADMIN — Medication 1000 UNITS: at 05:20

## 2021-01-31 RX ADMIN — DOCUSATE SODIUM 50 MG AND SENNOSIDES 8.6 MG 2 TABLET: 8.6; 5 TABLET, FILM COATED ORAL at 05:20

## 2021-01-31 RX ADMIN — INSULIN GLARGINE 15 UNITS: 100 INJECTION, SOLUTION SUBCUTANEOUS at 08:26

## 2021-01-31 RX ADMIN — CYANOCOBALAMIN TAB 500 MCG 1000 MCG: 500 TAB at 05:20

## 2021-01-31 NOTE — CARE PLAN
Problem: Communication  Goal: The ability to communicate needs accurately and effectively will improve  Outcome: PROGRESSING AS EXPECTED     Problem: Safety  Goal: Will remain free from injury  Outcome: PROGRESSING AS EXPECTED  Goal: Will remain free from falls  Outcome: PROGRESSING AS EXPECTED     Problem: Venous Thromboembolism (VTW)/Deep Vein Thrombosis (DVT) Prevention:  Goal: Patient will participate in Venous Thrombosis (VTE)/Deep Vein Thrombosis (DVT)Prevention Measures  Outcome: PROGRESSING AS EXPECTED     Problem: Bowel/Gastric:  Goal: Normal bowel function is maintained or improved  Outcome: PROGRESSING AS EXPECTED

## 2021-01-31 NOTE — PROGRESS NOTES
Pt A&Ox4.  Awaiting safe d/c plan and insurance.   No complaints this AM.  Tolerating diet, denies n/v. + bowel sounds, + flatus, LBM 1/28.  Saturating >90% on RA.  Pt ambulates SBA with FWW/cane. Calls appropriately for assistance. Moderate fall risk, bed alarm on.   Updated on plan of care. Safety education provided. Bed locked in low. Call light within reach. Rounding in place.

## 2021-01-31 NOTE — PROGRESS NOTES
Pt AO x 4  Vitals signs stable  Pt denies chest pain or SOB  O2 sat >90% on RA breathing unlabored   Pt denies pain.   Pt denies N/V/D  + voiding, + flatus, + Bowel sounds  Pt ambulates with standby assistance and FWW.   Bed alarm on.      Updated plan of care discussed with pt. Safety education done. Falls precautions in place.

## 2021-01-31 NOTE — CARE PLAN
Problem: Safety  Goal: Will remain free from injury  Outcome: PROGRESSING AS EXPECTED  Note: Bed alarm on, provided safety education, pt calls appropriately for assistance, treaded slipper socks on.     Problem: Discharge Barriers/Planning  Goal: Patient's continuum of care needs will be met  Outcome: PROGRESSING SLOWER THAN EXPECTED  Note: Awaiting insurance and safe d/c place to go.     Problem: Psychosocial Needs:  Goal: Level of anxiety will decrease  Outcome: PROGRESSING AS EXPECTED  Note: Pt upset about current situation, providing verbal reassurance.

## 2021-02-01 LAB
GLUCOSE BLD-MCNC: 121 MG/DL (ref 65–99)
GLUCOSE BLD-MCNC: 134 MG/DL (ref 65–99)
GLUCOSE BLD-MCNC: 140 MG/DL (ref 65–99)
GLUCOSE BLD-MCNC: 154 MG/DL (ref 65–99)

## 2021-02-01 PROCEDURE — 700111 HCHG RX REV CODE 636 W/ 250 OVERRIDE (IP): Performed by: STUDENT IN AN ORGANIZED HEALTH CARE EDUCATION/TRAINING PROGRAM

## 2021-02-01 PROCEDURE — 82962 GLUCOSE BLOOD TEST: CPT

## 2021-02-01 PROCEDURE — 770006 HCHG ROOM/CARE - MED/SURG/GYN SEMI*

## 2021-02-01 PROCEDURE — A9270 NON-COVERED ITEM OR SERVICE: HCPCS | Performed by: FAMILY MEDICINE

## 2021-02-01 PROCEDURE — 700102 HCHG RX REV CODE 250 W/ 637 OVERRIDE(OP): Performed by: FAMILY MEDICINE

## 2021-02-01 PROCEDURE — 700102 HCHG RX REV CODE 250 W/ 637 OVERRIDE(OP): Performed by: STUDENT IN AN ORGANIZED HEALTH CARE EDUCATION/TRAINING PROGRAM

## 2021-02-01 PROCEDURE — A9270 NON-COVERED ITEM OR SERVICE: HCPCS | Performed by: STUDENT IN AN ORGANIZED HEALTH CARE EDUCATION/TRAINING PROGRAM

## 2021-02-01 PROCEDURE — 700102 HCHG RX REV CODE 250 W/ 637 OVERRIDE(OP): Performed by: INTERNAL MEDICINE

## 2021-02-01 PROCEDURE — A9270 NON-COVERED ITEM OR SERVICE: HCPCS | Performed by: INTERNAL MEDICINE

## 2021-02-01 RX ADMIN — DOCUSATE SODIUM 50 MG AND SENNOSIDES 8.6 MG 2 TABLET: 8.6; 5 TABLET, FILM COATED ORAL at 17:46

## 2021-02-01 RX ADMIN — CYANOCOBALAMIN TAB 500 MCG 1000 MCG: 500 TAB at 04:48

## 2021-02-01 RX ADMIN — ENOXAPARIN SODIUM 40 MG: 40 INJECTION SUBCUTANEOUS at 04:48

## 2021-02-01 RX ADMIN — FINASTERIDE 5 MG: 5 TABLET, FILM COATED ORAL at 04:48

## 2021-02-01 RX ADMIN — DOCUSATE SODIUM 50 MG AND SENNOSIDES 8.6 MG 2 TABLET: 8.6; 5 TABLET, FILM COATED ORAL at 04:48

## 2021-02-01 RX ADMIN — INSULIN GLARGINE 15 UNITS: 100 INJECTION, SOLUTION SUBCUTANEOUS at 08:54

## 2021-02-01 RX ADMIN — Medication 1000 UNITS: at 04:49

## 2021-02-01 RX ADMIN — TAMSULOSIN HYDROCHLORIDE 0.4 MG: 0.4 CAPSULE ORAL at 08:38

## 2021-02-01 ASSESSMENT — ENCOUNTER SYMPTOMS
NERVOUS/ANXIOUS: 0
VOMITING: 0
FALLS: 0
CHILLS: 0
NAUSEA: 0
DEPRESSION: 1
WEAKNESS: 1

## 2021-02-01 NOTE — PROGRESS NOTES
Report received. Assessment completed.  Pt is A&O x4. Forgetful  Pt on room air.   Denies pain and nausea.  Last BM charted 1/28, pt states unknown. +flatus   +voiding in urinal  Tolerating diet..   Pt up with SBA.  Call light and belongings within reach. All needs met at this time. Fall Precautions and hourly rounding in place. Bed alarm on

## 2021-02-01 NOTE — PROGRESS NOTES
"Hospital Medicine Bi-Weekly Progress Note    Date of Service  1/31/2021    Chief Complaint  75 y.o. male admitted 1/17/2021 with encephalopathy.    Hospital Course  Admitted with encephalopathy, known history of frailty syndrome and frequent falls.  Encephalopathy has resolved.  Physical therapy and Occupational Therapy have evaluated him and recommended supervision.  Social work working on discharge planning on long-term skilled nurse facility versus a group home.  Has known history of urinary retention, a Clement catheter was initially placed, he was restarted back on Flomax and Proscar, his Clement catheter was discontinued.    Interval Problem Update  1/31- Patient sitting on side of bed attempting to eat breakfast without dentures, food continuing to fall out of his mouth. States no needs but is anxious to talk to SW to see if they have \"found him a place to live for the rest of his life\". Pursuing long term placement at skilled or in group home. SW/CM assisting in placement.     Consultants/Specialty  None    Code Status  DNAR/DNI    Disposition  Long Term SNF vs Group Home, working with CM, needs payor source      Review of Systems  Review of Systems   Constitutional: Negative for chills.   Cardiovascular: Negative for chest pain.   Gastrointestinal: Negative for nausea and vomiting.   Genitourinary: Positive for frequency. Negative for dysuria and urgency.   Musculoskeletal: Negative for falls.   Neurological: Positive for weakness.   Psychiatric/Behavioral: Positive for depression. The patient is not nervous/anxious.    All other systems reviewed and are negative.       Physical Exam  Temp:  [36.6 °C (97.8 °F)-36.8 °C (98.2 °F)] 36.6 °C (97.8 °F)  Pulse:  [64-80] 65  Resp:  [18-19] 18  BP: ()/(46-60) 114/51  SpO2:  [92 %-96 %] 96 %    Physical Exam  Vitals signs and nursing note reviewed.   HENT:      Head: Normocephalic.      Comments: Laceration at forehead     Nose: No congestion.      Mouth/Throat:    "   Mouth: Mucous membranes are moist.      Dentition: Abnormal dentition. Does not have dentures.      Comments: Missing teeth and in need of dentures   Eyes:      Extraocular Movements: Extraocular movements intact.      Conjunctiva/sclera: Conjunctivae normal.      Pupils: Pupils are equal, round, and reactive to light.   Neck:      Musculoskeletal: No muscular tenderness.   Cardiovascular:      Rate and Rhythm: Normal rate and regular rhythm.   Pulmonary:      Effort: Pulmonary effort is normal.      Breath sounds: Normal breath sounds.   Abdominal:      General: Bowel sounds are normal. There is no distension.      Palpations: Abdomen is soft.   Musculoskeletal:         General: No swelling.      Right lower leg: No edema.      Left lower leg: No edema.   Skin:     General: Skin is warm and dry.   Neurological:      Mental Status: He is alert and oriented to person, place, and time.      Motor: Weakness (MMT BUE 5/5, RLE 4+/5, LLE 4/5) present.   Psychiatric:         Mood and Affect: Affect is flat.         Behavior: Behavior is cooperative.      Comments: Depressed appearing         Fluids    Intake/Output Summary (Last 24 hours) at 2/1/2021 0819  Last data filed at 2/1/2021 0800  Gross per 24 hour   Intake 480 ml   Output 1310 ml   Net -830 ml       Laboratory                        Imaging  US-EXTREMITY VENOUS LOWER BILAT   Final Result      DX-CHEST-PORTABLE (1 VIEW)   Final Result         1. No acute cardiopulmonary abnormalities are identified.      CT-HEAD W/O   Final Result         1.  No evidence of acute intracranial hemorrhage or mass lesion.      2. Cerebral atrophy.              Assessment/Plan  * Encephalopathy- (present on admission)  Assessment & Plan  Avoid benzodiazepines and anticholinergics  Frequent orientation  Avoid early morning labs  Avoid vital signs during sleep  Ambulate if possible  -resolved and back at his baseline      Frequent falls- (present on admission)  Assessment & Plan  PT  and OT, needs group home verus LTP, working with CM/SW, limited options at this time  Bed alarm per unit policy     Frailty syndrome in geriatric patient- (present on admission)  Assessment & Plan  PT and OT    Vitamin B12 deficiency- (present on admission)  Assessment & Plan  oral B12  IM b12 given     Urinary retention- (present on admission)  Assessment & Plan  Bladder scan q 6, straight cath prn > 400 cc  Flomax and Proscar, tolerating well    Noncompliance- (present on admission)  Assessment & Plan  counseling    Type 2 diabetes mellitus with hyperglycemia, without long-term current use of insulin (HCC)- (present on admission)  Assessment & Plan  SSI, more controlled with addition of lispro  -lispro 3 units TIDAC  -continue Lantus 15 units daily  -Hypoglycemic protocol  -Diabetic diet   -FS qAC&HS      Vitamin D deficiency- (present on admission)  Assessment & Plan  Replaced       Pyuria- (present on admission)  Assessment & Plan  No culture done    Microcytic anemia- (present on admission)  Assessment & Plan  Follow cbc       VTE prophylaxis: Lovenox

## 2021-02-02 LAB
GLUCOSE BLD-MCNC: 108 MG/DL (ref 65–99)
GLUCOSE BLD-MCNC: 139 MG/DL (ref 65–99)
GLUCOSE BLD-MCNC: 157 MG/DL (ref 65–99)
GLUCOSE BLD-MCNC: 96 MG/DL (ref 65–99)

## 2021-02-02 PROCEDURE — 82962 GLUCOSE BLOOD TEST: CPT | Mod: 91

## 2021-02-02 PROCEDURE — 700102 HCHG RX REV CODE 250 W/ 637 OVERRIDE(OP): Performed by: FAMILY MEDICINE

## 2021-02-02 PROCEDURE — A9270 NON-COVERED ITEM OR SERVICE: HCPCS | Performed by: STUDENT IN AN ORGANIZED HEALTH CARE EDUCATION/TRAINING PROGRAM

## 2021-02-02 PROCEDURE — A9270 NON-COVERED ITEM OR SERVICE: HCPCS | Performed by: INTERNAL MEDICINE

## 2021-02-02 PROCEDURE — 700102 HCHG RX REV CODE 250 W/ 637 OVERRIDE(OP): Performed by: INTERNAL MEDICINE

## 2021-02-02 PROCEDURE — 700111 HCHG RX REV CODE 636 W/ 250 OVERRIDE (IP): Performed by: STUDENT IN AN ORGANIZED HEALTH CARE EDUCATION/TRAINING PROGRAM

## 2021-02-02 PROCEDURE — 700102 HCHG RX REV CODE 250 W/ 637 OVERRIDE(OP): Performed by: STUDENT IN AN ORGANIZED HEALTH CARE EDUCATION/TRAINING PROGRAM

## 2021-02-02 PROCEDURE — A9270 NON-COVERED ITEM OR SERVICE: HCPCS | Performed by: FAMILY MEDICINE

## 2021-02-02 PROCEDURE — 770006 HCHG ROOM/CARE - MED/SURG/GYN SEMI*

## 2021-02-02 RX ADMIN — TAMSULOSIN HYDROCHLORIDE 0.4 MG: 0.4 CAPSULE ORAL at 08:06

## 2021-02-02 RX ADMIN — FINASTERIDE 5 MG: 5 TABLET, FILM COATED ORAL at 04:26

## 2021-02-02 RX ADMIN — DOCUSATE SODIUM 50 MG AND SENNOSIDES 8.6 MG 2 TABLET: 8.6; 5 TABLET, FILM COATED ORAL at 04:26

## 2021-02-02 RX ADMIN — INSULIN GLARGINE 15 UNITS: 100 INJECTION, SOLUTION SUBCUTANEOUS at 08:12

## 2021-02-02 RX ADMIN — ENOXAPARIN SODIUM 40 MG: 40 INJECTION SUBCUTANEOUS at 04:26

## 2021-02-02 RX ADMIN — CYANOCOBALAMIN TAB 500 MCG 1000 MCG: 500 TAB at 04:26

## 2021-02-02 RX ADMIN — Medication 1000 UNITS: at 04:26

## 2021-02-02 NOTE — DISCHARGE PLANNING
Agency/Facility Name: [facility]  Outcome: Left vmail for admissions regarding referral

## 2021-02-02 NOTE — CARE PLAN
Problem: Communication  Goal: The ability to communicate needs accurately and effectively will improve  Outcome: PROGRESSING AS EXPECTED  Patient in good spirits and enjoys talking with staff. Patient able to verbalize needs.  Problem: Safety  Goal: Will remain free from injury  Outcome: PROGRESSING AS EXPECTED  Patient calls appropriately and bed alarm was verified.

## 2021-02-02 NOTE — CARE PLAN
Problem: Safety  Goal: Will remain free from injury  Outcome: PROGRESSING AS EXPECTED     Problem: Safety  Goal: Will remain free from falls  Outcome: PROGRESSING AS EXPECTED     Problem: Respiratory:  Goal: Respiratory status will improve  Outcome: PROGRESSING AS EXPECTED     Problem: Skin Integrity  Goal: Risk for impaired skin integrity will decrease  Outcome: PROGRESSING AS EXPECTED     Problem: Pain Management  Goal: Pain level will decrease to patient's comfort goal  Outcome: PROGRESSING AS EXPECTED     Problem: Mobility  Goal: Risk for activity intolerance will decrease  Outcome: PROGRESSING AS EXPECTED

## 2021-02-02 NOTE — PROGRESS NOTES
Bedside report received.  Assessment complete.  A&O x 4. Patient calls appropriately.  Patient mobilizes with standby and FFW assist. Bed alarm on.   Patient has 0/10 pain. No intervention needed at this time.  Denies N&V. Tolerating diabetic diet.  + void, + flatus, + BM.  Patient denies SOB.  SCD's refused.  Review plan with of care with patient. Call light and personal belongings with in reach. Hourly rounding in place. All needs met at this time.

## 2021-02-03 LAB
GLUCOSE BLD-MCNC: 114 MG/DL (ref 65–99)
GLUCOSE BLD-MCNC: 131 MG/DL (ref 65–99)
GLUCOSE BLD-MCNC: 176 MG/DL (ref 65–99)
GLUCOSE BLD-MCNC: 83 MG/DL (ref 65–99)

## 2021-02-03 PROCEDURE — 700111 HCHG RX REV CODE 636 W/ 250 OVERRIDE (IP): Performed by: STUDENT IN AN ORGANIZED HEALTH CARE EDUCATION/TRAINING PROGRAM

## 2021-02-03 PROCEDURE — A9270 NON-COVERED ITEM OR SERVICE: HCPCS | Performed by: INTERNAL MEDICINE

## 2021-02-03 PROCEDURE — 770006 HCHG ROOM/CARE - MED/SURG/GYN SEMI*

## 2021-02-03 PROCEDURE — A9270 NON-COVERED ITEM OR SERVICE: HCPCS | Performed by: FAMILY MEDICINE

## 2021-02-03 PROCEDURE — 82962 GLUCOSE BLOOD TEST: CPT | Mod: 91

## 2021-02-03 PROCEDURE — A9270 NON-COVERED ITEM OR SERVICE: HCPCS | Performed by: STUDENT IN AN ORGANIZED HEALTH CARE EDUCATION/TRAINING PROGRAM

## 2021-02-03 PROCEDURE — 700102 HCHG RX REV CODE 250 W/ 637 OVERRIDE(OP): Performed by: INTERNAL MEDICINE

## 2021-02-03 PROCEDURE — 700102 HCHG RX REV CODE 250 W/ 637 OVERRIDE(OP): Performed by: STUDENT IN AN ORGANIZED HEALTH CARE EDUCATION/TRAINING PROGRAM

## 2021-02-03 PROCEDURE — 700102 HCHG RX REV CODE 250 W/ 637 OVERRIDE(OP): Performed by: FAMILY MEDICINE

## 2021-02-03 RX ADMIN — CYANOCOBALAMIN TAB 500 MCG 1000 MCG: 500 TAB at 05:41

## 2021-02-03 RX ADMIN — Medication 1000 UNITS: at 05:40

## 2021-02-03 RX ADMIN — FINASTERIDE 5 MG: 5 TABLET, FILM COATED ORAL at 05:40

## 2021-02-03 RX ADMIN — INSULIN GLARGINE 15 UNITS: 100 INJECTION, SOLUTION SUBCUTANEOUS at 07:29

## 2021-02-03 RX ADMIN — DOCUSATE SODIUM 50 MG AND SENNOSIDES 8.6 MG 2 TABLET: 8.6; 5 TABLET, FILM COATED ORAL at 18:22

## 2021-02-03 RX ADMIN — ENOXAPARIN SODIUM 40 MG: 40 INJECTION SUBCUTANEOUS at 05:41

## 2021-02-03 RX ADMIN — TAMSULOSIN HYDROCHLORIDE 0.4 MG: 0.4 CAPSULE ORAL at 07:25

## 2021-02-03 NOTE — DISCHARGE PLANNING
Anticipated Discharge Disposition:  SNF vs     Action:   This RN CM is following the case.  Spoke with Maty of [employer] who states that [facility] assisted Pt with Medicaid-[employer] application on 1/29/21 and it could take 90 days to get approved.     Barriers to Discharge:   Pending SNF acceptance, possible long term bed.     Plan:   Will continue to assist Pt with discharge as needed.     Addendum:     Pt has been accepted at [facility] tomorrow at 12 noon .   NIDHI Deshpande will set up transport using [facility]    This RN CM informed BRITTA Regan of this update.

## 2021-02-03 NOTE — CARE PLAN
Problem: Safety  Goal: Will remain free from injury  Outcome: PROGRESSING AS EXPECTED  Bed alarm verified. Patient calls appropriately.   Problem: Psychosocial Needs:  Goal: Level of anxiety will decrease  Outcome: PROGRESSING AS EXPECTED  Patient anxious this morning regarding discharge and doctors visiting. This RN explained he is medically cleared and the [facility] is visiting twice a week and that SW is working hard to get him approved for insurance and off to a SNF vs .

## 2021-02-03 NOTE — PROGRESS NOTES
Assumed care of patient. Report received. Assessment complete.  All questions and concerns addressed.    No visible signs of distress. VSS.  Tolerating medication regimen without any signs or symptoms of adverse reactions.  Bed locked and bed alarm on. Call light within reach and calls appropriately.

## 2021-02-03 NOTE — PROGRESS NOTES
Assumed care of patient. Patient resting, but easily awoken. Patient expressing concerns regarding doctor visits and discharge. This RN explained the [facility] visits him twice a week because he is back at baseline and CM and SW are working to gain insurance and plan for discharge. Patient more calm and understands. Patient is alert and oriented x4 and has no other needs.

## 2021-02-04 VITALS
RESPIRATION RATE: 17 BRPM | SYSTOLIC BLOOD PRESSURE: 104 MMHG | TEMPERATURE: 98.6 F | HEART RATE: 73 BPM | HEIGHT: 70 IN | BODY MASS INDEX: 18.72 KG/M2 | OXYGEN SATURATION: 98 % | WEIGHT: 130.73 LBS | DIASTOLIC BLOOD PRESSURE: 58 MMHG

## 2021-02-04 PROBLEM — G93.40 ENCEPHALOPATHY: Status: RESOLVED | Noted: 2021-01-17 | Resolved: 2021-02-04

## 2021-02-04 LAB
GLUCOSE BLD-MCNC: 126 MG/DL (ref 65–99)
GLUCOSE BLD-MCNC: 131 MG/DL (ref 65–99)

## 2021-02-04 PROCEDURE — 700111 HCHG RX REV CODE 636 W/ 250 OVERRIDE (IP): Performed by: STUDENT IN AN ORGANIZED HEALTH CARE EDUCATION/TRAINING PROGRAM

## 2021-02-04 PROCEDURE — A9270 NON-COVERED ITEM OR SERVICE: HCPCS | Performed by: FAMILY MEDICINE

## 2021-02-04 PROCEDURE — A9270 NON-COVERED ITEM OR SERVICE: HCPCS | Performed by: INTERNAL MEDICINE

## 2021-02-04 PROCEDURE — 99239 HOSP IP/OBS DSCHRG MGMT >30: CPT | Performed by: STUDENT IN AN ORGANIZED HEALTH CARE EDUCATION/TRAINING PROGRAM

## 2021-02-04 PROCEDURE — 700102 HCHG RX REV CODE 250 W/ 637 OVERRIDE(OP): Performed by: INTERNAL MEDICINE

## 2021-02-04 PROCEDURE — 82962 GLUCOSE BLOOD TEST: CPT

## 2021-02-04 PROCEDURE — 700102 HCHG RX REV CODE 250 W/ 637 OVERRIDE(OP): Performed by: FAMILY MEDICINE

## 2021-02-04 RX ORDER — INSULIN GLARGINE 100 [IU]/ML
15 INJECTION, SOLUTION SUBCUTANEOUS EVERY MORNING
Qty: 10 ML | Status: ON HOLD
Start: 2021-02-05 | End: 2021-07-14

## 2021-02-04 RX ORDER — AMOXICILLIN 250 MG
2 CAPSULE ORAL 2 TIMES DAILY
Qty: 30 TAB | Refills: 0 | Status: ON HOLD
Start: 2021-02-04 | End: 2021-03-21

## 2021-02-04 RX ORDER — POLYETHYLENE GLYCOL 3350 17 G/17G
17 POWDER, FOR SOLUTION ORAL
Refills: 3 | Status: ON HOLD
Start: 2021-02-04 | End: 2021-07-14

## 2021-02-04 RX ADMIN — ENOXAPARIN SODIUM 40 MG: 40 INJECTION SUBCUTANEOUS at 05:42

## 2021-02-04 RX ADMIN — FINASTERIDE 5 MG: 5 TABLET, FILM COATED ORAL at 05:42

## 2021-02-04 RX ADMIN — TAMSULOSIN HYDROCHLORIDE 0.4 MG: 0.4 CAPSULE ORAL at 08:34

## 2021-02-04 RX ADMIN — Medication 1000 UNITS: at 05:42

## 2021-02-04 RX ADMIN — CYANOCOBALAMIN TAB 500 MCG 1000 MCG: 500 TAB at 05:42

## 2021-02-04 RX ADMIN — INSULIN GLARGINE 15 UNITS: 100 INJECTION, SOLUTION SUBCUTANEOUS at 08:32

## 2021-02-04 ASSESSMENT — FIBROSIS 4 INDEX: FIB4 SCORE: 1.14

## 2021-02-04 NOTE — DISCHARGE INSTRUCTIONS
Discharge Instructions    Discharged to other by medical transportation with escort. Discharged via wheelchair, hospital escort: Yes.  Special equipment needed: Not Applicable    Be sure to schedule a follow-up appointment with your primary care doctor or any specialists as instructed.     Discharge Plan:   Diet Plan: Discussed  Activity Level: Discussed  Confirmed Follow up Appointment: Patient to Call and Schedule Appointment  Confirmed Symptoms Management: Discussed  Medication Reconciliation Updated: Yes  Influenza Vaccine Indication: Indicated: 65 years and older  Influenza Vaccine Given - only chart on this line when given: Influenza Vaccine Given (See MAR)    I understand that a diet low in cholesterol, fat, and sodium is recommended for good health. Unless I have been given specific instructions below for another diet, I accept this instruction as my diet prescription.   Other diet: Carb COntrol    Special Instructions: None    · Is patient discharged on Warfarin / Coumadin?   No     Depression / Suicide Risk    As you are discharged from this [facility] facility, it is important to learn how to keep safe from harming yourself.    Recognize the warning signs:  · Abrupt changes in personality, positive or negative- including increase in energy   · Giving away possessions  · Change in eating patterns- significant weight changes-  positive or negative  · Change in sleeping patterns- unable to sleep or sleeping all the time   · Unwillingness or inability to communicate  · Depression  · Unusual sadness, discouragement and loneliness  · Talk of wanting to die  · Neglect of personal appearance   · Rebelliousness- reckless behavior  · Withdrawal from people/activities they love  · Confusion- inability to concentrate     If you or a loved one observes any of these behaviors or has concerns about self-harm, here's what you can do:  · Talk about it- your feelings and reasons for harming yourself  · Remove any means  that you might use to hurt yourself (examples: pills, rope, extension cords, firearm)  · Get professional help from the community (Mental Health, Substance Abuse, psychological counseling)  · Do not be alone:Call your Safe Contact- someone whom you trust who will be there for you.  · Call your local CRISIS HOTLINE 425-9349 or 522-471-0411  · Call your local Children's Mobile Crisis Response Team [place] (914) 594-2795 or www.Awesome Maps  · Call the toll free National Suicide Prevention Hotlines   · National Suicide Prevention Lifeline 680-177-FBTT (4543)  · National Hope Line Network 800-SUICIDE (765-3693)

## 2021-02-04 NOTE — DISCHARGE PLANNING
Anticipated Discharge Disposition:   [facility]    Action:   This RN CM is following the case. Pt has been accepted at [facility]. Pt's transport has been set up using [employer] [facility] at 12 noon today.    Will prepare [employer]/transfer packet    Reminded BRITTA Regan about Discharge Summary    Barriers to Discharge:   None    Plan:   Will continue to assist Pt with discharge as needed.

## 2021-02-04 NOTE — DISCHARGE SUMMARY
Discharge Summary    CHIEF COMPLAINT ON ADMISSION  Chief Complaint   Patient presents with   • T-5000 GLF     Code TBI pt had multiple GLFs unsure of how he fell -LOC -blood thinners -ETOH       Reason for Admission  Encephalopathy, fall with head injury, urinary tract infection    CODE STATUS  DNAR/DNI    HPI & HOSPITAL COURSE  This is a 75 y.o. male here with encephalopathy, known history of diabetes type II, frailty syndrome and frequent falls. On admission he was found to have a scalp injury and urinary tract infection. He was treated with 3 days of IV Rocephin. CT head showed no evidence of acute intracranial hemorrhage or mass lesion. Encephalopathy has since resolved.  Physical Therapy and Occupational Therapy evaluated him and recommended supervision.  The patient has known history of urinary retention. A Clement catheter was initially placed and he was restarted back on Flomax and Proscar. The Clement catheter has since been discontinued and he is voiding well with his baseline frequency and occasional hesitation.  Today the patient is sitting upright in bed, eating breakfast.  He is pleasant and cooperative.  He is fully alert and oriented. He has a stiff neck improved with movement. His appetite is improved.  He denies pain and any other problems. He feels well enough to discharge to skilled nursing facility today.  -trace bilateral lower extremity edema, patient states it is his baseline  - patient's home Metformin dose is 850 mg. Restarted today at 1000mg. Discontinued preprandial insulin. Continued Lantus and sliding scale insulin. Monitor response and titrate as needed.   -vital signs within normal limits, saturating well on room air.    Therefore, he is discharged in good and stable condition to skilled nursing facility.    The patient met 2-midnight criteria for an inpatient stay at the time of discharge.      FOLLOW UP ITEMS POST DISCHARGE  Skilled nursing    DISCHARGE DIAGNOSES  Principal Problem  (Resolved):    Encephalopathy POA: Yes  Active Problems:    Frailty syndrome in geriatric patient POA: Yes    Frequent falls POA: Yes    Microcytic anemia POA: Yes    Pyuria POA: Yes    Vitamin D deficiency POA: Yes    Type 2 diabetes mellitus with hyperglycemia, without long-term current use of insulin (HCC) POA: Yes    Urinary retention POA: Yes    Vitamin B12 deficiency POA: Yes    Moderate episode of recurrent major depressive disorder (HCC) POA: Yes  Resolved Problems:    Noncompliance POA: Yes    Elevated troponin POA: Yes      FOLLOW UP  [facility]  [address] [facility] [address]  [phone]          MEDICATIONS ON DISCHARGE     Medication List      START taking these medications      Instructions   cyanocobalamin 1000 MCG Tabs  Start taking on: February 5, 2021  Commonly known as: VITAMIN B12   Take 1 Tab by mouth every day.  Dose: 1,000 mcg     insulin glargine 100 UNIT/ML Soln  Start taking on: February 5, 2021  Commonly known as: Lantus   Inject 15 Units under the skin every morning.  Dose: 15 Units     insulin regular 100 Unit/mL Soln  Commonly known as: HumuLIN R   Inject 2-12 Units under the skin 4 Times a Day,Before Meals and at Bedtime.  Dose: 2-12 Units     magnesium hydroxide 400 MG/5ML Susp  Commonly known as: MILK OF MAGNESIA   Take 30 mL by mouth 1 time a day as needed (if polyethylene glycol ineffective after 24 hours).  Dose: 30 mL     polyethylene glycol/lytes 17 g Pack  Commonly known as: MIRALAX   Take 1 Packet by mouth 1 time a day as needed (if sennosides and docusate ineffective after 24 hours).  Dose: 17 g     senna-docusate 8.6-50 MG Tabs  Commonly known as: PERICOLACE or SENOKOT S   Take 2 Tabs by mouth 2 Times a Day.  Dose: 2 Tab     vitamin D 1000 UNIT Tabs  Start taking on: February 5, 2021  Commonly known as: VITAMIND D3   Take 1 Tab by mouth every day.  Dose: 1,000 Units        CHANGE how you take these medications      Instructions   metformin 1000  MG tablet  What changed:   · medication strength  · how much to take  Commonly known as: GLUCOPHAGE   Take 1 Tab by mouth 2 times a day, with meals.  Dose: 1,000 mg        CONTINUE taking these medications      Instructions   finasteride 5 MG Tabs  Commonly known as: PROSCAR   Take 1 Tab by mouth every day.  Dose: 5 mg     tamsulosin 0.4 MG capsule  Commonly known as: FLOMAX   Take 1 Cap by mouth ONE-HALF HOUR AFTER BREAKFAST.  Dose: 0.4 mg            Allergies  No Known Allergies    DIET  Orders Placed This Encounter   Procedures   • Diet Order Diet: Level 7 - Easy to Chew; Liquid level: Level 0 - Thin; Second Modifier: (optional): Consistent CHO (Diabetic)     Standing Status:   Standing     Number of Occurrences:   1     Order Specific Question:   Diet:     Answer:   Level 7 - Easy to Chew [22]     Order Specific Question:   Liquid level     Answer:   Level 0 - Thin     Order Specific Question:   Second Modifier: (optional)     Answer:   Consistent CHO (Diabetic) [4]       ACTIVITY  As tolerated and directed by skilled nursing.  Weight bearing as tolerated    LINES, DRAINS, AND WOUNDS  This is an automated list. Peripheral IVs will be removed prior to discharge.  Peripheral IV 01/17/21 20 G Right Upper arm (Active)   Site Assessment Clean;Dry;Intact 02/04/21 0830   Dressing Type Transparent 02/04/21 0830   Line Status Flushed;Scrubbed the hub prior to access;Saline locked 02/04/21 0830   Dressing Status Dry;Intact;Old drainage 02/04/21 0830   Dressing Intervention N/A 02/04/21 0830   Dressing Change Due 02/02/21 02/01/21 0800   Date Primary Tubing Changed 01/17/21 01/19/21 2000   Date Secondary Tubing Changed 01/17/21 01/17/21 0800   NEXT Primary Tubing Change  01/19/21 01/19/21 2000   NEXT Secondary Tubing Change  01/18/21 01/18/21 0810   Infiltration Grading ([facility], [facility]) 0 02/04/21 0830   Phlebitis Scale ([facility] Only) 0 02/04/21 0830          Peripheral IV 01/17/21 20 G Right Upper arm (Active)   Site  Assessment Clean;Dry;Intact 02/04/21 0830   Dressing Type Transparent 02/04/21 0830   Line Status Flushed;Scrubbed the hub prior to access;Saline locked 02/04/21 0830   Dressing Status Dry;Intact;Old drainage 02/04/21 0830   Dressing Intervention N/A 02/04/21 0830   Dressing Change Due 02/02/21 02/01/21 0800   Date Primary Tubing Changed 01/17/21 01/19/21 2000   Date Secondary Tubing Changed 01/17/21 01/17/21 0800   NEXT Primary Tubing Change  01/19/21 01/19/21 2000   NEXT Secondary Tubing Change  01/18/21 01/18/21 0810   Infiltration Grading ([facility] [facility] 0 02/04/21 0830   Phlebitis Scale ([facility] Only) 0 02/04/21 0830               MENTAL STATUS ON TRANSFER  Level of Consciousness: Alert  Orientation : Oriented x 4  Speech: Speech Clear    CONSULTATIONS  none    PROCEDURES  none    LABORATORY  Lab Results   Component Value Date    SODIUM 133 (L) 01/25/2021    POTASSIUM 4.5 01/25/2021    CHLORIDE 100 01/25/2021    CO2 22 01/25/2021    GLUCOSE 232 (H) 01/25/2021    BUN 29 (H) 01/25/2021    CREATININE 1.03 01/25/2021        Lab Results   Component Value Date    WBC 7.7 01/25/2021    HEMOGLOBIN 8.7 (L) 01/25/2021    HEMATOCRIT 29.0 (L) 01/25/2021    PLATELETCT 207 01/25/2021      Recent Labs     02/02/21  1236 02/02/21  1736 02/02/21  2202 02/03/21  0727 02/03/21  1233 02/03/21  1819 02/03/21  2155 02/04/21  0831   POCGLUCOSE 108* 139* 157* 176* 83 114* 131* 126*     A1c: 7.8% on 1/17/21      US-EXTREMITY VENOUS LOWER BILAT   Vascular Laboratory   CONCLUSIONS   No superficial or deep venous thrombosis.    Exam Date:     01/18/2021 10:43   Ordering Physician:        KEERTHI ANDERSON   Sonographer:               [facility],    Indications:     Swelling of Limb   PROCEDURES:   Bilateral lower extremity venous duplex imaging.    The following venous structures were evaluated: common femoral, profunda    femoral, greater saphenous, femoral, popliteal , peroneal and posterior    tibial veins.    Serial compression,  augmentation maneuvers,  color and spectral Doppler    flow evaluations were performed.      FINDINGS:   Bilateral lower extremities -.    Complete color filling and compressibility with normal venous flow dynamics    including spontaneous flow, response to augmentation maneuvers, and    respiratory phasicity.    The peroneal and posterior tibial veins are difficult to assess for    compressibility, but flow response to augmentation is demonstrated.    No superficial or deep venous thrombosis.      Janusz Fry   (Electronically Signed)   Final Date:      18 January 2021                     12:23    DX-CHEST-PORTABLE (1 VIEW)   Final Result           1. No acute cardiopulmonary abnormalities are identified.       CT-HEAD W/O   Final Result           1.  No evidence of acute intracranial hemorrhage or mass lesion.       2. Cerebral atrophy.     LISETTE Regan.    Total time of the discharge process exceeds 31 minutes.

## 2021-02-04 NOTE — PROGRESS NOTES
Discharges to [facility]. Patient discharged with belongings.  Discharged with patient transport, sent finistride in packet with patient.

## 2021-03-17 ENCOUNTER — HOSPITAL ENCOUNTER (INPATIENT)
Facility: MEDICAL CENTER | Age: 76
LOS: 4 days | DRG: 690 | End: 2021-03-21
Attending: EMERGENCY MEDICINE | Admitting: STUDENT IN AN ORGANIZED HEALTH CARE EDUCATION/TRAINING PROGRAM
Payer: MEDICARE

## 2021-03-17 DIAGNOSIS — N30.01 ACUTE CYSTITIS WITH HEMATURIA: ICD-10-CM

## 2021-03-17 DIAGNOSIS — N39.0 URINARY TRACT INFECTION WITHOUT HEMATURIA, SITE UNSPECIFIED: ICD-10-CM

## 2021-03-17 LAB
ALBUMIN SERPL BCP-MCNC: 3.5 G/DL (ref 3.2–4.9)
ALBUMIN/GLOB SERPL: 0.9 G/DL
ALP SERPL-CCNC: 204 U/L (ref 30–99)
ALT SERPL-CCNC: 18 U/L (ref 2–50)
ANION GAP SERPL CALC-SCNC: 11 MMOL/L (ref 7–16)
APPEARANCE UR: CLEAR
AST SERPL-CCNC: 15 U/L (ref 12–45)
BACTERIA #/AREA URNS HPF: NEGATIVE /HPF
BASOPHILS # BLD AUTO: 0.6 % (ref 0–1.8)
BASOPHILS # BLD: 0.04 K/UL (ref 0–0.12)
BILIRUB SERPL-MCNC: 0.5 MG/DL (ref 0.1–1.5)
BILIRUB UR QL STRIP.AUTO: NEGATIVE
BUN SERPL-MCNC: 24 MG/DL (ref 8–22)
CALCIUM SERPL-MCNC: 9 MG/DL (ref 8.5–10.5)
CHLORIDE SERPL-SCNC: 100 MMOL/L (ref 96–112)
CO2 SERPL-SCNC: 24 MMOL/L (ref 20–33)
COLOR UR: YELLOW
CREAT SERPL-MCNC: 1.37 MG/DL (ref 0.5–1.4)
EOSINOPHIL # BLD AUTO: 0.19 K/UL (ref 0–0.51)
EOSINOPHIL NFR BLD: 2.6 % (ref 0–6.9)
EPI CELLS #/AREA URNS HPF: ABNORMAL /HPF
ERYTHROCYTE [DISTWIDTH] IN BLOOD BY AUTOMATED COUNT: 43.6 FL (ref 35.9–50)
EST. AVERAGE GLUCOSE BLD GHB EST-MCNC: 292 MG/DL
GLOBULIN SER CALC-MCNC: 4.1 G/DL (ref 1.9–3.5)
GLUCOSE BLD-MCNC: 198 MG/DL (ref 65–99)
GLUCOSE BLD-MCNC: 314 MG/DL (ref 65–99)
GLUCOSE BLD-MCNC: 431 MG/DL (ref 65–99)
GLUCOSE SERPL-MCNC: 425 MG/DL (ref 65–99)
GLUCOSE UR STRIP.AUTO-MCNC: >=1000 MG/DL
HBA1C MFR BLD: 11.8 % (ref 4–5.6)
HCT VFR BLD AUTO: 32.2 % (ref 42–52)
HGB BLD-MCNC: 10 G/DL (ref 14–18)
IMM GRANULOCYTES # BLD AUTO: 0.04 K/UL (ref 0–0.11)
IMM GRANULOCYTES NFR BLD AUTO: 0.6 % (ref 0–0.9)
KETONES UR STRIP.AUTO-MCNC: NEGATIVE MG/DL
LEUKOCYTE ESTERASE UR QL STRIP.AUTO: ABNORMAL
LYMPHOCYTES # BLD AUTO: 1.23 K/UL (ref 1–4.8)
LYMPHOCYTES NFR BLD: 17 % (ref 22–41)
MCH RBC QN AUTO: 23.8 PG (ref 27–33)
MCHC RBC AUTO-ENTMCNC: 31.1 G/DL (ref 33.7–35.3)
MCV RBC AUTO: 76.7 FL (ref 81.4–97.8)
MICRO URNS: ABNORMAL
MONOCYTES # BLD AUTO: 0.71 K/UL (ref 0–0.85)
MONOCYTES NFR BLD AUTO: 9.8 % (ref 0–13.4)
NEUTROPHILS # BLD AUTO: 5.03 K/UL (ref 1.82–7.42)
NEUTROPHILS NFR BLD: 69.4 % (ref 44–72)
NITRITE UR QL STRIP.AUTO: NEGATIVE
NRBC # BLD AUTO: 0 K/UL
NRBC BLD-RTO: 0 /100 WBC
PH UR STRIP.AUTO: 6 [PH] (ref 5–8)
PLATELET # BLD AUTO: 229 K/UL (ref 164–446)
PMV BLD AUTO: 9.8 FL (ref 9–12.9)
POTASSIUM SERPL-SCNC: 4.3 MMOL/L (ref 3.6–5.5)
PROT SERPL-MCNC: 7.6 G/DL (ref 6–8.2)
PROT UR QL STRIP: 30 MG/DL
RBC # BLD AUTO: 4.2 M/UL (ref 4.7–6.1)
RBC # URNS HPF: ABNORMAL /HPF
RBC UR QL AUTO: ABNORMAL
SARS-COV-2 RNA RESP QL NAA+PROBE: NOTDETECTED
SODIUM SERPL-SCNC: 135 MMOL/L (ref 135–145)
SP GR UR STRIP.AUTO: 1.01
SPECIMEN SOURCE: NORMAL
UROBILINOGEN UR STRIP.AUTO-MCNC: 0.2 MG/DL
WBC # BLD AUTO: 7.2 K/UL (ref 4.8–10.8)
WBC #/AREA URNS HPF: ABNORMAL /HPF

## 2021-03-17 PROCEDURE — 96365 THER/PROPH/DIAG IV INF INIT: CPT

## 2021-03-17 PROCEDURE — 700111 HCHG RX REV CODE 636 W/ 250 OVERRIDE (IP): Performed by: STUDENT IN AN ORGANIZED HEALTH CARE EDUCATION/TRAINING PROGRAM

## 2021-03-17 PROCEDURE — 85025 COMPLETE CBC W/AUTO DIFF WBC: CPT

## 2021-03-17 PROCEDURE — 770006 HCHG ROOM/CARE - MED/SURG/GYN SEMI*

## 2021-03-17 PROCEDURE — U0005 INFEC AGEN DETEC AMPLI PROBE: HCPCS

## 2021-03-17 PROCEDURE — 83036 HEMOGLOBIN GLYCOSYLATED A1C: CPT

## 2021-03-17 PROCEDURE — U0003 INFECTIOUS AGENT DETECTION BY NUCLEIC ACID (DNA OR RNA); SEVERE ACUTE RESPIRATORY SYNDROME CORONAVIRUS 2 (SARS-COV-2) (CORONAVIRUS DISEASE [COVID-19]), AMPLIFIED PROBE TECHNIQUE, MAKING USE OF HIGH THROUGHPUT TECHNOLOGIES AS DESCRIBED BY CMS-2020-01-R: HCPCS

## 2021-03-17 PROCEDURE — 700105 HCHG RX REV CODE 258: Performed by: STUDENT IN AN ORGANIZED HEALTH CARE EDUCATION/TRAINING PROGRAM

## 2021-03-17 PROCEDURE — 303105 HCHG CATHETER EXTRA

## 2021-03-17 PROCEDURE — 87086 URINE CULTURE/COLONY COUNT: CPT

## 2021-03-17 PROCEDURE — 80053 COMPREHEN METABOLIC PANEL: CPT

## 2021-03-17 PROCEDURE — 700102 HCHG RX REV CODE 250 W/ 637 OVERRIDE(OP): Performed by: STUDENT IN AN ORGANIZED HEALTH CARE EDUCATION/TRAINING PROGRAM

## 2021-03-17 PROCEDURE — 87077 CULTURE AEROBIC IDENTIFY: CPT

## 2021-03-17 PROCEDURE — 81001 URINALYSIS AUTO W/SCOPE: CPT

## 2021-03-17 PROCEDURE — 82962 GLUCOSE BLOOD TEST: CPT

## 2021-03-17 PROCEDURE — 99285 EMERGENCY DEPT VISIT HI MDM: CPT

## 2021-03-17 PROCEDURE — 700105 HCHG RX REV CODE 258: Performed by: EMERGENCY MEDICINE

## 2021-03-17 PROCEDURE — 700111 HCHG RX REV CODE 636 W/ 250 OVERRIDE (IP): Performed by: EMERGENCY MEDICINE

## 2021-03-17 PROCEDURE — 51702 INSERT TEMP BLADDER CATH: CPT

## 2021-03-17 PROCEDURE — A9270 NON-COVERED ITEM OR SERVICE: HCPCS | Performed by: STUDENT IN AN ORGANIZED HEALTH CARE EDUCATION/TRAINING PROGRAM

## 2021-03-17 PROCEDURE — 99221 1ST HOSP IP/OBS SF/LOW 40: CPT | Mod: AI | Performed by: STUDENT IN AN ORGANIZED HEALTH CARE EDUCATION/TRAINING PROGRAM

## 2021-03-17 RX ORDER — ONDANSETRON 2 MG/ML
4 INJECTION INTRAMUSCULAR; INTRAVENOUS EVERY 4 HOURS PRN
Status: DISCONTINUED | OUTPATIENT
Start: 2021-03-17 | End: 2021-03-21 | Stop reason: HOSPADM

## 2021-03-17 RX ORDER — INSULIN GLARGINE 100 [IU]/ML
15 INJECTION, SOLUTION SUBCUTANEOUS EVERY EVENING
Status: DISCONTINUED | OUTPATIENT
Start: 2021-03-17 | End: 2021-03-21 | Stop reason: HOSPADM

## 2021-03-17 RX ORDER — SODIUM CHLORIDE 9 MG/ML
INJECTION, SOLUTION INTRAVENOUS CONTINUOUS
Status: DISCONTINUED | OUTPATIENT
Start: 2021-03-17 | End: 2021-03-20

## 2021-03-17 RX ORDER — SODIUM CHLORIDE 9 MG/ML
1000 INJECTION, SOLUTION INTRAVENOUS ONCE
Status: COMPLETED | OUTPATIENT
Start: 2021-03-17 | End: 2021-03-17

## 2021-03-17 RX ORDER — TAMSULOSIN HYDROCHLORIDE 0.4 MG/1
0.4 CAPSULE ORAL
Status: DISCONTINUED | OUTPATIENT
Start: 2021-03-17 | End: 2021-03-21 | Stop reason: HOSPADM

## 2021-03-17 RX ORDER — DEXTROSE MONOHYDRATE 25 G/50ML
50 INJECTION, SOLUTION INTRAVENOUS
Status: DISCONTINUED | OUTPATIENT
Start: 2021-03-17 | End: 2021-03-21 | Stop reason: HOSPADM

## 2021-03-17 RX ORDER — ACETAMINOPHEN 325 MG/1
650 TABLET ORAL EVERY 4 HOURS PRN
Status: DISCONTINUED | OUTPATIENT
Start: 2021-03-17 | End: 2021-03-21 | Stop reason: HOSPADM

## 2021-03-17 RX ORDER — LABETALOL HYDROCHLORIDE 5 MG/ML
10 INJECTION, SOLUTION INTRAVENOUS EVERY 4 HOURS PRN
Status: DISCONTINUED | OUTPATIENT
Start: 2021-03-17 | End: 2021-03-21 | Stop reason: HOSPADM

## 2021-03-17 RX ADMIN — ENOXAPARIN SODIUM 40 MG: 40 INJECTION SUBCUTANEOUS at 18:23

## 2021-03-17 RX ADMIN — SODIUM CHLORIDE: 9 INJECTION, SOLUTION INTRAVENOUS at 18:48

## 2021-03-17 RX ADMIN — INSULIN LISPRO 10 UNITS: 100 INJECTION, SOLUTION INTRAVENOUS; SUBCUTANEOUS at 18:18

## 2021-03-17 RX ADMIN — INSULIN HUMAN 3 UNITS: 100 INJECTION, SOLUTION PARENTERAL at 20:43

## 2021-03-17 RX ADMIN — CEFTRIAXONE SODIUM 1 G: 1 INJECTION, POWDER, FOR SOLUTION INTRAMUSCULAR; INTRAVENOUS at 15:15

## 2021-03-17 RX ADMIN — TAMSULOSIN HYDROCHLORIDE 0.4 MG: 0.4 CAPSULE ORAL at 18:25

## 2021-03-17 RX ADMIN — INSULIN GLARGINE 15 UNITS: 100 INJECTION, SOLUTION SUBCUTANEOUS at 18:20

## 2021-03-17 RX ADMIN — SODIUM CHLORIDE 1000 ML: 9 INJECTION, SOLUTION INTRAVENOUS at 13:15

## 2021-03-17 ASSESSMENT — ENCOUNTER SYMPTOMS
MYALGIAS: 1
NAUSEA: 1
HEADACHES: 1
CHILLS: 1
DIZZINESS: 1
DEPRESSION: 1
EYES NEGATIVE: 1
FEVER: 1
VOMITING: 1
COUGH: 1

## 2021-03-17 ASSESSMENT — LIFESTYLE VARIABLES
EVER HAD A DRINK FIRST THING IN THE MORNING TO STEADY YOUR NERVES TO GET RID OF A HANGOVER: NO
TOTAL SCORE: 0
TOTAL SCORE: 0
HAVE YOU EVER FELT YOU SHOULD CUT DOWN ON YOUR DRINKING: NO
SUBSTANCE_ABUSE: 1
TOTAL SCORE: 0
ON A TYPICAL DAY WHEN YOU DRINK ALCOHOL HOW MANY DRINKS DO YOU HAVE: 1
ALCOHOL_USE: YES
HAVE PEOPLE ANNOYED YOU BY CRITICIZING YOUR DRINKING: NO
HOW MANY TIMES IN THE PAST YEAR HAVE YOU HAD 5 OR MORE DRINKS IN A DAY: 0
AVERAGE NUMBER OF DAYS PER WEEK YOU HAVE A DRINK CONTAINING ALCOHOL: 1
EVER FELT BAD OR GUILTY ABOUT YOUR DRINKING: NO
CONSUMPTION TOTAL: NEGATIVE

## 2021-03-17 ASSESSMENT — COGNITIVE AND FUNCTIONAL STATUS - GENERAL
TOILETING: A LOT
HELP NEEDED FOR BATHING: A LOT
MOVING FROM LYING ON BACK TO SITTING ON SIDE OF FLAT BED: A LITTLE
MOVING TO AND FROM BED TO CHAIR: A LITTLE
SUGGESTED CMS G CODE MODIFIER MOBILITY: CL
CLIMB 3 TO 5 STEPS WITH RAILING: A LOT
DAILY ACTIVITIY SCORE: 17
WALKING IN HOSPITAL ROOM: A LOT
STANDING UP FROM CHAIR USING ARMS: A LOT
TURNING FROM BACK TO SIDE WHILE IN FLAT BAD: A LOT
PERSONAL GROOMING: A LITTLE
SUGGESTED CMS G CODE MODIFIER DAILY ACTIVITY: CK
DRESSING REGULAR LOWER BODY CLOTHING: A LITTLE
MOBILITY SCORE: 14
DRESSING REGULAR UPPER BODY CLOTHING: A LITTLE

## 2021-03-17 ASSESSMENT — FIBROSIS 4 INDEX: FIB4 SCORE: 1.14

## 2021-03-17 ASSESSMENT — PATIENT HEALTH QUESTIONNAIRE - PHQ9
2. FEELING DOWN, DEPRESSED, IRRITABLE, OR HOPELESS: NOT AT ALL
1. LITTLE INTEREST OR PLEASURE IN DOING THINGS: NOT AT ALL
SUM OF ALL RESPONSES TO PHQ9 QUESTIONS 1 AND 2: 0

## 2021-03-17 NOTE — ASSESSMENT & PLAN NOTE
Patient straight cathed in ED and retaining 450cc's urine  He received Clement's catheter due to urinary retention.  Will continue tamsulosin

## 2021-03-17 NOTE — ASSESSMENT & PLAN NOTE
Patient presented with distended bladder and difficulty and pain with urination  UA-Moderate Leukocyte Esterase and Packed WBC's  Patient received one dose Rocephin in ED  He received ceftriaxone.  Urine culture showed Candida.  Blood culture negative to date.

## 2021-03-17 NOTE — DISCHARGE PLANNING
Assessment copied from last admission, patient was discharged to [facility] at that time.       CM spoke with pt at bedside.   · Pt reports he is homeless. Pt was most recently discharged back to a motel room from [facility]. Pt reports he no longer has a motel room and has lost all his belongings.      · Pt reports he has a daughter in [place] (?) by the name of Nicky Marquez. Pt reports he is not estranged from her but does not have any contact info and is not sure of her whereabouts.      · Pt reports his SS income is $1100 per month.     · Pt reports he is no longer able to care for himself and would like assistance in placement. Options were discussed with pt- vs SNF long term bed. Pt agreeable to both but has no payor source for this at this time.  CM sent an email to [employer] to assist with application for Institutional Medicaid.  waiver application will be submitted for pt as well.      Barriers to Discharge: Medical clearance  PT OT recommending Post acute placement  Payor source-does not cover long term care     Plan: Care coordination will continue to follow and assist with placement in long term care as applicable.      Care Transition Team Assessment     Information Source  Information Given By: Patient  Who is responsible for making decisions for patient? : Patient     Readmission Evaluation  Is this a readmission?: Yes - unplanned readmission  Why do you think you were readmitted?: Falls  Was an appointment arranged for you prior to discharge?: No  Were there new prescriptions you were supposed to fill after you were discharged?: No  Did you understand your discharge instructions?: Yes  Did you have enough support after your last discharge?: No     Elopement Risk  Legal Hold: No  Ambulatory or Self Mobile in Wheelchair: No-Not an Elopement Risk     Interdisciplinary Discharge Planning  Primary Care Physician: None  Lives with - Patient's Self Care Capacity: Alone and Unable to Care For  Self  Patient or legal guardian wants to designate a caregiver: No  Housing / Facility: Motel, Homeless  Able to Return to Previous ADL's: No  Mobility Issues: Yes  Prior Services: Home-Independent  Assistance Needed: Yes  Durable Medical Equipment: Not Applicable     Discharge Preparedness  What is your plan after discharge?: Other (comment), Group home(Long term care)  What are your discharge supports?: (None)  Prior Functional Level: Needs Assist with Activities of Daily Living, Needs Assist with Medication Management  Difficulity with ADLs: Walking     Functional Assesment  Prior Functional Level: Needs Assist with Activities of Daily Living, Needs Assist with Medication Management     Finances  Financial Barriers to Discharge: Yes  Average Monthly Income: 1100 $  Source of Income: Social Security  Prescription Coverage: Yes     Advance Directive  Advance Directive?: None     Domestic Abuse  Have you ever been the victim of abuse or violence?: No  Physical Abuse or Sexual Abuse: No  Verbal Abuse or Emotional Abuse: No  Possible Abuse/Neglect Reported to:: Not Applicable     Discharge Risks or Barriers  Discharge risks or barriers?: Post-acute placement / services, Lives alone, no community support, Homeless / couch surfing, No PCP, Uninsured / underinsured  Patient risk factors: Active abuse / Neglect concerns, Homeless, Lack of outside supports, Lives alone and no community support, No PCP, Vulnerable adult, Uninsured or underinsured

## 2021-03-17 NOTE — ASSESSMENT & PLAN NOTE
Give one stat dose of 10 Units lispro  Continue with Insulin Glargine 15U QHS  He found to have HbA1c of 11.8.  Requested consult for diabetic education.

## 2021-03-17 NOTE — ED TRIAGE NOTES
Chief Complaint   Patient presents with   • Enuresis     Pt states he has had incontinence x6 months but it has gotten worse over the last 2 months     [facility] complaint. Pt ambulatory with cane from EMS stretcher to ER bed. Pt cleaned of incontinence and wet clothes taken off him. Pt has no other complaints at this time and had no interventions en route.

## 2021-03-17 NOTE — ED PROVIDER NOTES
ED Provider Note    CHIEF COMPLAINT  Chief Complaint   Patient presents with   • Enuresis     Pt states he has had incontinence x6 months but it has gotten worse over the last 2 months       HPI  Hugh Núñez is a 75 y.o. male who presents with difficulty initiating a stream of urine as well as incontinence over the last several months.  He states he has a known history of prostatic hypertrophy and had a Clement catheter in place for quite some time.  He states the catheter was removed about 2 years ago.  He states that over the last 6 months has been continent and having difficulty initiating his stream of urine.  He does not have any fever nor back pain.  He denies nausea and vomiting.  He has no change in neurologic function.  He is currently homeless and at a homeless shelter.    REVIEW OF SYSTEMS  See HPI for further details. All other systems are negative.     PAST MEDICAL HISTORY  Past Medical History:   Diagnosis Date   •     • Polio Ag of 4   • Type II or unspecified type diabetes mellitus without mention of complication, not stated as uncontrolled        FAMILY HISTORY  [unfilled]    SOCIAL HISTORY  Social History     Socioeconomic History   • Marital status:      Spouse name: Not on file   • Number of children: Not on file   • Years of education: Not on file   • Highest education level: Not on file   Occupational History   • Not on file   Tobacco Use   • Smoking status: Former Smoker     Years: 5.00     Types: Cigarettes     Start date: 1955     Quit date: 1960     Years since quittin.5   • Smokeless tobacco: Never Used   Substance and Sexual Activity   • Alcohol use: No   • Drug use: No   • Sexual activity: Not on file   Other Topics Concern   • Not on file   Social History Narrative   • Not on file     Social Determinants of Health     Financial Resource Strain:    • Difficulty of Paying Living Expenses:    Food Insecurity:    • Worried About Running Out of Food in the Last  "Year:    • Ran Out of Food in the Last Year:    Transportation Needs:    • Lack of Transportation (Medical):    • Lack of Transportation (Non-Medical):    Physical Activity:    • Days of Exercise per Week:    • Minutes of Exercise per Session:    Stress:    • Feeling of Stress :    Social Connections:    • Frequency of Communication with Friends and Family:    • Frequency of Social Gatherings with Friends and Family:    • Attends [employer] Services:    • Active Member of Clubs or Organizations:    • Attends Club or Organization Meetings:    • Marital Status:    Intimate Partner Violence:    • Fear of Current or Ex-Partner:    • Emotionally Abused:    • Physically Abused:    • Sexually Abused:        SURGICAL HISTORY  Past Surgical History:   Procedure Laterality Date   • PB APPENDECTOMY     • TONSILLECTOMY         CURRENT MEDICATIONS  Home Medications    **Home medications have not yet been reviewed for this encounter**         ALLERGIES  No Known Allergies    PHYSICAL EXAM  VITAL SIGNS: /61   Pulse 79   Temp 36.4 °C (97.5 °F) (Temporal)   Resp 16   Ht 1.778 m (5' 10\")   Wt 59 kg (130 lb)   SpO2 97%   BMI 18.65 kg/m²       Constitutional: Cachectic in no acute distress  HENT: Normocephalic, Atraumatic, Bilateral external ears normal, Oropharynx moist, No oral exudates, Nose normal.   Eyes: PERRLA, EOMI, Conjunctiva normal, No discharge.   Neck: Normal range of motion, No tenderness, Supple, No stridor.   Lymphatic: No lymphadenopathy noted.   Cardiovascular: Normal heart rate, Normal rhythm, No murmurs, No rubs, No gallops.   Thorax & Lungs: Normal breath sounds, No respiratory distress, No wheezing, No chest tenderness.   Abdomen: Bowel sounds normal, Soft, suprapubic tenderness, No masses, No pulsatile masses.   Skin: Warm, Dry, No erythema, No rash.   Back: No tenderness, No CVA tenderness. .   Extremities: Intact distal pulses, No edema, No tenderness, No cyanosis, No clubbing.   Neurologic: Alert " and at his baseline   psychiatric: Affect normal, Judgment normal, Mood normal.       COURSE & MEDICAL DECISION MAKING  Pertinent Labs & Imaging studies reviewed. (See chart for details)  This a 75-year-old gentleman who presents to the emerge department difficulty controlling his urine.  Bedside ultrasound was performed and confirmed urinary retention.  A Clement catheter was placed and the patient did have cloudy urine that returned consistent with a urinary tract infection.  An IV established as his Accu-Chek was also elevated.  The patient is supposed be on insulin as well as Metformin has not been taking his medication.  Laboratory analysis does show poorly controlled diabetes mellitus and an elevation in the BUN.  The patient did receive a liter of fluid to help dilute down the blood sugar as well as rehydrate the patient.  He will receive Rocephin for treatment of his urinary tract infection.  The patient is currently homeless with no resources and will bit the patient to the hospital for IV hydration, blood sugar control, and antibiotics.  He does not appear septic at the time of admission.    FINAL IMPRESSION  1.  Urinary tension  2.  Cystitis  3.  Poorly controlled diabetes myelitis  4.  Dehydration    Disposition  The patient will be admitted in stable condition         Electronically signed by: Saul Motta M.D., 3/17/2021 12:00 PM

## 2021-03-17 NOTE — H&P
Hospital Medicine History & Physical Note    Date of Service  3/17/2021    Primary Care Physician  Pcp Pt States None    Consultants  None    Code Status  Full Code    Chief Complaint  Chief Complaint   Patient presents with   • Enuresis     Pt states he has had incontinence x6 months but it has gotten worse over the last 2 months       History of Presenting Illness  75 y.o. male with PMHx of HLD, DM2, BPH and Homelessness who presented 3/17/2021 with Dysuria. Patient presented with difficulty initiating a stream of urine as well as incontinence over the last several months.  He states he has a known history of prostatic hypertrophy and had a Clement catheter in place for quite some time.  He states the catheter was removed about 2 years ago.  He states that over the last 6 months has been continent and having difficulty initiating his stream of urine.  He does not have any fever nor back pain.  He denies nausea and vomiting.  He has no change in neurologic function.  He is currently homeless and at a homeless shelter. Upon admission to the ED, a urinalysis was performed and showed moderate leukocyte esterase and packed WBC's. Patient received one dose of Rocephin in the ED. Glucose on admission was in the mid 400's and patient did endorse that he hasnt taken his medications in months. No anion gap or evidence of DKA. Patients bladder was scanned and showed significant retention and was cathed by ERP. Will admit patient for UTI and Hyperglycemia.     Review of Systems  Review of Systems   Constitutional: Positive for chills, fever and malaise/fatigue.   HENT: Negative.    Eyes: Negative.    Respiratory: Positive for cough.    Cardiovascular: Positive for chest pain.   Gastrointestinal: Positive for nausea and vomiting.   Genitourinary: Positive for dysuria, frequency and urgency.   Musculoskeletal: Positive for myalgias.   Skin: Negative.    Neurological: Positive for dizziness and headaches.   Endo/Heme/Allergies:  Negative.    Psychiatric/Behavioral: Positive for depression and substance abuse.       Past Medical History   has a past medical history of Fall, Polio (Ag of 5), and Type II or unspecified type diabetes mellitus without mention of complication, not stated as uncontrolled.    Surgical History   has a past surgical history that includes pr appendectomy and tonsillectomy.     Family History  family history is not on file.     Social History   reports that he quit smoking about 60 years ago. His smoking use included cigarettes. He started smoking about 66 years ago. He quit after 5.00 years of use. He has never used smokeless tobacco. He reports that he does not drink alcohol and does not use drugs.    Allergies  No Known Allergies    Medications  Prior to Admission Medications   Prescriptions Last Dose Informant Patient Reported? Taking?   cyanocobalamin (VITAMIN B12) 1000 MCG Tab NOT TAKINGN at NOT TAKING Patient No No   Sig: Take 1 Tab by mouth every day.   Patient not taking: Reported on 3/17/2021   finasteride (PROSCAR) 5 MG TABS 3/16/2021 at 1000 Patient No No   Sig: Take 1 Tab by mouth every day.   insulin glargine (LANTUS) 100 UNIT/ML Solution NOT TAKING at NOT TAKING Patient No No   Sig: Inject 15 Units under the skin every morning.   Patient not taking: Reported on 3/17/2021   insulin regular (HUMULIN R) 100 Unit/mL Solution NOT TAKING at NOT TAKING Patient No No   Sig: Inject 2-12 Units under the skin 4 Times a Day,Before Meals and at Bedtime.   Patient not taking: Reported on 3/17/2021   magnesium hydroxide (MILK OF MAGNESIA) 400 MG/5ML Suspension NOT TAKING at NOT TAKING Patient No No   Sig: Take 30 mL by mouth 1 time a day as needed (if polyethylene glycol ineffective after 24 hours).   Patient not taking: Reported on 3/17/2021   metFORMIN (GLUCOPHAGE) 1000 MG tablet NOT TAKING at NOT TAKING Patient No No   Sig: Take 1 Tab by mouth 2 times a day, with meals.   Patient not taking: Reported on 3/17/2021    polyethylene glycol/lytes (MIRALAX) 17 g Pack NOT TAKING at NOT TAKING Patient No No   Sig: Take 1 Packet by mouth 1 time a day as needed (if sennosides and docusate ineffective after 24 hours).   Patient not taking: Reported on 3/17/2021   senna-docusate (PERICOLACE OR SENOKOT S) 8.6-50 MG Tab NOT TAKING at NOT TAKING Patient No No   Sig: Take 2 Tabs by mouth 2 Times a Day.   Patient not taking: Reported on 3/17/2021   tamsulosin (FLOMAX) 0.4 MG capsule NOT TAKING at NOT TAKING Patient No No   Sig: Take 1 Cap by mouth ONE-HALF HOUR AFTER BREAKFAST.   Patient not taking: Reported on 11/13/2020   vitamin D (VITAMIND D3) 1000 UNIT Tab NOT TAKING at NOT TAKING Patient No No   Sig: Take 1 Tab by mouth every day.   Patient not taking: Reported on 3/17/2021      Facility-Administered Medications: None       Physical Exam  Temp:  [36.4 °C (97.5 °F)] 36.4 °C (97.5 °F)  Pulse:  [79-88] 88  Resp:  [16] 16  BP: (137-148)/(61-70) 137/69  SpO2:  [96 %-97 %] 96 %    Physical Exam  Vitals reviewed.   Constitutional:       Appearance: Normal appearance.   HENT:      Head: Normocephalic and atraumatic.      Right Ear: External ear normal.      Left Ear: External ear normal.      Nose: Nose normal.      Mouth/Throat:      Mouth: Mucous membranes are dry.   Eyes:      Pupils: Pupils are equal, round, and reactive to light.   Cardiovascular:      Rate and Rhythm: Normal rate and regular rhythm.      Pulses: Normal pulses.      Heart sounds: Normal heart sounds.   Pulmonary:      Effort: Pulmonary effort is normal.      Breath sounds: Normal breath sounds.   Abdominal:      General: Abdomen is flat.   Musculoskeletal:         General: Normal range of motion.      Cervical back: Neck supple.   Skin:     General: Skin is dry.      Capillary Refill: Capillary refill takes less than 2 seconds.   Neurological:      General: No focal deficit present.   Psychiatric:         Mood and Affect: Mood normal.         Laboratory:  Recent Labs      03/17/21  1326   WBC 7.2   RBC 4.20*   HEMOGLOBIN 10.0*   HEMATOCRIT 32.2*   MCV 76.7*   MCH 23.8*   MCHC 31.1*   RDW 43.6   PLATELETCT 229   MPV 9.8     Recent Labs     03/17/21  1326   SODIUM 135   POTASSIUM 4.3   CHLORIDE 100   CO2 24   GLUCOSE 425*   BUN 24*   CREATININE 1.37   CALCIUM 9.0     Recent Labs     03/17/21  1326   ALTSGPT 18   ASTSGOT 15   ALKPHOSPHAT 204*   TBILIRUBIN 0.5   GLUCOSE 425*         No results for input(s): NTPROBNP in the last 72 hours.      No results for input(s): TROPONINT in the last 72 hours.    Imaging:  No orders to display         Assessment/Plan:  I anticipate this patient will require at least two midnights for appropriate medical management, necessitating inpatient admission.    * UTI (urinary tract infection)  Assessment & Plan  Patient presented with distended bladder and difficulty and pain with urination  UA-Moderate Leukocyte Esterase and Packed WBC's  Patient received one dose Rocephin in ED  Continue with Rocephin 2g IVPB Daily for 3 days  Continue with IVF Maintenance with NS 0.9% @100cc/hr  Trend CBC/CMP  Pain Control-Tylenol  Diet-Diabetic  DVT Prophylaxis-Lovenox  N/V-Zofran  Labetalol 10mg IVP PRN for SBP>180mmHg      Hyperglycemia- (present on admission)  Assessment & Plan  Give one stat dose of 10 Units lispro  Continue with Insulin Glargine 15U QHS  Diabetic Diet  Insulin Sliding Scale  Obtain Lipid Profile  Obtain HbA1C    Bladder obstruction- (present on admission)  Assessment & Plan  Patient straight cathed in ED and retaining 450cc's urine  Will continue tamsulosin    BPH (benign prostatic hypertrophy)- (present on admission)  Assessment & Plan  Continue with Tamusulosin 0.4mg QHS

## 2021-03-17 NOTE — ED NOTES
Med rec completed per Pt at bedside.  Pt denies taking medications other than finasteride.  Pt denies using insulins.  Allergies reviewed with Pt. No known drug allergies.  No oral antibiotics in last 14 days.

## 2021-03-18 LAB
ALBUMIN SERPL BCP-MCNC: 2.8 G/DL (ref 3.2–4.9)
ALBUMIN/GLOB SERPL: 0.8 G/DL
ALP SERPL-CCNC: 160 U/L (ref 30–99)
ALT SERPL-CCNC: 16 U/L (ref 2–50)
ANION GAP SERPL CALC-SCNC: 7 MMOL/L (ref 7–16)
AST SERPL-CCNC: 13 U/L (ref 12–45)
BILIRUB SERPL-MCNC: 0.2 MG/DL (ref 0.1–1.5)
BUN SERPL-MCNC: 24 MG/DL (ref 8–22)
CALCIUM SERPL-MCNC: 8.8 MG/DL (ref 8.5–10.5)
CHLORIDE SERPL-SCNC: 110 MMOL/L (ref 96–112)
CHOLEST SERPL-MCNC: 115 MG/DL (ref 100–199)
CO2 SERPL-SCNC: 26 MMOL/L (ref 20–33)
CREAT SERPL-MCNC: 1.52 MG/DL (ref 0.5–1.4)
ERYTHROCYTE [DISTWIDTH] IN BLOOD BY AUTOMATED COUNT: 43.3 FL (ref 35.9–50)
GLOBULIN SER CALC-MCNC: 3.4 G/DL (ref 1.9–3.5)
GLUCOSE BLD-MCNC: 133 MG/DL (ref 65–99)
GLUCOSE BLD-MCNC: 165 MG/DL (ref 65–99)
GLUCOSE BLD-MCNC: 206 MG/DL (ref 65–99)
GLUCOSE BLD-MCNC: 85 MG/DL (ref 65–99)
GLUCOSE SERPL-MCNC: 78 MG/DL (ref 65–99)
HCT VFR BLD AUTO: 27.7 % (ref 42–52)
HDLC SERPL-MCNC: 57 MG/DL
HGB BLD-MCNC: 8.6 G/DL (ref 14–18)
LDLC SERPL CALC-MCNC: 48 MG/DL
MCH RBC QN AUTO: 23.8 PG (ref 27–33)
MCHC RBC AUTO-ENTMCNC: 31 G/DL (ref 33.7–35.3)
MCV RBC AUTO: 76.7 FL (ref 81.4–97.8)
PLATELET # BLD AUTO: 212 K/UL (ref 164–446)
PMV BLD AUTO: 9.4 FL (ref 9–12.9)
POTASSIUM SERPL-SCNC: 4.2 MMOL/L (ref 3.6–5.5)
PROT SERPL-MCNC: 6.2 G/DL (ref 6–8.2)
RBC # BLD AUTO: 3.61 M/UL (ref 4.7–6.1)
SODIUM SERPL-SCNC: 143 MMOL/L (ref 135–145)
TRIGL SERPL-MCNC: 49 MG/DL (ref 0–149)
WBC # BLD AUTO: 8.5 K/UL (ref 4.8–10.8)

## 2021-03-18 PROCEDURE — 80053 COMPREHEN METABOLIC PANEL: CPT

## 2021-03-18 PROCEDURE — 80061 LIPID PANEL: CPT

## 2021-03-18 PROCEDURE — 97161 PT EVAL LOW COMPLEX 20 MIN: CPT

## 2021-03-18 PROCEDURE — 700105 HCHG RX REV CODE 258: Performed by: STUDENT IN AN ORGANIZED HEALTH CARE EDUCATION/TRAINING PROGRAM

## 2021-03-18 PROCEDURE — 82962 GLUCOSE BLOOD TEST: CPT

## 2021-03-18 PROCEDURE — 36415 COLL VENOUS BLD VENIPUNCTURE: CPT

## 2021-03-18 PROCEDURE — 770006 HCHG ROOM/CARE - MED/SURG/GYN SEMI*

## 2021-03-18 PROCEDURE — 700111 HCHG RX REV CODE 636 W/ 250 OVERRIDE (IP): Performed by: STUDENT IN AN ORGANIZED HEALTH CARE EDUCATION/TRAINING PROGRAM

## 2021-03-18 PROCEDURE — 85027 COMPLETE CBC AUTOMATED: CPT

## 2021-03-18 PROCEDURE — 700102 HCHG RX REV CODE 250 W/ 637 OVERRIDE(OP): Performed by: STUDENT IN AN ORGANIZED HEALTH CARE EDUCATION/TRAINING PROGRAM

## 2021-03-18 PROCEDURE — A9270 NON-COVERED ITEM OR SERVICE: HCPCS | Performed by: STUDENT IN AN ORGANIZED HEALTH CARE EDUCATION/TRAINING PROGRAM

## 2021-03-18 PROCEDURE — 99232 SBSQ HOSP IP/OBS MODERATE 35: CPT | Performed by: INTERNAL MEDICINE

## 2021-03-18 RX ADMIN — TAMSULOSIN HYDROCHLORIDE 0.4 MG: 0.4 CAPSULE ORAL at 08:07

## 2021-03-18 RX ADMIN — ENOXAPARIN SODIUM 40 MG: 40 INJECTION SUBCUTANEOUS at 05:11

## 2021-03-18 RX ADMIN — INSULIN GLARGINE 15 UNITS: 100 INJECTION, SOLUTION SUBCUTANEOUS at 17:24

## 2021-03-18 RX ADMIN — CEFTRIAXONE SODIUM 1 G: 1 INJECTION, POWDER, FOR SOLUTION INTRAMUSCULAR; INTRAVENOUS at 05:12

## 2021-03-18 RX ADMIN — INSULIN HUMAN 3 UNITS: 100 INJECTION, SOLUTION PARENTERAL at 20:21

## 2021-03-18 RX ADMIN — INSULIN HUMAN 4 UNITS: 100 INJECTION, SOLUTION PARENTERAL at 17:23

## 2021-03-18 ASSESSMENT — ENCOUNTER SYMPTOMS
EYE PAIN: 0
DIARRHEA: 0
BLURRED VISION: 0
CONSTIPATION: 0
COUGH: 0
FOCAL WEAKNESS: 0
CHILLS: 0
SPEECH CHANGE: 0
HEADACHES: 0
PHOTOPHOBIA: 0
FEVER: 0
BACK PAIN: 0
TINGLING: 0
DOUBLE VISION: 0
NECK PAIN: 0
WEIGHT LOSS: 0
TREMORS: 0
SHORTNESS OF BREATH: 0
HALLUCINATIONS: 0
NAUSEA: 0
PALPITATIONS: 0
SENSORY CHANGE: 0
ABDOMINAL PAIN: 0
VOMITING: 0
MYALGIAS: 0
DIZZINESS: 0
SPUTUM PRODUCTION: 0
ORTHOPNEA: 0

## 2021-03-18 ASSESSMENT — FIBROSIS 4 INDEX: FIB4 SCORE: 1.17

## 2021-03-18 ASSESSMENT — LIFESTYLE VARIABLES: SUBSTANCE_ABUSE: 0

## 2021-03-18 NOTE — PROGRESS NOTES
2 RN skin check complete with RN trupti   Devices in place IV and lazo.  Skin assessed under devices: yes.  Confirmed pressure ulcers found on NA.  New potential pressure ulcers noted on Right inner thigh is red and not blanching. Sacrum is red and slow to red. Wound consult placed yes.  The following interventions in place. Patient is able to turn self and has been educated on doing so, waffle overlay will be added.

## 2021-03-18 NOTE — DIETARY
Nutrition Services: Pt with BMI < 19    Pt is a 76 yo M admitted for UTI and on day 1 of admit on a consistent CHO diet.    Ht: 177.8 cm  Wt: 56.5 kg   BMI: 17.9 kg/m^2 (underweight)     Nutrition admit screen filled out and negative for nutrition risk factors. Per chart review pt is homeless, currently residing at shelter. Weight history obtained via chart review:   November 2020 = 118 lb   January 2021 = 124 lb   Current weight = 125 lb   Weight trended up from November, and has been stable over past 2 months. Pt consumed % of dinner last night. Expect pt to continue to eat well. Will add snacks TID to increase caloric intake, however full nutrition assessment not indicated.     RD available prn

## 2021-03-18 NOTE — PROGRESS NOTES
[facility] Medicine Daily Progress Note    Date of Service  3/18/2021    Chief Complaint  75 y.o. male admitted 3/17/2021 with difficulty to urinate.    Hospital Course  75 y.o. male with PMHx of HLD, DM2, BPH and Homelessness who presented 3/17/2021 with Dysuria. Patient presented with difficulty initiating a stream of urine as well as incontinence over the last several months.  He states he has a known history of prostatic hypertrophy and had a Clement catheter in place for quite some time.  He states the catheter was removed about 2 years ago.  He states that over the last 6 months has been continent and having difficulty initiating his stream of urine.  He found to have a diagnosis of urinary tract infection and he was started on IV ceftriaxone.  He also found to have hyperglycemia and HbA1c came back 11.8.  He underwent straight catheterization in ER and 450 cc of urine was removed.    Interval Problem Update  I evaluated and examined him at the bedside.  He reported that he is feeling significantly better.  He expressed when he came in he had dysuria.  Creatinine slightly trended up could be secondary to obstruction.  Current hemoglobin is 8.6 with platelet count of 212.  He found to have macrocytosis I ordered iron panel.  I discussed plan of care with him.  Consultants/Specialty  None    Code Status  Full Code    Disposition  To be decided    Review of Systems  Review of Systems   Constitutional: Negative for chills, fever and weight loss.   HENT: Negative for hearing loss and tinnitus.    Eyes: Negative for blurred vision, double vision, photophobia and pain.   Respiratory: Negative for cough, sputum production and shortness of breath.    Cardiovascular: Negative for chest pain, palpitations, orthopnea and leg swelling.   Gastrointestinal: Negative for abdominal pain, constipation, diarrhea, nausea and vomiting.   Genitourinary: Positive for dysuria (Now resolved). Negative for frequency and urgency.         Urinary retention   Musculoskeletal: Negative for back pain, joint pain, myalgias and neck pain.   Skin: Negative for rash.   Neurological: Negative for dizziness, tingling, tremors, sensory change, speech change, focal weakness and headaches.   Psychiatric/Behavioral: Negative for hallucinations and substance abuse.   All other systems reviewed and are negative.       Physical Exam  Temp:  [36.2 °C (97.2 °F)-36.8 °C (98.2 °F)] 36.6 °C (97.8 °F)  Pulse:  [66-88] 82  Resp:  [16-18] 17  BP: (101-148)/(51-77) 110/57  SpO2:  [94 %-98 %] 98 %    Physical Exam  Vitals reviewed.   Constitutional:       General: He is not in acute distress.  HENT:      Head: Normocephalic and atraumatic. No contusion.      Right Ear: External ear normal.      Left Ear: External ear normal.      Nose: Nose normal.      Mouth/Throat:      Pharynx: No oropharyngeal exudate.   Eyes:      General:         Right eye: No discharge.         Left eye: No discharge.      Pupils: Pupils are equal, round, and reactive to light.   Cardiovascular:      Rate and Rhythm: Normal rate and regular rhythm.      Heart sounds: No murmur. No friction rub. No gallop.    Pulmonary:      Effort: Pulmonary effort is normal.      Breath sounds: No wheezing or rhonchi.   Abdominal:      General: Bowel sounds are normal. There is no distension.      Palpations: Abdomen is soft.      Tenderness: There is no abdominal tenderness. There is no rebound.   Genitourinary:     Comments: Clement's catheter in place.  Musculoskeletal:         General: No swelling or tenderness. Normal range of motion.      Cervical back: No rigidity. No muscular tenderness.   Skin:     General: Skin is warm and dry.      Coloration: Skin is not jaundiced.   Neurological:      General: No focal deficit present.      Mental Status: He is alert.      Cranial Nerves: No cranial nerve deficit.      Sensory: No sensory deficit.   Psychiatric:         Mood and Affect: Mood normal.          Fluids    Intake/Output Summary (Last 24 hours) at 3/18/2021 0834  Last data filed at 3/18/2021 0600  Gross per 24 hour   Intake 1350 ml   Output 1250 ml   Net 100 ml       Laboratory  Recent Labs     03/17/21  1326 03/18/21  0125   WBC 7.2 8.5   RBC 4.20* 3.61*   HEMOGLOBIN 10.0* 8.6*   HEMATOCRIT 32.2* 27.7*   MCV 76.7* 76.7*   MCH 23.8* 23.8*   MCHC 31.1* 31.0*   RDW 43.6 43.3   PLATELETCT 229 212   MPV 9.8 9.4     Recent Labs     03/17/21  1326 03/18/21  0125   SODIUM 135 143   POTASSIUM 4.3 4.2   CHLORIDE 100 110   CO2 24 26   GLUCOSE 425* 78   BUN 24* 24*   CREATININE 1.37 1.52*   CALCIUM 9.0 8.8             Recent Labs     03/18/21  0125   TRIGLYCERIDE 49   HDL 57   LDL 48       Imaging  No orders to display        Assessment/Plan  * UTI (urinary tract infection)  Assessment & Plan  Patient presented with distended bladder and difficulty and pain with urination  UA-Moderate Leukocyte Esterase and Packed WBC's  Patient received one dose Rocephin in ED  Continue ceftriaxone for now.        Hyperglycemia- (present on admission)  Assessment & Plan  Give one stat dose of 10 Units lispro  Continue with Insulin Glargine 15U QHS  He found to have HbA1c of 11.8.  Requested consult for diabetic education.    Bladder obstruction- (present on admission)  Assessment & Plan  Patient straight cathed in ED and retaining 450cc's urine  He received Clement's catheter due to urinary retention.  Will continue tamsulosin    BPH (benign prostatic hypertrophy)- (present on admission)  Assessment & Plan  Continue with Tamusulosin 0.4mg QHS  Continue to monitor.    Type 2 diabetes mellitus with hyperglycemia, with long-term current use of insulin ([facility])  Assessment & Plan  He found to have poorly controlled diabetes.  HbA1c significantly elevated 11.8  Requested consult for diabetic education.  Continue insulin glargine  Continue insulin sliding scale with hypoglycemia protocol.    Microcytic anemia- (present on  admission)  Assessment & Plan  He found to have microcytic anemia.  I ordered iron panel.  Continue to monitor.     I discussed plan of care with bedside RN.    I discussed plan of care with .  VTE prophylaxis: Lovenox

## 2021-03-18 NOTE — ASSESSMENT & PLAN NOTE
He found to have microcytic anemia.  I ordered iron panel and he found no significant decrease in iron percentage.  Iron replacement per pharmacy.  Continue to monitor.

## 2021-03-18 NOTE — PROGRESS NOTES
Bedside report received at change of shift. Pt is A&Ox4, reports no pain at this time. Pt sat up in bed to eat breakfast this AM. Safety precautions in place. All pt needs met at this time.

## 2021-03-18 NOTE — PROGRESS NOTES
"Late entyr 1700: Patient arrived to unit via wheelchair. Patient stated normally he ambulated independently with cane but is very weak. 2 assist to get patient in bed. Patient noted to have multiple scabs through body. During skin check noted that they were throughout different areas yet no bugs identified. Educated on calling before getting out of bed. Asked patient if insulin is taken at home and he states no. He states he does not check his FS at home as he does not have a machine. RN asked if he had one would he and he stated \"probaly not, I have nowhere to store it and it would probably go missing\". MD paged to start patient on diet as he had 10 units of regular insulin scheduled. IV fluids started and bed alarm placed. Call light in reach. Patient educated that diet had been ordered and should be delivered.   "

## 2021-03-18 NOTE — PATIENT COMMUNICATION
Advised pt that we needed to put a waffle cushion under him but he refused and stated that he doesn't need a waffle cushion and doesn't want it. Advised pt of the importance of skin breakdown and pressure ulcers patient still refused. Advised ALISA Marquez of the situation who said that it is okay for him to not have a cushion because he is able to move in bed

## 2021-03-18 NOTE — ASSESSMENT & PLAN NOTE
He found to have poorly controlled diabetes.  HbA1c significantly elevated 11.8  Requested consult for diabetic education.  Continue insulin glargine  Continue insulin sliding scale with hypoglycemia protocol.

## 2021-03-18 NOTE — PROGRESS NOTES
Alert and oriented x4. Offered dinner tray. Consumed 75 to 100% of the meal. Admission profile done. Head to toe assessment done. Safety precautions in placed. Bed in lowest position. Upper side rails up. Treaded socks on. Reinforced the use of call light when needing assistance.

## 2021-03-18 NOTE — CARE PLAN
Problem: Venous Thromboembolism (VTW)/Deep Vein Thrombosis (DVT) Prevention:  Goal: Patient will participate in Venous Thrombosis (VTE)/Deep Vein Thrombosis (DVT)Prevention Measures  Outcome: PROGRESSING AS EXPECTED  Pt is receiving lovenox for DVT prophylaxis.     Problem: Urinary Elimination:  Goal: Ability to reestablish a normal urinary elimination pattern will improve  Outcome: PROGRESSING AS EXPECTED  Pt has a lazo in place.

## 2021-03-19 LAB
ALBUMIN SERPL BCP-MCNC: 2.9 G/DL (ref 3.2–4.9)
ALBUMIN/GLOB SERPL: 0.8 G/DL
ALP SERPL-CCNC: 185 U/L (ref 30–99)
ALT SERPL-CCNC: 15 U/L (ref 2–50)
ANION GAP SERPL CALC-SCNC: 10 MMOL/L (ref 7–16)
AST SERPL-CCNC: 22 U/L (ref 12–45)
BILIRUB SERPL-MCNC: <0.2 MG/DL (ref 0.1–1.5)
BUN SERPL-MCNC: 24 MG/DL (ref 8–22)
CALCIUM SERPL-MCNC: 8.7 MG/DL (ref 8.5–10.5)
CHLORIDE SERPL-SCNC: 102 MMOL/L (ref 96–112)
CO2 SERPL-SCNC: 25 MMOL/L (ref 20–33)
CREAT SERPL-MCNC: 1.53 MG/DL (ref 0.5–1.4)
ERYTHROCYTE [DISTWIDTH] IN BLOOD BY AUTOMATED COUNT: 44.3 FL (ref 35.9–50)
GLOBULIN SER CALC-MCNC: 3.7 G/DL (ref 1.9–3.5)
GLUCOSE BLD-MCNC: 115 MG/DL (ref 65–99)
GLUCOSE BLD-MCNC: 197 MG/DL (ref 65–99)
GLUCOSE BLD-MCNC: 91 MG/DL (ref 65–99)
GLUCOSE SERPL-MCNC: 131 MG/DL (ref 65–99)
HCT VFR BLD AUTO: 29 % (ref 42–52)
HGB BLD-MCNC: 9 G/DL (ref 14–18)
HGB RETIC QN AUTO: 26.2 PG/CELL (ref 29–35)
IMM RETICS NFR: 15.4 % (ref 9.3–17.4)
IRON SATN MFR SERPL: 7 % (ref 15–55)
IRON SERPL-MCNC: 13 UG/DL (ref 50–180)
MAGNESIUM SERPL-MCNC: 1.7 MG/DL (ref 1.5–2.5)
MCH RBC QN AUTO: 24.1 PG (ref 27–33)
MCHC RBC AUTO-ENTMCNC: 31 G/DL (ref 33.7–35.3)
MCV RBC AUTO: 77.7 FL (ref 81.4–97.8)
PLATELET # BLD AUTO: 213 K/UL (ref 164–446)
PMV BLD AUTO: 9.6 FL (ref 9–12.9)
POTASSIUM SERPL-SCNC: 3.6 MMOL/L (ref 3.6–5.5)
PROT SERPL-MCNC: 6.6 G/DL (ref 6–8.2)
RBC # BLD AUTO: 3.73 M/UL (ref 4.7–6.1)
RETICS # AUTO: 0.04 M/UL (ref 0.04–0.06)
RETICS/RBC NFR: 1.1 % (ref 0.8–2.1)
SODIUM SERPL-SCNC: 137 MMOL/L (ref 135–145)
TIBC SERPL-MCNC: 189 UG/DL (ref 250–450)
UIBC SERPL-MCNC: 176 UG/DL (ref 110–370)
WBC # BLD AUTO: 8.6 K/UL (ref 4.8–10.8)

## 2021-03-19 PROCEDURE — 700111 HCHG RX REV CODE 636 W/ 250 OVERRIDE (IP): Performed by: STUDENT IN AN ORGANIZED HEALTH CARE EDUCATION/TRAINING PROGRAM

## 2021-03-19 PROCEDURE — 85046 RETICYTE/HGB CONCENTRATE: CPT

## 2021-03-19 PROCEDURE — 99232 SBSQ HOSP IP/OBS MODERATE 35: CPT | Performed by: INTERNAL MEDICINE

## 2021-03-19 PROCEDURE — 83540 ASSAY OF IRON: CPT

## 2021-03-19 PROCEDURE — 82962 GLUCOSE BLOOD TEST: CPT | Mod: 91

## 2021-03-19 PROCEDURE — 83735 ASSAY OF MAGNESIUM: CPT

## 2021-03-19 PROCEDURE — 700105 HCHG RX REV CODE 258: Performed by: STUDENT IN AN ORGANIZED HEALTH CARE EDUCATION/TRAINING PROGRAM

## 2021-03-19 PROCEDURE — 85027 COMPLETE CBC AUTOMATED: CPT

## 2021-03-19 PROCEDURE — 36415 COLL VENOUS BLD VENIPUNCTURE: CPT

## 2021-03-19 PROCEDURE — 770006 HCHG ROOM/CARE - MED/SURG/GYN SEMI*

## 2021-03-19 PROCEDURE — 97161 PT EVAL LOW COMPLEX 20 MIN: CPT

## 2021-03-19 PROCEDURE — 80053 COMPREHEN METABOLIC PANEL: CPT

## 2021-03-19 PROCEDURE — A9270 NON-COVERED ITEM OR SERVICE: HCPCS | Performed by: INTERNAL MEDICINE

## 2021-03-19 PROCEDURE — 700105 HCHG RX REV CODE 258: Performed by: INTERNAL MEDICINE

## 2021-03-19 PROCEDURE — 700102 HCHG RX REV CODE 250 W/ 637 OVERRIDE(OP): Performed by: INTERNAL MEDICINE

## 2021-03-19 PROCEDURE — 83550 IRON BINDING TEST: CPT

## 2021-03-19 PROCEDURE — A9270 NON-COVERED ITEM OR SERVICE: HCPCS | Performed by: STUDENT IN AN ORGANIZED HEALTH CARE EDUCATION/TRAINING PROGRAM

## 2021-03-19 PROCEDURE — 700102 HCHG RX REV CODE 250 W/ 637 OVERRIDE(OP): Performed by: STUDENT IN AN ORGANIZED HEALTH CARE EDUCATION/TRAINING PROGRAM

## 2021-03-19 PROCEDURE — 700111 HCHG RX REV CODE 636 W/ 250 OVERRIDE (IP): Mod: JG | Performed by: INTERNAL MEDICINE

## 2021-03-19 RX ORDER — DIPHENHYDRAMINE HCL 25 MG
25 TABLET ORAL ONCE
Status: COMPLETED | OUTPATIENT
Start: 2021-03-19 | End: 2021-03-19

## 2021-03-19 RX ORDER — ACETAMINOPHEN 325 MG/1
650 TABLET ORAL ONCE
Status: COMPLETED | OUTPATIENT
Start: 2021-03-19 | End: 2021-03-19

## 2021-03-19 RX ORDER — DIPHENHYDRAMINE HYDROCHLORIDE 50 MG/ML
25 INJECTION INTRAMUSCULAR; INTRAVENOUS ONCE
Status: COMPLETED | OUTPATIENT
Start: 2021-03-19 | End: 2021-03-19

## 2021-03-19 RX ADMIN — INSULIN HUMAN 3 UNITS: 100 INJECTION, SOLUTION PARENTERAL at 17:30

## 2021-03-19 RX ADMIN — SODIUM CHLORIDE: 9 INJECTION, SOLUTION INTRAVENOUS at 12:43

## 2021-03-19 RX ADMIN — INSULIN GLARGINE 15 UNITS: 100 INJECTION, SOLUTION SUBCUTANEOUS at 17:30

## 2021-03-19 RX ADMIN — SODIUM CHLORIDE 1975 MG: 9 INJECTION, SOLUTION INTRAVENOUS at 18:48

## 2021-03-19 RX ADMIN — CEFTRIAXONE SODIUM 1 G: 1 INJECTION, POWDER, FOR SOLUTION INTRAMUSCULAR; INTRAVENOUS at 05:30

## 2021-03-19 RX ADMIN — ACETAMINOPHEN 650 MG: 325 TABLET ORAL at 16:50

## 2021-03-19 RX ADMIN — TAMSULOSIN HYDROCHLORIDE 0.4 MG: 0.4 CAPSULE ORAL at 08:04

## 2021-03-19 RX ADMIN — SODIUM CHLORIDE 25 MG: 9 INJECTION, SOLUTION INTRAVENOUS at 17:19

## 2021-03-19 RX ADMIN — INSULIN HUMAN 3 UNITS: 100 INJECTION, SOLUTION PARENTERAL at 21:17

## 2021-03-19 RX ADMIN — DIPHENHYDRAMINE HYDROCHLORIDE 25 MG: 25 TABLET ORAL at 16:50

## 2021-03-19 RX ADMIN — ENOXAPARIN SODIUM 40 MG: 40 INJECTION SUBCUTANEOUS at 05:30

## 2021-03-19 ASSESSMENT — ENCOUNTER SYMPTOMS
CONSTIPATION: 0
NECK PAIN: 0
PALPITATIONS: 0
EYE PAIN: 0
ABDOMINAL PAIN: 0
TINGLING: 0
SPEECH CHANGE: 0
CHILLS: 0
PHOTOPHOBIA: 0
BLURRED VISION: 0
HEADACHES: 0
DIARRHEA: 0
DOUBLE VISION: 0
WEIGHT LOSS: 0
FEVER: 0
HALLUCINATIONS: 0
FOCAL WEAKNESS: 0
SENSORY CHANGE: 0
VOMITING: 0
DIZZINESS: 0
ORTHOPNEA: 0
TREMORS: 0
BACK PAIN: 0
MYALGIAS: 0
SHORTNESS OF BREATH: 0
SPUTUM PRODUCTION: 0
NAUSEA: 0
COUGH: 0

## 2021-03-19 ASSESSMENT — COGNITIVE AND FUNCTIONAL STATUS - GENERAL
MOVING TO AND FROM BED TO CHAIR: A LOT
MOBILITY SCORE: 16
MOVING FROM LYING ON BACK TO SITTING ON SIDE OF FLAT BED: A LOT
WALKING IN HOSPITAL ROOM: A LITTLE
SUGGESTED CMS G CODE MODIFIER MOBILITY: CK
STANDING UP FROM CHAIR USING ARMS: A LITTLE
TURNING FROM BACK TO SIDE WHILE IN FLAT BAD: A LITTLE
CLIMB 3 TO 5 STEPS WITH RAILING: A LITTLE

## 2021-03-19 ASSESSMENT — GAIT ASSESSMENTS
DISTANCE (FEET): 100
ASSISTIVE DEVICE: FRONT WHEEL WALKER
DEVIATION: OTHER (COMMENT)

## 2021-03-19 ASSESSMENT — LIFESTYLE VARIABLES: SUBSTANCE_ABUSE: 0

## 2021-03-19 NOTE — PROGRESS NOTES
IV Iron Per Pharmacy Note    Patient Lean Body Weight:  73 kg  Reason for Iron Replacement: Iron Deficiency Anemia      Lab Results   Component Value Date/Time    WBC 8.6 03/19/2021 01:22 AM    RBC 3.73 (L) 03/19/2021 01:22 AM    HEMOGLOBIN 9.0 (L) 03/19/2021 01:22 AM    HEMATOCRIT 29.0 (L) 03/19/2021 01:22 AM    MCV 77.7 (L) 03/19/2021 01:22 AM    MCH 24.1 (L) 03/19/2021 01:22 AM    MCHC 31.0 (L) 03/19/2021 01:22 AM    MPV 9.6 03/19/2021 01:22 AM       Recent Labs     03/19/21  0122   IRON 13*         Recent Labs     03/19/21  0122   CREATININE 1.53*          Assessment/Plan:  1. IV Iron Indicated.   2. Following diphenhydramine/acetaminophen premeds, administer Iron Dextran 25 mg IV test dose over 30 minutes.  3. If no reaction (Anaphylaxis, Hypotension/Hypertension, N/V/D, Chest pain/Back Pain, Urticaria/Pruritis) in the next hour, proceed to full dose.   4. Full dose: Iron Dextran 1975 mg IV over 4 hours. Continue to monitor for delayed ADR including: Arthralgia/myalgia, Headache/backache, chills/dizziness/malaise, moderate to high fever and n/v.      Jono Suero, PharmD

## 2021-03-19 NOTE — DISCHARGE PLANNING
Anticipated Discharge Disposition: SNF for short term therapy    Action: Patient was discussed at IDT rounds, PT is recommending SNF, referal was placed by Provider, Dr. Brown. This RN,  sent a choice document to [facility], [facility], [facility], [facility], and [facility]. Patient was previously at [facility].     Barriers to Discharge: Medical clearance, diabetic educator consult, accepting SNF    Plan: Case Manger to follow up on SNF referral, will need COBRA packet, PASRR on file.     Ania Kaba RN,

## 2021-03-19 NOTE — PROGRESS NOTES
Bedside report received at change of shift. Pt is A&Ox4, reports no pain at this time. Pt's blood sugar was at 91 this AM. Safety precautions in place. Educated on use of call light. All pt needs met at this time.

## 2021-03-19 NOTE — CARE PLAN
Problem: Venous Thromboembolism (VTW)/Deep Vein Thrombosis (DVT) Prevention:  Goal: Patient will participate in Venous Thrombosis (VTE)/Deep Vein Thrombosis (DVT)Prevention Measures  Outcome: PROGRESSING AS EXPECTED  Pt is receiving lovenox for DVT prophylaxis.      Problem: Mobility  Goal: Risk for activity intolerance will decrease  Outcome: PROGRESSING AS EXPECTED  Pt ambulated with PT and FWW this shift.

## 2021-03-19 NOTE — DOCUMENTATION QUERY
[facility]                                                                       Query Response Note      PATIENT:               JERAMIE NIX  ACCT #:                  [id number]  MRN:                     [id number]  :                      1945  ADMIT DATE:       3/17/2021 11:39 AM  DISCH DATE:          RESPONDING  PROVIDER #:        [id number]           QUERY TEXT:    The following pressure injuries are noted in the Wound Team Eval:    1) Deep tissue pressure injury right inner thigh POA  2) Deep tissue pressure injury right buttocks POA     Please specify if you:    NOTE:  If an appropriate response is not listed below, please respond with a new note.      The patient's Clinical Indicators include:  Per 3/18 Wound Note: Pressure injury thigh inner right DTI POA, pressure injury buttocks right DTI POA  Risk factors: diabetes  Treatment: pressure offloading, sacral mepilex, reposition q 2 h    Thank You,  Deanna Gaytan RN, BSN  Clinical    Connect via Second Decimal  Options provided:   -- Agree with Wound Care RN assessment   -- Disagree with Wound Care RN assessment   -- Unable to determine      Query created by: Deanna Gaytan on 3/19/2021 10:22 AM    RESPONSE TEXT:    Agree with Wound Care RN assessment       QUERY TEXT:    BMI 17.9 is documented in the Medical Record.  Can a diagnosis be provided to support this finding?    NOTE:  If an appropriate response is not listed below, please respond with a new note.    The patient's Clinical Indicators include:  Per 3/18 Dietary Note:  BMI 17.9 (underweight)   Risk Factors: homelessness, diabetes  Treatment: dietary eval, add snacks TID to diet    Thank You,  Deanna Gaytan RN, BSN  Clinical    Connect via Second Decimal  Options provided:   -- Underweight   -- Cachexia   -- Findings of no clinical significance   -- Unable to  determine      Query created by: Deanna Gaytan on 3/19/2021 10:24 AM    RESPONSE TEXT:    Underweight          Electronically signed by:  CLAYTON SHERIDAN MD 3/19/2021 10:38 AM

## 2021-03-19 NOTE — DISCHARGE PLANNING
Received Choice form at [phone]  Agency/Facility Name: [facility]  Referral sent per Choice form @ [id number]

## 2021-03-19 NOTE — PROGRESS NOTES
[facility] Medicine Daily Progress Note    Date of Service  3/19/2021    Chief Complaint  75 y.o. male admitted 3/17/2021 with difficulty to urinate.    Hospital Course  75 y.o. male with PMHx of HLD, DM2, BPH and Homelessness who presented 3/17/2021 with Dysuria. Patient presented with difficulty initiating a stream of urine as well as incontinence over the last several months.  He states he has a known history of prostatic hypertrophy and had a Clement catheter in place for quite some time.  He states the catheter was removed about 2 years ago.  He states that over the last 6 months has been continent and having difficulty initiating his stream of urine.  He found to have a diagnosis of urinary tract infection and he was started on IV ceftriaxone.  He also found to have hyperglycemia and HbA1c came back 11.8.  He underwent straight catheterization in ER and 450 cc of urine was removed.    Interval Problem Update  I evaluated and examined him at the bedside.  He is hemodynamically stable this morning.  Blood culture and urine culture negative so far.  I discussed plan of care with him.  Physical therapy evaluated him and recommended SNF placement.  I placed SNF referrals.    Discussed plan of care during multidisciplinary rounds.      Consultants/Specialty  None    Code Status  Full Code    Disposition  To be decided    Review of Systems  Review of Systems   Constitutional: Negative for chills, fever and weight loss.   HENT: Negative for hearing loss and tinnitus.    Eyes: Negative for blurred vision, double vision, photophobia and pain.   Respiratory: Negative for cough, sputum production and shortness of breath.    Cardiovascular: Negative for chest pain, palpitations, orthopnea and leg swelling.   Gastrointestinal: Negative for abdominal pain, constipation, diarrhea, nausea and vomiting.   Genitourinary: Positive for dysuria (Now resolved). Negative for frequency and urgency.        Urinary retention    Musculoskeletal: Negative for back pain, joint pain, myalgias and neck pain.   Skin: Negative for rash.   Neurological: Negative for dizziness, tingling, tremors, sensory change, speech change, focal weakness and headaches.   Psychiatric/Behavioral: Negative for hallucinations and substance abuse.   All other systems reviewed and are negative.       Physical Exam  Temp:  [36.4 °C (97.5 °F)-36.9 °C (98.4 °F)] 36.9 °C (98.4 °F)  Pulse:  [73-83] 83  Resp:  [17-19] 17  BP: (104-138)/(61-76) 138/76  SpO2:  [96 %-97 %] 97 %    Physical Exam  Vitals reviewed.   Constitutional:       General: He is not in acute distress.  HENT:      Head: Normocephalic and atraumatic. No contusion.      Right Ear: External ear normal.      Left Ear: External ear normal.      Nose: Nose normal.      Mouth/Throat:      Pharynx: No oropharyngeal exudate.   Eyes:      General:         Right eye: No discharge.         Left eye: No discharge.      Pupils: Pupils are equal, round, and reactive to light.   Cardiovascular:      Rate and Rhythm: Normal rate and regular rhythm.      Heart sounds: No murmur. No friction rub. No gallop.    Pulmonary:      Effort: Pulmonary effort is normal.      Breath sounds: No wheezing or rhonchi.   Abdominal:      General: Bowel sounds are normal. There is no distension.      Palpations: Abdomen is soft.      Tenderness: There is no abdominal tenderness. There is no rebound.   Genitourinary:     Comments: Clement's catheter in place.  Musculoskeletal:         General: No swelling or tenderness. Normal range of motion.      Cervical back: No rigidity. No muscular tenderness.   Skin:     General: Skin is warm and dry.      Coloration: Skin is not jaundiced.   Neurological:      General: No focal deficit present.      Mental Status: He is alert.      Cranial Nerves: No cranial nerve deficit.      Sensory: No sensory deficit.   Psychiatric:         Mood and Affect: Mood normal.     I performed physical exam on March 19,  2021 remain similar without any acute changes.    Fluids    Intake/Output Summary (Last 24 hours) at 3/19/2021 1419  Last data filed at 3/19/2021 1000  Gross per 24 hour   Intake 980 ml   Output 2900 ml   Net -1920 ml       Laboratory  Recent Labs     03/17/21  1326 03/18/21  0125 03/19/21  0122   WBC 7.2 8.5 8.6   RBC 4.20* 3.61* 3.73*   HEMOGLOBIN 10.0* 8.6* 9.0*   HEMATOCRIT 32.2* 27.7* 29.0*   MCV 76.7* 76.7* 77.7*   MCH 23.8* 23.8* 24.1*   MCHC 31.1* 31.0* 31.0*   RDW 43.6 43.3 44.3   PLATELETCT 229 212 213   MPV 9.8 9.4 9.6     Recent Labs     03/17/21  1326 03/18/21  0125 03/19/21  0122   SODIUM 135 143 137   POTASSIUM 4.3 4.2 3.6   CHLORIDE 100 110 102   CO2 24 26 25   GLUCOSE 425* 78 131*   BUN 24* 24* 24*   CREATININE 1.37 1.52* 1.53*   CALCIUM 9.0 8.8 8.7             Recent Labs     03/18/21  0125   TRIGLYCERIDE 49   HDL 57   LDL 48       Imaging  No orders to display        Assessment/Plan  * UTI (urinary tract infection)  Assessment & Plan  Patient presented with distended bladder and difficulty and pain with urination  UA-Moderate Leukocyte Esterase and Packed WBC's  Patient received one dose Rocephin in ED  Continue ceftriaxone for now.  Blood and urine cultures negative to date.        Hyperglycemia- (present on admission)  Assessment & Plan  Give one stat dose of 10 Units lispro  Continue with Insulin Glargine 15U QHS  He found to have HbA1c of 11.8.  Requested consult for diabetic education.    Bladder obstruction- (present on admission)  Assessment & Plan  Patient straight cathed in ED and retaining 450cc's urine  He received Clement's catheter due to urinary retention.  Will continue tamsulosin    BPH (benign prostatic hypertrophy)- (present on admission)  Assessment & Plan  Continue with Tamusulosin 0.4mg QHS  Continue to monitor.    Type 2 diabetes mellitus with hyperglycemia, with long-term current use of insulin ([facility])  Assessment & Plan  He found to have poorly controlled diabetes.  HbA1c  significantly elevated 11.8  Requested consult for diabetic education.  Continue insulin glargine  Continue insulin sliding scale with hypoglycemia protocol.    Microcytic anemia- (present on admission)  Assessment & Plan  He found to have microcytic anemia.  I ordered iron panel and he found no significant decrease in iron percentage.  Iron replacement per pharmacy.  Continue to monitor.     I discussed plan of care with bedside RN.    I discussed plan of care during multidisciplinary rounds.    VTE prophylaxis: Lovenox

## 2021-03-19 NOTE — WOUND TEAM
[facility]  Inpatient Services  Initial Wound and Skin Care Evaluation    Admission Date: 3/17/2021     Last order of IP CONSULT TO WOUND CARE was found on 3/17/2021 from Hospital Encounter on 3/17/2021     HPI, PMH, SH: Reviewed    Past Surgical History:   Procedure Laterality Date   • PB APPENDECTOMY     • TONSILLECTOMY       Social History     Tobacco Use   • Smoking status: Former Smoker     Years: 5.00     Types: Cigarettes     Start date: 1955     Quit date: 1960     Years since quittin.5   • Smokeless tobacco: Never Used   Substance Use Topics   • Alcohol use: Not Currently     Alcohol/week: 0.6 oz     Types: 1 Cans of beer per week     Chief Complaint   Patient presents with   • Enuresis     Pt states he has had incontinence x6 months but it has gotten worse over the last 2 months     Diagnosis: UTI (urinary tract infection) [N39.0]    Unit where seen by Wound Team: S632/02     WOUND CONSULT/FOLLOW UP RELATED TO:  R lower buttock (I.T.) and R upper inner thigh     WOUND HISTORY:  Pt unaware of wounds.  Nursing skin inspection triggered wound consult.      WOUND ASSESSMENT/LDA  Wound 21 Pressure Injury Thigh Inner Right DTI POA (Active)   Wound Image   21 1800   Site Assessment Purple 21 1800   Periwound Assessment Blanchable erythema 21 1800   Margins Undefined edges 21 1800   Closure Secondary intention 21 1800   Drainage Amount None 21 1800   Treatments Site care;Cleansed;Offloading 21 1800   Wound Cleansing Normal Saline Irrigation 21 1800   Periwound Protectant Skin Protectant Wipes to Periwound 21 1800   Dressing Options Open to Air 21 1800   Dressing Changed Changed 21 1800   Dressing Change/Treatment Frequency Every Shift, and As Needed 21 1800   NEXT Weekly Photo (Inpatient Only) 21 1800   Pressure Injury Stage DTPI 21 1800   Wound Length (cm) 3 cm 21 1800   Wound  Width (cm) 2 cm 03/18/21 1800   Wound Surface Area (cm^2) 6 cm^2 03/18/21 1800   Wound Odor None 03/18/21 1800   Exposed Structures None 03/18/21 1800   WOUND NURSE ONLY - Time Spent with Patient (mins) 60 03/18/21 1800   Number of days: 1       Wound 03/17/21 Pressure Injury Buttocks Right DTI POA (Active)   Wound Image   03/18/21 1800   Site Assessment Purple 03/18/21 1800   Periwound Assessment Blanchable erythema 03/18/21 1800   Margins Undefined edges 03/18/21 1800   Closure Secondary intention 03/18/21 1800   Drainage Amount None 03/18/21 1800   Treatments Site care;Cleansed;Offloading 03/18/21 1800   Wound Cleansing Normal Saline Irrigation 03/18/21 1800   Periwound Protectant Skin Protectant Wipes to Periwound 03/18/21 1800   Dressing Options Mepilex 03/18/21 1800   Dressing Changed Changed 03/18/21 1800   Dressing Change/Treatment Frequency Every 48 hrs, and As Needed 03/18/21 1800   NEXT Dressing Change/Treatment Date 03/20/21 03/18/21 1800   NEXT Weekly Photo (Inpatient Only) 03/25/21 03/18/21 1800   Pressure Injury Stage DTPI 03/18/21 1800   Wound Length (cm) 0.4 cm 03/18/21 1800   Wound Width (cm) 0.4 cm 03/18/21 1800   Wound Surface Area (cm^2) 0.16 cm^2 03/18/21 1800   Exposed Structures None 03/18/21 1800   Number of days: 1     Left lateral thigh and back excoriated with dry small red papules    Vascular:    JUAN:   No results found.    Lab Values:    Lab Results   Component Value Date/Time    WBC 8.5 03/18/2021 01:25 AM    RBC 3.61 (L) 03/18/2021 01:25 AM    HEMOGLOBIN 8.6 (L) 03/18/2021 01:25 AM    HEMATOCRIT 27.7 (L) 03/18/2021 01:25 AM    HBA1C 11.8 (H) 03/17/2021 01:26 PM        Culture Results show:  No results found for this or any previous visit (from the past 720 hour(s)).    Pain Level/Medicated:         INTERVENTIONS BY WOUND TEAM:  Performed standard wound care which includes appropriate positioning, dressing removal and non-selective debridement. Pictures and measurements obtained weekly  if/when required.  Preparation for Dressing removal: Dressing soaked with na  Cleansed with:  saline and gauze.  Sharp debridement: none  Denisse wound: Cleansed with saline, Prepped with skin prep  Primary Dressing: sacral mepilex to R I.T.,  BRANDEE R upper inner thigh  Secondary (Outer) Dressing:     Interdisciplinary consultation: Patient, Bedside RN will place waffle overlay on bed - pt is now agreeable    EVALUATION / RATIONALE FOR TREATMENT:  Most Recent Date:  : Pt with small DTIs - areas now off loaded.       Goals: Steady decrease in wound area and depth weekly.    WOUND TEAM PLAN OF CARE ([X] for frequency of wound follow up,):   Nursing to follow orders written for wound care. Contact wound team if area fails to progress, deteriorates or with any questions/concerns  Dressing changes by wound team:                   Follow up 3 times weekly:                NPWT change 3 times weekly:     Follow up 1-2 times weekly:      Follow up Bi-Monthly:                   Follow up as needed:   X  Other (explain):     NURSING PLAN OF CARE ORDERS (X):  Dressing changes: See Dressing Care orders: X  Skin care: See Skin Care orders: X  RN Prevention Protocol:   Rectal tube care: See Rectal Tube Care orders:   Other orders:    RSKIN:   CURRENTLY IN PLACE (X), APPLIED THIS VISIT (A), ORDERED (O):   Q shift Chevy:  X  Q shift pressure point assessments:  X    Surface/Positioning   Pressure redistribution mattress            Low Airloss          Bariatric foam      Bariatric LEANN     Waffle cushion        Waffle Overlay   - RN will place ovelay on window sill - pt agreeable.       Reposition q 2 hours    X  TAPs Turning system     Z Eren Pillow     Offloading/Redistribution   Sacral Mepilex (Silicone dressing)   X  Heel Mepilex (Silicone dressing)         Heel float boots (Prevalon boot)             Float Heels off Bed with Pillows   O        Respiratory   Silicone O2 tubing         Gray Foam Ear protectors     Cannula fixation  Device (Tender )          High flow offloading Clip    Elastic head band offloading device      Anchorfast                                                         Trach with Optifoam split foam             Containment/Moisture Prevention     Rectal tube or BMS    Purwick/Condom Cath        Clement Catheter    Barrier wipes           Barrier paste       Antifungal tx      Interdry        Mobilization       Up to chair    X    Ambulate  X    PT/OT      Nutrition    Dietician        Diabetes Education      PO     TF     TPN     NPO   # days     Other        Anticipated discharge plans: pt will not need wound care upon discharge  LTACH:        SNF/Rehab:                  Home Health Care:           Outpatient Wound Center:            Self/Family Care:        Other:

## 2021-03-19 NOTE — THERAPY
Physical Therapy   Initial Evaluation     Patient Name: Hugh Núñez  Age:  75 y.o., Sex:  male  Medical Record #: [id number]  Today's Date: 3/19/2021     Precautions:  Fall Risk (impaired cognition/insight; this is likely baseline)    Assessment  Pt presents to PT with impaired endurance, balance and general locomotion associated with deconditioning and medical co-morbidities. He was able to demonstrate hallway ambulation with FWW with CGA/close SBA. He has 1 slight LOB initially with standing though does self correct with use of bed features. Pt is known to PT department and noted recs in past for eventual long term/[facility] placement given cognitive concerns and hx of inability to effectively care for self and readmissions. Recs remain as prior and pt will likely readmit without long term plan in place given baseline function and cognition. See below for details/recs with dc planning.     Plan    Recommend Physical Therapy 3 times per week until therapy goals are met for the following treatments:  Bed Mobility, Community Re-integration, Equipment, Gait Training, Manual Therapy, Neuro Re-Education / Balance, Self Care/Home Evaluation, Stair Training, Therapeutic Activities and Therapeutic Exercises    DC Equipment Recommendations:  Unable to determine at this time, Front-Wheel Walker  Discharge Recommendations: Would optimally recommend post-acute placement for additional physical therapy services to optimize functional strength and reduce caregiver burden PRIOR to discharge to /long term secure environment post SNF/rehab; (hx of readmits post SNF stay as pt essentially unable to care for self)        03/19/21 1038   Prior Living Situation   Housing / Facility Homeless   Equipment Owned Single Point Cane   Lives with - Patient's Self Care Capacity Alone and Unable to Care For Self   Comments pt reports he has been unable to care for self after dc'ing from SNF (noted this has happened multiple occasions with  readmit to hospital)   Prior Level of Functional Mobility   Bed Mobility Independent   Transfer Status Independent   Ambulation Independent   Distance Ambulation (Feet)   (limited community)   Assistive Devices Used Single Point Cane   Stairs Unable To Determine At This Time   Comments pt reports avoids stairs as able; reports he has fatigue with limited activity and difficulty caring for self   Cognition    Cognition / Consciousness X   Speech/ Communication Delayed Responses;Hard of Hearing   Level of Consciousness Alert   Safety Awareness Impaired   New Learning Impaired   Attention Impaired   Comments very pleasant and cooperative; however verbose, perseverative and tangential   Passive ROM Lower Body   Passive ROM Lower Body X   Comments decreased muscle length at hamstrings, ER knee extension , though this appears baseline   Active ROM Lower Body    Active ROM Lower Body  X   Comments as above, grossly WFL though limited 2' to muscle length   Strength Lower Body   Lower Body Strength  X   Comments grossly 4/5 at BLE though efforftul and noted dec LE endurance with ambulation/see gait   Sensation Lower Body   Lower Extremity Sensation   WDL   Balance Assessment   Sitting Balance (Static) Good   Sitting Balance (Dynamic) Good   Standing Balance (Static) Fair -   Standing Balance (Dynamic) Fair -   Weight Shift Sitting Good   Weight Shift Standing Fair   Comments upon initial standing pt has mild posterior LOB and uses EOB with posterior knees to prevent LOB; no other overt LOB noted and improves gait/balance with use of FWW; see gait; cueing for lines/tubes as pt generally has poor safety awareness in unfamliar environment (though this appears to be baseline cog/behavior)   Gait Analysis   Gait Level Of Assist   (CGA->progress to close SBA)   Assistive Device Front Wheel Walker   Distance (Feet) 100   # of Times Distance was Traveled 1   Deviation Other (Comment)  (reciprocal gait; see below)   # of Stairs  "Climbed 0   Weight Bearing Status no restrictions   Comments cueing and CGA for appropriate use of FWW and postural mechanics; noted increasing knee and hip flexion with inc distances and noted that pt reports LE feel \"tired\" during ambulation   Bed Mobility    Supine to Sit Modified Independent   Sit to Supine Modified Independent   Comments effortful though performs without physical assist   Functional Mobility   Sit to Stand   (CGA)   Bed, Chair, Wheelchair Transfer   (CGA/close SBA once stable with static standing)   Transfer Method Stand Step   Mobility with FWW   How much difficulty does the patient currently have...   Turning over in bed (including adjusting bedclothes, sheets and blankets)? 3   Sitting down on and standing up from a chair with arms (e.g., wheelchair, bedside commode, etc.) 2   Moving from lying on back to sitting on the side of the bed? 2   How much help from another person does the patient currently need...   Moving to and from a bed to a chair (including a wheelchair)? 3   Need to walk in a hospital room? 3   Climbing 3-5 steps with a railing? 3   6 clicks Mobility Score 16   Activity Tolerance   Sitting in Chair NT   Sitting Edge of Bed at least 4 mins   Standing ~5 mins    Comments generally dec LE endurance   Edema / Skin Assessment   Edema / Skin  Not Assessed   Short Term Goals    Short Term Goal # 1 Pt will be able to perform supine<>sit with HOB flat/no rails with SPv in 6tx to promote fnx progression towards I   Short Term Goal # 2 Pt will be able to perform sit<>stand/transfers with FWW with SPv in 6tx to promote fnx progression towards I    Short Term Goal # 3 Pt will be able to ambulate 250ft with FWW with Spv in 6tx to promote fnx progression towards I    Education Group   Education Provided Role of Physical Therapist;Use of Assistive Device;Gait Training   Role of Physical Therapist Patient Response Patient;Acceptance;Explanation;Verbal Demonstration   Gait Training Patient " Response Patient;Acceptance;Explanation;Demonstration;Verbal Demonstration;Action Demonstration;Reinforcement Needed   Use of Assistive Device Patient Response Patient;Acceptance;Explanation;Verbal Demonstration;Action Demonstration;Reinforcement Needed   Additional Comments unclear carryover with recs/education as noted pt has similar presentation to prior admit with re: use of FWW and mechanics; though note surprising given baseline cog issues

## 2021-03-20 LAB
ANION GAP SERPL CALC-SCNC: 7 MMOL/L (ref 7–16)
BACTERIA UR CULT: ABNORMAL
BACTERIA UR CULT: ABNORMAL
BUN SERPL-MCNC: 26 MG/DL (ref 8–22)
CALCIUM SERPL-MCNC: 8.4 MG/DL (ref 8.5–10.5)
CHLORIDE SERPL-SCNC: 107 MMOL/L (ref 96–112)
CO2 SERPL-SCNC: 26 MMOL/L (ref 20–33)
CREAT SERPL-MCNC: 1.63 MG/DL (ref 0.5–1.4)
ERYTHROCYTE [DISTWIDTH] IN BLOOD BY AUTOMATED COUNT: 45.6 FL (ref 35.9–50)
GLUCOSE BLD-MCNC: 137 MG/DL (ref 65–99)
GLUCOSE BLD-MCNC: 157 MG/DL (ref 65–99)
GLUCOSE BLD-MCNC: 188 MG/DL (ref 65–99)
GLUCOSE BLD-MCNC: 194 MG/DL (ref 65–99)
GLUCOSE BLD-MCNC: 88 MG/DL (ref 65–99)
GLUCOSE SERPL-MCNC: 80 MG/DL (ref 65–99)
HCT VFR BLD AUTO: 27 % (ref 42–52)
HGB BLD-MCNC: 8.3 G/DL (ref 14–18)
MAGNESIUM SERPL-MCNC: 1.7 MG/DL (ref 1.5–2.5)
MCH RBC QN AUTO: 24.1 PG (ref 27–33)
MCHC RBC AUTO-ENTMCNC: 30.7 G/DL (ref 33.7–35.3)
MCV RBC AUTO: 78.5 FL (ref 81.4–97.8)
PLATELET # BLD AUTO: 196 K/UL (ref 164–446)
PMV BLD AUTO: 9.7 FL (ref 9–12.9)
POTASSIUM SERPL-SCNC: 3.7 MMOL/L (ref 3.6–5.5)
RBC # BLD AUTO: 3.44 M/UL (ref 4.7–6.1)
SIGNIFICANT IND 70042: ABNORMAL
SITE SITE: ABNORMAL
SODIUM SERPL-SCNC: 140 MMOL/L (ref 135–145)
SOURCE SOURCE: ABNORMAL
WBC # BLD AUTO: 7.5 K/UL (ref 4.8–10.8)

## 2021-03-20 PROCEDURE — 83735 ASSAY OF MAGNESIUM: CPT

## 2021-03-20 PROCEDURE — A9270 NON-COVERED ITEM OR SERVICE: HCPCS | Performed by: STUDENT IN AN ORGANIZED HEALTH CARE EDUCATION/TRAINING PROGRAM

## 2021-03-20 PROCEDURE — 770006 HCHG ROOM/CARE - MED/SURG/GYN SEMI*

## 2021-03-20 PROCEDURE — 99232 SBSQ HOSP IP/OBS MODERATE 35: CPT | Performed by: INTERNAL MEDICINE

## 2021-03-20 PROCEDURE — 36415 COLL VENOUS BLD VENIPUNCTURE: CPT

## 2021-03-20 PROCEDURE — 85027 COMPLETE CBC AUTOMATED: CPT

## 2021-03-20 PROCEDURE — 700101 HCHG RX REV CODE 250: Performed by: INTERNAL MEDICINE

## 2021-03-20 PROCEDURE — 700102 HCHG RX REV CODE 250 W/ 637 OVERRIDE(OP): Performed by: STUDENT IN AN ORGANIZED HEALTH CARE EDUCATION/TRAINING PROGRAM

## 2021-03-20 PROCEDURE — 82962 GLUCOSE BLOOD TEST: CPT | Mod: 91

## 2021-03-20 PROCEDURE — 700111 HCHG RX REV CODE 636 W/ 250 OVERRIDE (IP): Performed by: STUDENT IN AN ORGANIZED HEALTH CARE EDUCATION/TRAINING PROGRAM

## 2021-03-20 PROCEDURE — 700105 HCHG RX REV CODE 258: Performed by: STUDENT IN AN ORGANIZED HEALTH CARE EDUCATION/TRAINING PROGRAM

## 2021-03-20 PROCEDURE — 80048 BASIC METABOLIC PNL TOTAL CA: CPT

## 2021-03-20 RX ORDER — POLYETHYLENE GLYCOL 3350 17 G/17G
1 POWDER, FOR SOLUTION ORAL DAILY
Status: DISCONTINUED | OUTPATIENT
Start: 2021-03-20 | End: 2021-03-21 | Stop reason: HOSPADM

## 2021-03-20 RX ADMIN — POLYETHYLENE GLYCOL 3350 1 PACKET: 17 POWDER, FOR SOLUTION ORAL at 14:05

## 2021-03-20 RX ADMIN — TAMSULOSIN HYDROCHLORIDE 0.4 MG: 0.4 CAPSULE ORAL at 07:26

## 2021-03-20 RX ADMIN — INSULIN HUMAN 3 UNITS: 100 INJECTION, SOLUTION PARENTERAL at 18:12

## 2021-03-20 RX ADMIN — SODIUM CHLORIDE: 9 INJECTION, SOLUTION INTRAVENOUS at 05:13

## 2021-03-20 RX ADMIN — ENOXAPARIN SODIUM 40 MG: 40 INJECTION SUBCUTANEOUS at 05:12

## 2021-03-20 RX ADMIN — INSULIN HUMAN 3 UNITS: 100 INJECTION, SOLUTION PARENTERAL at 21:03

## 2021-03-20 RX ADMIN — CEFTRIAXONE SODIUM 1 G: 1 INJECTION, POWDER, FOR SOLUTION INTRAMUSCULAR; INTRAVENOUS at 05:12

## 2021-03-20 RX ADMIN — INSULIN GLARGINE 15 UNITS: 100 INJECTION, SOLUTION SUBCUTANEOUS at 18:12

## 2021-03-20 ASSESSMENT — ENCOUNTER SYMPTOMS
FEVER: 0
ABDOMINAL PAIN: 0
FOCAL WEAKNESS: 0
ORTHOPNEA: 0
SPEECH CHANGE: 0
PHOTOPHOBIA: 0
BLURRED VISION: 0
EYE PAIN: 0
TINGLING: 0
WEIGHT LOSS: 0
SPUTUM PRODUCTION: 0
SENSORY CHANGE: 0
BACK PAIN: 0
HALLUCINATIONS: 0
VOMITING: 0
NECK PAIN: 0
DOUBLE VISION: 0
WEAKNESS: 1
CONSTIPATION: 1
HEADACHES: 0
PALPITATIONS: 0
NAUSEA: 0
COUGH: 0
DIARRHEA: 0
CHILLS: 0
DIZZINESS: 0
TREMORS: 0
MYALGIAS: 0
SHORTNESS OF BREATH: 0

## 2021-03-20 ASSESSMENT — LIFESTYLE VARIABLES: SUBSTANCE_ABUSE: 0

## 2021-03-20 NOTE — ASSESSMENT & PLAN NOTE
He reported constipation.  I started him on MiraLAX daily  Discussed plan of care with bedside RN.

## 2021-03-20 NOTE — DISCHARGE PLANNING
ALISA MALCOLM spoke with Wilda with [facility], will speak with facilities and give this RN CM a call back for acceptance status updates.    Addendum 1433- ALISA MALCOLM left  for Wilda with [facility], requesting return call for update

## 2021-03-20 NOTE — CARE PLAN
Problem: Venous Thromboembolism (VTW)/Deep Vein Thrombosis (DVT) Prevention:  Goal: Patient will participate in Venous Thrombosis (VTE)/Deep Vein Thrombosis (DVT)Prevention Measures  Outcome: PROGRESSING AS EXPECTED  Pt is receiving lovenox for DVT prophylaxis.      Problem: Urinary Elimination:  Goal: Ability to reestablish a normal urinary elimination pattern will improve  Outcome: PROGRESSING AS EXPECTED  Pt has a lazo in place for urinary retention.

## 2021-03-20 NOTE — PROGRESS NOTES
Bedside report received at change of shift pt is A&Ox4, reports no pain at this time. Pt is currently sleeping comfortably in bed. Safety precautions in place. All pt needs met at this time.

## 2021-03-20 NOTE — PROGRESS NOTES
[facility] Medicine Daily Progress Note    Date of Service  3/20/2021    Chief Complaint  75 y.o. male admitted 3/17/2021 with difficulty to urinate.    Hospital Course  75 y.o. male with PMHx of HLD, DM2, BPH and Homelessness who presented 3/17/2021 with Dysuria. Patient presented with difficulty initiating a stream of urine as well as incontinence over the last several months.  He states he has a known history of prostatic hypertrophy and had a Clement catheter in place for quite some time.  He states the catheter was removed about 2 years ago.  He states that over the last 6 months has been continent and having difficulty initiating his stream of urine.  He found to have a diagnosis of urinary tract infection and he was started on IV ceftriaxone.  He also found to have hyperglycemia and HbA1c came back 11.8.  He underwent straight catheterization in ER and 450 cc of urine was removed.    Interval Problem Update  I evaluated and examined him at the bedside.  He reported that he feels weak.  He reported constipation.  I started him on MiraLAX.  Urine culture showed Candida.  Blood culture remained negative to date.      Consultants/Specialty  None    Code Status  Full Code    Disposition  To be decided    Review of Systems  Review of Systems   Constitutional: Negative for chills, fever and weight loss.   HENT: Negative for hearing loss and tinnitus.    Eyes: Negative for blurred vision, double vision, photophobia and pain.   Respiratory: Negative for cough, sputum production and shortness of breath.    Cardiovascular: Negative for chest pain, palpitations, orthopnea and leg swelling.   Gastrointestinal: Positive for constipation. Negative for abdominal pain, diarrhea, nausea and vomiting.   Genitourinary: Positive for dysuria (Now resolved). Negative for frequency and urgency.        Urinary retention   Musculoskeletal: Negative for back pain, joint pain, myalgias and neck pain.   Skin: Negative for rash.    Neurological: Positive for weakness. Negative for dizziness, tingling, tremors, sensory change, speech change, focal weakness and headaches.   Psychiatric/Behavioral: Negative for hallucinations and substance abuse.   All other systems reviewed and are negative.       Physical Exam  Temp:  [36.4 °C (97.6 °F)-37.4 °C (99.4 °F)] 36.4 °C (97.6 °F)  Pulse:  [67-81] 70  Resp:  [16-18] 18  BP: (127-144)/(59-83) 130/59  SpO2:  [95 %-97 %] 96 %    Physical Exam  Vitals reviewed.   Constitutional:       General: He is not in acute distress.  HENT:      Head: Normocephalic and atraumatic. No contusion.      Right Ear: External ear normal.      Left Ear: External ear normal.      Nose: Nose normal.      Mouth/Throat:      Pharynx: No oropharyngeal exudate.   Eyes:      General:         Right eye: No discharge.         Left eye: No discharge.      Pupils: Pupils are equal, round, and reactive to light.   Cardiovascular:      Rate and Rhythm: Normal rate and regular rhythm.      Heart sounds: No murmur. No friction rub. No gallop.    Pulmonary:      Effort: Pulmonary effort is normal.      Breath sounds: No wheezing or rhonchi.   Abdominal:      General: Bowel sounds are normal. There is no distension.      Palpations: Abdomen is soft.      Tenderness: There is no abdominal tenderness. There is no rebound.   Genitourinary:     Comments: Clement's catheter in place.  Musculoskeletal:         General: No swelling or tenderness. Normal range of motion.      Cervical back: No rigidity. No muscular tenderness.   Skin:     General: Skin is warm and dry.      Coloration: Skin is not jaundiced.   Neurological:      General: No focal deficit present.      Mental Status: He is alert.      Cranial Nerves: No cranial nerve deficit.      Sensory: No sensory deficit.   Psychiatric:         Mood and Affect: Mood normal.     I performed physical exam on March 20, 2021 remain similar without any acute changes.    Fluids    Intake/Output Summary  (Last 24 hours) at 3/20/2021 1326  Last data filed at 3/20/2021 0900  Gross per 24 hour   Intake 480 ml   Output 3700 ml   Net -3220 ml       Laboratory  Recent Labs     03/18/21  0125 03/19/21  0122 03/20/21  0120   WBC 8.5 8.6 7.5   RBC 3.61* 3.73* 3.44*   HEMOGLOBIN 8.6* 9.0* 8.3*   HEMATOCRIT 27.7* 29.0* 27.0*   MCV 76.7* 77.7* 78.5*   MCH 23.8* 24.1* 24.1*   MCHC 31.0* 31.0* 30.7*   RDW 43.3 44.3 45.6   PLATELETCT 212 213 196   MPV 9.4 9.6 9.7     Recent Labs     03/18/21  0125 03/19/21  0122 03/20/21  0120   SODIUM 143 137 140   POTASSIUM 4.2 3.6 3.7   CHLORIDE 110 102 107   CO2 26 25 26   GLUCOSE 78 131* 80   BUN 24* 24* 26*   CREATININE 1.52* 1.53* 1.63*   CALCIUM 8.8 8.7 8.4*             Recent Labs     03/18/21  0125   TRIGLYCERIDE 49   HDL 57   LDL 48       Imaging  No orders to display        Assessment/Plan  * UTI (urinary tract infection)  Assessment & Plan  Patient presented with distended bladder and difficulty and pain with urination  UA-Moderate Leukocyte Esterase and Packed WBC's  Patient received one dose Rocephin in ED  He received ceftriaxone.  Urine culture showed Candida.  Blood culture negative to date.        Hyperglycemia- (present on admission)  Assessment & Plan  Give one stat dose of 10 Units lispro  Continue with Insulin Glargine 15U QHS  He found to have HbA1c of 11.8.  Requested consult for diabetic education.    Bladder obstruction- (present on admission)  Assessment & Plan  Patient straight cathed in ED and retaining 450cc's urine  He received Clement's catheter due to urinary retention.  Will continue tamsulosin    BPH (benign prostatic hypertrophy)- (present on admission)  Assessment & Plan  Continue with Tamusulosin 0.4mg QHS  Continue to monitor.    Type 2 diabetes mellitus with hyperglycemia, with long-term current use of insulin ([facility])  Assessment & Plan  He found to have poorly controlled diabetes.  HbA1c significantly elevated 11.8  Requested consult for diabetic  education.  Continue insulin glargine  Continue insulin sliding scale with hypoglycemia protocol.    Constipation- (present on admission)  Assessment & Plan  He reported constipation.  I started him on MiraLAX daily  Discussed plan of care with bedside RN.    Microcytic anemia- (present on admission)  Assessment & Plan  He found to have microcytic anemia.  I ordered iron panel and he found no significant decrease in iron percentage.  Iron replacement per pharmacy.  Continue to monitor.     I discussed plan of care with bedside RN.    I discussed plan of care during multidisciplinary rounds.    VTE prophylaxis: Lovenox

## 2021-03-21 VITALS
OXYGEN SATURATION: 96 % | DIASTOLIC BLOOD PRESSURE: 66 MMHG | RESPIRATION RATE: 16 BRPM | BODY MASS INDEX: 17.83 KG/M2 | TEMPERATURE: 97.9 F | HEIGHT: 70 IN | HEART RATE: 72 BPM | SYSTOLIC BLOOD PRESSURE: 117 MMHG | WEIGHT: 124.56 LBS

## 2021-03-21 PROBLEM — N39.0 UTI (URINARY TRACT INFECTION): Status: RESOLVED | Noted: 2021-03-17 | Resolved: 2021-03-21

## 2021-03-21 LAB
ANION GAP SERPL CALC-SCNC: 7 MMOL/L (ref 7–16)
BUN SERPL-MCNC: 24 MG/DL (ref 8–22)
CALCIUM SERPL-MCNC: 8.9 MG/DL (ref 8.5–10.5)
CHLORIDE SERPL-SCNC: 107 MMOL/L (ref 96–112)
CO2 SERPL-SCNC: 25 MMOL/L (ref 20–33)
CREAT SERPL-MCNC: 1.04 MG/DL (ref 0.5–1.4)
ERYTHROCYTE [DISTWIDTH] IN BLOOD BY AUTOMATED COUNT: 44.5 FL (ref 35.9–50)
GLUCOSE BLD-MCNC: 164 MG/DL (ref 65–99)
GLUCOSE BLD-MCNC: 92 MG/DL (ref 65–99)
GLUCOSE SERPL-MCNC: 99 MG/DL (ref 65–99)
HCT VFR BLD AUTO: 30 % (ref 42–52)
HGB BLD-MCNC: 9.1 G/DL (ref 14–18)
MCH RBC QN AUTO: 23.5 PG (ref 27–33)
MCHC RBC AUTO-ENTMCNC: 30.3 G/DL (ref 33.7–35.3)
MCV RBC AUTO: 77.5 FL (ref 81.4–97.8)
PLATELET # BLD AUTO: 210 K/UL (ref 164–446)
PMV BLD AUTO: 9.7 FL (ref 9–12.9)
POTASSIUM SERPL-SCNC: 4 MMOL/L (ref 3.6–5.5)
RBC # BLD AUTO: 3.87 M/UL (ref 4.7–6.1)
SODIUM SERPL-SCNC: 139 MMOL/L (ref 135–145)
WBC # BLD AUTO: 9.6 K/UL (ref 4.8–10.8)

## 2021-03-21 PROCEDURE — 99239 HOSP IP/OBS DSCHRG MGMT >30: CPT | Performed by: INTERNAL MEDICINE

## 2021-03-21 PROCEDURE — 700111 HCHG RX REV CODE 636 W/ 250 OVERRIDE (IP): Performed by: STUDENT IN AN ORGANIZED HEALTH CARE EDUCATION/TRAINING PROGRAM

## 2021-03-21 PROCEDURE — 82962 GLUCOSE BLOOD TEST: CPT | Mod: 91

## 2021-03-21 PROCEDURE — 85027 COMPLETE CBC AUTOMATED: CPT

## 2021-03-21 PROCEDURE — 80048 BASIC METABOLIC PNL TOTAL CA: CPT

## 2021-03-21 PROCEDURE — 700101 HCHG RX REV CODE 250: Performed by: INTERNAL MEDICINE

## 2021-03-21 PROCEDURE — 36415 COLL VENOUS BLD VENIPUNCTURE: CPT

## 2021-03-21 PROCEDURE — 700102 HCHG RX REV CODE 250 W/ 637 OVERRIDE(OP): Performed by: STUDENT IN AN ORGANIZED HEALTH CARE EDUCATION/TRAINING PROGRAM

## 2021-03-21 PROCEDURE — A9270 NON-COVERED ITEM OR SERVICE: HCPCS | Performed by: STUDENT IN AN ORGANIZED HEALTH CARE EDUCATION/TRAINING PROGRAM

## 2021-03-21 RX ADMIN — POLYETHYLENE GLYCOL 3350 1 PACKET: 17 POWDER, FOR SOLUTION ORAL at 04:48

## 2021-03-21 RX ADMIN — TAMSULOSIN HYDROCHLORIDE 0.4 MG: 0.4 CAPSULE ORAL at 08:22

## 2021-03-21 RX ADMIN — ENOXAPARIN SODIUM 40 MG: 40 INJECTION SUBCUTANEOUS at 04:48

## 2021-03-21 RX ADMIN — INSULIN HUMAN 3 UNITS: 100 INJECTION, SOLUTION PARENTERAL at 12:21

## 2021-03-21 NOTE — DISCHARGE PLANNING
Agency/Facility Name: [facility]  Spoke To: Primo  Outcome: Declined.  [facility] feels patient needs LTC and patient does not have a payer source for LTC.

## 2021-03-21 NOTE — PROGRESS NOTES
1325 Called [facility] to give report, no answer    1400 Attempted to call [facility], still no answer    1450 Attempted to call [facility], still no answer

## 2021-03-21 NOTE — DISCHARGE PLANNING
Agency/Facility Name: [facility]  Spoke To: Rosemary  Outcome: Has a bed available today.    TREVON Boo notified.

## 2021-03-21 NOTE — DISCHARGE INSTRUCTIONS
Discharge Instructions    Discharged to other by medical transportation with escort. Discharged via wheelchair, hospital escort: Yes.  Special equipment needed: Not Applicable    Be sure to schedule a follow-up appointment with your primary care doctor or any specialists as instructed.     Discharge Plan:   Influenza Vaccine Indication: Indicated: 65 years and older    I understand that a diet low in cholesterol, fat, and sodium is recommended for good health. Unless I have been given specific instructions below for another diet, I accept this instruction as my diet prescription.   Other diet: n/a    Special Instructions: None    · Is patient discharged on Warfarin / Coumadin?   No     Depression / Suicide Risk    As you are discharged from this [facility] facility, it is important to learn how to keep safe from harming yourself.    Recognize the warning signs:  · Abrupt changes in personality, positive or negative- including increase in energy   · Giving away possessions  · Change in eating patterns- significant weight changes-  positive or negative  · Change in sleeping patterns- unable to sleep or sleeping all the time   · Unwillingness or inability to communicate  · Depression  · Unusual sadness, discouragement and loneliness  · Talk of wanting to die  · Neglect of personal appearance   · Rebelliousness- reckless behavior  · Withdrawal from people/activities they love  · Confusion- inability to concentrate     If you or a loved one observes any of these behaviors or has concerns about self-harm, here's what you can do:  · Talk about it- your feelings and reasons for harming yourself  · Remove any means that you might use to hurt yourself (examples: pills, rope, extension cords, firearm)  · Get professional help from the community (Mental Health, Substance Abuse, psychological counseling)  · Do not be alone:Call your Safe Contact- someone whom you trust who will be there for you.  · Call your local CRISIS  HOTLINE 491-0581 or 632-672-1915  · Call your local Children's Mobile Crisis Response Team [place] (386) 856-5744 or www.Oxonica  · Call the toll free National Suicide Prevention Hotlines   · National Suicide Prevention Lifeline 190-959-NFKD (7548)  · [facility] Line Network 800-SUICIDE (528-5290)    Indwelling Urinary Catheter Care  You have been given a flexible tube (catheter) used to drain the bladder. Catheters are often used when a person has difficulty urinating due to blockage, bleeding, infection, or inability to control bladder or bowel movements (incontinence). A catheter requires daily care to prevent infection and blockage.  HOME CARE INSTRUCTIONS   Do the following to reduce the risk of infection. Antibiotic medicines cannot prevent infections.  Limit the number of bacteria entering your bladder  · Wash your hands for 2 minutes with soapy water before and after handling the catheter.  · Wash your bottom and the entire catheter twice daily, as well as after each bowel movement. Wash the tip of the penis or just above the vaginal opening with soap and warm water, rinse, and then wash the rectal area. Always wash from front to back.  · When changing from the leg bag to overnight bag or from the overnight bag to leg bag, thoroughly clean the end of the catheter where it connects to the tubing with an alcohol wipe.  · Clean the leg bag and overnight bag daily after use. Replace your drainage bags weekly.  · Always keep the tubing and bag below the level of your bladder. This allows your urine to drain properly. Lifting the bag or tubing above the level of your bladder will cause dirty urine to flow back into your bladder. If you must briefly lift the bag higher than your bladder, pinch the catheter or tubing to prevent backflow.  · Drink enough water and fluids to keep your urine clear or pale yellow, or as directed by your caregiver. This will flush bacteria out of the bladder.  Protect  tissues from injury  · Attach the catheter to your leg so there is no tension on the catheter. Use adhesive tape or a leg strap. If you are using adhesive tape, remove any sticky residue left behind by the previous tape you used.  · Place your leg bag on your lower leg. Fasten the straps securely and comfortably.  · Do not remove the catheter yourself unless you have been instructed how to do so.  Keep the urinary pathway open  · Check throughout the day to be sure your catheter is working and urine is draining freely. Make sure the tubing does not become kinked.  · Do not let the drainage bag overfill.  SEEK IMMEDIATE MEDICAL CARE IF:   · The catheter becomes blocked. Urine is not draining.  · Urine is leaking.  · You have any pain.  · You have a fever.  Document Released: 12/18/2006 Document Revised: 12/04/2013 Document Reviewed: 05/19/2011  ExitCare® Patient Information ©2014 [employer].

## 2021-03-21 NOTE — PROGRESS NOTES
Received bedside report and accepted care of patient.     Pt is currently A/Ox4, room air with no visible or stated sign of distress, discomfort, or SOB. Pt currently has one PIV, saline locked at this time. Pt is currently on a diabetic diet, tolerating diet well. Pt currently has a lazo for urinary retention, based on MD, pt is leaving with the lazo upon discharge.    Patient currently resting in bed in no visible or stated signs of distress. Bed alarm in place, controls on and bed in locked and lowest position. Call light and personal possessions within reach. Patient educated about use of call light and verbalized understanding.

## 2021-03-21 NOTE — DISCHARGE SUMMARY
Discharge Summary    CHIEF COMPLAINT ON ADMISSION  Chief Complaint   Patient presents with   • Enuresis     Pt states he has had incontinence x6 months but it has gotten worse over the last 2 months       Reason for Admission  ems     Admission Date  3/17/2021    CODE STATUS  Full Code    HPI & HOSPITAL COURSE    75 y.o. male with PMHx of HLD, DM2, BPH and Homelessness who presented 3/17/2021 with Dysuria. Patient presented with difficulty initiating a stream of urine as well as incontinence over the last several months.  He states he has a known history of prostatic hypertrophy and had a Clement catheter in place for quite some time.  He states the catheter was removed about 2 years ago.  He states that over the last 6 months has been continent and having difficulty initiating his stream of urine.  He found to have a diagnosis of urinary tract infection and he was started on IV ceftriaxone.  He also found to have hyperglycemia and HbA1c came back 11.8.  He underwent straight catheterization in ER and 450 cc of urine was removed.  He received Clement catheter placement due to urinary obstruction.  He was started on IV ceftriaxone due to concern for urinary tract infection.  His urine culture showed Candida.  Physical therapy and Occupational Therapy evaluated him and recommended acute placement.    Due to his bladder obstruction he received Clement's catheter and he will discharge to skilled nursing facility with this catheter and he would need Clement's removal trial.  He has been receiving tamsulosin and I resume and finasteride.  He also reported constipation 1 day prior to his discharge and he received MiraLAX and he had a bowel movement.    He has been receiving insulin glargine as well as insulin sliding scale with hypoglycemia protocol.      He also found to have microcytic anemia and he received iron dextran.    Today I evaluated and examined him at the bedside he denies any acute complaints and he feels ready to be  discharged.  He is going to be discharged to [facility] well.    He will need Clement's catheter removal trial as well as further adjustment of his insulin due to significantly elevated HbA1c.      Therefore, he is discharged in fair and stable condition to skilled nursing facility.    The patient met 2-midnight criteria for an inpatient stay at the time of discharge.    Discharge Date  03/21/21      FOLLOW UP ITEMS POST DISCHARGE  Primary care provider    DISCHARGE DIAGNOSES  Principal Problem (Resolved):    UTI (urinary tract infection) POA: Unknown  Active Problems:    Microcytic anemia POA: Yes    Type 2 diabetes mellitus with hyperglycemia, with long-term current use of insulin ([facility]) POA: Unknown    BPH (benign prostatic hypertrophy) POA: Yes    Bladder obstruction POA: Yes      Overview: IMO load March 2020  Resolved Problems:    Hyperglycemia POA: Yes    Constipation POA: Yes      FOLLOW UP    No follow-up provider specified.    MEDICATIONS ON DISCHARGE     Medication List      CONTINUE taking these medications      Instructions   cyanocobalamin 1000 MCG Tabs  Commonly known as: VITAMIN B12   Take 1 Tab by mouth every day.  Dose: 1,000 mcg     finasteride 5 MG Tabs  Commonly known as: PROSCAR   Take 1 Tab by mouth every day.  Dose: 5 mg     insulin glargine 100 UNIT/ML Soln  Commonly known as: Lantus   Inject 15 Units under the skin every morning.  Dose: 15 Units     insulin regular 100 Unit/mL Soln  Commonly known as: HumuLIN R   Inject 2-12 Units under the skin 4 Times a Day,Before Meals and at Bedtime.  Dose: 2-12 Units     polyethylene glycol/lytes 17 g Pack  Commonly known as: MIRALAX   Take 1 Packet by mouth 1 time a day as needed (if sennosides and docusate ineffective after 24 hours).  Dose: 17 g     tamsulosin 0.4 MG capsule  Commonly known as: FLOMAX   Take 1 Cap by mouth ONE-HALF HOUR AFTER BREAKFAST.  Dose: 0.4 mg     vitamin D 1000 UNIT Tabs  Commonly known as: VITAMIND D3   Take 1 Tab by mouth every  day.  Dose: 1,000 Units        STOP taking these medications    magnesium hydroxide 400 MG/5ML Susp  Commonly known as: MILK OF MAGNESIA     metformin 1000 MG tablet  Commonly known as: GLUCOPHAGE     senna-docusate 8.6-50 MG Tabs  Commonly known as: PERICOLACE or SENOKOT S            Allergies  No Known Allergies    DIET  Orders Placed This Encounter   Procedures   • Diet Order Diet: Consistent CHO (Diabetic)     Standing Status:   Standing     Number of Occurrences:   1     Order Specific Question:   Diet:     Answer:   Consistent CHO (Diabetic) [4]       ACTIVITY  As tolerated.  Weight bearing as tolerated    CONSULTATIONS  None    PROCEDURES  None    LABORATORY  Lab Results   Component Value Date    SODIUM 139 03/21/2021    POTASSIUM 4.0 03/21/2021    CHLORIDE 107 03/21/2021    CO2 25 03/21/2021    GLUCOSE 99 03/21/2021    BUN 24 (H) 03/21/2021    CREATININE 1.04 03/21/2021        Lab Results   Component Value Date    WBC 9.6 03/21/2021    HEMOGLOBIN 9.1 (L) 03/21/2021    HEMATOCRIT 30.0 (L) 03/21/2021    PLATELETCT 210 03/21/2021      No orders to display         Total time of the discharge process exceeds 34 minutes.

## 2021-03-21 NOTE — PROGRESS NOTES
Pt dc'd to [facility]. IV removed. Pt left unit via wheelchair with medical transport. Personal belongings with pt when leaving unit. Pt given discharge instructions prior to leaving unit including where to  prescriptions and when to follow-up; verbalizes understanding. Copy of discharge instructions with pt and in the chart.

## 2021-04-25 ENCOUNTER — HOSPITAL ENCOUNTER (EMERGENCY)
Facility: MEDICAL CENTER | Age: 76
End: 2021-04-25
Attending: EMERGENCY MEDICINE
Payer: MEDICARE

## 2021-04-25 VITALS
BODY MASS INDEX: 18.02 KG/M2 | DIASTOLIC BLOOD PRESSURE: 68 MMHG | HEART RATE: 75 BPM | WEIGHT: 125.88 LBS | TEMPERATURE: 98.2 F | RESPIRATION RATE: 15 BRPM | OXYGEN SATURATION: 98 % | HEIGHT: 70 IN | SYSTOLIC BLOOD PRESSURE: 145 MMHG

## 2021-04-25 DIAGNOSIS — T83.9XXA PROBLEM WITH FOLEY CATHETER, INITIAL ENCOUNTER (HCC): ICD-10-CM

## 2021-04-25 LAB
APPEARANCE UR: ABNORMAL
BACTERIA #/AREA URNS HPF: ABNORMAL /HPF
BILIRUB UR QL STRIP.AUTO: NEGATIVE
COLOR UR: YELLOW
EPI CELLS #/AREA URNS HPF: ABNORMAL /HPF
GLUCOSE UR STRIP.AUTO-MCNC: >=1000 MG/DL
KETONES UR STRIP.AUTO-MCNC: NEGATIVE MG/DL
LEUKOCYTE ESTERASE UR QL STRIP.AUTO: ABNORMAL
MICRO URNS: ABNORMAL
NITRITE UR QL STRIP.AUTO: NEGATIVE
PH UR STRIP.AUTO: 6 [PH] (ref 5–8)
PROT UR QL STRIP: 30 MG/DL
RBC # URNS HPF: ABNORMAL /HPF
RBC UR QL AUTO: ABNORMAL
SP GR UR STRIP.AUTO: 1.02
UROBILINOGEN UR STRIP.AUTO-MCNC: 0.2 MG/DL
WBC #/AREA URNS HPF: ABNORMAL /HPF

## 2021-04-25 PROCEDURE — 99283 EMERGENCY DEPT VISIT LOW MDM: CPT

## 2021-04-25 PROCEDURE — 87077 CULTURE AEROBIC IDENTIFY: CPT | Mod: 91

## 2021-04-25 PROCEDURE — 87086 URINE CULTURE/COLONY COUNT: CPT

## 2021-04-25 PROCEDURE — A9270 NON-COVERED ITEM OR SERVICE: HCPCS | Performed by: EMERGENCY MEDICINE

## 2021-04-25 PROCEDURE — 87186 SC STD MICRODIL/AGAR DIL: CPT

## 2021-04-25 PROCEDURE — 51702 INSERT TEMP BLADDER CATH: CPT

## 2021-04-25 PROCEDURE — 303105 HCHG CATHETER EXTRA

## 2021-04-25 PROCEDURE — 700102 HCHG RX REV CODE 250 W/ 637 OVERRIDE(OP): Performed by: EMERGENCY MEDICINE

## 2021-04-25 PROCEDURE — 81001 URINALYSIS AUTO W/SCOPE: CPT

## 2021-04-25 RX ORDER — CEFDINIR 300 MG/1
300 CAPSULE ORAL ONCE
Status: COMPLETED | OUTPATIENT
Start: 2021-04-25 | End: 2021-04-25

## 2021-04-25 RX ORDER — SULFAMETHOXAZOLE AND TRIMETHOPRIM 800; 160 MG/1; MG/1
1 TABLET ORAL ONCE
Status: DISCONTINUED | OUTPATIENT
Start: 2021-04-25 | End: 2021-04-25

## 2021-04-25 RX ORDER — CEFDINIR 300 MG/1
300 CAPSULE ORAL 2 TIMES DAILY
Qty: 10 CAPSULE | Refills: 0 | Status: SHIPPED | OUTPATIENT
Start: 2021-04-25 | End: 2021-05-09 | Stop reason: SDUPTHER

## 2021-04-25 RX ORDER — SULFAMETHOXAZOLE AND TRIMETHOPRIM 800; 160 MG/1; MG/1
1 TABLET ORAL 2 TIMES DAILY
Qty: 10 QUANTITY SUFFICIENT | Refills: 0 | Status: ON HOLD | OUTPATIENT
Start: 2021-04-25 | End: 2021-07-14

## 2021-04-25 RX ADMIN — CEFDINIR 300 MG: 300 CAPSULE ORAL at 17:39

## 2021-04-25 ASSESSMENT — FIBROSIS 4 INDEX: FIB4 SCORE: 2.06

## 2021-04-25 NOTE — LETTER
4/27/2021               Hugh Jose Núñez  [address] [address]        Dear Hugh (MR#[id number])    As we have been unable to contact you by phone, this letter is sent in regards to your recent visit to the [facility] Emergency Department on 4/25/2021.  During the visit, tests were performed to assist the physician in a medical diagnosis.  A review of those tests requires that we notify you of the following:    Your urine culture and sensitivity was POSITIVE for a bacteria called Enterococcus faecalis, and further treatment may be necessary. The currently prescribed antibiotic (sulfamethoxazole-trimethoprim & cefdinir) may not be effective in treating your infection. IF YOU ARE NOT FEELING BETTER PLEASE CONTACT ME AS SOON AS POSSIBLE AT THE NUMBER BELOW.       Thank you for your cooperation in the matter.    Sincerely,  ED Culture Follow-Up Staff  Sai Avila, PharmD    [facility], Emergency Department  [address] [place] [id number]  [phone] (ED Culture Line)  [phone]

## 2021-04-25 NOTE — ED TRIAGE NOTES
"Hugh Núñez 75 y.o. male ambulatory to triage with FWW with slow steady gait for     Chief Complaint   Patient presents with   • Urinary Catheter Problem     pt reports his catheter quit draining this morning.  Pt reports urinary leakeage around penis tip.  Pt reports low abdominal pain.     Pt reports [facility].  Pt's catheter has whitish cloudy output in the tubing and mark urine the bag at approx 400cc, which he states he had upon waking early this morning.  Pt in NAD.  Denies fever.   /71   Pulse 74   Temp 36.4 °C (97.5 °F) (Temporal)   Resp 20   Ht 1.778 m (5' 10\")   Wt 57.1 kg (125 lb 14.1 oz)   SpO2 95%   BMI 18.06 kg/m²   Pt returned to the lobby to await bed assignment.  Advised to return to the triage desk for any changes/concerns.  "

## 2021-04-25 NOTE — ED PROVIDER NOTES
ED Provider Note    Scribed for Dr. Kulwinder Beth M.D. by Laura Briones. 2021  4:00 PM    Primary care provider: Pcp Pt States None  Means of arrival: Walk in   History obtained from: Patient   History limited by: None     CHIEF COMPLAINT  Chief Complaint   Patient presents with    Urinary Catheter Problem     pt reports his catheter quit draining this morning.  Pt reports urinary leakeage around penis tip.  Pt reports low abdominal pain.       HPI  Hugh Núñez is a 75 y.o. male who presents to the Emergency Department for a urinary catheter problem onset this morning. The patient states that his catheter stopped draining this morning and he noticed urinary leakage around the tip of his penis. He says that he had this catheter placed about 1 month ago. Patient reports associated lower abdominal pain.     REVIEW OF SYSTEMS  Pertinent positives include urinary catheter problem and lower abdominal pain. Pertinent negatives include no fever nausea vomiting as above, all other systems reviewed and are negative.   See HPI for further details.     PAST MEDICAL HISTORY   has a past medical history of Fall, Polio (Ag of 5), and Type II or unspecified type diabetes mellitus without mention of complication, not stated as uncontrolled.    SURGICAL HISTORY   has a past surgical history that includes appendectomy and tonsillectomy.    SOCIAL HISTORY  Social History     Tobacco Use    Smoking status: Former Smoker     Years: 5.00     Types: Cigarettes     Start date: 1955     Quit date: 1960     Years since quittin.6    Smokeless tobacco: Never Used   Substance Use Topics    Alcohol use: Not Currently     Alcohol/week: 0.6 oz     Types: 1 Cans of beer per week    Drug use: No      Social History     Substance and Sexual Activity   Drug Use No       FAMILY HISTORY  No family history noted.    CURRENT MEDICATIONS  Current Outpatient Medications   Medication Instructions    cyanocobalamin (VITAMIN  "B12) 1,000 mcg, Oral, DAILY    finasteride (PROSCAR) 5 mg, Oral, DAILY    insulin glargine (LANTUS) 15 Units, Subcutaneous, EVERY MORNING    insulin regular (HUMULIN R) 2-12 Units, Subcutaneous, 4 TIMES DAILY - BEFORE MEALS , NIGHTLY    polyethylene glycol/lytes (MIRALAX) 17 g Pack 1 Packet, Oral, 1 TIME DAILY PRN    tamsulosin (FLOMAX) 0.4 mg, Oral, AFTER BREAKFAST    vitamin D (VITAMIND D3) 1,000 Units, Oral, DAILY       ALLERGIES  No Known Allergies    PHYSICAL EXAM  VITAL SIGNS: /71   Pulse 74   Temp 36.4 °C (97.5 °F) (Temporal)   Resp 20   Ht 1.778 m (5' 10\")   Wt 57.1 kg (125 lb 14.1 oz)   SpO2 95%   BMI 18.06 kg/m²     Constitutional: Well developed, Well nourished, Non-toxic appearance.   HENT: Normocephalic, Atraumatic, Bilateral external ears normal, Oropharynx moist, No oral exudates.   Eyes: PERRLA, EOMI, Conjunctiva normal, No discharge.   Neck: No tenderness, Supple, No stridor.   Lymphatic: No lymphadenopathy noted.   Cardiovascular: Normal heart rate, Normal rhythm.   Thorax & Lungs: Clear to auscultation bilaterally, No respiratory distress, No wheezing, No crackles.   Abdomen: Soft, No tenderness, No masses, No pulsatile masses.   : Urinary catheter with no obvious problem except that it is not draining   Skin: Warm, Dry, No erythema, No rash.   Extremities:, No edema No cyanosis.   Musculoskeletal: No tenderness to palpation or major deformities noted.  Intact distal pulses  Neurologic: Awake, alert. Moves all extremities spontaneously.  Psychiatric: Affect normal, Judgment normal, Mood normal.     LABS  Results for orders placed or performed during the hospital encounter of 03/17/21   URINALYSIS    Specimen: Urine, Clement Cath   Result Value Ref Range    Color Yellow     Character Clear     Specific Gravity 1.015 <1.035    Ph 6.0 5.0 - 8.0    Glucose >=1000 (A) Negative mg/dL    Ketones Negative Negative mg/dL    Protein 30 (A) Negative mg/dL    Bilirubin Negative Negative    " Urobilinogen, Urine 0.2 Negative    Nitrite Negative Negative    Leukocyte Esterase Moderate (A) Negative    Occult Blood Small (A) Negative    Micro Urine Req Microscopic    CBC WITH DIFFERENTIAL   Result Value Ref Range    WBC 7.2 4.8 - 10.8 K/uL    RBC 4.20 (L) 4.70 - 6.10 M/uL    Hemoglobin 10.0 (L) 14.0 - 18.0 g/dL    Hematocrit 32.2 (L) 42.0 - 52.0 %    MCV 76.7 (L) 81.4 - 97.8 fL    MCH 23.8 (L) 27.0 - 33.0 pg    MCHC 31.1 (L) 33.7 - 35.3 g/dL    RDW 43.6 35.9 - 50.0 fL    Platelet Count 229 164 - 446 K/uL    MPV 9.8 9.0 - 12.9 fL    Neutrophils-Polys 69.40 44.00 - 72.00 %    Lymphocytes 17.00 (L) 22.00 - 41.00 %    Monocytes 9.80 0.00 - 13.40 %    Eosinophils 2.60 0.00 - 6.90 %    Basophils 0.60 0.00 - 1.80 %    Immature Granulocytes 0.60 0.00 - 0.90 %    Nucleated RBC 0.00 /100 WBC    Neutrophils (Absolute) 5.03 1.82 - 7.42 K/uL    Lymphs (Absolute) 1.23 1.00 - 4.80 K/uL    Monos (Absolute) 0.71 0.00 - 0.85 K/uL    Eos (Absolute) 0.19 0.00 - 0.51 K/uL    Baso (Absolute) 0.04 0.00 - 0.12 K/uL    Immature Granulocytes (abs) 0.04 0.00 - 0.11 K/uL    NRBC (Absolute) 0.00 K/uL   COMP METABOLIC PANEL   Result Value Ref Range    Sodium 135 135 - 145 mmol/L    Potassium 4.3 3.6 - 5.5 mmol/L    Chloride 100 96 - 112 mmol/L    Co2 24 20 - 33 mmol/L    Anion Gap 11.0 7.0 - 16.0    Glucose 425 (H) 65 - 99 mg/dL    Bun 24 (H) 8 - 22 mg/dL    Creatinine 1.37 0.50 - 1.40 mg/dL    Calcium 9.0 8.5 - 10.5 mg/dL    AST(SGOT) 15 12 - 45 U/L    ALT(SGPT) 18 2 - 50 U/L    Alkaline Phosphatase 204 (H) 30 - 99 U/L    Total Bilirubin 0.5 0.1 - 1.5 mg/dL    Albumin 3.5 3.2 - 4.9 g/dL    Total Protein 7.6 6.0 - 8.2 g/dL    Globulin 4.1 (H) 1.9 - 3.5 g/dL    A-G Ratio 0.9 g/dL   URINE MICROSCOPIC (W/UA)   Result Value Ref Range    WBC Packed (A) /hpf    RBC 2-5 (A) /hpf    Bacteria Negative None /hpf    Epithelial Cells Rare /hpf   ESTIMATED GFR   Result Value Ref Range    GFR If African American >60 >60 mL/min/1.73 m 2    GFR If Non   50 (A) >60 mL/min/1.73 m 2   SARS-CoV-2 PCR (24 hour In-House): Collect NP swab in [facility]    Specimen: Nasopharyngeal; Respirate   Result Value Ref Range    SARS-CoV-2 Source NP Swab     SARS-CoV-2 by PCR NotDetected    HEMOGLOBIN A1C   Result Value Ref Range    Glycohemoglobin 11.8 (H) 4.0 - 5.6 %    Est Avg Glucose 292 mg/dL   CBC WITHOUT DIFFERENTIAL   Result Value Ref Range    WBC 8.5 4.8 - 10.8 K/uL    RBC 3.61 (L) 4.70 - 6.10 M/uL    Hemoglobin 8.6 (L) 14.0 - 18.0 g/dL    Hematocrit 27.7 (L) 42.0 - 52.0 %    MCV 76.7 (L) 81.4 - 97.8 fL    MCH 23.8 (L) 27.0 - 33.0 pg    MCHC 31.0 (L) 33.7 - 35.3 g/dL    RDW 43.3 35.9 - 50.0 fL    Platelet Count 212 164 - 446 K/uL    MPV 9.4 9.0 - 12.9 fL   Comp Metabolic Panel   Result Value Ref Range    Sodium 143 135 - 145 mmol/L    Potassium 4.2 3.6 - 5.5 mmol/L    Chloride 110 96 - 112 mmol/L    Co2 26 20 - 33 mmol/L    Anion Gap 7.0 7.0 - 16.0    Glucose 78 65 - 99 mg/dL    Bun 24 (H) 8 - 22 mg/dL    Creatinine 1.52 (H) 0.50 - 1.40 mg/dL    Calcium 8.8 8.5 - 10.5 mg/dL    AST(SGOT) 13 12 - 45 U/L    ALT(SGPT) 16 2 - 50 U/L    Alkaline Phosphatase 160 (H) 30 - 99 U/L    Total Bilirubin 0.2 0.1 - 1.5 mg/dL    Albumin 2.8 (L) 3.2 - 4.9 g/dL    Total Protein 6.2 6.0 - 8.2 g/dL    Globulin 3.4 1.9 - 3.5 g/dL    A-G Ratio 0.8 g/dL   Lipid Profile   Result Value Ref Range    Cholesterol,Tot 115 100 - 199 mg/dL    Triglycerides 49 0 - 149 mg/dL    HDL 57 >=40 mg/dL    LDL 48 <100 mg/dL   ESTIMATED GFR   Result Value Ref Range    GFR If  54 (A) >60 mL/min/1.73 m 2    GFR If Non African American 45 (A) >60 mL/min/1.73 m 2   URINE CULTURE-EXISTING-LESS THAN 48 HOURS    Specimen: Urine, Straight Cath   Result Value Ref Range    Significant Indicator POS (POS)     Source UR     Site URINE, STRAIGHT CATH     Culture Result (A)      Growth noted after further incubation, see below for  organism identification.      Culture Result Candida albicans  10-50,000  cfu/mL   (A)    CBC WITHOUT DIFFERENTIAL   Result Value Ref Range    WBC 8.6 4.8 - 10.8 K/uL    RBC 3.73 (L) 4.70 - 6.10 M/uL    Hemoglobin 9.0 (L) 14.0 - 18.0 g/dL    Hematocrit 29.0 (L) 42.0 - 52.0 %    MCV 77.7 (L) 81.4 - 97.8 fL    MCH 24.1 (L) 27.0 - 33.0 pg    MCHC 31.0 (L) 33.7 - 35.3 g/dL    RDW 44.3 35.9 - 50.0 fL    Platelet Count 213 164 - 446 K/uL    MPV 9.6 9.0 - 12.9 fL   RETICULOCYTES COUNT   Result Value Ref Range    Reticulocyte Count 1.1 0.8 - 2.1 %    Retic, Absolute 0.04 0.04 - 0.06 M/uL    Imm. Reticulocyte Fraction 15.4 9.3 - 17.4 %    Retic Hgb Equivalent 26.2 (L) 29.0 - 35.0 pg/cell   IRON/TOTAL IRON BIND   Result Value Ref Range    Iron 13 (L) 50 - 180 ug/dL    Total Iron Binding 189 (L) 250 - 450 ug/dL    Unsat Iron Binding 176 110 - 370 ug/dL    % Saturation 7 (L) 15 - 55 %   Comp Metabolic Panel   Result Value Ref Range    Sodium 137 135 - 145 mmol/L    Potassium 3.6 3.6 - 5.5 mmol/L    Chloride 102 96 - 112 mmol/L    Co2 25 20 - 33 mmol/L    Anion Gap 10.0 7.0 - 16.0    Glucose 131 (H) 65 - 99 mg/dL    Bun 24 (H) 8 - 22 mg/dL    Creatinine 1.53 (H) 0.50 - 1.40 mg/dL    Calcium 8.7 8.5 - 10.5 mg/dL    AST(SGOT) 22 12 - 45 U/L    ALT(SGPT) 15 2 - 50 U/L    Alkaline Phosphatase 185 (H) 30 - 99 U/L    Total Bilirubin <0.2 0.1 - 1.5 mg/dL    Albumin 2.9 (L) 3.2 - 4.9 g/dL    Total Protein 6.6 6.0 - 8.2 g/dL    Globulin 3.7 (H) 1.9 - 3.5 g/dL    A-G Ratio 0.8 g/dL   MAGNESIUM   Result Value Ref Range    Magnesium 1.7 1.5 - 2.5 mg/dL   ESTIMATED GFR   Result Value Ref Range    GFR If  54 (A) >60 mL/min/1.73 m 2    GFR If Non  44 (A) >60 mL/min/1.73 m 2   CBC WITHOUT DIFFERENTIAL   Result Value Ref Range    WBC 7.5 4.8 - 10.8 K/uL    RBC 3.44 (L) 4.70 - 6.10 M/uL    Hemoglobin 8.3 (L) 14.0 - 18.0 g/dL    Hematocrit 27.0 (L) 42.0 - 52.0 %    MCV 78.5 (L) 81.4 - 97.8 fL    MCH 24.1 (L) 27.0 - 33.0 pg    MCHC 30.7 (L) 33.7 - 35.3 g/dL    RDW 45.6 35.9 - 50.0 fL     Platelet Count 196 164 - 446 K/uL    MPV 9.7 9.0 - 12.9 fL   Basic Metabolic Panel   Result Value Ref Range    Sodium 140 135 - 145 mmol/L    Potassium 3.7 3.6 - 5.5 mmol/L    Chloride 107 96 - 112 mmol/L    Co2 26 20 - 33 mmol/L    Glucose 80 65 - 99 mg/dL    Bun 26 (H) 8 - 22 mg/dL    Creatinine 1.63 (H) 0.50 - 1.40 mg/dL    Calcium 8.4 (L) 8.5 - 10.5 mg/dL    Anion Gap 7.0 7.0 - 16.0   MAGNESIUM   Result Value Ref Range    Magnesium 1.7 1.5 - 2.5 mg/dL   ESTIMATED GFR   Result Value Ref Range    GFR If African American 50 (A) >60 mL/min/1.73 m 2    GFR If Non  41 (A) >60 mL/min/1.73 m 2   CBC WITHOUT DIFFERENTIAL   Result Value Ref Range    WBC 9.6 4.8 - 10.8 K/uL    RBC 3.87 (L) 4.70 - 6.10 M/uL    Hemoglobin 9.1 (L) 14.0 - 18.0 g/dL    Hematocrit 30.0 (L) 42.0 - 52.0 %    MCV 77.5 (L) 81.4 - 97.8 fL    MCH 23.5 (L) 27.0 - 33.0 pg    MCHC 30.3 (L) 33.7 - 35.3 g/dL    RDW 44.5 35.9 - 50.0 fL    Platelet Count 210 164 - 446 K/uL    MPV 9.7 9.0 - 12.9 fL   Basic Metabolic Panel   Result Value Ref Range    Sodium 139 135 - 145 mmol/L    Potassium 4.0 3.6 - 5.5 mmol/L    Chloride 107 96 - 112 mmol/L    Co2 25 20 - 33 mmol/L    Glucose 99 65 - 99 mg/dL    Bun 24 (H) 8 - 22 mg/dL    Creatinine 1.04 0.50 - 1.40 mg/dL    Calcium 8.9 8.5 - 10.5 mg/dL    Anion Gap 7.0 7.0 - 16.0   ESTIMATED GFR   Result Value Ref Range    GFR If African American >60 >60 mL/min/1.73 m 2    GFR If Non African American >60 >60 mL/min/1.73 m 2   ACCU-CHEK GLUCOSE   Result Value Ref Range    Glucose - Accu-Ck 431 (HH) 65 - 99 mg/dL   ACCU-CHEK GLUCOSE   Result Value Ref Range    Glucose - Accu-Ck 314 (H) 65 - 99 mg/dL   ACCU-CHEK GLUCOSE   Result Value Ref Range    Glucose - Accu-Ck 198 (H) 65 - 99 mg/dL   ACCU-CHEK GLUCOSE   Result Value Ref Range    Glucose - Accu-Ck 85 65 - 99 mg/dL   ACCU-CHEK GLUCOSE   Result Value Ref Range    Glucose - Accu-Ck 133 (H) 65 - 99 mg/dL   ACCU-CHEK GLUCOSE   Result Value Ref Range    Glucose -  Accu-Ck 206 (H) 65 - 99 mg/dL   ACCU-CHEK GLUCOSE   Result Value Ref Range    Glucose - Accu-Ck 165 (H) 65 - 99 mg/dL   ACCU-CHEK GLUCOSE   Result Value Ref Range    Glucose - Accu-Ck 91 65 - 99 mg/dL   ACCU-CHEK GLUCOSE   Result Value Ref Range    Glucose - Accu-Ck 115 (H) 65 - 99 mg/dL   ACCU-CHEK GLUCOSE   Result Value Ref Range    Glucose - Accu-Ck 197 (H) 65 - 99 mg/dL   ACCU-CHEK GLUCOSE   Result Value Ref Range    Glucose - Accu-Ck 157 (H) 65 - 99 mg/dL   ACCU-CHEK GLUCOSE   Result Value Ref Range    Glucose - Accu-Ck 137 (H) 65 - 99 mg/dL   ACCU-CHEK GLUCOSE   Result Value Ref Range    Glucose - Accu-Ck 88 65 - 99 mg/dL   ACCU-CHEK GLUCOSE   Result Value Ref Range    Glucose - Accu-Ck 188 (H) 65 - 99 mg/dL   ACCU-CHEK GLUCOSE   Result Value Ref Range    Glucose - Accu-Ck 194 (H) 65 - 99 mg/dL   ACCU-CHEK GLUCOSE   Result Value Ref Range    Glucose - Accu-Ck 92 65 - 99 mg/dL   ACCU-CHEK GLUCOSE   Result Value Ref Range    Glucose - Accu-Ck 164 (H) 65 - 99 mg/dL      All labs reviewed by me.    RADIOLOGY  No orders to display     The radiologist's interpretation of all radiological studies have been reviewed by me.    COURSE & MEDICAL DECISION MAKING  Pertinent Labs & Imaging studies reviewed. (See chart for details)    4:00 PM - Patient seen and examined at bedside. Discussed plan of care with the patient which includes changing his urinary catheter. Gave the patient the opportunity to ask any questions and he is agreeable to plan of care.  The differential diagnoses include but are not limited to: UTI, Clement catheter occlusion    4:23 PM - Ordered UA Culture to evaluate the patient's symptoms.     Decision Making:   This is a 75-year-old male who has had an indwelling Clement catheter for years c, changed out every month or so.  He states that is been about 1 month since it was changed.  Today he had some leakage around the catheter but no urine output through the catheter.  He feels like his bladder is  distended.  He denies any fever chills or other associated symptoms.  After placement of a new Clement catheter the it is draining well there is cloudy urine the patient's pain is completely resolved however after drainage of the bladder.  The patient does appear to have history of urinary tract infection low no symptoms I will go ahead and put him on a course of Omnicef running with a dose here    FINAL IMPRESSION  1. Problem with Clement catheter, initial encounter ([facility])          Laura CARDENAS (Scribdanuta), am scribing for, and in the presence of, Kulwinder Beth M.D..    Electronically signed by: Laura Briones (Zonia), 4/25/2021    Kulwinder CARDENAS M.D. personally performed the services described in this documentation, as scribed by Laura Briones in my presence, and it is both accurate and complete.    The note accurately reflects work and decisions made by me.  Kulwinder Beth M.D.  4/25/2021  5:35 PM

## 2021-04-25 NOTE — ED NOTES
Prior catheter removed. Replaced with 16 fr catheter. +urine output - yellow and cloudy with sediment. UA sent to lab.

## 2021-04-26 NOTE — ED NOTES
Pt given d/c instructions, including RX for Omnicef. Pt educated on importance of getting his RX filled. Cab called for patient. Pt walked to ER exit without difficulty. Pt RR even and unlabored, A&Ox4 on arrival.

## 2021-04-27 LAB
BACTERIA UR CULT: ABNORMAL
BACTERIA UR CULT: ABNORMAL
SIGNIFICANT IND 70042: ABNORMAL
SITE SITE: ABNORMAL
SOURCE SOURCE: ABNORMAL

## 2021-04-27 NOTE — PROGRESS NOTES
"ED Positive Culture Follow-up/Notification Note:   Date: 4/27/21    Patient seen in the ED on 4/25/2021 for urinary catheter problem.   1. Problem with Clement catheter, initial encounter ([facility]CC)      Discharge Medication List as of 4/25/2021  6:12 PM      START taking these medications    Details   sulfamethoxazole-trimethoprim (BACTRIM DS) 800-160 MG tablet Take 1 tablet by mouth 2 times a day., Disp-10 Quantity Sufficient, R-0, Print Rx Paper      cefdinir (OMNICEF) 300 MG Cap Take 1 capsule by mouth 2 times a day., Disp-10 capsule, R-0, Print Rx Paper           Allergies: Patient has no known allergies.    Vitals:    04/25/21 1514 04/25/21 1517 04/25/21 1827   BP: 151/71  145/68   Pulse: 74  75   Resp: 20  15   Temp: 36.4 °C (97.5 °F)  36.8 °C (98.2 °F)   TempSrc: Temporal  Temporal   SpO2: 95%  98%   Weight:  57.1 kg (125 lb 14.1 oz)    Height:  1.778 m (5' 10\")        Final cultures:   Results     Procedure Component Value Units Date/Time    URINE CULTURE(NEW) [[id number]]  (Abnormal)  (Susceptibility) Collected: 04/25/21 1620    Order Status: Completed Specimen: Urine Updated: 04/27/21 1021     Significant Indicator POS     Source UR     Site -     Culture Result Usual skin remedios 10-50,000 cfu/mL      Enterococcus faecalis  >100,000 cfu/mL      Narrative:      Indication for culture:->Unexplained new onset of Flank pain  and/or Costovertebral angle tenderness    Susceptibility     Enterococcus faecalis (1)     Antibiotic Interpretation Microscan Method Status    Ciprofloxacin [*]  Resistant >2 mcg/mL ROHAN Final    Daptomycin Sensitive 1 mcg/mL ROHAN Final    Nitrofurantoin Sensitive <=32 mcg/mL ROHAN Final    Gent Synergy [*]  Resistant >500 mcg/mL ROHAN Final    Levofloxacin [*]  Resistant >4 mcg/mL ROHAN Final    Ampicillin Sensitive <=2 mcg/mL ROHAN Final    Linezolid [*]  Sensitive 2 mcg/mL ROHAN Final    Vancomycin Sensitive 1 mcg/mL ROHAN Final    Rifampin [*]  Sensitive <=1 mcg/mL ROHAN Final    Strep Synergy [*]  Resistant " >1000 mcg/mL ROHAN Final    Tetracycline Resistant >8 mcg/mL ROHAN Final    Tigecycline [*]  Sensitive <=0.25 mcg/mL ROHAN Final           [*]   Suppressed Antibiotic                 URINALYSIS CULTURE, IF INDICATED [[id number]]  (Abnormal) Collected: 04/25/21 1620    Order Status: Completed Specimen: Urine Updated: 04/25/21 5830     Color Yellow     Character Hazy     Specific Gravity 1.020     Ph 6.0     Glucose >=1000 mg/dL      Ketones Negative mg/dL      Protein 30 mg/dL      Bilirubin Negative     Urobilinogen, Urine 0.2     Nitrite Negative     Leukocyte Esterase Large     Occult Blood Small     Micro Urine Req Microscopic    Narrative:      Indication for culture:->Unexplained new onset of Flank pain  and/or Costovertebral angle tenderness          Plan:   Isolated organism is not covered by prescribed antibiotics. Unable to reach via phone and unable to leave voicemail. If patient returns with worsening symptoms recommend initiating ampicillin 2 g IV q6hr.     Patient is homeless, sending letter to address on file regarding positive culture and to return to ED if symptoms have worsened.     Sai Avila, PharmD

## 2021-05-01 ENCOUNTER — HOSPITAL ENCOUNTER (EMERGENCY)
Facility: MEDICAL CENTER | Age: 76
End: 2021-05-02
Attending: EMERGENCY MEDICINE
Payer: MEDICARE

## 2021-05-01 DIAGNOSIS — T83.9XXA PROBLEM WITH FOLEY CATHETER, INITIAL ENCOUNTER (HCC): ICD-10-CM

## 2021-05-01 PROCEDURE — 99282 EMERGENCY DEPT VISIT SF MDM: CPT

## 2021-05-01 ASSESSMENT — FIBROSIS 4 INDEX: FIB4 SCORE: 2.06

## 2021-05-01 ASSESSMENT — PAIN DESCRIPTION - PAIN TYPE: TYPE: ACUTE PAIN

## 2021-05-02 VITALS
HEIGHT: 71 IN | WEIGHT: 126.1 LBS | HEART RATE: 74 BPM | SYSTOLIC BLOOD PRESSURE: 146 MMHG | OXYGEN SATURATION: 98 % | BODY MASS INDEX: 17.65 KG/M2 | RESPIRATION RATE: 18 BRPM | DIASTOLIC BLOOD PRESSURE: 70 MMHG | TEMPERATURE: 97.2 F

## 2021-05-02 NOTE — ED TRIAGE NOTES
"Chief Complaint   Patient presents with   • Urinary Catheter Problem     pain 5/10, states leaking, burning, and wanting to void frequently, changed last week in shelter; pt states urine is clear        Pt ambulated to triage w/ steady gait w/ use of personal walker, pt A&Ox4, RA, [place]. Pt states that he recently finished abx course for recent UTI, chronic lazo for urine retention. States that he made appointment w/ urologist but it's not till 5/28.     Pt to alla, educated on alerting staff for changes in condition.     /80   Pulse 79   Temp 36.3 °C (97.3 °F) (Temporal)   Resp 18   Ht 1.791 m (5' 10.5\")   Wt 57.2 kg (126 lb 1.7 oz)   SpO2 98%   BMI 17.84 kg/m²   "

## 2021-05-02 NOTE — ED NOTES
Discharge education provided. Discharge paperwork signed by pt.  All questions answered. All belongings with pt. Pt ambulated to with front wheel walker lobby unassisted. Patient has urology appt scheduled for May 28.

## 2021-05-02 NOTE — ED PROVIDER NOTES
ED Provider Note    ED Provider Note    Primary care provider: Pcp Pt States None  Means of arrival: Walk-in  History obtained from: Patient    CHIEF COMPLAINT  Chief Complaint   Patient presents with   • Urinary Catheter Problem     pain 5/10, states leaking, burning, and wanting to void frequently, changed last week in shelter; pt states urine is clear      Seen at 10:32 PM.   HPI  Hugh Núñez is a 75 y.o. male who presents to the Emergency Department for decreasing flow through his chronic indwelling Clement.  The patient was seen here last week.  He is very satisfied with the Clement catheter and leg bag.  He would like to keep it in place as it was better than him battling with some intermittent urinary retention and incontinence over the past 2 years.  He did schedule a follow-up with urology but cannot be seen until later on this month.    He does not have any other active issues.  He did appear dry but states he has been eating and drinking well.  He denies any fevers, chills, chest pain, flank pain, abdominal pain or pain with the Clement catheter.  He denies any numbness or weakness.    REVIEW OF SYSTEMS  See HPI,   Remainder of ROS negative.     PAST MEDICAL HISTORY   has a past medical history of Fall, Polio (Ag of 5), and Type II or unspecified type diabetes mellitus without mention of complication, not stated as uncontrolled.    SURGICAL HISTORY   has a past surgical history that includes appendectomy and tonsillectomy.    SOCIAL HISTORY  Social History     Tobacco Use   • Smoking status: Former Smoker     Years: 5.00     Types: Cigarettes     Start date: 1955     Quit date: 1960     Years since quittin.6   • Smokeless tobacco: Never Used   Substance Use Topics   • Alcohol use: Not Currently     Alcohol/week: 0.6 oz     Types: 1 Cans of beer per week   • Drug use: No      Social History     Substance and Sexual Activity   Drug Use No       FAMILY HISTORY  History reviewed. No pertinent  "family history.    CURRENT MEDICATIONS  Reviewed.  See Encounter Summary.     ALLERGIES  No Known Allergies    PHYSICAL EXAM  VITAL SIGNS: /70   Pulse 74   Temp 36.2 °C (97.2 °F) (Oral)   Resp 18   Ht 1.791 m (5' 10.5\")   Wt 57.2 kg (126 lb 1.7 oz)   SpO2 98%   BMI 17.84 kg/m²   Constitutional: Awake, alert in no apparent distress.  HENT: Normocephalic, Bilateral external ears normal. Nose normal.  Dry mucosal membranes.  Eyes: Conjunctiva normal, non-icteric, EOMI.    Thorax & Lungs: Easy unlabored respirations, Clear to ascultation bilaterally.  Cardiovascular: Regular rate, Regular rhythm, No murmurs, rubs or gallops. Bilateral pulses symmetrical.   Abdomen:  Soft, nontender, nondistended, normal active bowel sounds.   : Indwelling Clement intact, cloudy urine in the leg bag.  Skin: Visualized skin is  Dry, No erythema, No rash.   Musculoskeletal:   No cyanosis, clubbing or edema. No leg asymmetry.   Neurologic: Alert, Grossly non-focal.   Psychiatric: Normal affect, Normal mood  Lymphatic:  No cervical LAD         RADIOLOGY  No orders to display         COURSE & MEDICAL DECISION MAKING  Pertinent Labs & Imaging studies reviewed. (See chart for details)        10:32 PM - Medical record reviewed, urine culture 4/25 grew Enterococcus faecalis.  The patient was seen and admitted mid March for urinary retention.  He was discharged at that time with a Clement catheter.  Prior to that he had not had any indwelling Clement for the past 2 years.  Decision Making:  This is a pleasant 75 y.o. year old male who presents with difficulty with his Clement catheter.  I offered to remove the Clement and attempt a voiding trial but the patient would prefer to keep it in.  Therefore the Clement was flushed, it is working well and he is currently having no issues.  He is afebrile, he denies any fevers or pain.  I do not feel that urinalysis is indicated as any bacteria shown on this would likely be colonization.  The patient " will be discharged at this time.  He does have follow-up with urology later this month which is appropriate.    Discharge Medications:  Discharge Medication List as of 5/2/2021 12:47 AM          The patient was discharged home (see d/c instructions) was told to return immediately for any signs or symptoms listed, or any worsening at all.  The patient verbally agreed to the discharge precautions and follow-up plan which is documented in EPIC.        FINAL IMPRESSION  1. Problem with Clement catheter, initial encounter ([facility])

## 2021-05-04 ENCOUNTER — HOSPITAL ENCOUNTER (EMERGENCY)
Facility: MEDICAL CENTER | Age: 76
End: 2021-05-04
Attending: EMERGENCY MEDICINE
Payer: MEDICARE

## 2021-05-04 VITALS
WEIGHT: 125 LBS | OXYGEN SATURATION: 98 % | HEART RATE: 69 BPM | TEMPERATURE: 98.1 F | DIASTOLIC BLOOD PRESSURE: 72 MMHG | RESPIRATION RATE: 14 BRPM | BODY MASS INDEX: 17.68 KG/M2 | SYSTOLIC BLOOD PRESSURE: 154 MMHG

## 2021-05-04 DIAGNOSIS — T83.9XXA PROBLEM WITH FOLEY CATHETER, INITIAL ENCOUNTER (HCC): ICD-10-CM

## 2021-05-04 LAB
APPEARANCE UR: ABNORMAL
BACTERIA #/AREA URNS HPF: ABNORMAL /HPF
BILIRUB UR QL STRIP.AUTO: NEGATIVE
COLOR UR: YELLOW
EPI CELLS #/AREA URNS HPF: NEGATIVE /HPF
GLUCOSE UR STRIP.AUTO-MCNC: >=1000 MG/DL
HYALINE CASTS #/AREA URNS LPF: ABNORMAL /LPF
KETONES UR STRIP.AUTO-MCNC: NEGATIVE MG/DL
LEUKOCYTE ESTERASE UR QL STRIP.AUTO: ABNORMAL
MICRO URNS: ABNORMAL
NITRITE UR QL STRIP.AUTO: NEGATIVE
PH UR STRIP.AUTO: 5 [PH] (ref 5–8)
PROT UR QL STRIP: 30 MG/DL
RBC # URNS HPF: ABNORMAL /HPF
RBC UR QL AUTO: ABNORMAL
SP GR UR STRIP.AUTO: 1.02
UROBILINOGEN UR STRIP.AUTO-MCNC: 0.2 MG/DL
WBC #/AREA URNS HPF: ABNORMAL /HPF
YEAST BUDDING URNS QL: PRESENT /HPF
YEAST HYPHAE #/AREA URNS HPF: PRESENT /HPF

## 2021-05-04 PROCEDURE — 51702 INSERT TEMP BLADDER CATH: CPT

## 2021-05-04 PROCEDURE — 303105 HCHG CATHETER EXTRA

## 2021-05-04 PROCEDURE — 81001 URINALYSIS AUTO W/SCOPE: CPT

## 2021-05-04 PROCEDURE — 99284 EMERGENCY DEPT VISIT MOD MDM: CPT

## 2021-05-04 RX ORDER — LIDOCAINE HYDROCHLORIDE 20 MG/ML
JELLY TOPICAL ONCE
Status: DISCONTINUED | OUTPATIENT
Start: 2021-05-04 | End: 2021-05-04 | Stop reason: HOSPADM

## 2021-05-04 ASSESSMENT — FIBROSIS 4 INDEX: FIB4 SCORE: 2.06

## 2021-05-04 NOTE — ED TRIAGE NOTES
Chief Complaint   Patient presents with   • Urinary Catheter Problem   BIBA from [facility] for above complaint. Pt reports being seen yesterday for the same, had his catheter flushed and was DCed without issue. Pt reports urine was draining but then stopped and has not had any output for 8 hours PTA. Pt reports lower abdominal discomfort

## 2021-05-04 NOTE — ED NOTES
Pt DCed at this time, AAOX4, VSS, ambulating with walker with steady gait. Education provided on f/u care, lazo care, and all questions addressed. Pt verbalized understanding and ambulated off unit with YVON

## 2021-05-04 NOTE — ED PROVIDER NOTES
"ED Provider Note    CHIEF COMPLAINT  Chief Complaint   Patient presents with   • Urinary Catheter Problem        HPI    Primary care provider: Pcp Pt States None   History obtained from: Patient  History limited by: None     Hugh Núñez is a 75 y.o. male who presents to the ED by EMS complaining of no drainage coming out from his Clement catheter since this morning.  Patient states that he was in this ED yesterday for the same complaint and \"they irrigated it\" and it was flowing fine but no urine output since this morning.  He is reporting a lot of pressure in his lower abdomen.  He otherwise denies pain anywhere else.  He states that he has chronic indwelling Clement catheter because \"of a bad prostate.\"  He denies fever/chills/shortness of breath or difficulty breathing/nausea/vomiting/diarrhea/constipation.    REVIEW OF SYSTEMS  Please see HPI for pertinent positives/negatives.     PAST MEDICAL HISTORY  Past Medical History:   Diagnosis Date   •     • Polio Ag of 5   • Type II or unspecified type diabetes mellitus without mention of complication, not stated as uncontrolled         SURGICAL HISTORY  Past Surgical History:   Procedure Laterality Date   • PB APPENDECTOMY     • TONSILLECTOMY          SOCIAL HISTORY  Social History     Tobacco Use   • Smoking status: Former Smoker     Years: 5.00     Types: Cigarettes     Start date: 1955     Quit date: 1960     Years since quittin.6   • Smokeless tobacco: Never Used   Substance and Sexual Activity   • Alcohol use: Not Currently     Alcohol/week: 0.6 oz     Types: 1 Cans of beer per week   • Drug use: No   • Sexual activity: Not on file        FAMILY HISTORY  History reviewed. No pertinent family history.     CURRENT MEDICATIONS  Home Medications    **Home medications have not yet been reviewed for this encounter**          ALLERGIES  No Known Allergies     PHYSICAL EXAM  VITAL SIGNS: /72   Pulse 69   Temp 36.7 °C (98.1 °F) (Oral)   " Resp 14   Wt 56.7 kg (125 lb)   SpO2 98%   BMI 17.68 kg/m²  @LEYDA[[id number]::@     Pulse ox interpretation: 97% I interpret this pulse ox as normal     Constitutional: Well developed, well nourished, alert in no apparent distress, nontoxic appearance    HENT: No external signs of trauma, normocephalic, has gone due to COVID-19 pandemic  Eyes: PERRL, conjunctiva without erythema, no discharge, no icterus    Neck: Soft and supple, trachea midline, no stridor, no tenderness, no LAD, no JVD, good ROM    Cardiovascular: Regular rate and rhythm, 2/6 SM, strong distal pulses and good perfusion    Thorax & Lungs: No respiratory distress, CTAB   Abdomen: Soft, mild suprapubic tenderness to palpation, nondistended, no guarding, no rebound, normal BS    Back: No CVAT    Extremities: No cyanosis, no edema, no gross deformity, good ROM, no tenderness, intact distal pulses with brisk cap refill    Skin: Warm, dry, no pallor/cyanosis, no rash noted    Lymphatic: No lymphadenopathy noted    Neuro: A/O times 3  Psychiatric: Cooperative, normal mood and affect, normal judgement, appropriate for clinical situation        DIAGNOSTIC STUDIES / PROCEDURES        LABS  All labs reviewed by me.     Results for orders placed or performed during the hospital encounter of 05/04/21   URINALYSIS (UA)    Specimen: Urine, Clement Cath   Result Value Ref Range    Color Yellow     Character Cloudy (A)     Specific Gravity 1.025 <1.035    Ph 5.0 5.0 - 8.0    Glucose >=1000 (A) Negative mg/dL    Ketones Negative Negative mg/dL    Protein 30 (A) Negative mg/dL    Bilirubin Negative Negative    Urobilinogen, Urine 0.2 Negative    Nitrite Negative Negative    Leukocyte Esterase Moderate (A) Negative    Occult Blood Small (A) Negative    Micro Urine Req Microscopic    URINE MICROSCOPIC (W/UA)   Result Value Ref Range    WBC Packed (A) /hpf    RBC 5-10 (A) /hpf    Bacteria Rare (A) None /hpf    Epithelial Cells Negative /hpf    Hyaline Cast 0-2 /lpf     Budding Yeast Present (A) Absent /hpf    Hyphae Yeast Present (A) Absent /hpf        RADIOLOGY  The radiologist's interpretation of all radiological studies have been reviewed by me.     No orders to display          COURSE & MEDICAL DECISION MAKING  Nursing notes, VS, PMSFHx reviewed in chart.     Review of past medical records shows the patient was last seen in this ED May 1, 2021 for decreased output from his Clement catheter.  Catheter was flushed with no issues and patient was discharged home.      Differential diagnoses considered include but are not limited to: UTI/cystitis/pyelo, appendicitis, prostatitis, KS/renal colic, urinary retention, cancer, Clement catheter dysfunction/obstruction       History and physical exam as above.  Patient requested Clement catheter replacement which was performed by ED nurse without complication and he reports feeling much better afterwards.  UA finding is likely from chronic colonization rather than acute UTI.  I discussed the findings with the patient.  He is noted to be in no acute distress and nontoxic in appearance with no signs of acute abdomen to suggest a surgical emergency.  Low clinical suspicion for sepsis, abscess or pyelonephritis.  Patient states that he will follow-up with his doctor and urologist.  Patient also noted to have elevated blood pressure reading for which he will need outpatient follow-up for further management.  Return to ED precautions were given.  Patient verbalized understanding and agreed with plan of care with no further questions or concerns.      The patient is referred to a primary physician for blood pressure management, diabetic screening, and for all other preventative health concerns.       FINAL IMPRESSION  1. Problem with Clement catheter, initial encounter (HCC) Acute          DISPOSITION  Patient will be discharged home in stable condition.       FOLLOW UP  Please follow-up with your doctor and urologist          [facility]  Center, Emergency Dept  [facility] [facility] [address]  [phone]    If symptoms worsen         OUTPATIENT MEDICATIONS  Discharge Medication List as of 5/4/2021  4:06 AM             Electronically signed by: Kurt Vasquez D.O., 5/4/2021 2:49 AM      Portions of this record were made with voice recognition software.  Despite my review, spelling/grammar/context errors may still remain.  Interpretation of this chart should be taken in this context.

## 2021-05-09 ENCOUNTER — HOSPITAL ENCOUNTER (EMERGENCY)
Facility: MEDICAL CENTER | Age: 76
End: 2021-05-10
Attending: EMERGENCY MEDICINE
Payer: MEDICARE

## 2021-05-09 DIAGNOSIS — N39.0 ACUTE UTI: ICD-10-CM

## 2021-05-09 DIAGNOSIS — T83.9XXA PROBLEM WITH FOLEY CATHETER, INITIAL ENCOUNTER (HCC): ICD-10-CM

## 2021-05-09 LAB
APPEARANCE UR: ABNORMAL
BILIRUB UR QL STRIP.AUTO: NEGATIVE
COLOR UR: YELLOW
GLUCOSE UR STRIP.AUTO-MCNC: >=1000 MG/DL
KETONES UR STRIP.AUTO-MCNC: NEGATIVE MG/DL
LEUKOCYTE ESTERASE UR QL STRIP.AUTO: ABNORMAL
MICRO URNS: ABNORMAL
NITRITE UR QL STRIP.AUTO: NEGATIVE
PH UR STRIP.AUTO: 5 [PH] (ref 5–8)
PROT UR QL STRIP: NEGATIVE MG/DL
RBC UR QL AUTO: ABNORMAL
SP GR UR STRIP.AUTO: 1.03
UROBILINOGEN UR STRIP.AUTO-MCNC: 0.2 MG/DL
WBC #/AREA URNS HPF: ABNORMAL /HPF
YEAST BUDDING URNS QL: PRESENT /HPF
YEAST HYPHAE #/AREA URNS HPF: PRESENT /HPF

## 2021-05-09 PROCEDURE — 87086 URINE CULTURE/COLONY COUNT: CPT

## 2021-05-09 PROCEDURE — 87186 SC STD MICRODIL/AGAR DIL: CPT

## 2021-05-09 PROCEDURE — 99284 EMERGENCY DEPT VISIT MOD MDM: CPT

## 2021-05-09 PROCEDURE — 87077 CULTURE AEROBIC IDENTIFY: CPT | Mod: 91

## 2021-05-09 PROCEDURE — 81001 URINALYSIS AUTO W/SCOPE: CPT

## 2021-05-09 RX ORDER — CEFDINIR 300 MG/1
300 CAPSULE ORAL ONCE
Status: COMPLETED | OUTPATIENT
Start: 2021-05-10 | End: 2021-05-10

## 2021-05-09 RX ORDER — CEFDINIR 300 MG/1
300 CAPSULE ORAL 2 TIMES DAILY
Qty: 10 CAPSULE | Refills: 0 | Status: SHIPPED | OUTPATIENT
Start: 2021-05-09 | End: 2021-05-15

## 2021-05-09 ASSESSMENT — FIBROSIS 4 INDEX: FIB4 SCORE: 2.06

## 2021-05-09 NOTE — LETTER
5/12/2021               Hugh Jose Núñez  [address] [facility] [address]        Dear Hugh (MR#[id number])    As we have been unable to contact you by phone, this letter is sent in regards to your recent visit to the [facility] Emergency Department on 5/9/2021. During the visit, tests were performed to assist the physician in a medical diagnosis. A review of those tests requires that we notify you of the following:    Your urine culture and sensitivity was POSITIVE for a bacteria called Enterococcus faecalis, and further treatment may be necessary. The currently prescribed antibiotic (cefdinir) may not be effective in treating your infection. IF YOU ARE NOT FEELING BETTER PLEASE CONTACT ME AS SOON AS POSSIBLE AT THE NUMBER BELOW.       Thank you for your cooperation in the matter.    Sincerely,  ED Culture Follow-Up Staff  Sai Avila, PharmD    [facility], Emergency Department  [address] [place] [id number] [phone] (ED Culture Line)

## 2021-05-10 ENCOUNTER — PHARMACY VISIT (OUTPATIENT)
Dept: PHARMACY | Facility: MEDICAL CENTER | Age: 76
End: 2021-05-10
Payer: COMMERCIAL

## 2021-05-10 VITALS
WEIGHT: 122.36 LBS | HEART RATE: 74 BPM | OXYGEN SATURATION: 95 % | TEMPERATURE: 98.3 F | BODY MASS INDEX: 17.52 KG/M2 | HEIGHT: 70 IN | SYSTOLIC BLOOD PRESSURE: 134 MMHG | DIASTOLIC BLOOD PRESSURE: 70 MMHG | RESPIRATION RATE: 16 BRPM

## 2021-05-10 PROCEDURE — RXMED WILLOW AMBULATORY MEDICATION CHARGE: Performed by: EMERGENCY MEDICINE

## 2021-05-10 PROCEDURE — A9270 NON-COVERED ITEM OR SERVICE: HCPCS | Performed by: EMERGENCY MEDICINE

## 2021-05-10 PROCEDURE — 700102 HCHG RX REV CODE 250 W/ 637 OVERRIDE(OP): Performed by: EMERGENCY MEDICINE

## 2021-05-10 RX ADMIN — CEFDINIR 300 MG: 300 CAPSULE ORAL at 00:09

## 2021-05-10 NOTE — ED TRIAGE NOTES
Hugh Núñez  75 y.o. male  Chief Complaint   Patient presents with   • Urinary Retention     Patient reports this is the third month he had a catheter placed for urinary retention related to his prostate. Patient has a urinary catheter in place but reports urge to void and no drainage in the bag. Patient had similar problem in 5/4, for which he was evaluated in the ED.

## 2021-05-10 NOTE — ED NOTES
Discharge teaching and paperwork provided. All questions/concerns answered. VSS, assessment stable. Given information regarding prescription. Patient discharged to the care of self and ambulated out of the ED with steady gait.

## 2021-05-10 NOTE — ED PROVIDER NOTES
"ED Provider Note    CHIEF COMPLAINT  Chief Complaint   Patient presents with   • Urinary Catheter Problem       HPI  Hugh Núñez is a 75 y.o. male who presents to the emergency room and with blocks Clement catheter. Patient sees urology of [place]. Recently had Clement catheter placed approximately four days ago for outlet obstruction. Recurrence of the same now. Significant sediment in the Clement catheter tonight on evaluation. No fever chills. No flank or back pain. No nausea vomiting. No other complaints.    REVIEW OF SYSTEMS  See HPI for further details. All other systems are negative.     PAST MEDICAL HISTORY   has a past medical history of Fall, Polio (Ag of 5), and Type II or unspecified type diabetes mellitus without mention of complication, not stated as uncontrolled.    SOCIAL HISTORY  Social History     Tobacco Use   • Smoking status: Former Smoker     Years: 5.00     Types: Cigarettes     Start date: 1955     Quit date: 1960     Years since quittin.6   • Smokeless tobacco: Never Used   Substance and Sexual Activity   • Alcohol use: Not Currently     Alcohol/week: 0.6 oz     Types: 1 Cans of beer per week   • Drug use: No   • Sexual activity: Not on file       SURGICAL HISTORY   has a past surgical history that includes appendectomy and tonsillectomy.    CURRENT MEDICATIONS  Home Medications    **Home medications have not yet been reviewed for this encounter**         ALLERGIES  No Known Allergies    PHYSICAL EXAM  VITAL SIGNS: /70   Pulse 74   Temp 36.8 °C (98.3 °F) (Temporal)   Resp 16   Ht 1.778 m (5' 10\")   Wt 55.5 kg (122 lb 5.7 oz)   SpO2 95%   BMI 17.56 kg/m²  @LEYDA[[id number]::@  Pulse ox interpretation: I interpret this pulse ox as normal.  Constitutional: Alert in no apparent distress.  HENT: Normocephalic, Atraumatic, Bilateral external ears normal. Nose normal.   Lungs: no respiratory distress  Skin: Warm, Dry, No erythema, No rash.   Abdomen: soft nontender " nondistended active bowel sounds. Nose cost vertebral angle tenderness.    : circumcised, bilaterally distended testes. Slight use infection to the base of the penis and anterior scrotum. Clement catheter in place. Yellow cloudy sediment urine in leg bag  Neurologic: Alert, Grossly non-focal.   Psychiatric: Affect normal, Judgment normal, Mood normal, Appears appropriate and not intoxicated.       Results for orders placed or performed during the hospital encounter of 05/09/21   URINALYSIS,CULTURE IF INDICATED    Specimen: Urine   Result Value Ref Range    Color Yellow     Character Cloudy (A)     Specific Gravity 1.026 <1.035    Ph 5.0 5.0 - 8.0    Glucose >=1000 (A) Negative mg/dL    Ketones Negative Negative mg/dL    Protein Negative Negative mg/dL    Bilirubin Negative Negative    Urobilinogen, Urine 0.2 Negative    Nitrite Negative Negative    Leukocyte Esterase Moderate (A) Negative    Occult Blood Small (A) Negative    Micro Urine Req Microscopic    URINE MICROSCOPIC (W/UA)   Result Value Ref Range    WBC  (A) /hpf    Budding Yeast Present (A) Absent /hpf    Hyphae Yeast Present (A) Absent /hpf   URINE CULTURE(NEW)    Specimen: Urine   Result Value Ref Range    Significant Indicator NEG     Source UR     Site -     Culture Result -          COURSE & MEDICAL DECISION MAKING  Pertinent Labs & Imaging studies reviewed. (See chart for details)  75-year-old presented to the emergency room and with blocked Clement catheter. History as above. There is evidence of urinary tract infection I will start the patient on antibiotics he is not currently on any despite the Clement catheter placement earlier this week. I will firm back to his primary urologist for follow-up this week. He is understanding return precautions and ongoing Clement care at home. He is understanding of wound and sanitation needs for yeast infection.    The patient will return for worsening symptoms and is stable at the time of discharge. The patient  verbalizes understanding and will comply.    FINAL IMPRESSION  1. Problem with Clement catheter, initial encounter (HCC)    2. Acute UTI               Electronically signed by: Mook Beth M.D., 5/9/2021 11:49 PM

## 2021-05-12 NOTE — ED NOTES
"ED Positive Culture Follow-up/Notification Note:   Date: 5/12    Patient seen in the ED on 5/9/2021 for urinary catheter problem.   1. Problem with Clement catheter, initial encounter (HCC)    2. Acute UTI      Patient given cefdinir 300 mg PO x 1 dose in ED.    Discharge Medication List as of 5/10/2021 12:01 AM        Allergies: Patient has no known allergies.    Vitals:    05/09/21 2133 05/09/21 2139 05/10/21 0000   BP: 138/64  134/70   Pulse: 81  74   Resp: 17  16   Temp: 36.8 °C (98.3 °F)     TempSrc: Temporal     SpO2: 94%  95%   Weight:  55.5 kg (122 lb 5.7 oz)    Height:  1.778 m (5' 10\")        Final cultures:   Results     Procedure Component Value Units Date/Time    URINE CULTURE(NEW) [[id number]]  (Abnormal)  (Susceptibility) Collected: 05/09/21 2255    Order Status: Completed Specimen: Urine Updated: 05/12/21 0745     Significant Indicator POS     Source UR     Site -     Culture Result -      Enterococcus faecalis  >100,000 cfu/mL      Narrative:      Indication for culture:->Evaluation for sepsis without a  clear source of infection    Susceptibility     Enterococcus faecalis (1)     Antibiotic Interpretation Microscan Method Status    Ciprofloxacin<!--EPICS--><sup style='font-weight:normal'>*</sup></font> Resistant >2 mcg/mL ROHAN Final    Daptomycin Sensitive 1 mcg/mL ROHAN Final    Nitrofurantoin Sensitive <=32 mcg/mL ROHAN Final    Gent Synergy<!--EPICS--><sup style='font-weight:normal'>*</sup></font> Resistant >500 mcg/mL ROHAN Final    Levofloxacin<!--EPICS--><sup style='font-weight:normal'>*</sup></font> Resistant >4 mcg/mL ROHAN Final    Ampicillin Sensitive <=2 mcg/mL ROHAN Final    Linezolid<!--EPICS--><sup style='font-weight:normal'>*</sup></font> Sensitive 2 mcg/mL ROHAN Final    Vancomycin Sensitive 1 mcg/mL ROHAN Final    Rifampin<!--EPICS--><sup style='font-weight:normal'>*</sup></font> Sensitive <=1 mcg/mL ROHAN Final    Strep Synergy<!--EPICS--><sup style='font-weight:normal'>*</sup></font> Resistant >1000 " mcg/mL ROHAN Final    Tetracycline Resistant >8 mcg/mL ROHAN Final    Tigecycline<!--EPICS--><sup style='font-weight:normal'>*</sup></font> Sensitive <=0.25 mcg/mL ROHAN Final          <!--EPICS--><sup style='font-weight:normal'>*</sup></font> Suppressed Antibiotic                 URINALYSIS,CULTURE IF INDICATED [[id number]]  (Abnormal) Collected: 05/09/21 0041    Order Status: Completed Specimen: Urine Updated: 05/09/21 1035     Color Yellow     Character Cloudy     Specific Gravity 1.026     Ph 5.0     Glucose >=1000 mg/dL      Ketones Negative mg/dL      Protein Negative mg/dL      Bilirubin Negative     Urobilinogen, Urine 0.2     Nitrite Negative     Leukocyte Esterase Moderate     Occult Blood Small     Micro Urine Req Microscopic    Narrative:      Indication for culture:->Evaluation for sepsis without a  clear source of infection          Plan:   Isolated organism is resistant to prescribed therapy.  Unable to reach patient, left voicemail for call back. Current abx do not cover organism grown in urine. If patient is not improving or having worsening symptoms recommend new prescription for amoxicillin 500 mg po tid x 7 days #21 with 0 refills to complete therapy.         Sai Avila, PharmD

## 2021-05-18 ENCOUNTER — HOSPITAL ENCOUNTER (EMERGENCY)
Facility: MEDICAL CENTER | Age: 76
End: 2021-05-18
Attending: EMERGENCY MEDICINE
Payer: MEDICARE

## 2021-05-18 VITALS
BODY MASS INDEX: 17.9 KG/M2 | WEIGHT: 125 LBS | RESPIRATION RATE: 16 BRPM | SYSTOLIC BLOOD PRESSURE: 117 MMHG | HEART RATE: 64 BPM | HEIGHT: 70 IN | TEMPERATURE: 97.5 F | OXYGEN SATURATION: 95 % | DIASTOLIC BLOOD PRESSURE: 60 MMHG

## 2021-05-18 DIAGNOSIS — N39.0 ACUTE UTI: ICD-10-CM

## 2021-05-18 DIAGNOSIS — T83.9XXA PROBLEM WITH FOLEY CATHETER, INITIAL ENCOUNTER (HCC): ICD-10-CM

## 2021-05-18 LAB
APPEARANCE UR: ABNORMAL
BACTERIA #/AREA URNS HPF: ABNORMAL /HPF
BILIRUB UR QL STRIP.AUTO: NEGATIVE
COLOR UR: YELLOW
EPI CELLS #/AREA URNS HPF: ABNORMAL /HPF
GLUCOSE UR STRIP.AUTO-MCNC: >=1000 MG/DL
KETONES UR STRIP.AUTO-MCNC: NEGATIVE MG/DL
LEUKOCYTE ESTERASE UR QL STRIP.AUTO: ABNORMAL
MICRO URNS: ABNORMAL
NITRITE UR QL STRIP.AUTO: NEGATIVE
PH UR STRIP.AUTO: 5.5 [PH] (ref 5–8)
PROT UR QL STRIP: 30 MG/DL
RBC # URNS HPF: ABNORMAL /HPF
RBC UR QL AUTO: ABNORMAL
SP GR UR STRIP.AUTO: 1.01
UROBILINOGEN UR STRIP.AUTO-MCNC: 0.2 MG/DL
WBC #/AREA URNS HPF: ABNORMAL /HPF
YEAST BUDDING URNS QL: PRESENT /HPF
YEAST HYPHAE #/AREA URNS HPF: PRESENT /HPF

## 2021-05-18 PROCEDURE — 81001 URINALYSIS AUTO W/SCOPE: CPT

## 2021-05-18 PROCEDURE — 87077 CULTURE AEROBIC IDENTIFY: CPT | Mod: 91

## 2021-05-18 PROCEDURE — 700102 HCHG RX REV CODE 250 W/ 637 OVERRIDE(OP): Performed by: EMERGENCY MEDICINE

## 2021-05-18 PROCEDURE — A9270 NON-COVERED ITEM OR SERVICE: HCPCS | Performed by: EMERGENCY MEDICINE

## 2021-05-18 PROCEDURE — 99284 EMERGENCY DEPT VISIT MOD MDM: CPT

## 2021-05-18 PROCEDURE — 87086 URINE CULTURE/COLONY COUNT: CPT

## 2021-05-18 PROCEDURE — 303105 HCHG CATHETER EXTRA

## 2021-05-18 PROCEDURE — 51702 INSERT TEMP BLADDER CATH: CPT

## 2021-05-18 PROCEDURE — 87186 SC STD MICRODIL/AGAR DIL: CPT

## 2021-05-18 RX ORDER — AMOXICILLIN AND CLAVULANATE POTASSIUM 875; 125 MG/1; MG/1
1 TABLET, FILM COATED ORAL 2 TIMES DAILY
Qty: 10 TABLET | Refills: 0 | Status: SHIPPED | OUTPATIENT
Start: 2021-05-18 | End: 2021-05-23

## 2021-05-18 RX ORDER — AMOXICILLIN AND CLAVULANATE POTASSIUM 875; 125 MG/1; MG/1
1 TABLET, FILM COATED ORAL ONCE
Status: COMPLETED | OUTPATIENT
Start: 2021-05-18 | End: 2021-05-18

## 2021-05-18 RX ADMIN — AMOXICILLIN AND CLAVULANATE POTASSIUM 1 TABLET: 875; 125 TABLET, FILM COATED ORAL at 19:46

## 2021-05-18 ASSESSMENT — FIBROSIS 4 INDEX: FIB4 SCORE: 2.06

## 2021-05-19 NOTE — ED TRIAGE NOTES
BIB EMS. Pt states catheter hasn't been draining x2 days. Urine leaking around catheter w/ air in catheter bag.

## 2021-05-19 NOTE — ED NOTES
All DC discussed. Pt verbalized understanding of all DC instructions r/t catheter care, s/s of infection, & when to return to the ED, prescriptions and follow up recommendations. Pt ambulated to lobby with FWW.

## 2021-05-19 NOTE — ED PROVIDER NOTES
ED Provider Note    CHIEF COMPLAINT  Chief Complaint   Patient presents with   • Urinary Catheter Problem     x2 days, decreased urine output in urine bag       HPI  Hugh Núñez is a 75 y.o. male who presents to the emergency department complaining of a clogged Clement catheter.  Patient states a lot of discomfort because there is been no drainage from his Clement catheter for couple of days.  He denies any other acute concerns or complaints.  He denies any abdominal pain.  No nausea, vomiting, fevers chills or flank pain.  Is not currently on antibiotics.  Denies any other acute concerns or complaints.    REVIEW OF SYSTEMS  See HPI for further details. All other systems are negative.    PAST MEDICAL HISTORY  Past Medical History:   Diagnosis Date   • Fall    • Polio Ag of 5   • Type II or unspecified type diabetes mellitus without mention of complication, not stated as uncontrolled        FAMILY HISTORY  History reviewed. No pertinent family history.    SOCIAL HISTORY  Social History     Socioeconomic History   • Marital status:      Spouse name: Not on file   • Number of children: Not on file   • Years of education: Not on file   • Highest education level: Not on file   Occupational History   • Not on file   Tobacco Use   • Smoking status: Former Smoker     Years: 5.00     Types: Cigarettes     Start date: 1955     Quit date: 1960     Years since quittin.7   • Smokeless tobacco: Never Used   Vaping Use   • Vaping Use: Never used   Substance and Sexual Activity   • Alcohol use: Not Currently     Alcohol/week: 0.6 oz     Types: 1 Cans of beer per week   • Drug use: No   • Sexual activity: Not on file   Other Topics Concern   • Not on file   Social History Narrative   • Not on file     Social Determinants of Health     Financial Resource Strain:    • Difficulty of Paying Living Expenses:    Food Insecurity:    • Worried About Running Out of Food in the Last Year:    • Ran Out of Food in the  "Last Year:    Transportation Needs:    • Lack of Transportation (Medical):    • Lack of Transportation (Non-Medical):    Physical Activity:    • Days of Exercise per Week:    • Minutes of Exercise per Session:    Stress:    • Feeling of Stress :    Social Connections:    • Frequency of Communication with Friends and Family:    • Frequency of Social Gatherings with Friends and Family:    • Attends Gnosticism Services:    • Active Member of Clubs or Organizations:    • Attends Club or Organization Meetings:    • Marital Status:    Intimate Partner Violence:    • Fear of Current or Ex-Partner:    • Emotionally Abused:    • Physically Abused:    • Sexually Abused:        SURGICAL HISTORY  Past Surgical History:   Procedure Laterality Date   • PB APPENDECTOMY     • TONSILLECTOMY         CURRENT MEDICATIONS  Home Medications     Reviewed by Samantha Díaz R.N. (Registered Nurse) on 05/18/21 at 1809  Med List Status: Partial   Medication Last Dose Status   cyanocobalamin (VITAMIN B12) 1000 MCG Tab  Active   finasteride (PROSCAR) 5 MG TABS  Active   insulin glargine (LANTUS) 100 UNIT/ML Solution  Active   insulin regular (HUMULIN R) 100 Unit/mL Solution  Active   polyethylene glycol/lytes (MIRALAX) 17 g Pack  Active   sulfamethoxazole-trimethoprim (BACTRIM DS) 800-160 MG tablet  Active   tamsulosin (FLOMAX) 0.4 MG capsule  Active   vitamin D (VITAMIND D3) 1000 UNIT Tab  Active                ALLERGIES  No Known Allergies    PHYSICAL EXAM  VITAL SIGNS: BP (!) 161/67   Pulse 70   Temp 36.9 °C (98.4 °F) (Temporal)   Resp 16   Ht 1.778 m (5' 10\")   Wt 56.7 kg (125 lb)   SpO2 97%   BMI 17.94 kg/m²    Constitutional: Awake alert chronically ill-appearing no acute distress  HENT: Normocephalic, Atraumatic  Cardiovascular: Normal heart rate, Normal rhythm, No murmurs, No rubs, No gallops.   Thorax & Lungs: Normal breath sounds, No respiratory distress, No wheezing,  Abdomen: Soft nontender nondistended.   Clement catheter " in place.  Mild irritation of the skin of the scrotum but no signs of cellulitis.  Skin: Warm, Dry, No erythema, No rash.  Musculoskeletal: Good range of motion in all major joints.  Neurologic: Alert, No focal deficits noted.   Psychiatric: Affect normal    Results for orders placed or performed during the hospital encounter of 05/18/21   URINALYSIS CULTURE, IF INDICATED    Specimen: Urine   Result Value Ref Range    Micro Urine Req Microscopic       COURSE & MEDICAL DECISION MAKING  Pertinent Labs & Imaging studies reviewed. (See chart for details)    Patient presents with a clogged Clement catheter.  Before I saw the nurses have irrigated his Clement catheter a lot of urine came out and he feels relieved.  The Clement in the urine both are quite dirty so we will change these place a new Clement catheter and start him on Augmentin.  Augmentin was chosen after discussion with pharmacy and review his recent culture results.  Patient is otherwise afebrile and nontoxic.      He will be discharged home to follow-up with his doctor return for worsening symptoms or other concerns.    Patient is given a dose of Augmentin and prescribed Augmentin for home.  He is given return precautions and follow-up shortness.  He is referred to the urologist.    At this point the patient looks well he is comfortable going home.  Urinalysis is pending this is not any change or therapy.  He is not febrile ill or toxic.  Questions are answered is agreeable to plan and discharged in good condition      Jared Rowan M.D.  [address]  [facility] [address]  [phone]    Schedule an appointment as soon as possible for a visit in 2 days          FINAL IMPRESSION  1. Problem with Clement catheter, initial encounter ([facility])     2. Acute UTI         3.         Electronically signed by: Mook Caballero M.D., 5/18/2021 6:51 PM

## 2021-05-19 NOTE — DISCHARGE INSTRUCTIONS
Return to the emergency department for problems with your Clement catheter, problems urinating, or other concerns.  Follow-up with your doctor.  Follow-up with urologist.  Take antibiotics as prescribed.  
Wounds

## 2021-05-21 LAB
BACTERIA UR CULT: ABNORMAL
SIGNIFICANT IND 70042: ABNORMAL
SITE SITE: ABNORMAL
SOURCE SOURCE: ABNORMAL

## 2021-05-21 NOTE — ED NOTES
"2311- Report called to Dr. Jacinto. ED Positive Culture Follow-up/Notification Note:    Date: 5/21/21     Patient seen in the ED on 5/18/2021 for a clogged lazo catheter. Patient reports discomfort since there has been no drainage. Patient received Augmentin 875mg PO x 1 in the ED.     1. Problem with Lazo catheter, initial encounter (HCC)    2. Acute UTI       Discharge Medication List as of 5/18/2021 10:15 PM      START taking these medications    Details   amoxicillin-clavulanate (AUGMENTIN) 875-125 MG Tab Take 1 tablet by mouth 2 times a day for 5 days., Disp-10 tablet, R-0, Normal             Allergies: Patient has no known allergies.     Vitals:    05/18/21 2030 05/18/21 2100 05/18/21 2130 05/18/21 2200   BP: 119/56 125/58 117/60    Pulse: 67 66 64    Resp:       Temp:    36.4 °C (97.5 °F)   TempSrc:    Oral   SpO2: 95% 95% 95%    Weight:       Height:           Final cultures:   Results     Procedure Component Value Units Date/Time    URINE CULTURE(NEW) [[id number]]  (Abnormal)  (Susceptibility) Collected: 05/18/21 2028    Order Status: Completed Specimen: Urine Updated: 05/21/21 0902     Significant Indicator POS     Source UR     Site -     Culture Result -      Candida albicans  10-50,000 cfu/mL        Enterococcus faecalis  10-50,000 cfu/mL      Narrative:      Indication for culture:->Patient WITHOUT an indwelling Lazo  catheter in place with new onset of Dysuria, Frequency,  Urgency, and/or Suprapubic pain    Susceptibility     Enterococcus faecalis (2)     Antibiotic Interpretation Microscan Method Status    Ciprofloxacin<!--EPICS--><sup style='font-weight:normal'>*</sup></font> Resistant >2 mcg/mL ROHAN Final    Daptomycin Sensitive 1 mcg/mL ROHAN Final    Nitrofurantoin Sensitive <=32 mcg/mL ROHAN Final    Gent Synergy<!--EPICS--><sup style='font-weight:normal'>*</sup></font> Resistant >500 mcg/mL ROHAN Final    Levofloxacin<!--EPICS--><sup style='font-weight:normal'>*</sup></font> Resistant >4 mcg/mL ROHAN Final    Ampicillin Sensitive <=2  mcg/mL ROHAN Final    Linezolid<!--EPICS--><sup style='font-weight:normal'>*</sup></font> Sensitive <=1 mcg/mL ROHAN Final    Vancomycin Sensitive 1 mcg/mL ROHAN Final    Rifampin<!--EPICS--><sup style='font-weight:normal'>*</sup></font> Sensitive <=1 mcg/mL ROHAN Final    Strep Synergy<!--EPICS--><sup style='font-weight:normal'>*</sup></font> Resistant >1000 mcg/mL ROHAN Final    Tetracycline Resistant >8 mcg/mL ROHAN Final    Tigecycline<!--EPICS--><sup style='font-weight:normal'>*</sup></font> Sensitive <=0.25 mcg/mL ROHAN Final          <!--EPICS--><sup style='font-weight:normal'>*</sup></font> Suppressed Antibiotic                 URINALYSIS CULTURE, IF INDICATED [[id number]]  (Abnormal) Collected: 05/18/21 2028    Order Status: Completed Specimen: Urine Updated: 05/18/21 2147     Color Yellow     Character Hazy     Specific Gravity 1.015     Ph 5.5     Glucose >=1000 mg/dL      Ketones Negative mg/dL      Protein 30 mg/dL      Bilirubin Negative     Urobilinogen, Urine 0.2     Nitrite Negative     Leukocyte Esterase Small     Occult Blood Small     Micro Urine Req Microscopic    Narrative:      Indication for culture:->Patient WITHOUT an indwelling Lazo  catheter in place with new onset of Dysuria, Frequency,  Urgency, and/or Suprapubic pain          Plan:   Patient's lazo catheter was irrigated and replaced in the ED. Patient was AF & WBC of 9.6 in the ED. Cultures grew Enterococcus and Candida which may likely be colonizers on the patient's catheter and may not need warrant antibitiocs. Called patient to assess how he was feeling and to ask if he has ever had a UTI before and if this felt similar. However, unable to speak with the patient and left a voicemail stating to call back. If patient calls back, will assess his urinary complaints to see if antibiotics are needed or if they can be discontinued.     Maylin Marquez, PharmD

## 2021-05-25 ENCOUNTER — NURSE TRIAGE (OUTPATIENT)
Dept: HEALTH INFORMATION MANAGEMENT | Facility: OTHER | Age: 76
End: 2021-05-25

## 2021-06-19 ENCOUNTER — APPOINTMENT (OUTPATIENT)
Dept: RADIOLOGY | Facility: MEDICAL CENTER | Age: 76
DRG: 638 | End: 2021-06-19
Attending: EMERGENCY MEDICINE
Payer: MEDICARE

## 2021-06-19 ENCOUNTER — HOSPITAL ENCOUNTER (INPATIENT)
Facility: MEDICAL CENTER | Age: 76
LOS: 20 days | DRG: 638 | End: 2021-07-15
Attending: EMERGENCY MEDICINE | Admitting: INTERNAL MEDICINE
Payer: MEDICARE

## 2021-06-19 DIAGNOSIS — R54 FRAILTY SYNDROME IN GERIATRIC PATIENT: ICD-10-CM

## 2021-06-19 DIAGNOSIS — S70.01XA CONTUSION OF RIGHT HIP, INITIAL ENCOUNTER: ICD-10-CM

## 2021-06-19 DIAGNOSIS — R29.6 FREQUENT FALLS: ICD-10-CM
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
## 2021-06-19 DIAGNOSIS — N17.9 AKI (ACUTE KIDNEY INJURY) (HCC): ICD-10-CM

## 2021-06-19 DIAGNOSIS — R33.9 URINARY RETENTION: ICD-10-CM

## 2021-06-19 DIAGNOSIS — F33.1 MODERATE EPISODE OF RECURRENT MAJOR DEPRESSIVE DISORDER (HCC): ICD-10-CM

## 2021-06-19 DIAGNOSIS — R73.9 HYPERGLYCEMIA: ICD-10-CM

## 2021-06-19 DIAGNOSIS — E53.8 VITAMIN B12 DEFICIENCY: ICD-10-CM

## 2021-06-19 DIAGNOSIS — R53.1 WEAKNESS: ICD-10-CM

## 2021-06-19 DIAGNOSIS — N28.9 ACUTE RENAL INSUFFICIENCY: ICD-10-CM

## 2021-06-19 LAB
ALBUMIN SERPL BCP-MCNC: 3.5 G/DL (ref 3.2–4.9)
ALBUMIN/GLOB SERPL: 1.1 G/DL
ALP SERPL-CCNC: 137 U/L (ref 30–99)
ALT SERPL-CCNC: 21 U/L (ref 2–50)
ANION GAP SERPL CALC-SCNC: 10 MMOL/L (ref 7–16)
AST SERPL-CCNC: 20 U/L (ref 12–45)
BASOPHILS # BLD AUTO: 0.3 % (ref 0–1.8)
BASOPHILS # BLD: 0.03 K/UL (ref 0–0.12)
BILIRUB SERPL-MCNC: 0.3 MG/DL (ref 0.1–1.5)
BUN SERPL-MCNC: 15 MG/DL (ref 8–22)
CALCIUM SERPL-MCNC: 8.7 MG/DL (ref 8.5–10.5)
CHLORIDE SERPL-SCNC: 103 MMOL/L (ref 96–112)
CO2 SERPL-SCNC: 26 MMOL/L (ref 20–33)
CREAT SERPL-MCNC: 2.72 MG/DL (ref 0.5–1.4)
EOSINOPHIL # BLD AUTO: 0.71 K/UL (ref 0–0.51)
EOSINOPHIL NFR BLD: 7.7 % (ref 0–6.9)
ERYTHROCYTE [DISTWIDTH] IN BLOOD BY AUTOMATED COUNT: 49.7 FL (ref 35.9–50)
EST. AVERAGE GLUCOSE BLD GHB EST-MCNC: 364 MG/DL
FLUAV RNA SPEC QL NAA+PROBE: NEGATIVE
FLUBV RNA SPEC QL NAA+PROBE: NEGATIVE
GLOBULIN SER CALC-MCNC: 3.1 G/DL (ref 1.9–3.5)
GLUCOSE BLD-MCNC: 266 MG/DL (ref 65–99)
GLUCOSE BLD-MCNC: 336 MG/DL (ref 65–99)
GLUCOSE SERPL-MCNC: 439 MG/DL (ref 65–99)
HBA1C MFR BLD: 14.3 % (ref 4–5.6)
HCT VFR BLD AUTO: 34.3 % (ref 42–52)
HGB BLD-MCNC: 11.1 G/DL (ref 14–18)
IMM GRANULOCYTES # BLD AUTO: 0.05 K/UL (ref 0–0.11)
IMM GRANULOCYTES NFR BLD AUTO: 0.5 % (ref 0–0.9)
LYMPHOCYTES # BLD AUTO: 0.89 K/UL (ref 1–4.8)
LYMPHOCYTES NFR BLD: 9.6 % (ref 22–41)
MCH RBC QN AUTO: 27.5 PG (ref 27–33)
MCHC RBC AUTO-ENTMCNC: 32.4 G/DL (ref 33.7–35.3)
MCV RBC AUTO: 85.1 FL (ref 81.4–97.8)
MONOCYTES # BLD AUTO: 0.6 K/UL (ref 0–0.85)
MONOCYTES NFR BLD AUTO: 6.5 % (ref 0–13.4)
NEUTROPHILS # BLD AUTO: 6.98 K/UL (ref 1.82–7.42)
NEUTROPHILS NFR BLD: 75.4 % (ref 44–72)
NRBC # BLD AUTO: 0 K/UL
NRBC BLD-RTO: 0 /100 WBC
PLATELET # BLD AUTO: 163 K/UL (ref 164–446)
PMV BLD AUTO: 9.3 FL (ref 9–12.9)
POTASSIUM SERPL-SCNC: 3.6 MMOL/L (ref 3.6–5.5)
PROT SERPL-MCNC: 6.6 G/DL (ref 6–8.2)
RBC # BLD AUTO: 4.03 M/UL (ref 4.7–6.1)
RSV RNA SPEC QL NAA+PROBE: NEGATIVE
SARS-COV-2 RNA RESP QL NAA+PROBE: NOTDETECTED
SODIUM SERPL-SCNC: 139 MMOL/L (ref 135–145)
SPECIMEN SOURCE: NORMAL
WBC # BLD AUTO: 9.3 K/UL (ref 4.8–10.8)

## 2021-06-19 PROCEDURE — 99285 EMERGENCY DEPT VISIT HI MDM: CPT

## 2021-06-19 PROCEDURE — 96372 THER/PROPH/DIAG INJ SC/IM: CPT

## 2021-06-19 PROCEDURE — 73552 X-RAY EXAM OF FEMUR 2/>: CPT | Mod: RT

## 2021-06-19 PROCEDURE — 700105 HCHG RX REV CODE 258: Performed by: EMERGENCY MEDICINE

## 2021-06-19 PROCEDURE — 72170 X-RAY EXAM OF PELVIS: CPT

## 2021-06-19 PROCEDURE — 700102 HCHG RX REV CODE 250 W/ 637 OVERRIDE(OP): Performed by: EMERGENCY MEDICINE

## 2021-06-19 PROCEDURE — 83036 HEMOGLOBIN GLYCOSYLATED A1C: CPT

## 2021-06-19 PROCEDURE — C9803 HOPD COVID-19 SPEC COLLECT: HCPCS | Performed by: EMERGENCY MEDICINE

## 2021-06-19 PROCEDURE — G0378 HOSPITAL OBSERVATION PER HR: HCPCS

## 2021-06-19 PROCEDURE — 700105 HCHG RX REV CODE 258: Performed by: STUDENT IN AN ORGANIZED HEALTH CARE EDUCATION/TRAINING PROGRAM

## 2021-06-19 PROCEDURE — 36415 COLL VENOUS BLD VENIPUNCTURE: CPT

## 2021-06-19 PROCEDURE — 0241U HCHG SARS-COV-2 COVID-19 NFCT DS RESP RNA 4 TRGT MIC: CPT

## 2021-06-19 PROCEDURE — 82962 GLUCOSE BLOOD TEST: CPT

## 2021-06-19 PROCEDURE — 700102 HCHG RX REV CODE 250 W/ 637 OVERRIDE(OP): Performed by: STUDENT IN AN ORGANIZED HEALTH CARE EDUCATION/TRAINING PROGRAM

## 2021-06-19 PROCEDURE — 99218 PR INITIAL OBSERVATION CARE,LEVL I: CPT | Performed by: STUDENT IN AN ORGANIZED HEALTH CARE EDUCATION/TRAINING PROGRAM

## 2021-06-19 PROCEDURE — 85025 COMPLETE CBC W/AUTO DIFF WBC: CPT

## 2021-06-19 PROCEDURE — 80053 COMPREHEN METABOLIC PANEL: CPT

## 2021-06-19 RX ORDER — INSULIN LISPRO 100 [IU]/ML
2-9 INJECTION, SOLUTION INTRAVENOUS; SUBCUTANEOUS EVERY 6 HOURS
Status: DISCONTINUED | OUTPATIENT
Start: 2021-06-19 | End: 2021-06-19

## 2021-06-19 RX ORDER — ACETAMINOPHEN 325 MG/1
650 TABLET ORAL EVERY 6 HOURS PRN
Status: DISCONTINUED | OUTPATIENT
Start: 2021-06-19 | End: 2021-06-20

## 2021-06-19 RX ORDER — POLYETHYLENE GLYCOL 3350 17 G/17G
1 POWDER, FOR SOLUTION ORAL
Status: DISCONTINUED | OUTPATIENT
Start: 2021-06-19 | End: 2021-07-15 | Stop reason: HOSPADM

## 2021-06-19 RX ORDER — SODIUM CHLORIDE 9 MG/ML
2000 INJECTION, SOLUTION INTRAVENOUS ONCE
Status: COMPLETED | OUTPATIENT
Start: 2021-06-19 | End: 2021-06-19

## 2021-06-19 RX ORDER — ONDANSETRON 2 MG/ML
4 INJECTION INTRAMUSCULAR; INTRAVENOUS EVERY 4 HOURS PRN
Status: DISCONTINUED | OUTPATIENT
Start: 2021-06-19 | End: 2021-07-13

## 2021-06-19 RX ORDER — GAUZE BANDAGE 2" X 2"
100 BANDAGE TOPICAL DAILY
Status: DISCONTINUED | OUTPATIENT
Start: 2021-06-20 | End: 2021-07-15 | Stop reason: HOSPADM

## 2021-06-19 RX ORDER — INSULIN LISPRO 100 [IU]/ML
2-9 INJECTION, SOLUTION INTRAVENOUS; SUBCUTANEOUS EVERY 6 HOURS
Status: DISCONTINUED | OUTPATIENT
Start: 2021-06-20 | End: 2021-06-20

## 2021-06-19 RX ORDER — SODIUM CHLORIDE, SODIUM LACTATE, POTASSIUM CHLORIDE, CALCIUM CHLORIDE 600; 310; 30; 20 MG/100ML; MG/100ML; MG/100ML; MG/100ML
INJECTION, SOLUTION INTRAVENOUS CONTINUOUS
Status: DISCONTINUED | OUTPATIENT
Start: 2021-06-19 | End: 2021-06-21

## 2021-06-19 RX ORDER — FINASTERIDE 5 MG/1
5 TABLET, FILM COATED ORAL DAILY
Status: DISCONTINUED | OUTPATIENT
Start: 2021-06-20 | End: 2021-07-15 | Stop reason: HOSPADM

## 2021-06-19 RX ORDER — TAMSULOSIN HYDROCHLORIDE 0.4 MG/1
0.4 CAPSULE ORAL
Status: DISCONTINUED | OUTPATIENT
Start: 2021-06-20 | End: 2021-06-20

## 2021-06-19 RX ORDER — ONDANSETRON 4 MG/1
4 TABLET, ORALLY DISINTEGRATING ORAL EVERY 4 HOURS PRN
Status: DISCONTINUED | OUTPATIENT
Start: 2021-06-19 | End: 2021-07-15 | Stop reason: HOSPADM

## 2021-06-19 RX ORDER — DEXTROSE MONOHYDRATE 25 G/50ML
50 INJECTION, SOLUTION INTRAVENOUS
Status: DISCONTINUED | OUTPATIENT
Start: 2021-06-19 | End: 2021-06-20

## 2021-06-19 RX ORDER — ENALAPRILAT 1.25 MG/ML
1.25 INJECTION INTRAVENOUS EVERY 6 HOURS PRN
Status: DISCONTINUED | OUTPATIENT
Start: 2021-06-19 | End: 2021-06-20

## 2021-06-19 RX ORDER — HEPARIN SODIUM 5000 [USP'U]/ML
5000 INJECTION, SOLUTION INTRAVENOUS; SUBCUTANEOUS EVERY 8 HOURS
Status: DISCONTINUED | OUTPATIENT
Start: 2021-06-19 | End: 2021-06-21

## 2021-06-19 RX ORDER — LABETALOL HYDROCHLORIDE 5 MG/ML
10 INJECTION, SOLUTION INTRAVENOUS EVERY 4 HOURS PRN
Status: DISCONTINUED | OUTPATIENT
Start: 2021-06-19 | End: 2021-06-20

## 2021-06-19 RX ORDER — AMOXICILLIN 250 MG
2 CAPSULE ORAL 2 TIMES DAILY
Status: DISCONTINUED | OUTPATIENT
Start: 2021-06-19 | End: 2021-07-15 | Stop reason: HOSPADM

## 2021-06-19 RX ORDER — DEXTROSE MONOHYDRATE 25 G/50ML
50 INJECTION, SOLUTION INTRAVENOUS
Status: DISCONTINUED | OUTPATIENT
Start: 2021-06-19 | End: 2021-06-19

## 2021-06-19 RX ORDER — BISACODYL 10 MG
10 SUPPOSITORY, RECTAL RECTAL
Status: DISCONTINUED | OUTPATIENT
Start: 2021-06-19 | End: 2021-07-15 | Stop reason: HOSPADM

## 2021-06-19 RX ADMIN — SODIUM CHLORIDE 2000 ML: 9 INJECTION, SOLUTION INTRAVENOUS at 18:09

## 2021-06-19 RX ADMIN — INSULIN HUMAN 6 UNITS: 100 INJECTION, SOLUTION PARENTERAL at 18:46

## 2021-06-19 RX ADMIN — SODIUM CHLORIDE, POTASSIUM CHLORIDE, SODIUM LACTATE AND CALCIUM CHLORIDE: 600; 310; 30; 20 INJECTION, SOLUTION INTRAVENOUS at 23:52

## 2021-06-19 ASSESSMENT — ENCOUNTER SYMPTOMS
CHILLS: 0
FALLS: 1
ORTHOPNEA: 0
WEAKNESS: 0
DIARRHEA: 0
FEVER: 0
SINUS PAIN: 0
MYALGIAS: 0
BLURRED VISION: 0
EYE PAIN: 0
FOCAL WEAKNESS: 0
VOMITING: 0
NAUSEA: 0
HEADACHES: 0
HEMOPTYSIS: 0
TREMORS: 0
COUGH: 0

## 2021-06-19 ASSESSMENT — FIBROSIS 4 INDEX: FIB4 SCORE: 2.06

## 2021-06-19 ASSESSMENT — LIFESTYLE VARIABLES: DO YOU DRINK ALCOHOL: NO

## 2021-06-19 NOTE — ED TRIAGE NOTES
Chief Complaint   Patient presents with   • T-5000 GLF     While at [facility], showering, slipped out of chair in shower, denies hitting head, -LOC   • Hip Pain     R, pedal pulse 2+   • Failure to Thrive   • Bug Bite     t/o body x1 month     Patient bib EMS from [facility], patient homeless, showering at facility while slipped and fell out of shower chair, denies hitting head, -LOC.      Patient A&O changed into gown, chart up for ERP.

## 2021-06-19 NOTE — ED PROVIDER NOTES
ED Provider  Scribed for Evans Sandoval D.O. by Min Girard. 2021  4:06 PM    Means of arrival: EMS  History obtained from: Patient  History limited by: None    CHIEF COMPLAINT  Chief Complaint   Patient presents with    T-5000 GLF     While at [facility], showering, slipped out of chair in shower, denies hitting head, -LOC    Hip Pain     R, pedal pulse 2+    Failure to Thrive    Bug Bite     t/o body x1 month       HPI  Hugh Núñez is a 75 y.o. male with a history of homelessness and diabetes who presents to the Emergency Department vis EMS for evaluation of right hip pain onset today. The patient states he was in the shower earlier today when he slipped and fell onto his right hip. He denies experiencing any head trauma, loss of consciousness, or episodes of vomiting during or after the event. Patient states he has fallen several other times in the past. He reports he has had a catheter in place for several years and is currently living in a homeless shelter. Patient adds while living at the shelter he received multiple bug bites throughout his body. No alleviating factors were noted, and pain is exacerbated by walking. Patient has associated bug bites and weakness, but denies coughing, congesting, vomiting, and diarrhea. He has no known allergies and takes insulin. Patient does not have a PCP.     REVIEW OF SYSTEMS  See HPI for further details. All other systems are negative.     PAST MEDICAL HISTORY   has a past medical history of Fall, Polio (Ag of 5), and Type II or unspecified type diabetes mellitus without mention of complication, not stated as uncontrolled.    SOCIAL HISTORY  Social History     Tobacco Use    Smoking status: Former Smoker     Years: 5.00     Types: Cigarettes     Start date: 1955     Quit date: 1960     Years since quittin.7    Smokeless tobacco: Never Used   Vaping Use    Vaping Use: Never used   Substance and Sexual Activity    Alcohol use: Not Currently     " Alcohol/week: 0.6 oz     Types: 1 Cans of beer per week    Drug use: No    Sexual activity: None noted       SURGICAL HISTORY   has a past surgical history that includes appendectomy and tonsillectomy.    CURRENT MEDICATIONS  Current Outpatient Medications   Medication Instructions    cyanocobalamin (VITAMIN B12) 1,000 mcg, Oral, DAILY    finasteride (PROSCAR) 5 mg, Oral, DAILY    insulin glargine (LANTUS) 15 Units, Subcutaneous, EVERY MORNING    insulin regular (HUMULIN R) 2-12 Units, Subcutaneous, 4 TIMES DAILY - BEFORE MEALS , NIGHTLY    polyethylene glycol/lytes (MIRALAX) 17 g Pack 1 Packet, Oral, 1 TIME DAILY PRN    sulfamethoxazole-trimethoprim (BACTRIM DS) 800-160 MG tablet 1 tablet, Oral, 2 TIMES DAILY    tamsulosin (FLOMAX) 0.4 mg, Oral, AFTER BREAKFAST    vitamin D (VITAMIND D3) 1,000 Units, Oral, DAILY       ALLERGIES  No Known Allergies    PHYSICAL EXAM  VITAL SIGNS: /65   Pulse 68   Temp 37.2 °C (99 °F) (Temporal)   Resp 16   Ht 1.778 m (5' 10\")   Wt 54.4 kg (120 lb)   SpO2 96%   BMI 17.22 kg/m²   Constitutional: Alert in no apparent distress.  HENT:  No signs of trauma, mucous membranes are moist  Eyes: Right eye has crusted yellow drainage. Conjunctiva normal, Non-icteric.   Neck: Normal range of motion, No tenderness, Supple.  Lymphatic: No lymphadenopathy noted.   Cardiovascular: Regular rate and rhythm, no murmurs.   Thorax & Lungs: Normal breath sounds, No respiratory distress, No wheezing, No chest tenderness.   Abdomen: Bowel sounds normal, Soft, No tenderness, No masses, No pulsatile masses. No peritoneal signs.  Genitourinary: Urinary catheter in place  Skin: Multiple insect bite marks. No signs of infection. Warm, Dry, normal color.   Back: No bony tenderness, No CVA tenderness.   Extremities: No edema, No tenderness, No cyanosis  Musculoskeletal: Right hip is without deformity or shortening. Range of motion intact with mild tenderness.  Neurologic: Alert and oriented x4, Normal " motor function, Normal sensory function, No focal deficits noted.   Psychiatric: Affect normal, Judgment normal, Mood normal.     DIAGNOSTIC STUDIES / PROCEDURES      LABS  Results for orders placed or performed during the hospital encounter of 06/19/21   CBC WITH DIFFERENTIAL   Result Value Ref Range    WBC 9.3 4.8 - 10.8 K/uL    RBC 4.03 (L) 4.70 - 6.10 M/uL    Hemoglobin 11.1 (L) 14.0 - 18.0 g/dL    Hematocrit 34.3 (L) 42.0 - 52.0 %    MCV 85.1 81.4 - 97.8 fL    MCH 27.5 27.0 - 33.0 pg    MCHC 32.4 (L) 33.7 - 35.3 g/dL    RDW 49.7 35.9 - 50.0 fL    Platelet Count 163 (L) 164 - 446 K/uL    MPV 9.3 9.0 - 12.9 fL    Neutrophils-Polys 75.40 (H) 44.00 - 72.00 %    Lymphocytes 9.60 (L) 22.00 - 41.00 %    Monocytes 6.50 0.00 - 13.40 %    Eosinophils 7.70 (H) 0.00 - 6.90 %    Basophils 0.30 0.00 - 1.80 %    Immature Granulocytes 0.50 0.00 - 0.90 %    Nucleated RBC 0.00 /100 WBC    Neutrophils (Absolute) 6.98 1.82 - 7.42 K/uL    Lymphs (Absolute) 0.89 (L) 1.00 - 4.80 K/uL    Monos (Absolute) 0.60 0.00 - 0.85 K/uL    Eos (Absolute) 0.71 (H) 0.00 - 0.51 K/uL    Baso (Absolute) 0.03 0.00 - 0.12 K/uL    Immature Granulocytes (abs) 0.05 0.00 - 0.11 K/uL    NRBC (Absolute) 0.00 K/uL   COMP METABOLIC PANEL   Result Value Ref Range    Sodium 139 135 - 145 mmol/L    Potassium 3.6 3.6 - 5.5 mmol/L    Chloride 103 96 - 112 mmol/L    Co2 26 20 - 33 mmol/L    Anion Gap 10.0 7.0 - 16.0    Glucose 439 (H) 65 - 99 mg/dL    Bun 15 8 - 22 mg/dL    Creatinine 2.72 (H) 0.50 - 1.40 mg/dL    Calcium 8.7 8.5 - 10.5 mg/dL    AST(SGOT) 20 12 - 45 U/L    ALT(SGPT) 21 2 - 50 U/L    Alkaline Phosphatase 137 (H) 30 - 99 U/L    Total Bilirubin 0.3 0.1 - 1.5 mg/dL    Albumin 3.5 3.2 - 4.9 g/dL    Total Protein 6.6 6.0 - 8.2 g/dL    Globulin 3.1 1.9 - 3.5 g/dL    A-G Ratio 1.1 g/dL   ESTIMATED GFR   Result Value Ref Range    GFR If  28 (A) >60 mL/min/1.73 m 2    GFR If Non African American 23 (A) >60 mL/min/1.73 m 2   COV-2, FLU A/B, AND  RSV BY PCR (2-4 HOURS [employer]): Collect NP swab in VTM    Specimen: Respirate   Result Value Ref Range    Influenza virus A RNA Negative Negative    Influenza virus B, PCR Negative Negative    RSV, PCR Negative Negative    SARS-CoV-2 by PCR NotDetected     SARS-CoV-2 Source NP Swab    HEMOGLOBIN A1C   Result Value Ref Range    Glycohemoglobin 14.3 (H) 4.0 - 5.6 %    Est Avg Glucose 364 mg/dL   POCT glucose device results   Result Value Ref Range    Glucose - Accu-Ck 336 (H) 65 - 99 mg/dL   POCT glucose device results   Result Value Ref Range    Glucose - Accu-Ck 266 (H) 65 - 99 mg/dL     All labs reviewed by me.    RADIOLOGY  DX-FEMUR-2+ RIGHT   Final Result      No radiographic evidence of acute traumatic injury.      Moderate right hip osteoarthritis      DX-PELVIS-1 OR 2 VIEWS   Final Result      No radiographic evidence of acute displaced fracture      Moderate right, mild left hip osteoarthritis        The radiologist's interpretations of all radiological studies have been reviewed by me.    Films have been independently by me      COURSE  Pertinent Labs & Imaging studies reviewed. (See chart for details)    4:06 PM - Patient seen and examined at bedside. Discussed plan of care. Ordered for DX-Pelvis, DX-Femur Right, CBC with differential, and CMP to evaluate his symptoms.     5:05 PM - Ordered GFR to evaluate his symptoms    5:56 PM - Patient will be medicated with NS infusion 2000 mL and insulin to treat his symptoms. Ordered COV-2, Flu A/B, nd RSV by PCR to evaluate his symptoms    5:57 PM - Paged Hospitalist    6:03 PM - I discussed the patient's case and the above findings with Dr. Reese (Hospitalist) who agrees to admit the patient for hospitalization.    6:05 PM - Patient was reevaluated at bedside. Discussed lab and radiology results with the patient and the plan for admission was discussued. Patient was given the opportunity to ask questions. Patient verbalizes understanding and agreement to this plan  of care.       MEDICAL DECISION MAKING  This is a 75 y.o. male who presents with a generalized weakness, he had a fall while he was in the shower.  He did have right hip pain and x-rays show no fracture.    He has known history of diabetes, he has been taking his insulin.  His blood sugar is significantly elevated.  There is no signs of other electrolyte imbalance but his creatinine has elevated from his last lab draw.  This is concerning for hypovolemia/dehydration.  And uncontrolled hyperglycemia.  The patient was given insulin and IV fluids here.  Patient will be admitted for further evaluation and treatment.      DISPOSITION:  Patient will be hospitalized by Dr. Reese in guarded condition.     FINAL IMPRESSION  1. Hyperglycemia    2. Contusion of right hip, initial encounter    3. Acute renal insufficiency          -ADMIT-      FINAL IMPRESSION  1. Hyperglycemia    2. Contusion of right hip, initial encounter    3. Acute renal insufficiency         Min CARDENAS (Scribe), am scribing for, and in the presence of, Evans Sandoval D.O..    Electronically signed by: Min Girard (Panchoibdanuta), 6/19/2021    IEvans D.O. personally performed the services described in this documentation, as scribed by Min Girard in my presence, and it is both accurate and complete. C.    The note accurately reflects work and decisions made by me.  Evans Sandoval D.O.  6/19/2021  8:41 PM

## 2021-06-20 PROBLEM — S70.01XA CONTUSION OF RIGHT HIP: Status: ACTIVE | Noted: 2021-06-20

## 2021-06-20 LAB
GLUCOSE BLD-MCNC: 118 MG/DL (ref 65–99)
GLUCOSE BLD-MCNC: 168 MG/DL (ref 65–99)
GLUCOSE BLD-MCNC: 250 MG/DL (ref 65–99)
GLUCOSE BLD-MCNC: 293 MG/DL (ref 65–99)

## 2021-06-20 PROCEDURE — G0378 HOSPITAL OBSERVATION PER HR: HCPCS

## 2021-06-20 PROCEDURE — 96372 THER/PROPH/DIAG INJ SC/IM: CPT

## 2021-06-20 PROCEDURE — 700102 HCHG RX REV CODE 250 W/ 637 OVERRIDE(OP): Performed by: STUDENT IN AN ORGANIZED HEALTH CARE EDUCATION/TRAINING PROGRAM

## 2021-06-20 PROCEDURE — 82962 GLUCOSE BLOOD TEST: CPT

## 2021-06-20 PROCEDURE — A9270 NON-COVERED ITEM OR SERVICE: HCPCS | Performed by: STUDENT IN AN ORGANIZED HEALTH CARE EDUCATION/TRAINING PROGRAM

## 2021-06-20 PROCEDURE — 700111 HCHG RX REV CODE 636 W/ 250 OVERRIDE (IP): Performed by: STUDENT IN AN ORGANIZED HEALTH CARE EDUCATION/TRAINING PROGRAM

## 2021-06-20 PROCEDURE — 99225 PR SUBSEQUENT OBSERVATION CARE,LEVEL II: CPT | Performed by: STUDENT IN AN ORGANIZED HEALTH CARE EDUCATION/TRAINING PROGRAM

## 2021-06-20 RX ORDER — OXYCODONE HYDROCHLORIDE 5 MG/1
5 TABLET ORAL EVERY 6 HOURS PRN
Status: DISCONTINUED | OUTPATIENT
Start: 2021-06-20 | End: 2021-07-15 | Stop reason: HOSPADM

## 2021-06-20 RX ORDER — INSULIN LISPRO 100 [IU]/ML
2-9 INJECTION, SOLUTION INTRAVENOUS; SUBCUTANEOUS EVERY 6 HOURS
Status: DISCONTINUED | OUTPATIENT
Start: 2021-06-20 | End: 2021-06-21

## 2021-06-20 RX ORDER — DEXTROSE MONOHYDRATE 25 G/50ML
50 INJECTION, SOLUTION INTRAVENOUS
Status: DISCONTINUED | OUTPATIENT
Start: 2021-06-20 | End: 2021-06-21

## 2021-06-20 RX ORDER — OXYCODONE HYDROCHLORIDE 5 MG/1
5 TABLET ORAL EVERY 6 HOURS PRN
Status: DISCONTINUED | OUTPATIENT
Start: 2021-06-20 | End: 2021-06-20

## 2021-06-20 RX ORDER — ACETAMINOPHEN 500 MG
1000 TABLET ORAL 3 TIMES DAILY PRN
Status: DISCONTINUED | OUTPATIENT
Start: 2021-06-20 | End: 2021-07-15 | Stop reason: HOSPADM

## 2021-06-20 RX ADMIN — THERA TABS 1 TABLET: TAB at 05:28

## 2021-06-20 RX ADMIN — ACETAMINOPHEN 650 MG: 325 TABLET, FILM COATED ORAL at 00:22

## 2021-06-20 RX ADMIN — OXYCODONE HYDROCHLORIDE 5 MG: 5 TABLET ORAL at 00:22

## 2021-06-20 RX ADMIN — HEPARIN SODIUM 5000 UNITS: 5000 INJECTION, SOLUTION INTRAVENOUS; SUBCUTANEOUS at 22:26

## 2021-06-20 RX ADMIN — DOCUSATE SODIUM 50 MG AND SENNOSIDES 8.6 MG 2 TABLET: 8.6; 5 TABLET, FILM COATED ORAL at 16:36

## 2021-06-20 RX ADMIN — HEPARIN SODIUM 5000 UNITS: 5000 INJECTION, SOLUTION INTRAVENOUS; SUBCUTANEOUS at 16:35

## 2021-06-20 RX ADMIN — DOCUSATE SODIUM 50 MG AND SENNOSIDES 8.6 MG 2 TABLET: 8.6; 5 TABLET, FILM COATED ORAL at 05:28

## 2021-06-20 RX ADMIN — INSULIN GLARGINE 10 UNITS: 100 INJECTION, SOLUTION SUBCUTANEOUS at 17:04

## 2021-06-20 RX ADMIN — Medication 100 MG: at 05:28

## 2021-06-20 RX ADMIN — INSULIN LISPRO 5 UNITS: 100 INJECTION, SOLUTION INTRAVENOUS; SUBCUTANEOUS at 17:05

## 2021-06-20 RX ADMIN — FINASTERIDE 5 MG: 5 TABLET, FILM COATED ORAL at 05:28

## 2021-06-20 RX ADMIN — INSULIN LISPRO 3 UNITS: 100 INJECTION, SOLUTION INTRAVENOUS; SUBCUTANEOUS at 23:55

## 2021-06-20 RX ADMIN — INSULIN LISPRO 2 UNITS: 100 INJECTION, SOLUTION INTRAVENOUS; SUBCUTANEOUS at 05:36

## 2021-06-20 RX ADMIN — TAMSULOSIN HYDROCHLORIDE 0.4 MG: 0.4 CAPSULE ORAL at 09:21

## 2021-06-20 RX ADMIN — INSULIN LISPRO 3 UNITS: 100 INJECTION, SOLUTION INTRAVENOUS; SUBCUTANEOUS at 11:59

## 2021-06-20 RX ADMIN — HEPARIN SODIUM 5000 UNITS: 5000 INJECTION, SOLUTION INTRAVENOUS; SUBCUTANEOUS at 05:28

## 2021-06-20 ASSESSMENT — ENCOUNTER SYMPTOMS
HEADACHES: 0
FLANK PAIN: 0
SINUS PAIN: 0
BLOOD IN STOOL: 0
FEVER: 0
ABDOMINAL PAIN: 0
FALLS: 1
SORE THROAT: 0
NECK PAIN: 0
VOMITING: 0
SHORTNESS OF BREATH: 0
MYALGIAS: 0
EYE PAIN: 0
DIARRHEA: 0
BACK PAIN: 0
DEPRESSION: 1
COUGH: 0
NERVOUS/ANXIOUS: 0
WEAKNESS: 1
BRUISES/BLEEDS EASILY: 0
CHILLS: 0
NAUSEA: 0
DIZZINESS: 0
CONSTIPATION: 0
WEIGHT LOSS: 1

## 2021-06-20 ASSESSMENT — COGNITIVE AND FUNCTIONAL STATUS - GENERAL
WALKING IN HOSPITAL ROOM: A LOT
TOILETING: A LITTLE
DAILY ACTIVITIY SCORE: 20
SUGGESTED CMS G CODE MODIFIER DAILY ACTIVITY: CJ
CLIMB 3 TO 5 STEPS WITH RAILING: TOTAL
DRESSING REGULAR LOWER BODY CLOTHING: A LITTLE
DRESSING REGULAR UPPER BODY CLOTHING: A LITTLE
STANDING UP FROM CHAIR USING ARMS: A LOT
MOVING FROM LYING ON BACK TO SITTING ON SIDE OF FLAT BED: A LOT
SUGGESTED CMS G CODE MODIFIER MOBILITY: CL
MOBILITY SCORE: 13
HELP NEEDED FOR BATHING: A LITTLE
MOVING TO AND FROM BED TO CHAIR: A LITTLE
TURNING FROM BACK TO SIDE WHILE IN FLAT BAD: A LITTLE

## 2021-06-20 ASSESSMENT — PAIN DESCRIPTION - PAIN TYPE
TYPE: ACUTE PAIN

## 2021-06-20 ASSESSMENT — PATIENT HEALTH QUESTIONNAIRE - PHQ9
2. FEELING DOWN, DEPRESSED, IRRITABLE, OR HOPELESS: NOT AT ALL
SUM OF ALL RESPONSES TO PHQ9 QUESTIONS 1 AND 2: 0
1. LITTLE INTEREST OR PLEASURE IN DOING THINGS: NOT AT ALL

## 2021-06-20 NOTE — H&P
Hospital Medicine History & Physical Note    Date of Service  6/19/2021    Primary Care Physician  Pcp Pt States None    Consultants  None    Code Status  Full Code    Chief Complaint  Chief Complaint   Patient presents with   • T-5000 GLF     While at [facility], showering, slipped out of chair in shower, denies hitting head, -LOC   • Hip Pain     R, pedal pulse 2+   • Failure to Thrive   • Bug Bite     t/o body x1 month       History of Presenting Illness  75 y.o. male who presented 6/19/2021 with past medical history of diabetes, homelessness presented to the ER via EMS for evaluation of right hip pain after he had a fall in the shower hitting his right hip.  Patient denies any head trauma or LOC, respiratory vomiting, fever chills, nausea, vomiting abdominal habitus.  The ER patient received x-ray of right hip no fracture was noted.  However his labs were significant for SHAHID, and uncontrolled blood sugar of 400, A1c of 14.  Bicarb of 26, anion gap 10 patient received IV fluid bolus in the ER, regular insulin admitted to floor for IV hydration, blood sugar control.    Review of Systems  Review of Systems   Constitutional: Negative for chills and fever.   HENT: Negative for ear pain and sinus pain.    Eyes: Negative for blurred vision and pain.   Respiratory: Negative for cough and hemoptysis.    Cardiovascular: Negative for chest pain and orthopnea.   Gastrointestinal: Negative for diarrhea, nausea and vomiting.   Genitourinary: Negative for dysuria and hematuria.   Musculoskeletal: Positive for falls and joint pain. Negative for myalgias.   Skin: Negative for itching.   Neurological: Negative for tremors, focal weakness, weakness and headaches.   Psychiatric/Behavioral: Negative for suicidal ideas.       Past Medical History   has a past medical history of Fall, Polio (Ag of 5), and Type II or unspecified type diabetes mellitus without mention of complication, not stated as uncontrolled.    Surgical History    has a past surgical history that includes pr appendectomy and tonsillectomy.     Family History  family history is not on file.     Social History   reports that he quit smoking about 60 years ago. His smoking use included cigarettes. He started smoking about 66 years ago. He quit after 5.00 years of use. He has never used smokeless tobacco. He reports previous alcohol use of about 0.6 oz of alcohol per week. He reports that he does not use drugs.    Allergies  No Known Allergies    Medications  Prior to Admission Medications   Prescriptions Last Dose Informant Patient Reported? Taking?   cyanocobalamin (VITAMIN B12) 1000 MCG Tab Not Taking at Unknown time Patient No No   Sig: Take 1 Tab by mouth every day.   Patient not taking: Reported on 6/19/2021   finasteride (PROSCAR) 5 MG TABS 6/19/2021 at 0800 Patient No No   Sig: Take 1 Tab by mouth every day.   insulin glargine (LANTUS) 100 UNIT/ML Solution Not Taking at Unknown time Patient No No   Sig: Inject 15 Units under the skin every morning.   Patient not taking: Reported on 6/19/2021   insulin regular (HUMULIN R) 100 Unit/mL Solution Not Taking at Unknown time Patient No No   Sig: Inject 2-12 Units under the skin 4 Times a Day,Before Meals and at Bedtime.   Patient not taking: Reported on 6/19/2021   polyethylene glycol/lytes (MIRALAX) 17 g Pack Not Taking at Unknown time Patient No No   Sig: Take 1 Packet by mouth 1 time a day as needed (if sennosides and docusate ineffective after 24 hours).   Patient not taking: Reported on 6/19/2021   sulfamethoxazole-trimethoprim (BACTRIM DS) 800-160 MG tablet Not Taking at Unknown time Patient No No   Sig: Take 1 tablet by mouth 2 times a day.   Patient not taking: Reported on 6/19/2021   tamsulosin (FLOMAX) 0.4 MG capsule Not Taking at Unknown time Patient No No   Sig: Take 1 Cap by mouth ONE-HALF HOUR AFTER BREAKFAST.   Patient not taking: Reported on 6/19/2021   vitamin D (VITAMIND D3) 1000 UNIT Tab Not Taking at Unknown  time Patient No No   Sig: Take 1 Tab by mouth every day.   Patient not taking: Reported on 6/19/2021      Facility-Administered Medications: None       Physical Exam  Temp:  [37.2 °C (99 °F)-37.3 °C (99.2 °F)] 37.3 °C (99.2 °F)  Pulse:  [63-85] 78  Resp:  [14-18] 18  BP: (134-179)/(63-81) 154/63  SpO2:  [95 %-98 %] 97 %    Physical Exam  Vitals reviewed.   Constitutional:       Appearance: Normal appearance.      Comments: Severely malnourished   HENT:      Head: Normocephalic and atraumatic.      Right Ear: External ear normal.      Left Ear: External ear normal.      Mouth/Throat:      Mouth: Mucous membranes are dry.   Eyes:      Extraocular Movements: Extraocular movements intact.      Conjunctiva/sclera: Conjunctivae normal.      Pupils: Pupils are equal, round, and reactive to light.   Cardiovascular:      Rate and Rhythm: Normal rate and regular rhythm.      Pulses: Normal pulses.      Heart sounds: Normal heart sounds.   Pulmonary:      Effort: Pulmonary effort is normal.      Breath sounds: Normal breath sounds.   Abdominal:      General: Abdomen is flat. Bowel sounds are normal.      Palpations: Abdomen is soft.   Musculoskeletal:         General: Normal range of motion.   Skin:     General: Skin is warm.      Capillary Refill: Capillary refill takes more than 3 seconds.      Findings: Rash present.      Comments: Multiple erythematous micropapular lesion throughout the body   Neurological:      General: No focal deficit present.      Mental Status: He is alert and oriented to person, place, and time.   Psychiatric:         Mood and Affect: Mood normal.         Laboratory:  Recent Labs     06/19/21  1705   WBC 9.3   RBC 4.03*   HEMOGLOBIN 11.1*   HEMATOCRIT 34.3*   MCV 85.1   MCH 27.5   MCHC 32.4*   RDW 49.7   PLATELETCT 163*   MPV 9.3     Recent Labs     06/19/21  1705   SODIUM 139   POTASSIUM 3.6   CHLORIDE 103   CO2 26   GLUCOSE 439*   BUN 15   CREATININE 2.72*   CALCIUM 8.7     Recent Labs      06/19/21  1705   ALTSGPT 21   ASTSGOT 20   ALKPHOSPHAT 137*   TBILIRUBIN 0.3   GLUCOSE 439*         No results for input(s): NTPROBNP in the last 72 hours.      No results for input(s): TROPONINT in the last 72 hours.    Imaging:  DX-FEMUR-2+ RIGHT   Final Result      No radiographic evidence of acute traumatic injury.      Moderate right hip osteoarthritis      DX-PELVIS-1 OR 2 VIEWS   Final Result      No radiographic evidence of acute displaced fracture      Moderate right, mild left hip osteoarthritis            Assessment/Plan:  I anticipate this patient is appropriate for observation status at this time.    Type 2 diabetes mellitus with hyperglycemia, with long-term current use of insulin (HCC)- (present on admission)  Assessment & Plan  Patient not adherent to insulin  A1c 14, blood sugar in 400  Started on glargine 16 units, and sliding scale  As hypoglycemia protocol      SHAHID (acute kidney injury) (HCC)- (present on admission)  Assessment & Plan  Likely prerenal  Continue with IV fluid Ringer lactate at 100 cc/h  Follow-up serial BMP  Avoid nephrotoxins  Strict I&O    Frequent falls- (present on admission)  Assessment & Plan  -In setting of uncontrolled diabetes, dehydration, SHAHID  Follow-up PT/OT eval    Frailty syndrome in geriatric patient- (present on admission)  Assessment & Plan  BMI of 1 7  Follow-up nutrition consult  Continue with thiamine, multivitamins, supplements    Prostate enlargement- (present on admission)  Assessment & Plan  Continue with finasteride    Microcytic anemia- (present on admission)  Assessment & Plan  Stable

## 2021-06-20 NOTE — CARE PLAN
Problem: Pain - Standard  Goal: Alleviation of pain or a reduction in pain to the patient’s comfort goal  Outcome: Progressing  Note: Pt. Verbalizes satisfaction with pain control.       Problem: Skin Integrity  Goal: Skin integrity is maintained or improved  Outcome: Progressing  Note: 2 RN skin check complete. Wound protocol ordered.   The patient is Stable - Low risk of patient condition declining or worsening    Shift Goals  Clinical Goals: pain control  Patient Goals: sleep    Progress made toward(s) clinical / shift goals:  Pt. Verbalizes satisfaction with pain control. Pt. Has been asleep.      Patient is not progressing towards the following goals:    The patient is Stable - Low risk of patient condition declining or worsening    Shift Goals  Clinical Goals: pain control  Patient Goals: sleep    Progress made toward(s) clinical / shift goals:  Pt understands pain scale.      Patient is not progressing towards the following goals:

## 2021-06-20 NOTE — PROGRESS NOTES
Pt is A&O.  Very [place].  Very basic education done with pt regarding DM and nutritional health.  New leg bags ordered.  Racquel to d/d.  Declines medication for hip pain.

## 2021-06-20 NOTE — ASSESSMENT & PLAN NOTE
Due to fall at homeless shelter P[facility]  Pain has improved.   Pelvic x-ray was negative for fracture.   PT/OT recommends postacute placement.  Continue PRN acetaminophen

## 2021-06-20 NOTE — PROGRESS NOTES
"Hospital Medicine Daily Progress Note    Date of Service  2021    Chief Complaint  75 y.o. male with T2DM, BPH s/p chronic indwelling lzao, MDD admitted 2021 with fall and SHAHID.    Hospital Course  No notes on file    Interval Problem Update  Mr. Núñez reports that he was in the shower at the shelter, slipped, and fell on his Right hip.  He denies preceding presyncope, dyspnea, palpitations, chest pain, N/V.  His right hip is hurting today so he has not tried to get out of bed yet.  He reports itching and bedbugs from the shelter but \"washed them all off in the shower.\"  He has been depressed since losing his house and his wife  in the past year.  He is looking into finding placement at a living facility.  He has been losing weight over time and \"down to skin and bones.  He denies F/C, other pain, bowel/bladder dysfunction.    He was prescribed insulin previously but has not used it because he did not think his diabetes was bad and he didn't want to bother with it.  He reports his only medication PTA is finasteride for his prostate.  He has a urologist and has a urinary catheter exchange every month or he cannot urinate.  He was not aware that his increased urine production was abnormal.    Consultants/Specialty  Diabetes Educator    Code Status  Full Code    Disposition  Pending improvement in SHAHID and PT/OT recommendations for falls    Review of Systems  Review of Systems   Constitutional: Positive for malaise/fatigue and weight loss. Negative for chills and fever.   HENT: Negative for ear pain, nosebleeds, sinus pain and sore throat.    Eyes: Negative for pain.   Respiratory: Negative for cough and shortness of breath.    Cardiovascular: Negative for chest pain.   Gastrointestinal: Negative for abdominal pain, blood in stool, constipation, diarrhea, melena, nausea and vomiting.   Genitourinary: Negative for dysuria, flank pain, frequency and hematuria.   Musculoskeletal: Positive for falls. " Negative for back pain, joint pain, myalgias and neck pain.   Skin: Positive for itching and rash.   Neurological: Positive for weakness. Negative for dizziness and headaches.   Endo/Heme/Allergies: Does not bruise/bleed easily.   Psychiatric/Behavioral: Positive for depression. The patient is not nervous/anxious.         Physical Exam  Temp:  [36.7 °C (98 °F)-37.3 °C (99.2 °F)] 36.7 °C (98 °F)  Pulse:  [63-85] 81  Resp:  [14-18] 17  BP: (125-179)/(61-81) 138/71  SpO2:  [95 %-98 %] 96 %    Physical Exam  Vitals and nursing note reviewed.   Constitutional:       Appearance: He is cachectic. He is ill-appearing (Chronically).   HENT:      Head:      Comments: Bitemporal and bibuccal wasting.  No apparent pediculosis on scalp.     Nose: Nose normal.      Mouth/Throat:      Mouth: Mucous membranes are moist.      Pharynx: Oropharynx is clear.   Eyes:      General: No scleral icterus.     Conjunctiva/sclera: Conjunctivae normal.   Cardiovascular:      Rate and Rhythm: Normal rate and regular rhythm.      Pulses: Normal pulses.      Heart sounds: Normal heart sounds. No murmur heard.   No friction rub. No gallop.    Pulmonary:      Effort: Pulmonary effort is normal. No respiratory distress.      Breath sounds: Normal breath sounds. No wheezing, rhonchi or rales.   Abdominal:      General: Abdomen is flat. Bowel sounds are normal. There is no distension.      Palpations: Abdomen is soft.      Tenderness: There is no abdominal tenderness. There is no guarding or rebound.   Genitourinary:     Comments: +Clement  Musculoskeletal:         General: No tenderness (No right hip, pelvic, nor leg tenderness).      Cervical back: Neck supple.      Right lower leg: No edema.      Left lower leg: No edema.   Skin:     Findings: Lesion and rash present.      Comments: Scattered crusts and excoriations on trunk, face, and extremities.  AKs and scales on scalp and face.   Neurological:      Mental Status: He is alert.      Comments:  Appropriately conversant   Psychiatric:         Attention and Perception: Attention normal.         Mood and Affect: Mood is depressed.         Speech: Speech normal.         Behavior: Behavior normal. Behavior is cooperative.         Cognition and Memory: Cognition and memory normal.         Judgment: Judgment is inappropriate.         Fluids    Intake/Output Summary (Last 24 hours) at 6/20/2021 1358  Last data filed at 6/20/2021 1000  Gross per 24 hour   Intake 480 ml   Output --   Net 480 ml       Laboratory  Recent Labs     06/19/21  1705   WBC 9.3   RBC 4.03*   HEMOGLOBIN 11.1*   HEMATOCRIT 34.3*   MCV 85.1   MCH 27.5   MCHC 32.4*   RDW 49.7   PLATELETCT 163*   MPV 9.3     Recent Labs     06/19/21  1705   SODIUM 139   POTASSIUM 3.6   CHLORIDE 103   CO2 26   GLUCOSE 439*   BUN 15   CREATININE 2.72*   CALCIUM 8.7                   Imaging  DX-FEMUR-2+ RIGHT   Final Result      No radiographic evidence of acute traumatic injury.      Moderate right hip osteoarthritis      DX-PELVIS-1 OR 2 VIEWS   Final Result      No radiographic evidence of acute displaced fracture      Moderate right, mild left hip osteoarthritis           Assessment/Plan  SHAHID (acute kidney injury) (HCC)- (present on admission)  Assessment & Plan  Cr 2.7 on admission from 1 in March 2021  Likely prerenal in setting of hyperglycemia and polyuria  Continue LR at 100 cc/h  Repeat AM BMP  Avoid nephrotoxins  Strict I&O    Frequent falls- (present on admission)  Assessment & Plan  Likely due to uncontrolled diabetes causing polyuria and weight loss  PT/OT eval and treat    Type 2 diabetes mellitus with hyperglycemia, with long-term current use of insulin (HCC)- (present on admission)  Assessment & Plan  He had discontinued insulin therapy due to apathy and poor insight about T2DM  A1c 14.3,   Weight loss and SHAHID likely due to polyuria from hyperglycemia  Initiated on 10 units glargine (0.2 units/kg) QHS  Continue SSI / POCT  High-intensity  statin indicated, will review with him prior to discharge      Contusion of right hip- (present on admission)  Assessment & Plan  Sustained in fall in the shower PTA  Pelvic XR negative for fracture  APAP PRN for pain, oxycodone PRN for severe pain  PT eval and treat    Frailty syndrome in geriatric patient- (present on admission)  Assessment & Plan  BMI 17.2  Multifactorial including depression, homelessness, uncontrolled T2DM  PT/OT evaluation, Case Management assistance in discharge planning    Normocytic anemia- (present on admission)  Assessment & Plan  Denies signs of bleeding  Ordered ferritin and tibc for assessment of AOCD / ARNULFO  Repeat CBC only if clinical change    Moderate episode of recurrent major depressive disorder (HCC)- (present on admission)  Assessment & Plan  Reports losing home and his wife  this year  Will discuss pharmacotherapy with him prior to discharge    Benign prostatic hyperplasia with urinary obstruction- (present on admission)  Assessment & Plan  Chronic indwelling lazo, follows with urology monthly  On finasteride PTA, has not been taking tamsulosin  Tamsulosin discontinued due to frequent falls and pre-renal SHAHID concerning for orthostasis  Restart tamsulosin as outpatient oer urology when hyperglycemia controlled       VTE prophylaxis: SQH due to GFR <30

## 2021-06-20 NOTE — ASSESSMENT & PLAN NOTE
Chronic indwelling Clement.    Follows outpatient urology every month.    Continue finasteride.  Tamsulosin discontinued due to orthostasis and falls.  Clement was changed on 5/28/2021 and  6/30/2021

## 2021-06-20 NOTE — ED NOTES
Med rec updated and and complete. Allergies reviewed. Pt denies antibiotic use in last 14 days.    Home pharmacy  [facility]  [phone]    Pt reports that he only takes finasteride. Pt reports that he has not used insulin   In > 2 years.

## 2021-06-20 NOTE — CARE PLAN
Problem: Pain - Standard  Goal: Alleviation of pain or a reduction in pain to the patient’s comfort goal  Outcome: Progressing  Note: Pt. Verbalizes satisfaction with pain control.       Problem: Skin Integrity  Goal: Skin integrity is maintained or improved  Outcome: Progressing  Note: 2 RN skin check complete. Wound protocol ordered.   The patient is Stable - Low risk of patient condition declining or worsening    Shift Goals  Clinical Goals: pain control  Patient Goals: sleep    Progress made toward(s) clinical / shift goals:  Pt. Verbalizes satisfaction with pain control. Pt. Has been asleep.      Patient is not progressing towards the following goals:

## 2021-06-20 NOTE — ASSESSMENT & PLAN NOTE
A1c 14.3 on 6/19/2021.    Insulin discontinued due to recurrent hypoglycemia  Controlled on metformin and low-carbohydrate diet  Diabetes educator consulted

## 2021-06-20 NOTE — ASSESSMENT & PLAN NOTE
Frail, poor intake with uncontrolled diabetes.    PT/OT recommend postacute placement.  Plan to discharge with family for DMV and Bank errands, follow up with homeless outreach LCSW

## 2021-06-20 NOTE — PROGRESS NOTES
4 Eyes Skin Assessment Completed by Isadora MG RN and Agatha MOHAMUD RN.    Head Scab  Ears WDL  Nose WDL  Mouth WDL  Neck Scab  Breast/Chest Scab  Shoulder Blades Redness and Blanching  Spine WDL  (R) Arm/Elbow/Hand Scab  (L) Arm/Elbow/Hand Scab  Abdomen Scab  Groin Redness and Blanching  Scrotum/Coccyx/Buttocks Redness and Blanching  (R) Leg Scab  (L) Leg Scab  (R) Heel/Foot/Toe WDL  (L) Heel/Foot/Toe WDL          Devices In Places Clement      Interventions In Place Pillows for support, pt. Turns self, waffle cushion, barrier cream. Pt. Refusing Mepilex.    Possible Skin Injury No    Pictures Uploaded Into Epic N/A  Wound Consult Placed N/A  RN Wound Prevention Protocol Ordered Yes

## 2021-06-21 LAB
ANION GAP SERPL CALC-SCNC: 8 MMOL/L (ref 7–16)
BUN SERPL-MCNC: 19 MG/DL (ref 8–22)
CALCIUM SERPL-MCNC: 8.5 MG/DL (ref 8.5–10.5)
CHLORIDE SERPL-SCNC: 102 MMOL/L (ref 96–112)
CO2 SERPL-SCNC: 27 MMOL/L (ref 20–33)
CREAT SERPL-MCNC: 1.15 MG/DL (ref 0.5–1.4)
FERRITIN SERPL-MCNC: 1111 NG/ML (ref 22–322)
GLUCOSE BLD-MCNC: 119 MG/DL (ref 65–99)
GLUCOSE BLD-MCNC: 144 MG/DL (ref 65–99)
GLUCOSE BLD-MCNC: 218 MG/DL (ref 65–99)
GLUCOSE BLD-MCNC: 279 MG/DL (ref 65–99)
GLUCOSE SERPL-MCNC: 251 MG/DL (ref 65–99)
IRON SATN MFR SERPL: 22 % (ref 15–55)
IRON SERPL-MCNC: 25 UG/DL (ref 50–180)
POTASSIUM SERPL-SCNC: 3.5 MMOL/L (ref 3.6–5.5)
SODIUM SERPL-SCNC: 137 MMOL/L (ref 135–145)
TIBC SERPL-MCNC: 116 UG/DL (ref 250–450)
UIBC SERPL-MCNC: 91 UG/DL (ref 110–370)

## 2021-06-21 PROCEDURE — 700111 HCHG RX REV CODE 636 W/ 250 OVERRIDE (IP): Performed by: STUDENT IN AN ORGANIZED HEALTH CARE EDUCATION/TRAINING PROGRAM

## 2021-06-21 PROCEDURE — A9270 NON-COVERED ITEM OR SERVICE: HCPCS | Performed by: STUDENT IN AN ORGANIZED HEALTH CARE EDUCATION/TRAINING PROGRAM

## 2021-06-21 PROCEDURE — 700102 HCHG RX REV CODE 250 W/ 637 OVERRIDE(OP): Performed by: STUDENT IN AN ORGANIZED HEALTH CARE EDUCATION/TRAINING PROGRAM

## 2021-06-21 PROCEDURE — 97161 PT EVAL LOW COMPLEX 20 MIN: CPT

## 2021-06-21 PROCEDURE — 36415 COLL VENOUS BLD VENIPUNCTURE: CPT

## 2021-06-21 PROCEDURE — 80048 BASIC METABOLIC PNL TOTAL CA: CPT

## 2021-06-21 PROCEDURE — 83540 ASSAY OF IRON: CPT

## 2021-06-21 PROCEDURE — 700105 HCHG RX REV CODE 258: Performed by: STUDENT IN AN ORGANIZED HEALTH CARE EDUCATION/TRAINING PROGRAM

## 2021-06-21 PROCEDURE — 96372 THER/PROPH/DIAG INJ SC/IM: CPT

## 2021-06-21 PROCEDURE — 82962 GLUCOSE BLOOD TEST: CPT | Mod: 91

## 2021-06-21 PROCEDURE — 82728 ASSAY OF FERRITIN: CPT

## 2021-06-21 PROCEDURE — 83550 IRON BINDING TEST: CPT

## 2021-06-21 PROCEDURE — 97166 OT EVAL MOD COMPLEX 45 MIN: CPT

## 2021-06-21 PROCEDURE — 87169 MACROSCOPIC EXAM PARASITE: CPT

## 2021-06-21 PROCEDURE — 99225 PR SUBSEQUENT OBSERVATION CARE,LEVEL II: CPT | Performed by: STUDENT IN AN ORGANIZED HEALTH CARE EDUCATION/TRAINING PROGRAM

## 2021-06-21 PROCEDURE — G0378 HOSPITAL OBSERVATION PER HR: HCPCS

## 2021-06-21 RX ORDER — TAMSULOSIN HYDROCHLORIDE 0.4 MG/1
0.4 CAPSULE ORAL
Status: DISCONTINUED | OUTPATIENT
Start: 2021-06-22 | End: 2021-06-24

## 2021-06-21 RX ORDER — INSULIN LISPRO 100 [IU]/ML
2-9 INJECTION, SOLUTION INTRAVENOUS; SUBCUTANEOUS
Status: DISCONTINUED | OUTPATIENT
Start: 2021-06-22 | End: 2021-06-23

## 2021-06-21 RX ORDER — DEXTROSE MONOHYDRATE 25 G/50ML
50 INJECTION, SOLUTION INTRAVENOUS
Status: DISCONTINUED | OUTPATIENT
Start: 2021-06-21 | End: 2021-07-15 | Stop reason: HOSPADM

## 2021-06-21 RX ADMIN — OXYCODONE 5 MG: 5 TABLET ORAL at 12:07

## 2021-06-21 RX ADMIN — FINASTERIDE 5 MG: 5 TABLET, FILM COATED ORAL at 05:17

## 2021-06-21 RX ADMIN — OXYCODONE 5 MG: 5 TABLET ORAL at 21:23

## 2021-06-21 RX ADMIN — INSULIN GLARGINE 10 UNITS: 100 INJECTION, SOLUTION SUBCUTANEOUS at 18:08

## 2021-06-21 RX ADMIN — Medication 100 MG: at 05:17

## 2021-06-21 RX ADMIN — SODIUM CHLORIDE, POTASSIUM CHLORIDE, SODIUM LACTATE AND CALCIUM CHLORIDE: 600; 310; 30; 20 INJECTION, SOLUTION INTRAVENOUS at 05:17

## 2021-06-21 RX ADMIN — HEPARIN SODIUM 5000 UNITS: 5000 INJECTION, SOLUTION INTRAVENOUS; SUBCUTANEOUS at 05:17

## 2021-06-21 RX ADMIN — DOCUSATE SODIUM 50 MG AND SENNOSIDES 8.6 MG 2 TABLET: 8.6; 5 TABLET, FILM COATED ORAL at 05:17

## 2021-06-21 RX ADMIN — INSULIN LISPRO 5 UNITS: 100 INJECTION, SOLUTION INTRAVENOUS; SUBCUTANEOUS at 18:08

## 2021-06-21 RX ADMIN — THERA TABS 1 TABLET: TAB at 05:17

## 2021-06-21 ASSESSMENT — ENCOUNTER SYMPTOMS
NECK PAIN: 0
FLANK PAIN: 0
BLOOD IN STOOL: 0
BRUISES/BLEEDS EASILY: 0
VOMITING: 0
DEPRESSION: 1
MYALGIAS: 0
SINUS PAIN: 0
WEAKNESS: 1
FALLS: 0
CONSTIPATION: 0
NAUSEA: 0
SHORTNESS OF BREATH: 0
EYE PAIN: 0
NERVOUS/ANXIOUS: 0
CHILLS: 0
HEADACHES: 0
BACK PAIN: 0
SORE THROAT: 0
DIARRHEA: 0
ABDOMINAL PAIN: 0
FEVER: 0

## 2021-06-21 ASSESSMENT — COGNITIVE AND FUNCTIONAL STATUS - GENERAL
MOBILITY SCORE: 17
MOVING FROM LYING ON BACK TO SITTING ON SIDE OF FLAT BED: A LITTLE
MOVING TO AND FROM BED TO CHAIR: A LITTLE
SUGGESTED CMS G CODE MODIFIER MOBILITY: CK
WALKING IN HOSPITAL ROOM: A LITTLE
DRESSING REGULAR UPPER BODY CLOTHING: A LITTLE
SUGGESTED CMS G CODE MODIFIER DAILY ACTIVITY: CJ
STANDING UP FROM CHAIR USING ARMS: A LITTLE
HELP NEEDED FOR BATHING: A LITTLE
DRESSING REGULAR LOWER BODY CLOTHING: A LITTLE
TOILETING: A LITTLE
TURNING FROM BACK TO SIDE WHILE IN FLAT BAD: A LITTLE
CLIMB 3 TO 5 STEPS WITH RAILING: A LOT
DAILY ACTIVITIY SCORE: 20

## 2021-06-21 ASSESSMENT — GAIT ASSESSMENTS
DEVIATION: BRADYKINETIC;SHUFFLED GAIT
DISTANCE (FEET): 60
GAIT LEVEL OF ASSIST: SUPERVISED
ASSISTIVE DEVICE: FRONT WHEEL WALKER

## 2021-06-21 ASSESSMENT — PAIN DESCRIPTION - PAIN TYPE: TYPE: ACUTE PAIN

## 2021-06-21 ASSESSMENT — ACTIVITIES OF DAILY LIVING (ADL): TOILETING: INDEPENDENT

## 2021-06-21 NOTE — THERAPY
"Occupational Therapy   Initial Evaluation     Patient Name: Hugh Núñez  Age:  75 y.o., Sex:  male  Medical Record #: [id number]  Today's Date: 6/21/2021     Precautions  Precautions: (P) Fall Risk  Comments: (P) R hip pain    Assessment  Patient is 75 y.o. male admitted for right hip pain following fall in shower at [facility], no fracture. Pt normally independent with mobility and ADLs but based on presentation clearly has trouble taking care of self and spoke of trying to be set up in a long term care facility due to lack of help available at current situation. Pt required modA for lower body ADLs due to right hip pain as well as Bello for mobility and transfers as patient is a significant fall risk requiring close supervision. Will recommend post-acute placement at this time as patient is a high fall risk and likely to be re-admitted if sent back to shelter.    Plan    Recommend Occupational Therapy 3 times per week until therapy goals are met for the following treatments:  Adaptive Equipment, Self Care/Activities of Daily Living, Therapeutic Activities and Therapeutic Exercises.    DC Equipment Recommendations: (P) Unable to determine at this time  Discharge Recommendations: (P) Recommend post-acute placement for additional occupational therapy services prior to discharge home     Subjective    \"My hip really hurts\"     Objective       06/21/21 1230   Prior Living Situation   Prior Services None   Housing / Facility Homeless   Equipment Owned 4-Wheel Walker   Lives with - Patient's Self Care Capacity Alone and Unable to Care For Self   Comments Homeless at baseline   Prior Level of ADL Function   Self Feeding Independent   Grooming / Hygiene Independent   Bathing Independent   Dressing Independent   Toileting Independent   Prior Level of IADL Function   Medication Management Independent   Laundry Independent   Kitchen Mobility Independent   Finances Independent   Home Management Independent   Shopping " Independent   Prior Level Of Mobility Independent With Device in Community   Driving / Transportation Walks   Occupation (Pre-Hospital Vocational) Retired Due To Age   History of Falls   History of Falls Yes   Date of Last Fall   (reason for admit)   Precautions   Precautions Fall Risk   Comments R hip pain   Pain 0 - 10 Group   Therapist Pain Assessment Post Activity Pain Same as Prior to Activity;Nurse Notified  (r hip pain)   Cognition    Cognition / Consciousness X   Speech/ Communication Hard of Hearing   Level of Consciousness Alert   Safety Awareness Impaired   Comments Pleasant, cooperative, receptive to education   Active ROM Upper Body   Active ROM Upper Body  WDL   Dominant Hand Right   Strength Upper Body   Upper Body Strength  X   Gross Strength Generalized Weakness, Equal Bilaterally.    Sensation Upper Body   Upper Extremity Sensation  WDL   Coordination Upper Body   Coordination WDL   Balance Assessment   Sitting Balance (Static) Fair   Sitting Balance (Dynamic) Fair   Standing Balance (Static) Fair -   Standing Balance (Dynamic) Fair -   Weight Shift Sitting Fair   Weight Shift Standing Fair   Comments w/ FWW   Bed Mobility    Supine to Sit Minimal Assist   Sit to Supine Minimal Assist   Scooting Supervised   ADL Assessment   Eating Minimal Assist   Upper Body Dressing Supervision   Lower Body Dressing Minimal Assist   Toileting   (NT-lazo cath)   How much help from another person does the patient currently need...   Putting on and taking off regular lower body clothing? 3   Bathing (including washing, rinsing, and drying)? 3   Toileting, which includes using a toilet, bedpan, or urinal? 3   Putting on and taking off regular upper body clothing? 3   Taking care of personal grooming such as brushing teeth? 4   Eating meals? 4   6 Clicks Daily Activity Score 20   Functional Mobility   Sit to Stand Minimal Assist   Bed, Chair, Wheelchair Transfer Minimal Assist   Toilet Transfers Refused   Transfer  Method Stand Step   Mobility bed mobility, in room mobility, up to chair   Comments w/ FWW   Visual Perception   Visual Perception  Not Applicable   Edema / Skin Assessment   Edema / Skin  Not Assessed   Activity Tolerance   Sitting in Chair left seated in chair   Sitting Edge of Bed 4   Standing 9   Patient / Family Goals   Patient / Family Goal #1 To get more help   Short Term Goals   Short Term Goal # 1 Pt will complete ADL transfers with supervision   Short Term Goal # 2 Pt will complete LB dressing with supervision   Short Term Goal # 3 Pt will complete toileting with supervision   Education Group   Education Provided Role of Occupational Therapist   Role of Occupational Therapist Patient Response Patient;Acceptance;Explanation   Problem List   Problem List Decreased Active Daily Living Skills;Decreased Homemaking Skills;Decreased Functional Mobility;Decreased Activity Tolerance   Interdisciplinary Plan of Care Collaboration   IDT Collaboration with  Nursing   Patient Position at End of Therapy Seated;Chair Alarm On;Call Light within Reach;Tray Table within Reach;Phone within Reach   Collaboration Comments RN updated

## 2021-06-21 NOTE — PROGRESS NOTES
Pt is A&O.  C/o R hip pain but does not want to take any pain meds.  Pt also did not want to get up.  Pt told he needs to get up with PT and be evaluated.  Pt also elected to take oxycodone po for pain afterwards.  Pt had a large bm, soft brown.  Diabetic educator will return in am for teaching as pt was sleeping after taking oxy.  Pt turned t/o shift.  Refusing mepilex to sacrum for padding.  Pt has scabbed rash to torso and extremities.  Does not appear to be infectious.  Chronic lazo to leg bag.  Good uop.

## 2021-06-21 NOTE — CARE PLAN
Problem: Knowledge Deficit - Standard  Goal: Patient and family/care givers will demonstrate understanding of plan of care, disease process/condition, diagnostic tests and medications  Outcome: Progressing  Note: Plan of care discussed, questions answered.       Problem: Fall Risk  Goal: Patient will remain free from falls  Outcome: Progressing  Note: High fall risk precautions. Pt. Aox4, calls appropriately.    The patient is Stable - Low risk of patient condition declining or worsening    Shift Goals  Clinical Goals: pain control  Patient Goals: sleep    Progress made toward(s) clinical / shift goals:  Pt. Verbalizes satisfaction with pain control. Pt. Has been sleeping most of shift    Patient is not progressing towards the following goals:

## 2021-06-21 NOTE — DISCHARGE PLANNING
Anticipated Discharge Disposition: SNF     Action: LSW met with pt at bedside to collect assessment & choice for SNF. PT recommending SNF, LSW collected SNF, 1) [facility] 2) [facility] 3) [facility]. LSW faxed to DPA Leader Luma, fax confirmed. Likely facility won't get referral until tomorrow due to staffing.     Pt agreeable to group home after SNF.     Barriers to Discharge: SNF acceptance     Plan: Follow up when referrals are sent tomorrow morning     Care Transition Team Assessment  Pt has no NOK   LSW met with pt at bedside to collect assessment. Pt was residing at the [facility] prior to admission. Pt has been to SNF in the past. Pt is not current with a PCP but was seen at [facility] for behavioral health treatment (telehealth sessions). LSW collected collateral via Rosa M @ [facility]. Pt states that he has no other family or friends in the area. Pt states he was denied Medicaid because he has too much money in the bank, pt states he has $20,000 in the bank currently, makes $1100 on SSI. LSW stated that his best option after SNF would be a group home, pt is agreeable to this. PT notes indicate that pt will require 24/7 level of care. Pt could live in group home and apply for Medicaid there to roll into waiver. LSW will include this information to SNF's in the referral process so they are aware of dc disposition. Pt was AO4 when LSW met with him.     Information Source  Orientation Level: Oriented X4  Information Given By: Patient  Who is responsible for making decisions for patient? : Patient    Readmission Evaluation  Is this a readmission?: No    Elopement Risk  Legal Hold: No  Ambulatory or Self Mobile in Wheelchair: No-Not an Elopement Risk  Elopement Risk: Not at Risk for Elopement    Interdisciplinary Discharge Planning  Lives with - Patient's Self Care Capacity: Alone and Unable to Care For Self  Patient or legal guardian wants to designate a caregiver: No  Support  Systems: None  Housing / Facility: Homeless  Able to Return to Previous ADL's: No  Mobility Issues: Yes  Prior Services: Skilled Home Health Services  Assistance Needed: Yes  Durable Medical Equipment: Walker    Discharge Preparedness  What is your plan after discharge?: Skilled nursing facility    Finances  Financial Barriers to Discharge: No  Prescription Coverage: Yes    Vision / Hearing Impairment  Vision Impairment : No  Hearing Impairment : Yes  Hearing Impairment: Both Ears  Does Pt Need Special Equipment for the Hearing Impaired?: Yes-But Does not Need for Facility to Arrange Equipment    Advance Directive  Advance Directive?: None  Advance Directive offered?: AD Booklet refused    Domestic Abuse  Have you ever been the victim of abuse or violence?: No  Verbal Abuse or Emotional Abuse: No    Discharge Risks or Barriers  Discharge risks or barriers?: No PCP, Transportation, Mental health, Post-acute placement / services, Complex medical needs, Non-adherence to medication or treatment, Homeless / couch surfing  Patient risk factors: Cognitive / sensory / physical deficit, Complex medical needs, Homeless, Lack of outside supports, Mental health, No PCP    Anticipated Discharge Information  Discharge Disposition: Still a Patient (30)  Discharge Address: Unkown  Discharge Contact Phone Number: [phone]

## 2021-06-21 NOTE — CARE PLAN
Problem: Pain - Standard  Goal: Alleviation of pain or a reduction in pain to the patient’s comfort goal  Outcome: Progressing  Note: Pt. Verbalizes satisfaction with pain control.       Problem: Skin Integrity  Goal: Skin integrity is maintained or improved  Outcome: Progressing  Note: 2 RN skin check complete. Wound protocol ordered.   The patient is Stable - Low risk of patient condition declining or worsening    Shift Goals  Clinical Goals: pain control  Patient Goals: sleep    Progress made toward(s) clinical / shift goals:  Pt. Verbalizes satisfaction with pain control. Pt. Has been asleep.      Patient is not progressing towards the following goals:    The patient is Stable - Low risk of patient condition declining or worsening    Shift Goals  Clinical Goals: pain control  Patient Goals: sleep    Progress made toward(s) clinical / shift goals:  Pt understands pain scale.      Patient is not progressing towards the following goals:    The patient is Stable - Low risk of patient condition declining or worsening    Shift Goals  Clinical Goals: pain control  Patient Goals: sleep    Progress made toward(s) clinical / shift goals:  pt will turn in bed.    Patient is not progressing towards the following goals:

## 2021-06-21 NOTE — THERAPY
Physical Therapy   Initial Evaluation     Patient Name: Hugh Núñez  Age:  75 y.o., Sex:  male  Medical Record #: [id number]  Today's Date: 6/21/2021     Precautions: Fall Risk    Assessment  Patient is 75 y.o. male who presented 6/19/21 c/o right hip pain after a fall in shower at [employer], no fracture noted.  Today patient able to perform supine > sit with HOB flat and SPV, however required min A for trunk and LEs for sit > supine.  He was able to ambulate approx. 60 ft x 2 with FWW and SPV.  Patient would benefit from further acute PT to improve activity tolerance, increase ambulation distance, and increase safety for mobility in the community.    Recommend placement however anticipate quick progression back to baseline. Anticipate patient needs 24/7 supervision which is not feasible in the homeless shelter.  Patient would benefit from increased support such as a group home to reduce re-admissions.  He does not appear safe to return to homeless shelter.    Plan    Recommend Physical Therapy 3 times per week until therapy goals are met for the following treatments:  Bed Mobility, Gait Training, Neuro Re-Education / Balance, Self Care/Home Evaluation, Stair Training, Therapeutic Activities and Therapeutic Exercises    DC Equipment Recommendations: Unable to determine at this time  Discharge Recommendations: Recommend post-acute placement for additional physical therapy services prior to discharge home     Objective     06/21/21 1205   Precautions   Precautions Fall Risk   Pain   Pain Scales 0 to 10 Scale    Pain 0 - 10 Group   Location Hip   Location Orientation Right   Pain Rating Scale (NPRS) (Did not quantify)   Comfort Goal Comfort with Movement;Perform Activity   Therapist Pain Assessment Post Activity Pain Same as Prior to Activity   Prior Living Situation   Housing / Facility Homeless   Prior Level of Functional Mobility   Bed Mobility Independent   Transfer Status Independent   Ambulation Independent    Distance Ambulation (Feet) (Community)   Assistive Devices Used 4-Wheel Walker   Stairs Independent   History of Falls   History of Falls Yes   Date of Last Fall (Reason for admit)   Cognition    Cognition / Consciousness WDL   Level of Consciousness Alert   Comments Pleasant & cooperative   Active ROM Lower Body    Active ROM Lower Body  WDL   Strength Lower Body   Lower Body Strength  WDL   Comments functionally tested   Sensation Lower Body   Lower Extremity Sensation   WDL   Balance Assessment   Sitting Balance (Static) Fair   Sitting Balance (Dynamic) Fair   Standing Balance (Static) Fair   Standing Balance (Dynamic) Fair   Weight Shift Sitting Fair   Weight Shift Standing Fair   Comments w/ FWW   Gait Analysis   Gait Level Of Assist Supervised   Assistive Device Front Wheel Walker   Distance (Feet) 60   # of Times Distance was Traveled 2   Deviation Bradykinetic;Shuffled Gait (Flexed posture)   Weight Bearing Status No restrictions   Bed Mobility    Supine to Sit Supervised   Sit to Supine Minimal Assist   Scooting Supervised (in sitting)   Functional Mobility   Sit to Stand Supervised   Bed, Chair, Wheelchair Transfer Supervised   Transfer Method Stand Step   Mobility Ambulation   Activity Tolerance   Sitting Edge of Bed 3 min   Standing 8 min   Short Term Goals    Short Term Goal # 1 Pt will perform supine <> sit with HOB flat, no use of rails with SPV in 6 visits in order to progress toward independence with functional mobility   Short Term Goal # 2 Pt will ambulate >150 ft with LRAD and SPV within 6 visits in order to progress toward community distance ambulation   Short Term Goal # 3 Pt will ascend/descend 1 step with LRAD and SPV within 6 visits in order to negotiate curbs and community environment   Session Information   Date / Session Number  6/21 - 1 (1/3, 6/27)

## 2021-06-21 NOTE — DIETARY
"Nutrition Note: Discussed diabetes management with patient.  Patient stated he does nothing to manage his diabetes as it does not \"bother him much.\"  Patient reported he generally eats one meal per day and sometimes misses full days.  He also stated he has no way to get to a food pantry as he does not drive and it is too far to walk.  Meal planning for managing diabetes not feasible for patient at this time.    Recommend:   for discharge planning and resources as possible.  RD available PRN.  "

## 2021-06-22 LAB
GLUCOSE BLD-MCNC: 162 MG/DL (ref 65–99)
GLUCOSE BLD-MCNC: 90 MG/DL (ref 65–99)
PARASITE SPEC INSPECT: ABNORMAL
PARASITE SPEC INSPECT: ABNORMAL
SIGNIFICANT IND 70042: ABNORMAL
SITE SITE: ABNORMAL
SOURCE SOURCE: ABNORMAL

## 2021-06-22 PROCEDURE — A9270 NON-COVERED ITEM OR SERVICE: HCPCS | Performed by: STUDENT IN AN ORGANIZED HEALTH CARE EDUCATION/TRAINING PROGRAM

## 2021-06-22 PROCEDURE — 700102 HCHG RX REV CODE 250 W/ 637 OVERRIDE(OP): Performed by: STUDENT IN AN ORGANIZED HEALTH CARE EDUCATION/TRAINING PROGRAM

## 2021-06-22 PROCEDURE — 96372 THER/PROPH/DIAG INJ SC/IM: CPT

## 2021-06-22 PROCEDURE — 82962 GLUCOSE BLOOD TEST: CPT | Mod: 91

## 2021-06-22 PROCEDURE — 99225 PR SUBSEQUENT OBSERVATION CARE,LEVEL II: CPT | Performed by: INTERNAL MEDICINE

## 2021-06-22 PROCEDURE — 700111 HCHG RX REV CODE 636 W/ 250 OVERRIDE (IP): Performed by: STUDENT IN AN ORGANIZED HEALTH CARE EDUCATION/TRAINING PROGRAM

## 2021-06-22 PROCEDURE — G0378 HOSPITAL OBSERVATION PER HR: HCPCS

## 2021-06-22 RX ADMIN — THERA TABS 1 TABLET: TAB at 06:19

## 2021-06-22 RX ADMIN — TAMSULOSIN HYDROCHLORIDE 0.4 MG: 0.4 CAPSULE ORAL at 10:22

## 2021-06-22 RX ADMIN — OXYCODONE 5 MG: 5 TABLET ORAL at 06:19

## 2021-06-22 RX ADMIN — INSULIN LISPRO 3 UNITS: 100 INJECTION, SOLUTION INTRAVENOUS; SUBCUTANEOUS at 17:03

## 2021-06-22 RX ADMIN — INSULIN LISPRO 2 UNITS: 100 INJECTION, SOLUTION INTRAVENOUS; SUBCUTANEOUS at 12:15

## 2021-06-22 RX ADMIN — ACETAMINOPHEN 1000 MG: 500 TABLET ORAL at 17:11

## 2021-06-22 RX ADMIN — INSULIN GLARGINE 10 UNITS: 100 INJECTION, SOLUTION SUBCUTANEOUS at 18:08

## 2021-06-22 RX ADMIN — OXYCODONE 5 MG: 5 TABLET ORAL at 21:49

## 2021-06-22 RX ADMIN — ENOXAPARIN SODIUM 30 MG: 30 INJECTION SUBCUTANEOUS at 06:23

## 2021-06-22 RX ADMIN — DOCUSATE SODIUM 50 MG AND SENNOSIDES 8.6 MG 2 TABLET: 8.6; 5 TABLET, FILM COATED ORAL at 06:19

## 2021-06-22 RX ADMIN — Medication 100 MG: at 06:19

## 2021-06-22 RX ADMIN — OXYCODONE 5 MG: 5 TABLET ORAL at 12:09

## 2021-06-22 RX ADMIN — FINASTERIDE 5 MG: 5 TABLET, FILM COATED ORAL at 06:19

## 2021-06-22 RX ADMIN — DOCUSATE SODIUM 50 MG AND SENNOSIDES 8.6 MG 2 TABLET: 8.6; 5 TABLET, FILM COATED ORAL at 17:11

## 2021-06-22 ASSESSMENT — ENCOUNTER SYMPTOMS
FEVER: 0
WEAKNESS: 1
COUGH: 0
FALLS: 1
NERVOUS/ANXIOUS: 0
PALPITATIONS: 0
SINUS PAIN: 0
HEADACHES: 0
FLANK PAIN: 0
BRUISES/BLEEDS EASILY: 0
DIARRHEA: 0
SORE THROAT: 0
CONSTIPATION: 0
BLURRED VISION: 0
SHORTNESS OF BREATH: 0
NAUSEA: 0
DEPRESSION: 1
VOMITING: 0
CHILLS: 0
ABDOMINAL PAIN: 0

## 2021-06-22 ASSESSMENT — PAIN DESCRIPTION - PAIN TYPE: TYPE: ACUTE PAIN

## 2021-06-22 NOTE — DISCHARGE PLANNING
Received Choice form at 0758  Agency/Facility Name: [facility](1), [facility](2), [facility](3)  Referral sent per Choice form @ 0139    LSW informed

## 2021-06-22 NOTE — DISCHARGE PLANNING
Anticipated Discharge Disposition: SNF    Action: Pt has been denied by two SNF's waiting on notice from [facility]. Pt will likely require blanket referral tomorrow. LSW notified in hand off report.     If we are unable to locate a SNF, pt is agreeable to group home where he may be able to receive HH.     Barriers to Discharge: SNF acceptance     Plan: CM follow up with referral tomorrow

## 2021-06-22 NOTE — PROGRESS NOTES
[facility] Medicine Daily Progress Note    Date of Service  6/22/2021    Chief Complaint  75 y.o. male with T2DM, BPH s/p chronic indwelling lazo, MDD admitted 6/19/2021 with fall and SHAHID.    Hospital Course  Mr. Hugh Núñez is a 75 y.o. male history of diabetes, homelessness, benign prostatic hypertrophy status post chronic indwelling Lazo who presented on 6/19/2021 with ground-level fall, right hip pain.  Patient had a fall in the shower and hit his right hip.  Denies any loss of consciousness or head trauma.  Hip x-ray showed no fracture.  He was found to have an acute kidney injury and uncontrolled blood sugar in the 400s with an A1c of 14.      Interval Problem Update  Patient was seen and examined at bedside.  I have personally reviewed and interpreted vitals, labs, and imaging.    6/22.  Afebrile.  Stable vitals.  On room air.  Blood sugars are improved.  Denies fevers, chills, chest pains, shortness of breath.  Does report some right hip pain.  He is agreeable for placement.    Consultants/Specialty  Diabetes Educator    Code Status  Full Code    Disposition  Pending post-acute placement.  SNF versus group home with home health    Review of Systems  Review of Systems   Constitutional: Positive for malaise/fatigue. Negative for chills and fever.   HENT: Negative for sinus pain and sore throat.    Eyes: Negative for blurred vision.   Respiratory: Negative for cough and shortness of breath.    Cardiovascular: Negative for chest pain, palpitations and leg swelling.   Gastrointestinal: Negative for abdominal pain, constipation, diarrhea, nausea and vomiting.   Genitourinary: Negative for dysuria, flank pain and hematuria.   Musculoskeletal: Positive for falls and joint pain.   Skin: Negative for rash.   Neurological: Positive for weakness. Negative for headaches.   Endo/Heme/Allergies: Does not bruise/bleed easily.   Psychiatric/Behavioral: Positive for depression. The patient is not nervous/anxious.          Physical Exam  Temp:  [36.3 °C (97.4 °F)-36.8 °C (98.2 °F)] 36.8 °C (98.2 °F)  Pulse:  [72-73] 73  Resp:  [17-18] 18  BP: (136-146)/(60-75) 136/60  SpO2:  [95 %-98 %] 95 %    Physical Exam  Vitals and nursing note reviewed.   Constitutional:       Appearance: He is cachectic. He is ill-appearing (Chronically).   HENT:      Head:      Comments: Bitemporal and bibuccal wasting.     Right Ear: External ear normal.      Left Ear: External ear normal.      Nose: Nose normal.      Mouth/Throat:      Mouth: Mucous membranes are moist.      Pharynx: Oropharynx is clear.   Eyes:      Extraocular Movements: Extraocular movements intact.      Conjunctiva/sclera: Conjunctivae normal.   Cardiovascular:      Rate and Rhythm: Normal rate and regular rhythm.      Pulses: Normal pulses.      Heart sounds: Normal heart sounds. No murmur heard.   No friction rub. No gallop.    Pulmonary:      Effort: Pulmonary effort is normal. No respiratory distress.      Breath sounds: Normal breath sounds. No wheezing, rhonchi or rales.   Abdominal:      General: Abdomen is flat. Bowel sounds are normal. There is no distension.      Palpations: Abdomen is soft.      Tenderness: There is no abdominal tenderness. There is no guarding or rebound.   Genitourinary:     Comments: +Clement  Musculoskeletal:         General: Tenderness (Right hip, thigh) present.      Cervical back: Normal range of motion.      Right lower leg: No edema.      Left lower leg: No edema.   Neurological:      General: No focal deficit present.      Mental Status: He is alert, oriented to person, place, and time and easily aroused. Mental status is at baseline.      Cranial Nerves: No cranial nerve deficit.   Psychiatric:         Attention and Perception: Attention normal.         Mood and Affect: Mood is depressed. Affect is flat.         Speech: Speech normal.         Behavior: Behavior normal. Behavior is cooperative.         Cognition and Memory: Cognition and memory  normal.         Judgment: Judgment normal.         Fluids    Intake/Output Summary (Last 24 hours) at 2021 0723  Last data filed at 2021 0600  Gross per 24 hour   Intake --   Output 1925 ml   Net -1925 ml       Laboratory  Recent Labs     21  1705   WBC 9.3   RBC 4.03*   HEMOGLOBIN 11.1*   HEMATOCRIT 34.3*   MCV 85.1   MCH 27.5   MCHC 32.4*   RDW 49.7   PLATELETCT 163*   MPV 9.3     Recent Labs     21  1705 21  0012   SODIUM 139 137   POTASSIUM 3.6 3.5*   CHLORIDE 103 102   CO2 26 27   GLUCOSE 439* 251*   BUN 15 19   CREATININE 2.72* 1.15   CALCIUM 8.7 8.5                   Imaging  DX-FEMUR-2+ RIGHT   Final Result      No radiographic evidence of acute traumatic injury.      Moderate right hip osteoarthritis      DX-PELVIS-1 OR 2 VIEWS   Final Result      No radiographic evidence of acute displaced fracture      Moderate right, mild left hip osteoarthritis           Assessment/Plan  Contusion of right hip- (present on admission)  Assessment & Plan  Sustained in fall in the shower PTA  Pelvic XR negative for fracture  APAP PRN for pain, oxycodone PRN for severe pain  PT eval and treat, recommend post-acute placement    SHAHID (acute kidney injury) (HCC)- (present on admission)  Assessment & Plan  Resolved with IVF and glycemic control  Discontinued IVF  Avoid nephrotoxins    Moderate episode of recurrent major depressive disorder (HCC)- (present on admission)  Assessment & Plan  Reports losing home and his wife  this year    Frequent falls- (present on admission)  Assessment & Plan  Likely due to uncontrolled diabetes causing polyuria and weight loss  PT/OT eval and treat, recommending post-acute placement    Frailty syndrome in geriatric patient- (present on admission)  Assessment & Plan  BMI 17.2  Multifactorial including depression, homelessness, uncontrolled T2DM  PT/OT consulted, appreciate Case Management assistance in discharge planning for post-acute placement and eventual group  home    Type 2 diabetes mellitus with hyperglycemia, with long-term current use of insulin ([facility])- (present on admission)  Assessment & Plan  Lab Results   Component Value Date/Time    HBA1C 14.3 (H) 06/19/2021 1705    HBA1C 11.8 (H) 03/17/2021 1326    HBA1C 7.8 (H) 01/17/2021 0217     Results from last 7 days   Lab Units 06/22/21  1212 06/22/21  0622 06/21/21  1807 06/21/21  1210 06/21/21  0525 06/20/21  2352 06/20/21  1703 06/20/21  1157   ACCU CHECK GLUCOSE 788 mg/dL 162* 90 279* 119* 144* 218* 293* 250*     I have ordered insulin sliding scale with D50 and glucagon for hypoglycemia per protocol.  Diabetic diet    Improving  He had discontinued insulin therapy due to apathy and poor insight about T2DM  A1c 14.3,  on admission  Weight loss and SHAHID likely due to polyuria from hyperglycemia  Initiated on 10 units glargine (0.2 units/kg) QHS  Diabetes educator consulted    Benign prostatic hyperplasia with urinary obstruction- (present on admission)  Assessment & Plan  Chronic indwelling lazo, follows with urology monthly  On finasteride PTA, has not been taking tamsulosin  Tamsulosin discontinued due to frequent falls and pre-renal SHAHID concerning for orthostasis  Restart tamsulosin tomorrow    Normocytic anemia- (present on admission)  Assessment & Plan  Denies signs of bleeding  Elevated Ferritin with normal FeSat% consistent with AOCD  Repeat CBC only if clinical change       VTE prophylaxis: LMWH

## 2021-06-22 NOTE — PROGRESS NOTES
[facility] Medicine Daily Progress Note    Date of Service  6/21/2021    Chief Complaint  75 y.o. male with T2DM, BPH s/p chronic indwelling lazo, MDD admitted 6/19/2021 with fall and SHAHID.    Hospital Course  No notes on file    Interval Problem Update  KOFFI ON  He is having more hip pain and will take some tylenol.  He had difficulty walking and working with PT/OT today, so they have recommended post-acute placement.  He denies falls since admission.  He is uncertain about spending down his money to qualify for Medicaid and proceed with Group Home placement.  He is tired and feels down.  He denies dyspnea, N/V, F/C, bowel/bladder dysfunction, bleeding.    Consultants/Specialty  Diabetes Educator    Code Status  Full Code    Disposition  Pending post-acute placement    Review of Systems  Review of Systems   Constitutional: Positive for malaise/fatigue. Negative for chills and fever.   HENT: Negative for ear pain, nosebleeds, sinus pain and sore throat.    Eyes: Negative for pain.   Respiratory: Negative for shortness of breath.    Cardiovascular: Negative for chest pain.   Gastrointestinal: Negative for abdominal pain, blood in stool, constipation, diarrhea, melena, nausea and vomiting.   Genitourinary: Negative for dysuria, flank pain and hematuria.   Musculoskeletal: Positive for joint pain. Negative for back pain, falls, myalgias and neck pain.   Neurological: Positive for weakness. Negative for headaches.   Endo/Heme/Allergies: Does not bruise/bleed easily.   Psychiatric/Behavioral: Positive for depression. The patient is not nervous/anxious.         Physical Exam  Temp:  [36.3 °C (97.4 °F)-37.2 °C (98.9 °F)] 36.3 °C (97.4 °F)  Pulse:  [72] 72  Resp:  [17-18] 17  BP: (130-146)/(68-75) 146/75  SpO2:  [98 %] 98 %    Physical Exam  Vitals and nursing note reviewed.   Constitutional:       General: He is sleeping.      Appearance: He is cachectic. He is ill-appearing (Chronically).   HENT:      Head:      Comments:  Bitemporal and bibuccal wasting.     Nose: Nose normal.      Mouth/Throat:      Mouth: Mucous membranes are moist.      Pharynx: Oropharynx is clear.   Eyes:      General: No scleral icterus.     Conjunctiva/sclera: Conjunctivae normal.   Cardiovascular:      Rate and Rhythm: Normal rate and regular rhythm.      Pulses: Normal pulses.      Heart sounds: Normal heart sounds. No murmur heard.   No friction rub. No gallop.    Pulmonary:      Effort: Pulmonary effort is normal. No respiratory distress.      Breath sounds: Normal breath sounds. No wheezing, rhonchi or rales.   Abdominal:      General: Abdomen is flat. Bowel sounds are normal. There is no distension.      Palpations: Abdomen is soft.      Tenderness: There is no abdominal tenderness. There is no guarding or rebound.   Genitourinary:     Comments: +Clement  Musculoskeletal:      Cervical back: Neck supple.      Right lower leg: No edema.      Left lower leg: No edema.   Neurological:      Mental Status: He is easily aroused.      Comments: Appropriately conversant   Psychiatric:         Attention and Perception: Attention normal.         Mood and Affect: Mood is depressed. Affect is flat.         Speech: Speech normal.         Behavior: Behavior normal. Behavior is cooperative.         Cognition and Memory: Cognition and memory normal.         Judgment: Judgment normal.         Fluids    Intake/Output Summary (Last 24 hours) at 6/21/2021 2057  Last data filed at 6/21/2021 1200  Gross per 24 hour   Intake --   Output 700 ml   Net -700 ml       Laboratory  Recent Labs     06/19/21  1705   WBC 9.3   RBC 4.03*   HEMOGLOBIN 11.1*   HEMATOCRIT 34.3*   MCV 85.1   MCH 27.5   MCHC 32.4*   RDW 49.7   PLATELETCT 163*   MPV 9.3     Recent Labs     06/19/21  1705 06/21/21  0012   SODIUM 139 137   POTASSIUM 3.6 3.5*   CHLORIDE 103 102   CO2 26 27   GLUCOSE 439* 251*   BUN 15 19   CREATININE 2.72* 1.15   CALCIUM 8.7 8.5                   Imaging  DX-FEMUR-2+ RIGHT   Final  Result      No radiographic evidence of acute traumatic injury.      Moderate right hip osteoarthritis      DX-PELVIS-1 OR 2 VIEWS   Final Result      No radiographic evidence of acute displaced fracture      Moderate right, mild left hip osteoarthritis           Assessment/Plan  SHAHID (acute kidney injury) (HCC)- (present on admission)  Assessment & Plan  Resolved with IVF and glycemic control  Discontinued IVF  Avoid nephrotoxins    Frequent falls- (present on admission)  Assessment & Plan  Likely due to uncontrolled diabetes causing polyuria and weight loss  PT/OT eval and treat, recommending post-acute placement    Type 2 diabetes mellitus with hyperglycemia, with long-term current use of insulin (HCC)- (present on admission)  Assessment & Plan  Improving, 24h -293  He had discontinued insulin therapy due to apathy and poor insight about T2DM  A1c 14.3,  on admission  Weight loss and SHAHID likely due to polyuria from hyperglycemia  Initiated on 10 units glargine (0.2 units/kg) QHS  Continue SSI / POCT  High-intensity statin indicated, will review with him prior to discharge  Diabetes educator consulted      Contusion of right hip- (present on admission)  Assessment & Plan  Sustained in fall in the shower PTA  Pelvic XR negative for fracture  APAP PRN for pain, oxycodone PRN for severe pain  PT eval and treat, recommend post-acute placement    Frailty syndrome in geriatric patient- (present on admission)  Assessment & Plan  BMI 17.2  Multifactorial including depression, homelessness, uncontrolled T2DM  PT/OT consulted, appreciate Case Management assistance in discharge planning for post-acute placement and eventual group home    Normocytic anemia- (present on admission)  Assessment & Plan  Denies signs of bleeding  Elevated Ferritin with normal FeSat% consistent with AOCD  Repeat CBC only if clinical change    Moderate episode of recurrent major depressive disorder (HCC)- (present on  admission)  Assessment & Plan  Reports losing home and his wife  this year    Benign prostatic hyperplasia with urinary obstruction- (present on admission)  Assessment & Plan  Chronic indwelling lazo, follows with urology monthly  On finasteride PTA, has not been taking tamsulosin  Tamsulosin discontinued due to frequent falls and pre-renal SHAHID concerning for orthostasis  Restart tamsulosin tomorrow       VTE prophylaxis: LMWH

## 2021-06-22 NOTE — CONSULTS
Diabetes education: Pt has a hx of diabetes and was to be on Metformin prior to admit but did not take it. [facility] at [facility].  Pt was admitted with blood sugar of 439 and Hg a1c of 14.3%.  Pt is currently on Semglee 10 units pm with Admelog sliding scale every six hours (should be ac and hs if pt is eating). Blood sugars are 218 ( 3 units), 144, and 119.  Attempted to meet with pt this afternoon but he was sleeping in the chair ( would wake up with name but not sure how much he would retain). Per nursing, he just been medicated. Spoke with Rakel CUELLAR regarding CDE returning when pt more awake as well as asking RN to have pt give his insulin ( which she would have ac lunch, but no insulin needed).  Plan: Plan for d/c seems to be SNF. CDE will meet with pt and discuss diabetes and importance of taking medications for his diabetes. Per RD note pt not consistently eating meals when in [facility] facility. Pt may benefit in taking oral medications rather than insulin but more numbers will be available prior to dc from SNF. If going home on insulin pt will need a meter. If insulin please make simple and conservative.

## 2021-06-22 NOTE — PROGRESS NOTES
Diabetes education: Attempted to speak with pt this afternoon. Please see consult note.  Plan: Plan for d/c seems to be SNF. CDE will meet with pt and discuss diabetes and importance of taking medications for his diabetes. Per RD note pt not consistently eating meals when in [facility] facility. Pt may benefit in taking oral medications rather than insulin but more numbers will be available prior to dc from SNF. If going home on insulin pt will need a meter. If insulin please make simple and conservative.

## 2021-06-22 NOTE — CARE PLAN
The patient is Stable - Low risk of patient condition declining or worsening    Shift Goals  Clinical Goals: pain control  Patient Goals: pain control  Family Goals: N/A    Progress made toward(s) clinical / shift goals:    Problem: Pain - Standard  Goal: Alleviation of pain or a reduction in pain to the patient’s comfort goal  Outcome: Progressing     Problem: Knowledge Deficit - Standard  Goal: Patient and family/care givers will demonstrate understanding of plan of care, disease process/condition, diagnostic tests and medications  Outcome: Progressing     Problem: Fall Risk  Goal: Patient will remain free from falls  Outcome: Progressing       Patient is not progressing towards the following goals:      Problem: Skin Integrity  Goal: Skin integrity is maintained or improved  Outcome: Not Progressing   Pt noted to have multiple diffuse scabs and open scabs throughout body. Skin tear noted to sacrum, pt refusing mepilex at this time.

## 2021-06-22 NOTE — PROGRESS NOTES
A live bug was found on the bedding after patient sat up into chair. Bug was captured and sent to lab. Code clear was called. Pt shaved, showered and moved to a clean room.

## 2021-06-23 LAB
ANION GAP SERPL CALC-SCNC: 8 MMOL/L (ref 7–16)
BUN SERPL-MCNC: 21 MG/DL (ref 8–22)
CALCIUM SERPL-MCNC: 8.2 MG/DL (ref 8.5–10.5)
CHLORIDE SERPL-SCNC: 104 MMOL/L (ref 96–112)
CO2 SERPL-SCNC: 26 MMOL/L (ref 20–33)
CREAT SERPL-MCNC: 1.04 MG/DL (ref 0.5–1.4)
GLUCOSE BLD-MCNC: 119 MG/DL (ref 65–99)
GLUCOSE BLD-MCNC: 168 MG/DL (ref 65–99)
GLUCOSE SERPL-MCNC: 202 MG/DL (ref 65–99)
MAGNESIUM SERPL-MCNC: 1.9 MG/DL (ref 1.5–2.5)
PHOSPHATE SERPL-MCNC: 3.4 MG/DL (ref 2.5–4.5)
POTASSIUM SERPL-SCNC: 3.2 MMOL/L (ref 3.6–5.5)
SODIUM SERPL-SCNC: 138 MMOL/L (ref 135–145)

## 2021-06-23 PROCEDURE — G0378 HOSPITAL OBSERVATION PER HR: HCPCS

## 2021-06-23 PROCEDURE — 82962 GLUCOSE BLOOD TEST: CPT

## 2021-06-23 PROCEDURE — 84100 ASSAY OF PHOSPHORUS: CPT

## 2021-06-23 PROCEDURE — A9270 NON-COVERED ITEM OR SERVICE: HCPCS | Performed by: STUDENT IN AN ORGANIZED HEALTH CARE EDUCATION/TRAINING PROGRAM

## 2021-06-23 PROCEDURE — 36415 COLL VENOUS BLD VENIPUNCTURE: CPT

## 2021-06-23 PROCEDURE — 80048 BASIC METABOLIC PNL TOTAL CA: CPT

## 2021-06-23 PROCEDURE — 97116 GAIT TRAINING THERAPY: CPT

## 2021-06-23 PROCEDURE — 700102 HCHG RX REV CODE 250 W/ 637 OVERRIDE(OP): Performed by: STUDENT IN AN ORGANIZED HEALTH CARE EDUCATION/TRAINING PROGRAM

## 2021-06-23 PROCEDURE — U0003 INFECTIOUS AGENT DETECTION BY NUCLEIC ACID (DNA OR RNA); SEVERE ACUTE RESPIRATORY SYNDROME CORONAVIRUS 2 (SARS-COV-2) (CORONAVIRUS DISEASE [COVID-19]), AMPLIFIED PROBE TECHNIQUE, MAKING USE OF HIGH THROUGHPUT TECHNOLOGIES AS DESCRIBED BY CMS-2020-01-R: HCPCS

## 2021-06-23 PROCEDURE — 700111 HCHG RX REV CODE 636 W/ 250 OVERRIDE (IP): Performed by: STUDENT IN AN ORGANIZED HEALTH CARE EDUCATION/TRAINING PROGRAM

## 2021-06-23 PROCEDURE — 96372 THER/PROPH/DIAG INJ SC/IM: CPT

## 2021-06-23 PROCEDURE — 700102 HCHG RX REV CODE 250 W/ 637 OVERRIDE(OP): Performed by: INTERNAL MEDICINE

## 2021-06-23 PROCEDURE — U0005 INFEC AGEN DETEC AMPLI PROBE: HCPCS

## 2021-06-23 PROCEDURE — A9270 NON-COVERED ITEM OR SERVICE: HCPCS | Performed by: INTERNAL MEDICINE

## 2021-06-23 PROCEDURE — 99225 PR SUBSEQUENT OBSERVATION CARE,LEVEL II: CPT | Performed by: INTERNAL MEDICINE

## 2021-06-23 PROCEDURE — 97530 THERAPEUTIC ACTIVITIES: CPT

## 2021-06-23 PROCEDURE — 83735 ASSAY OF MAGNESIUM: CPT

## 2021-06-23 RX ORDER — POTASSIUM CHLORIDE 20 MEQ/1
40 TABLET, EXTENDED RELEASE ORAL EVERY 6 HOURS
Status: DISCONTINUED | OUTPATIENT
Start: 2021-06-23 | End: 2021-06-23

## 2021-06-23 RX ORDER — TRIAMCINOLONE ACETONIDE 1 MG/G
CREAM TOPICAL 2 TIMES DAILY PRN
Status: DISCONTINUED | OUTPATIENT
Start: 2021-06-23 | End: 2021-07-15 | Stop reason: HOSPADM

## 2021-06-23 RX ADMIN — INSULIN GLARGINE 10 UNITS: 100 INJECTION, SOLUTION SUBCUTANEOUS at 18:17

## 2021-06-23 RX ADMIN — POTASSIUM CHLORIDE 40 MEQ: 1500 TABLET, EXTENDED RELEASE ORAL at 06:07

## 2021-06-23 RX ADMIN — INSULIN LISPRO 3 UNITS: 100 INJECTION, SOLUTION INTRAVENOUS; SUBCUTANEOUS at 06:08

## 2021-06-23 RX ADMIN — FINASTERIDE 5 MG: 5 TABLET, FILM COATED ORAL at 06:08

## 2021-06-23 RX ADMIN — OXYCODONE 5 MG: 5 TABLET ORAL at 06:08

## 2021-06-23 RX ADMIN — DOCUSATE SODIUM 50 MG AND SENNOSIDES 8.6 MG 2 TABLET: 8.6; 5 TABLET, FILM COATED ORAL at 18:07

## 2021-06-23 RX ADMIN — Medication 100 MG: at 06:08

## 2021-06-23 RX ADMIN — THERA TABS 1 TABLET: TAB at 06:08

## 2021-06-23 RX ADMIN — TAMSULOSIN HYDROCHLORIDE 0.4 MG: 0.4 CAPSULE ORAL at 09:59

## 2021-06-23 RX ADMIN — Medication 400 MG: at 09:58

## 2021-06-23 RX ADMIN — CALAMINE AND PRAMOXINE HYDROCHLORIDE: 80; 10 LOTION TOPICAL at 03:14

## 2021-06-23 RX ADMIN — ENOXAPARIN SODIUM 30 MG: 30 INJECTION SUBCUTANEOUS at 06:08

## 2021-06-23 RX ADMIN — INSULIN LISPRO 2 UNITS: 100 INJECTION, SOLUTION INTRAVENOUS; SUBCUTANEOUS at 12:18

## 2021-06-23 RX ADMIN — POTASSIUM CHLORIDE 40 MEQ: 1500 TABLET, EXTENDED RELEASE ORAL at 12:24

## 2021-06-23 RX ADMIN — METFORMIN HYDROCHLORIDE 500 MG: 500 TABLET ORAL at 20:13

## 2021-06-23 ASSESSMENT — ENCOUNTER SYMPTOMS
CONSTIPATION: 0
COUGH: 0
WEAKNESS: 1
FEVER: 0
NAUSEA: 0
CHILLS: 0
SINUS PAIN: 0
HEADACHES: 0
BRUISES/BLEEDS EASILY: 0
NERVOUS/ANXIOUS: 0
SHORTNESS OF BREATH: 0
DEPRESSION: 1
SORE THROAT: 0
PALPITATIONS: 0
BLURRED VISION: 0
FLANK PAIN: 0
VOMITING: 0
DIARRHEA: 0
FALLS: 1
ABDOMINAL PAIN: 0

## 2021-06-23 ASSESSMENT — COGNITIVE AND FUNCTIONAL STATUS - GENERAL
SUGGESTED CMS G CODE MODIFIER MOBILITY: CK
MOVING TO AND FROM BED TO CHAIR: A LITTLE
MOBILITY SCORE: 17
WALKING IN HOSPITAL ROOM: A LITTLE
STANDING UP FROM CHAIR USING ARMS: A LITTLE
MOVING FROM LYING ON BACK TO SITTING ON SIDE OF FLAT BED: A LITTLE
TURNING FROM BACK TO SIDE WHILE IN FLAT BAD: A LITTLE
CLIMB 3 TO 5 STEPS WITH RAILING: A LOT

## 2021-06-23 ASSESSMENT — PAIN DESCRIPTION - PAIN TYPE: TYPE: ACUTE PAIN

## 2021-06-23 ASSESSMENT — GAIT ASSESSMENTS
DISTANCE (FEET): 20
DEVIATION: BRADYKINETIC;STEP TO
GAIT LEVEL OF ASSIST: SUPERVISED
ASSISTIVE DEVICE: FRONT WHEEL WALKER

## 2021-06-23 NOTE — CARE PLAN
Problem: Pain - Standard  Goal: Alleviation of pain or a reduction in pain to the patient’s comfort goal  Outcome: Progressing     Problem: Knowledge Deficit - Standard  Goal: Patient and family/care givers will demonstrate understanding of plan of care, disease process/condition, diagnostic tests and medications  Outcome: Progressing     Problem: Skin Integrity  Goal: Skin integrity is maintained or improved  Outcome: Progressing     Problem: Fall Risk  Goal: Patient will remain free from falls  Outcome: Progressing   The patient is call for assistance    Shift Goals  Clinical Goals: control itchiness  Patient Goals: sleep comfortably  Family Goals: N/A    Progress made toward(s) clinical / shift goals:  pain control    Patient is not progressing towards the following goals:patient is fatigued

## 2021-06-23 NOTE — THERAPY
Physical Therapy   Daily Treatment     Patient Name: Hugh Núñez  Age:  75 y.o., Sex:  male  Medical Record #: [id number]  Today's Date: 6/23/2021     Precautions: (P) Fall Risk    Assessment    Pt seen for PT treatment session. Reports R hip pain limiting mobility tolerance. Pt is motivated to complete mobility on his own, requires extra time and occasional assist depending on surface height. Pt able to complete functional mobility and gait with Min - SPV A. Demonstrated decreased R hip, knee flexion during swing phase of gait due to pain which presents higher fall risk. PT will continue to follow while in house.     Plan    Continue current treatment plan.    DC Equipment Recommendations: Unable to determine at this time  Discharge Recommendations: TBD...Home Health in group home setting, if possible?         06/23/21 1127   Precautions   Precautions Fall Risk   Vitals   O2 Delivery Device None - Room Air   Pain 0 - 10 Group   Therapist Pain Assessment During Activity;Nurse Notified  (R hip pain not rated)   Cognition    Speech/ Communication Hard of Hearing   Level of Consciousness Alert   Safety Awareness Impaired   Comments cooperative, slightly forgetful   Active ROM Lower Body    Active ROM Lower Body  X   Comments R LE decreased hip, knee flexion in swing   Strength Lower Body   Lower Body Strength  WDL   Comments adequate for functional mobility   Sitting Lower Body Exercises   Sitting Lower Body Exercises Yes   Ankle Pumps 1 set of 10;Bilateral   Long Arc Quad 1 set of 10;Bilateral   Balance   Sitting Balance (Static) Fair   Sitting Balance (Dynamic) Fair   Standing Balance (Static) Fair   Standing Balance (Dynamic) Fair -   Weight Shift Sitting Fair   Weight Shift Standing Fair   Skilled Intervention Verbal Cuing;Compensatory Strategies   Comments w/ 4WW   Gait Analysis   Gait Level Of Assist Supervised   Assistive Device Front Wheel Walker   Distance (Feet) 20   # of Times Distance was Traveled 2    Deviation Bradykinetic;Step To   # of Stairs Climbed 0   Weight Bearing Status no restrictions   Skilled Intervention Verbal Cuing;Sequencing;Compensatory Strategies   Comments difficulty with R LE through swing. cues to lead with R LE and 4WW management   Bed Mobility    Supine to Sit Supervised  (with extra time. HOB at 30*, reliant on railing support)   Sit to Supine   (seated in chair at end of session)   Scooting Supervised   Skilled Intervention Verbal Cuing   Functional Mobility   Sit to Stand Supervised   Bed, Chair, Wheelchair Transfer Supervised   Toilet Transfers Minimal Assist  (very low toilet height)   Skilled Intervention Verbal Cuing   Comments verbal cues to safely sequence turns. Requires extra time to complete but motivated to do on his own   Short Term Goals    Short Term Goal # 1 Pt will perform supine <> sit with HOB flat, no use of rails with SPV in 6 visits in order to progress toward independence with functional mobility   Goal Outcome # 1 goal not met   Short Term Goal # 2 Pt will ambulate >150 ft with LRAD and SPV within 6 visits in order to progress toward community distance ambulation   Goal Outcome # 2 Goal not met   Short Term Goal # 3 Pt will ascend/descend 1 step with LRAD and SPV within 6 visits in order to negotiate curbs and community environment   Goal Outcome # 3 Goal not met   Anticipated Discharge Equipment and Recommendations   DC Equipment Recommendations Unable to determine at this time   Discharge Recommendations Recommend post-acute placement for additional physical therapy services prior to discharge home

## 2021-06-23 NOTE — PROGRESS NOTES
Patient is alert and oriented. Reviewed POC with patient. Patient up in the chair today. Call light within reach. Will continue to monitor patient.

## 2021-06-23 NOTE — DISCHARGE PLANNING
Anticipated Discharge Disposition:  placement    Action: Call placed to Maris from [facility] ([phone]) inquiring about available beds; VM left with request for return call.  CM spoke with Hall from [facility]; pt not a candidate for his facility due to plan for Medicaid  waiver. His Medicaid facility currently has no open beds and other  does not take Medicaid at this time.     Barriers to Discharge: GH placement    Plan: Care coordination will continue to reach out to  for available beds.

## 2021-06-23 NOTE — DISCHARGE PLANNING
SW supervisor assisting with finding GH placement.     Patient has 20,000 in savings, makes $1100/SSI per month. Called [facility]: [phone].     [facility] has private or shared room in [place] or [place]. Will be here in 2 hours or so, to assess patient for placement. Provided RN/CM, Aneta number for contact on floor.     Patient is medically clear for d/c when a home is available.

## 2021-06-23 NOTE — PROGRESS NOTES
Patient is alert and oriented. Patient up with one person assist. Clement catheter in place with positive output. Reviewed POC with patient. Call light within reach. Will continue to monitor patient..

## 2021-06-23 NOTE — CARE PLAN
Problem: Pain - Standard  Goal: Alleviation of pain or a reduction in pain to the patient’s comfort goal  Outcome: Progressing     Problem: Knowledge Deficit - Standard  Goal: Patient and family/care givers will demonstrate understanding of plan of care, disease process/condition, diagnostic tests and medications  Outcome: Progressing     Problem: Skin Integrity  Goal: Skin integrity is maintained or improved  Outcome: Progressing     Problem: Fall Risk  Goal: Patient will remain free from falls  Outcome: Progressing   The patient is stable    Shift Goals  Clinical Goals: pain control  Patient Goals: pain control  Family Goals: NA    Progress made toward(s) clinical / shift goals:  pain control    Patient is not progressing towards the following goals:None

## 2021-06-23 NOTE — CARE PLAN
The patient is Stable - Low risk of patient condition declining or worsening    Shift Goals  Clinical Goals: control itchiness  Patient Goals: sleep comfortably  Family Goals: N/A    Progress made toward(s) clinical / shift goals:    Problem: Pain - Standard  Goal: Alleviation of pain or a reduction in pain to the patient’s comfort goal  Outcome: Progressing     Problem: Knowledge Deficit - Standard  Goal: Patient and family/care givers will demonstrate understanding of plan of care, disease process/condition, diagnostic tests and medications  Outcome: Progressing     Problem: Skin Integrity  Goal: Skin integrity is maintained or improved  Outcome: Progressing     Problem: Fall Risk  Goal: Patient will remain free from falls  Outcome: Progressing       Patient is not progressing towards the following goals:

## 2021-06-23 NOTE — PROGRESS NOTES
[facility] Medicine Daily Progress Note    Date of Service  6/23/2021    Chief Complaint  75 y.o. male with T2DM, BPH s/p chronic indwelling lazo, MDD admitted 6/19/2021 with fall and SHAHID.    Hospital Course  Mr. Hugh Núñez is a 75 y.o. male history of diabetes, homelessness, benign prostatic hypertrophy status post chronic indwelling Lazo who presented on 6/19/2021 with ground-level fall, right hip pain.  Patient had a fall in the shower and hit his right hip.  Denies any loss of consciousness or head trauma.  Hip x-ray showed no fracture.  He was found to have an acute kidney injury and uncontrolled blood sugar in the 400s with an A1c of 14.      Interval Problem Update  Patient was seen and examined at bedside.  I have personally reviewed and interpreted vitals, labs, and imaging.    6/22.  Afebrile.  Stable vitals.  On room air.  Blood sugars are improved.  Denies fevers, chills, chest pains, shortness of breath.  Does report some right hip pain.  He is agreeable for placement.  6/23.  Afebrile.  Stable vitals.  On room air.  Replete potassium and magnesium.  Denies fevers, chills, chest pains, shortness of breath.  Reports persistent right hip pain.  Reports abdominal pain localized around umbilicus that is intermittent.  Has not had very good p.o. intake states he is just not hungry.  Denies nausea, vomiting.  Has not been able to check his own blood sugar and give insulin which she will need to do a group home.  Diabetic education is following.  Will start oral Metformin for blood sugar control.  Patient does report some itching and rash on his back from prior history of bedbugs.  Start triamcinolone cream.    Consultants/Specialty  Diabetes Educator    Code Status  Full Code    Disposition  Pending post-acute placement.  SNF versus group home with home health    Review of Systems  Review of Systems   Constitutional: Positive for malaise/fatigue. Negative for chills and fever.   HENT: Negative for  sinus pain and sore throat.    Eyes: Negative for blurred vision.   Respiratory: Negative for cough and shortness of breath.    Cardiovascular: Negative for chest pain, palpitations and leg swelling.   Gastrointestinal: Negative for abdominal pain, constipation, diarrhea, nausea and vomiting.   Genitourinary: Negative for dysuria, flank pain and hematuria.   Musculoskeletal: Positive for falls and joint pain.   Skin: Positive for itching and rash.   Neurological: Positive for weakness. Negative for headaches.   Endo/Heme/Allergies: Does not bruise/bleed easily.   Psychiatric/Behavioral: Positive for depression. The patient is not nervous/anxious.         Physical Exam  Temp:  [36.6 °C (97.9 °F)-37.2 °C (98.9 °F)] 36.9 °C (98.4 °F)  Pulse:  [68-84] 68  Resp:  [12-18] 18  BP: (100-122)/(45-68) 100/49  SpO2:  [94 %-99 %] 97 %    Physical Exam  Vitals and nursing note reviewed.   Constitutional:       Appearance: He is cachectic. He is ill-appearing (Chronically).   HENT:      Head:      Comments: Bitemporal and bibuccal wasting.     Right Ear: External ear normal.      Left Ear: External ear normal.      Nose: Nose normal.      Mouth/Throat:      Mouth: Mucous membranes are moist.      Pharynx: Oropharynx is clear.   Eyes:      Extraocular Movements: Extraocular movements intact.      Conjunctiva/sclera: Conjunctivae normal.   Cardiovascular:      Rate and Rhythm: Normal rate and regular rhythm.      Pulses: Normal pulses.      Heart sounds: Normal heart sounds. No murmur heard.   No friction rub. No gallop.    Pulmonary:      Effort: Pulmonary effort is normal. No respiratory distress.      Breath sounds: Normal breath sounds. No wheezing, rhonchi or rales.   Abdominal:      General: Abdomen is flat. Bowel sounds are normal. There is no distension.      Palpations: Abdomen is soft.      Tenderness: There is no abdominal tenderness. There is no guarding or rebound.   Genitourinary:     Comments:  +Clement  Musculoskeletal:         General: Tenderness (Right hip, thigh) present.      Cervical back: Normal range of motion.      Right lower leg: No edema.      Left lower leg: No edema.   Neurological:      General: No focal deficit present.      Mental Status: He is alert, oriented to person, place, and time and easily aroused. Mental status is at baseline.      Cranial Nerves: No cranial nerve deficit.   Psychiatric:         Attention and Perception: Attention normal.         Mood and Affect: Mood is depressed. Affect is flat.         Speech: Speech normal.         Behavior: Behavior normal. Behavior is cooperative.         Cognition and Memory: Cognition and memory normal.         Judgment: Judgment normal.         Fluids    Intake/Output Summary (Last 24 hours) at 6/23/2021 0724  Last data filed at 6/23/2021 0540  Gross per 24 hour   Intake --   Output 1150 ml   Net -1150 ml       Laboratory      Recent Labs     06/21/21  0012 06/23/21  0036   SODIUM 137 138   POTASSIUM 3.5* 3.2*   CHLORIDE 102 104   CO2 27 26   GLUCOSE 251* 202*   BUN 19 21   CREATININE 1.15 1.04   CALCIUM 8.5 8.2*                   Imaging  DX-FEMUR-2+ RIGHT   Final Result      No radiographic evidence of acute traumatic injury.      Moderate right hip osteoarthritis      DX-PELVIS-1 OR 2 VIEWS   Final Result      No radiographic evidence of acute displaced fracture      Moderate right, mild left hip osteoarthritis           Assessment/Plan  Contusion of right hip- (present on admission)  Assessment & Plan  Sustained in fall in the shower PTA  Pelvic XR negative for fracture  APAP PRN for pain, oxycodone PRN for severe pain  PT eval and treat, recommend post-acute placement    SHAHID (acute kidney injury) (HCC)- (present on admission)  Assessment & Plan  Resolved with IVF and glycemic control  Discontinued IVF  Avoid nephrotoxins    Moderate episode of recurrent major depressive disorder (HCC)- (present on admission)  Assessment & Plan  Reports  losing home and his wife  this year    Frequent falls- (present on admission)  Assessment & Plan  Likely due to uncontrolled diabetes causing polyuria and weight loss  PT/OT eval and treat, recommending post-acute placement    Frailty syndrome in geriatric patient- (present on admission)  Assessment & Plan  BMI 17.2  Multifactorial including depression, homelessness, uncontrolled T2DM  PT/OT consulted, appreciate Case Management assistance in discharge planning for post-acute placement and eventual group home    Type 2 diabetes mellitus with hyperglycemia, with long-term current use of insulin ([facility])- (present on admission)  Assessment & Plan  Lab Results   Component Value Date/Time    HBA1C 14.3 (H) 2021 1705    HBA1C 11.8 (H) 2021 1326    HBA1C 7.8 (H) 2021 0217     Results from last 7 days   Lab Units 21  1214 21  1212 21  0622 21  1807 21  1210 21  0525 21  2352 21  1703   ACCU CHECK GLUCOSE 788 mg/dL 168* 162* 90 279* 119* 144* 218* 293*     I have ordered insulin sliding scale with D50 and glucagon for hypoglycemia per protocol.  Diabetic diet    Improving  He had discontinued insulin therapy due to apathy and poor insight about T2DM  A1c 14.3,  on admission  Weight loss and SHAHID likely due to polyuria from hyperglycemia  Initiated on 10 units glargine (0.2 units/kg) QHS  Diabetes educator consulted  She has been unable to give himself insulin and check blood sugars.  Started on Metformin    Benign prostatic hyperplasia with urinary obstruction- (present on admission)  Assessment & Plan  Chronic indwelling lazo, follows with urology monthly  On finasteride PTA, has not been taking tamsulosin  Tamsulosin discontinued due to frequent falls and pre-renal SHAHID concerning for orthostasis  Restart tamsulosin    Normocytic anemia- (present on admission)  Assessment & Plan  Denies signs of bleeding  Elevated Ferritin with normal FeSat%  consistent with AOCD  Repeat CBC only if clinical change       VTE prophylaxis: LMWH

## 2021-06-24 LAB
ANION GAP SERPL CALC-SCNC: 8 MMOL/L (ref 7–16)
BUN SERPL-MCNC: 19 MG/DL (ref 8–22)
CALCIUM SERPL-MCNC: 7.3 MG/DL (ref 8.5–10.5)
CHLORIDE SERPL-SCNC: 104 MMOL/L (ref 96–112)
CO2 SERPL-SCNC: 27 MMOL/L (ref 20–33)
CREAT SERPL-MCNC: 1.06 MG/DL (ref 0.5–1.4)
GLUCOSE BLD-MCNC: 154 MG/DL (ref 65–99)
GLUCOSE BLD-MCNC: 200 MG/DL (ref 65–99)
GLUCOSE SERPL-MCNC: 55 MG/DL (ref 65–99)
MAGNESIUM SERPL-MCNC: 1.8 MG/DL (ref 1.5–2.5)
PHOSPHATE SERPL-MCNC: 2.4 MG/DL (ref 2.5–4.5)
POTASSIUM SERPL-SCNC: 3.5 MMOL/L (ref 3.6–5.5)
SARS-COV-2 RNA RESP QL NAA+PROBE: NOTDETECTED
SODIUM SERPL-SCNC: 139 MMOL/L (ref 135–145)
SPECIMEN SOURCE: NORMAL

## 2021-06-24 PROCEDURE — 700102 HCHG RX REV CODE 250 W/ 637 OVERRIDE(OP): Performed by: STUDENT IN AN ORGANIZED HEALTH CARE EDUCATION/TRAINING PROGRAM

## 2021-06-24 PROCEDURE — A9270 NON-COVERED ITEM OR SERVICE: HCPCS | Performed by: STUDENT IN AN ORGANIZED HEALTH CARE EDUCATION/TRAINING PROGRAM

## 2021-06-24 PROCEDURE — 99232 SBSQ HOSP IP/OBS MODERATE 35: CPT | Performed by: INTERNAL MEDICINE

## 2021-06-24 PROCEDURE — 86480 TB TEST CELL IMMUN MEASURE: CPT

## 2021-06-24 PROCEDURE — 700102 HCHG RX REV CODE 250 W/ 637 OVERRIDE(OP): Performed by: INTERNAL MEDICINE

## 2021-06-24 PROCEDURE — 97535 SELF CARE MNGMENT TRAINING: CPT

## 2021-06-24 PROCEDURE — 84100 ASSAY OF PHOSPHORUS: CPT

## 2021-06-24 PROCEDURE — A9270 NON-COVERED ITEM OR SERVICE: HCPCS | Performed by: INTERNAL MEDICINE

## 2021-06-24 PROCEDURE — 83735 ASSAY OF MAGNESIUM: CPT

## 2021-06-24 PROCEDURE — 80048 BASIC METABOLIC PNL TOTAL CA: CPT

## 2021-06-24 PROCEDURE — 96372 THER/PROPH/DIAG INJ SC/IM: CPT

## 2021-06-24 PROCEDURE — 700111 HCHG RX REV CODE 636 W/ 250 OVERRIDE (IP): Performed by: STUDENT IN AN ORGANIZED HEALTH CARE EDUCATION/TRAINING PROGRAM

## 2021-06-24 PROCEDURE — 97530 THERAPEUTIC ACTIVITIES: CPT

## 2021-06-24 PROCEDURE — 36415 COLL VENOUS BLD VENIPUNCTURE: CPT

## 2021-06-24 PROCEDURE — G0378 HOSPITAL OBSERVATION PER HR: HCPCS

## 2021-06-24 PROCEDURE — 82962 GLUCOSE BLOOD TEST: CPT

## 2021-06-24 RX ORDER — POTASSIUM CHLORIDE 20 MEQ/1
40 TABLET, EXTENDED RELEASE ORAL ONCE
Status: COMPLETED | OUTPATIENT
Start: 2021-06-24 | End: 2021-06-24

## 2021-06-24 RX ORDER — DIPHENHYDRAMINE HCL 25 MG
25 TABLET ORAL EVERY 6 HOURS PRN
Status: DISCONTINUED | OUTPATIENT
Start: 2021-06-24 | End: 2021-07-15 | Stop reason: HOSPADM

## 2021-06-24 RX ADMIN — Medication 100 MG: at 06:23

## 2021-06-24 RX ADMIN — OXYCODONE 5 MG: 5 TABLET ORAL at 19:59

## 2021-06-24 RX ADMIN — ENOXAPARIN SODIUM 30 MG: 30 INJECTION SUBCUTANEOUS at 06:23

## 2021-06-24 RX ADMIN — INSULIN HUMAN 1 UNITS: 100 INJECTION, SOLUTION PARENTERAL at 12:02

## 2021-06-24 RX ADMIN — ACETAMINOPHEN 1000 MG: 500 TABLET ORAL at 23:05

## 2021-06-24 RX ADMIN — FINASTERIDE 5 MG: 5 TABLET, FILM COATED ORAL at 06:21

## 2021-06-24 RX ADMIN — INSULIN HUMAN 2 UNITS: 100 INJECTION, SOLUTION PARENTERAL at 17:06

## 2021-06-24 RX ADMIN — DIBASIC SODIUM PHOSPHATE, MONOBASIC POTASSIUM PHOSPHATE AND MONOBASIC SODIUM PHOSPHATE 500 MG: 852; 155; 130 TABLET ORAL at 12:06

## 2021-06-24 RX ADMIN — INSULIN HUMAN 1 UNITS: 100 INJECTION, SOLUTION PARENTERAL at 06:28

## 2021-06-24 RX ADMIN — POTASSIUM CHLORIDE 40 MEQ: 1500 TABLET, EXTENDED RELEASE ORAL at 06:23

## 2021-06-24 RX ADMIN — Medication 400 MG: at 06:22

## 2021-06-24 RX ADMIN — METFORMIN HYDROCHLORIDE 500 MG: 500 TABLET ORAL at 08:40

## 2021-06-24 RX ADMIN — THERA TABS 1 TABLET: TAB at 06:22

## 2021-06-24 RX ADMIN — METFORMIN HYDROCHLORIDE 500 MG: 500 TABLET ORAL at 17:19

## 2021-06-24 RX ADMIN — DIBASIC SODIUM PHOSPHATE, MONOBASIC POTASSIUM PHOSPHATE AND MONOBASIC SODIUM PHOSPHATE 500 MG: 852; 155; 130 TABLET ORAL at 06:22

## 2021-06-24 RX ADMIN — DIPHENHYDRAMINE HYDROCHLORIDE 25 MG: 25 TABLET ORAL at 23:06

## 2021-06-24 RX ADMIN — TAMSULOSIN HYDROCHLORIDE 0.4 MG: 0.4 CAPSULE ORAL at 08:40

## 2021-06-24 ASSESSMENT — ENCOUNTER SYMPTOMS
ABDOMINAL PAIN: 0
DEPRESSION: 1
CONSTIPATION: 0
CHILLS: 0
SHORTNESS OF BREATH: 0
PALPITATIONS: 0
FEVER: 0
HEADACHES: 0
DIARRHEA: 0
VOMITING: 0
SINUS PAIN: 0
FLANK PAIN: 0
COUGH: 0
NERVOUS/ANXIOUS: 0
WEAKNESS: 1
BRUISES/BLEEDS EASILY: 0
NAUSEA: 0
SORE THROAT: 0
FALLS: 1
BLURRED VISION: 0

## 2021-06-24 ASSESSMENT — COGNITIVE AND FUNCTIONAL STATUS - GENERAL
DRESSING REGULAR LOWER BODY CLOTHING: A LITTLE
DAILY ACTIVITIY SCORE: 21
TOILETING: A LITTLE
HELP NEEDED FOR BATHING: A LITTLE
SUGGESTED CMS G CODE MODIFIER DAILY ACTIVITY: CJ

## 2021-06-24 ASSESSMENT — PAIN DESCRIPTION - PAIN TYPE: TYPE: ACUTE PAIN

## 2021-06-24 NOTE — PROGRESS NOTES
[facility] Medicine Daily Progress Note    Date of Service  6/24/2021    Chief Complaint  75 y.o. male with T2DM, BPH s/p chronic indwelling lazo, MDD admitted 6/19/2021 with fall and SHAHID.    Hospital Course  Mr. Hugh Núñez is a 75 y.o. male history of diabetes, homelessness, benign prostatic hypertrophy status post chronic indwelling Lazo who presented on 6/19/2021 with ground-level fall, right hip pain.  Patient had a fall in the shower and hit his right hip.  Denies any loss of consciousness or head trauma.  Hip x-ray showed no fracture.  He was found to have an acute kidney injury and uncontrolled blood sugar in the 400s with an A1c of 14.      Interval Problem Update  Patient was seen and examined at bedside.  I have personally reviewed and interpreted vitals, labs, and imaging.    6/22.  Afebrile.  Stable vitals.  On room air.  Blood sugars are improved.  Denies fevers, chills, chest pains, shortness of breath.  Does report some right hip pain.  He is agreeable for placement.  6/23.  Afebrile.  Stable vitals.  On room air.  Replete potassium and magnesium.  Denies fevers, chills, chest pains, shortness of breath.  Reports persistent right hip pain.  Reports abdominal pain localized around umbilicus that is intermittent.  Has not had very good p.o. intake states he is just not hungry.  Denies nausea, vomiting.  Has not been able to check his own blood sugar and give insulin which she will need to do a group home.  Diabetic education is following.  Will start oral Metformin for blood sugar control.  Patient does report some itching and rash on his back from prior history of bedbugs.  Start triamcinolone cream.  6/24.  Afebrile.  Stable vitals.  On room air.  Continue to replete potassium, Phos, and magnesium.  Hypoglycemia on BMP this morning at 55.  Discontinue long-acting insulin and decrease sliding scale.  Started on Metformin.  Patient is eating better today.  Denies fever, chills, chest pains,  shortness of breath.  Right hip pain is controlled.  Still reports generalized itching mostly in his back.  Start Benadryl    Consultants/Specialty  Diabetes Educator    Code Status  Full Code    Disposition  Pending post-acute placement.  SNF versus group home with home health.  Covid negative.  Went from pending    Review of Systems  Review of Systems   Constitutional: Positive for malaise/fatigue. Negative for chills and fever.   HENT: Negative for sinus pain and sore throat.    Eyes: Negative for blurred vision.   Respiratory: Negative for cough and shortness of breath.    Cardiovascular: Negative for chest pain, palpitations and leg swelling.   Gastrointestinal: Negative for abdominal pain, constipation, diarrhea, nausea and vomiting.   Genitourinary: Negative for dysuria, flank pain and hematuria.   Musculoskeletal: Positive for falls and joint pain.   Skin: Positive for itching and rash.   Neurological: Positive for weakness. Negative for headaches.   Endo/Heme/Allergies: Does not bruise/bleed easily.   Psychiatric/Behavioral: Positive for depression. The patient is not nervous/anxious.         Physical Exam  Temp:  [36.4 °C (97.6 °F)-37.2 °C (98.9 °F)] 36.4 °C (97.6 °F)  Pulse:  [63-79] 79  Resp:  [18] 18  BP: (100-136)/(49-75) 133/68  SpO2:  [95 %-97 %] 97 %    Physical Exam  Vitals and nursing note reviewed.   Constitutional:       Appearance: He is cachectic. He is ill-appearing (Chronically).   HENT:      Head:      Comments: Bitemporal and bibuccal wasting.     Right Ear: External ear normal.      Left Ear: External ear normal.      Nose: Nose normal.      Mouth/Throat:      Mouth: Mucous membranes are moist.      Pharynx: Oropharynx is clear.   Eyes:      Extraocular Movements: Extraocular movements intact.      Conjunctiva/sclera: Conjunctivae normal.   Cardiovascular:      Rate and Rhythm: Normal rate and regular rhythm.      Pulses: Normal pulses.      Heart sounds: Normal heart sounds. No murmur  heard.   No friction rub. No gallop.    Pulmonary:      Effort: Pulmonary effort is normal. No respiratory distress.      Breath sounds: Normal breath sounds. No wheezing, rhonchi or rales.   Abdominal:      General: Abdomen is flat. Bowel sounds are normal. There is no distension.      Palpations: Abdomen is soft.      Tenderness: There is no abdominal tenderness. There is no guarding or rebound.   Genitourinary:     Comments: +Clement  Musculoskeletal:         General: Tenderness (Right hip, thigh) present.      Cervical back: Normal range of motion.      Right lower leg: No edema.      Left lower leg: No edema.   Neurological:      General: No focal deficit present.      Mental Status: He is alert, oriented to person, place, and time and easily aroused. Mental status is at baseline.      Cranial Nerves: No cranial nerve deficit.   Psychiatric:         Attention and Perception: Attention normal.         Mood and Affect: Mood is depressed. Affect is flat.         Speech: Speech normal.         Behavior: Behavior normal. Behavior is cooperative.         Cognition and Memory: Cognition and memory normal.         Judgment: Judgment normal.         Fluids    Intake/Output Summary (Last 24 hours) at 6/24/2021 0552  Last data filed at 6/23/2021 1600  Gross per 24 hour   Intake --   Output 500 ml   Net -500 ml       Laboratory      Recent Labs     06/23/21  0036 06/24/21  0029   SODIUM 138 139   POTASSIUM 3.2* 3.5*   CHLORIDE 104 104   CO2 26 27   GLUCOSE 202* 55*   BUN 21 19   CREATININE 1.04 1.06   CALCIUM 8.2* 7.3*                   Imaging  DX-FEMUR-2+ RIGHT   Final Result      No radiographic evidence of acute traumatic injury.      Moderate right hip osteoarthritis      DX-PELVIS-1 OR 2 VIEWS   Final Result      No radiographic evidence of acute displaced fracture      Moderate right, mild left hip osteoarthritis           Assessment/Plan  Contusion of right hip- (present on admission)  Assessment & Plan  Sustained in  fall in the shower PTA  Pelvic XR negative for fracture  APAP PRN for pain, oxycodone PRN for severe pain  PT eval and treat, recommend post-acute placement    SHAHID (acute kidney injury) (HCC)- (present on admission)  Assessment & Plan  Resolved with IVF and glycemic control  Discontinued IVF  Avoid nephrotoxins    Moderate episode of recurrent major depressive disorder (HCC)- (present on admission)  Assessment & Plan  Reports losing home and his wife  this year    Frequent falls- (present on admission)  Assessment & Plan  Likely due to uncontrolled diabetes causing polyuria and weight loss  PT/OT eval and treat, recommending post-acute placement    Frailty syndrome in geriatric patient- (present on admission)  Assessment & Plan  BMI 17.2  Multifactorial including depression, homelessness, uncontrolled T2DM  PT/OT consulted, appreciate Case Management assistance in discharge planning for post-acute placement and eventual group home    Type 2 diabetes mellitus with hyperglycemia, with long-term current use of insulin (HCC)- (present on admission)  Assessment & Plan  Lab Results   Component Value Date/Time    HBA1C 14.3 (H) 2021 1705    HBA1C 11.8 (H) 2021 1326    HBA1C 7.8 (H) 2021 0217     Results from last 7 days   Lab Units 21  1200 21  0633 21  1705 21  1214 21  1212 21  0622 21  1807 21  1210   ACCU CHECK GLUCOSE 788 mg/dL 200* 154* 119* 168* 162* 90 279* 119*     I have ordered insulin sliding scale with D50 and glucagon for hypoglycemia per protocol.  Diabetic diet    Improving  He had discontinued insulin therapy due to apathy and poor insight about T2DM  A1c 14.3,  on admission  Weight loss and SHAHID likely due to polyuria from hyperglycemia  Initiated on 10 units glargine (0.2 units/kg) QHS  Diabetes educator consulted  She has been unable to give himself insulin and check blood sugars.      Because of hypoglycemia long-acting insulin  was discontinued 6/24  Sliding scale insulin was decreased.  Continue Metformin    Benign prostatic hyperplasia with urinary obstruction- (present on admission)  Assessment & Plan  Chronic indwelling lazo, follows with urology monthly  On finasteride PTA, has not been taking tamsulosin  Tamsulosin discontinued due to frequent falls and pre-renal SHAHID concerning for orthostasis    Normocytic anemia- (present on admission)  Assessment & Plan  Denies signs of bleeding  Elevated Ferritin with normal FeSat% consistent with AOCD  Repeat CBC only if clinical change       VTE prophylaxis: LMWH

## 2021-06-24 NOTE — CARE PLAN
The patient is Stable - Low risk of patient condition declining or worsening    Shift Goals  Clinical Goals: pain control  Patient Goals: sleep comfortably  Family Goals: N/A    Progress made toward(s) clinical / shift goals:    Problem: Pain - Standard  Goal: Alleviation of pain or a reduction in pain to the patient’s comfort goal  Outcome: Progressing     Problem: Knowledge Deficit - Standard  Goal: Patient and family/care givers will demonstrate understanding of plan of care, disease process/condition, diagnostic tests and medications  Outcome: Progressing     Problem: Skin Integrity  Goal: Skin integrity is maintained or improved  Outcome: Progressing     Problem: Fall Risk  Goal: Patient will remain free from falls  Outcome: Progressing       Pt A&Ox 4, resting in bed. Discussed plan of care, pt verbalized agreement. Nightly medications and assessment completed. Safety and fall precautions in place. Rounding in place. All needs met at this moment.

## 2021-06-24 NOTE — THERAPY
"Occupational Therapy  Daily Treatment     Patient Name: Hugh Núñez  Age:  75 y.o., Sex:  male  Medical Record #: [id number]  Today's Date: 6/24/2021     Precautions  Precautions: Fall Risk  Comments: R hip pain    Assessment    Making progress, however remains limited by R hip pain, weakness, fatigue which impacts independence in self care and functional mobility.    Plan    Continue current treatment plan.    DC Equipment Recommendations: Unable to determine at this time  Discharge Recommendations: Possible group home placement per chart which would be appropriate as he needs a supportive setting that assists with IADLs.    Subjective    \"I'll sit here for a little while, I think it'd be good for me\"     Objective       06/24/21 1120   Cognition    Cognition / Consciousness X   Speech/ Communication Hard of Hearing   Level of Consciousness Alert   Comments pleasant and cooperative   Balance   Sitting Balance (Static) Fair   Sitting Balance (Dynamic) Fair   Standing Balance (Static) Fair   Standing Balance (Dynamic) Fair -   Weight Shift Sitting Fair   Weight Shift Standing Fair   Skilled Intervention Verbal Cuing   Comments w/ 4ww   Bed Mobility    Supine to Sit Supervised  (extra time)   Scooting Supervised   Skilled Intervention Verbal Cuing   Activities of Daily Living   Grooming   (declined)   Lower Body Dressing Minimal Assist  (don pajama pants, extra time)   Skilled Intervention Verbal Cuing;Sequencing;Compensatory Strategies   How much help from another person does the patient currently need...   Putting on and taking off regular lower body clothing? 3   Bathing (including washing, rinsing, and drying)? 3   Toileting, which includes using a toilet, bedpan, or urinal? 3   Putting on and taking off regular upper body clothing? 4   Taking care of personal grooming such as brushing teeth? 4   Eating meals? 4   6 Clicks Daily Activity Score 21   Functional Mobility   Sit to Stand Supervised   Bed, Chair, " Wheelchair Transfer Supervised   Mobility EOB>in room>munoz>EOB   Skilled Intervention Verbal Cuing   Comments w/ 4ww, pt declined need for toileting   Patient / Family Goals   Patient / Family Goal #1 To get more help   Goal #1 Outcome Progressing as expected   Short Term Goals   Short Term Goal # 1 Pt will complete ADL transfers with supervision   Goal Outcome # 1 Progressing as expected   Short Term Goal # 2 Pt will complete LB dressing with supervision   Goal Outcome # 2 Progressing as expected   Short Term Goal # 3 Pt will complete toileting with supervision   Goal Outcome # 3 Progressing as expected

## 2021-06-24 NOTE — PROGRESS NOTES
Diabetes education: Met with pt this afternoon. Pt does not want to give himself insulin shots. Explained and importance. CDE spoke with Sharon CUELLAR and asked to have him give his insulin with dinner. Finger stick was 119, no coverage needed. Lantus was due and given . No documentation as to whether he was asked and refused to give it to himself or not.   Plan: If pt to go to group home, he will need to be independent in giving insulin and doing finger sticks. Not sure he will follow though.  Maybe better choice would be Glimepiride or glyburide with Metformin and or Januvia ( provided he has a part D for medications), as not sure he would do finger sticks either. If going the oral route, better to start before discharge.  Please have pt stick his finger and give insulin with nursing, and nursing document if he did this or refused.   CDE to continue to follow.

## 2021-06-25 LAB
ANION GAP SERPL CALC-SCNC: 9 MMOL/L (ref 7–16)
BUN SERPL-MCNC: 23 MG/DL (ref 8–22)
CALCIUM SERPL-MCNC: 8.8 MG/DL (ref 8.5–10.5)
CHLORIDE SERPL-SCNC: 99 MMOL/L (ref 96–112)
CO2 SERPL-SCNC: 26 MMOL/L (ref 20–33)
CREAT SERPL-MCNC: 1.16 MG/DL (ref 0.5–1.4)
GAMMA INTERFERON BACKGROUND BLD IA-ACNC: 0.03 IU/ML
GLUCOSE BLD-MCNC: 201 MG/DL (ref 65–99)
GLUCOSE BLD-MCNC: 205 MG/DL (ref 65–99)
GLUCOSE BLD-MCNC: 214 MG/DL (ref 65–99)
GLUCOSE BLD-MCNC: 219 MG/DL (ref 65–99)
GLUCOSE SERPL-MCNC: 200 MG/DL (ref 65–99)
M TB IFN-G BLD-IMP: NEGATIVE
M TB IFN-G CD4+ BCKGRND COR BLD-ACNC: -0.01 IU/ML
MAGNESIUM SERPL-MCNC: 1.8 MG/DL (ref 1.5–2.5)
MITOGEN IGNF BCKGRD COR BLD-ACNC: 7.36 IU/ML
PHOSPHATE SERPL-MCNC: 2.9 MG/DL (ref 2.5–4.5)
POTASSIUM SERPL-SCNC: 3.9 MMOL/L (ref 3.6–5.5)
QFT TB2 - NIL TBQ2: 0 IU/ML
SODIUM SERPL-SCNC: 134 MMOL/L (ref 135–145)

## 2021-06-25 PROCEDURE — 82962 GLUCOSE BLOOD TEST: CPT

## 2021-06-25 PROCEDURE — 84100 ASSAY OF PHOSPHORUS: CPT

## 2021-06-25 PROCEDURE — 97116 GAIT TRAINING THERAPY: CPT

## 2021-06-25 PROCEDURE — A9270 NON-COVERED ITEM OR SERVICE: HCPCS | Performed by: STUDENT IN AN ORGANIZED HEALTH CARE EDUCATION/TRAINING PROGRAM

## 2021-06-25 PROCEDURE — 770004 HCHG ROOM/CARE - ONCOLOGY PRIVATE *

## 2021-06-25 PROCEDURE — 700111 HCHG RX REV CODE 636 W/ 250 OVERRIDE (IP): Performed by: STUDENT IN AN ORGANIZED HEALTH CARE EDUCATION/TRAINING PROGRAM

## 2021-06-25 PROCEDURE — 99232 SBSQ HOSP IP/OBS MODERATE 35: CPT | Performed by: INTERNAL MEDICINE

## 2021-06-25 PROCEDURE — A9270 NON-COVERED ITEM OR SERVICE: HCPCS | Performed by: INTERNAL MEDICINE

## 2021-06-25 PROCEDURE — 36415 COLL VENOUS BLD VENIPUNCTURE: CPT

## 2021-06-25 PROCEDURE — 96372 THER/PROPH/DIAG INJ SC/IM: CPT

## 2021-06-25 PROCEDURE — 83735 ASSAY OF MAGNESIUM: CPT

## 2021-06-25 PROCEDURE — 700102 HCHG RX REV CODE 250 W/ 637 OVERRIDE(OP): Performed by: INTERNAL MEDICINE

## 2021-06-25 PROCEDURE — 700102 HCHG RX REV CODE 250 W/ 637 OVERRIDE(OP): Performed by: STUDENT IN AN ORGANIZED HEALTH CARE EDUCATION/TRAINING PROGRAM

## 2021-06-25 PROCEDURE — 80048 BASIC METABOLIC PNL TOTAL CA: CPT

## 2021-06-25 PROCEDURE — 97530 THERAPEUTIC ACTIVITIES: CPT

## 2021-06-25 RX ORDER — POTASSIUM CHLORIDE 20 MEQ/1
20 TABLET, EXTENDED RELEASE ORAL ONCE
Status: COMPLETED | OUTPATIENT
Start: 2021-06-25 | End: 2021-06-25

## 2021-06-25 RX ADMIN — DIBASIC SODIUM PHOSPHATE, MONOBASIC POTASSIUM PHOSPHATE AND MONOBASIC SODIUM PHOSPHATE 250 MG: 852; 155; 130 TABLET ORAL at 07:01

## 2021-06-25 RX ADMIN — ACETAMINOPHEN 1000 MG: 500 TABLET ORAL at 23:26

## 2021-06-25 RX ADMIN — THERA TABS 1 TABLET: TAB at 05:39

## 2021-06-25 RX ADMIN — METFORMIN HYDROCHLORIDE 500 MG: 500 TABLET ORAL at 07:55

## 2021-06-25 RX ADMIN — POTASSIUM CHLORIDE 20 MEQ: 1500 TABLET, EXTENDED RELEASE ORAL at 07:01

## 2021-06-25 RX ADMIN — OXYCODONE 5 MG: 5 TABLET ORAL at 05:39

## 2021-06-25 RX ADMIN — Medication 400 MG: at 07:01

## 2021-06-25 RX ADMIN — INSULIN HUMAN 2 UNITS: 100 INJECTION, SOLUTION PARENTERAL at 05:51

## 2021-06-25 RX ADMIN — DOCUSATE SODIUM 50 MG AND SENNOSIDES 8.6 MG 2 TABLET: 8.6; 5 TABLET, FILM COATED ORAL at 05:39

## 2021-06-25 RX ADMIN — DOCUSATE SODIUM 50 MG AND SENNOSIDES 8.6 MG 2 TABLET: 8.6; 5 TABLET, FILM COATED ORAL at 16:29

## 2021-06-25 RX ADMIN — INSULIN HUMAN 1 UNITS: 100 INJECTION, SOLUTION PARENTERAL at 16:31

## 2021-06-25 RX ADMIN — METFORMIN HYDROCHLORIDE 500 MG: 500 TABLET ORAL at 16:29

## 2021-06-25 RX ADMIN — INSULIN HUMAN 1 UNITS: 100 INJECTION, SOLUTION PARENTERAL at 11:28

## 2021-06-25 RX ADMIN — ACETAMINOPHEN 1000 MG: 500 TABLET ORAL at 05:39

## 2021-06-25 RX ADMIN — Medication 100 MG: at 05:39

## 2021-06-25 RX ADMIN — OXYCODONE 5 MG: 5 TABLET ORAL at 23:27

## 2021-06-25 RX ADMIN — ENOXAPARIN SODIUM 30 MG: 30 INJECTION SUBCUTANEOUS at 05:40

## 2021-06-25 RX ADMIN — FINASTERIDE 5 MG: 5 TABLET, FILM COATED ORAL at 05:39

## 2021-06-25 ASSESSMENT — COGNITIVE AND FUNCTIONAL STATUS - GENERAL
MOBILITY SCORE: 18
MOVING TO AND FROM BED TO CHAIR: A LITTLE
MOVING FROM LYING ON BACK TO SITTING ON SIDE OF FLAT BED: A LITTLE
WALKING IN HOSPITAL ROOM: A LITTLE
TURNING FROM BACK TO SIDE WHILE IN FLAT BAD: A LITTLE
SUGGESTED CMS G CODE MODIFIER MOBILITY: CK
STANDING UP FROM CHAIR USING ARMS: A LITTLE
CLIMB 3 TO 5 STEPS WITH RAILING: A LITTLE

## 2021-06-25 ASSESSMENT — ENCOUNTER SYMPTOMS
SORE THROAT: 0
FEVER: 0
NAUSEA: 0
BLURRED VISION: 0
NERVOUS/ANXIOUS: 0
CHILLS: 0
BRUISES/BLEEDS EASILY: 0
SINUS PAIN: 0
WEAKNESS: 1
PALPITATIONS: 0
FLANK PAIN: 0
DIARRHEA: 0
DEPRESSION: 1
HEADACHES: 0
VOMITING: 0
FALLS: 1
SHORTNESS OF BREATH: 0
ABDOMINAL PAIN: 0
CONSTIPATION: 0
COUGH: 0

## 2021-06-25 ASSESSMENT — GAIT ASSESSMENTS
DISTANCE (FEET): 120
GAIT LEVEL OF ASSIST: SUPERVISED
ASSISTIVE DEVICE: FRONT WHEEL WALKER

## 2021-06-25 ASSESSMENT — PAIN DESCRIPTION - PAIN TYPE
TYPE: ACUTE PAIN

## 2021-06-25 NOTE — CARE PLAN
Problem: Pain - Standard  Goal: Alleviation of pain or a reduction in pain to the patient’s comfort goal  Outcome: Progressing     Problem: Knowledge Deficit - Standard  Goal: Patient and family/care givers will demonstrate understanding of plan of care, disease process/condition, diagnostic tests and medications  Outcome: Progressing     Problem: Skin Integrity  Goal: Skin integrity is maintained or improved  Outcome: Progressing     Problem: Fall Risk  Goal: Patient will remain free from falls  Outcome: Progressing   The patient is alert and oriented.     Shift Goals  Clinical Goals: pain control  Patient Goals: sleep comfortably  Family Goals: N/A    Progress made toward(s) clinical / shift goals: healing    Patient is not progressing towards the following goals:None

## 2021-06-25 NOTE — PROGRESS NOTES
4 Eyes Skin Assessment Completed by Gaurav RN and ALISA Chaney.    Head bites throughout  Ears WDL  Nose WDL  Mouth WDL  Neck WDL  Breast/Chest bites througout  Shoulder Blades bug bites  Spine WDL  (R) Arm/Elbow/Hand bug bites throughout  (L) Arm/Elbow/Hand bug bites throughout  Abdomen bug bites throughout  Groin WDL  Scrotum/Coccyx/Buttocks  Open pressure ulcer, covered with mepilex  (R) Leg WDL  (L) Leg WDL  (R) Heel/Foot/Toe WDL  (L) Heel/Foot/Toe WDL          Devices In Places SCD's      Interventions In Place Sacral Mepilex, Waffle Overlay, Q2 Turns and Heels Loaded W/Pillows    Possible Skin Injury Yes    Pictures Uploaded Into Epic Yes  Wound Consult Placed Yes  RN Wound Prevention Protocol Ordered Yes

## 2021-06-25 NOTE — PROGRESS NOTES
[facility] Medicine Daily Progress Note    Date of Service  6/25/2021    Chief Complaint  75 y.o. male with T2DM, BPH s/p chronic indwelling lazo, MDD admitted 6/19/2021 with fall and SHAHID.    Hospital Course  Mr. Hugh Núñez is a 75 y.o. male history of diabetes, homelessness, benign prostatic hypertrophy status post chronic indwelling Lazo who presented on 6/19/2021 with ground-level fall, right hip pain.  Patient had a fall in the shower and hit his right hip.  Denies any loss of consciousness or head trauma.  Hip x-ray showed no fracture.  He was found to have an acute kidney injury and uncontrolled blood sugar in the 400s with an A1c of 14.      Interval Problem Update  Patient was seen and examined at bedside.  I have personally reviewed and interpreted vitals, labs, and imaging.    6/22.  Afebrile.  Stable vitals.  On room air.  Blood sugars are improved.  Denies fevers, chills, chest pains, shortness of breath.  Does report some right hip pain.  He is agreeable for placement.  6/23.  Afebrile.  Stable vitals.  On room air.  Replete potassium and magnesium.  Denies fevers, chills, chest pains, shortness of breath.  Reports persistent right hip pain.  Reports abdominal pain localized around umbilicus that is intermittent.  Has not had very good p.o. intake states he is just not hungry.  Denies nausea, vomiting.  Has not been able to check his own blood sugar and give insulin which she will need to do a group home.  Diabetic education is following.  Will start oral Metformin for blood sugar control.  Patient does report some itching and rash on his back from prior history of bedbugs.  Start triamcinolone cream.  6/24.  Afebrile.  Stable vitals.  On room air.  Continue to replete potassium, Phos, and magnesium.  Hypoglycemia on BMP this morning at 55.  Discontinue long-acting insulin and decrease sliding scale.  Started on Metformin.  Patient is eating better today.  Denies fever, chills, chest pains,  shortness of breath.  Right hip pain is controlled.  Still reports generalized itching mostly in his back.  Start Benadryl  6/25.  Afebrile.  1 episode of hypotension initially evening has resolved.  On room air.  Replete mag, potassium, Phos.  Denies fever, chills, chest pain, shortness of breath.  Right hip pain is persistent.  Reports persistent itching from rash.  He is eating better.  He did work with physical therapy this morning.  Because of SHAHID and insulin titration along with Metformin for blood sugar control the patient has been rolled inpatient.  Pending placement.      Consultants/Specialty  Diabetes Educator    Code Status  Full Code    Disposition  Pending post-acute placement.  SNF versus group home with home health.  Covid negative.  QuantiFERON pending    Review of Systems  Review of Systems   Constitutional: Positive for malaise/fatigue. Negative for chills and fever.   HENT: Negative for sinus pain and sore throat.    Eyes: Negative for blurred vision.   Respiratory: Negative for cough and shortness of breath.    Cardiovascular: Negative for chest pain, palpitations and leg swelling.   Gastrointestinal: Negative for abdominal pain, constipation, diarrhea, nausea and vomiting.   Genitourinary: Negative for dysuria, flank pain and hematuria.   Musculoskeletal: Positive for falls and joint pain.   Skin: Positive for itching and rash.   Neurological: Positive for weakness. Negative for headaches.   Endo/Heme/Allergies: Does not bruise/bleed easily.   Psychiatric/Behavioral: Positive for depression. The patient is not nervous/anxious.         Physical Exam  Temp:  [36.2 °C (97.2 °F)-37.4 °C (99.3 °F)] 37 °C (98.6 °F)  Pulse:  [72-79] 76  Resp:  [14-18] 18  BP: ()/(45-62) 111/62  SpO2:  [92 %-98 %] 98 %    Physical Exam  Vitals and nursing note reviewed.   Constitutional:       Appearance: He is cachectic. He is ill-appearing (Chronically).   HENT:      Head:      Comments: Bitemporal and bibuccal  wasting.     Right Ear: External ear normal.      Left Ear: External ear normal.      Nose: Nose normal.      Mouth/Throat:      Mouth: Mucous membranes are moist.      Pharynx: Oropharynx is clear.   Eyes:      Extraocular Movements: Extraocular movements intact.      Conjunctiva/sclera: Conjunctivae normal.   Cardiovascular:      Rate and Rhythm: Normal rate and regular rhythm.      Pulses: Normal pulses.      Heart sounds: Normal heart sounds. No murmur heard.   No friction rub. No gallop.    Pulmonary:      Effort: Pulmonary effort is normal. No respiratory distress.      Breath sounds: Normal breath sounds. No wheezing, rhonchi or rales.   Abdominal:      General: Abdomen is flat. Bowel sounds are normal. There is no distension.      Palpations: Abdomen is soft.      Tenderness: There is no abdominal tenderness. There is no guarding or rebound.   Genitourinary:     Comments: +Clement  Musculoskeletal:         General: Tenderness (Right hip, thigh) present.      Cervical back: Normal range of motion.      Right lower leg: No edema.      Left lower leg: No edema.   Neurological:      General: No focal deficit present.      Mental Status: He is alert, oriented to person, place, and time and easily aroused. Mental status is at baseline.      Cranial Nerves: No cranial nerve deficit.   Psychiatric:         Attention and Perception: Attention normal.         Mood and Affect: Mood is depressed. Affect is flat.         Speech: Speech normal.         Behavior: Behavior normal. Behavior is cooperative.         Cognition and Memory: Cognition and memory normal.         Judgment: Judgment normal.         Fluids    Intake/Output Summary (Last 24 hours) at 6/25/2021 0554  Last data filed at 6/24/2021 0930  Gross per 24 hour   Intake --   Output 1000 ml   Net -1000 ml       Laboratory      Recent Labs     06/23/21  0036 06/24/21  0029 06/25/21  0003   SODIUM 138 139 134*   POTASSIUM 3.2* 3.5* 3.9   CHLORIDE 104 104 99   CO2 26  27 26   GLUCOSE 202* 55* 200*   BUN 21 19 23*   CREATININE 1.04 1.06 1.16   CALCIUM 8.2* 7.3* 8.8                   Imaging  DX-FEMUR-2+ RIGHT   Final Result      No radiographic evidence of acute traumatic injury.      Moderate right hip osteoarthritis      DX-PELVIS-1 OR 2 VIEWS   Final Result      No radiographic evidence of acute displaced fracture      Moderate right, mild left hip osteoarthritis           Assessment/Plan  Contusion of right hip- (present on admission)  Assessment & Plan  Sustained in fall in the shower PTA  Pelvic XR negative for fracture  APAP PRN for pain, oxycodone PRN for severe pain  PT eval and treat, recommend post-acute placement    SHAHID (acute kidney injury) (HCC)- (present on admission)  Assessment & Plan  Resolved with IVF and glycemic control  Discontinued IVF  Avoid nephrotoxins    Moderate episode of recurrent major depressive disorder (HCC)- (present on admission)  Assessment & Plan  Reports losing home and his wife  this year    Frequent falls- (present on admission)  Assessment & Plan  Likely due to uncontrolled diabetes causing polyuria and weight loss  PT/OT eval and treat, recommending post-acute placement    Frailty syndrome in geriatric patient- (present on admission)  Assessment & Plan  BMI 17.2  Multifactorial including depression, homelessness, uncontrolled T2DM  PT/OT consulted, appreciate Case Management assistance in discharge planning for post-acute placement and eventual group home    Type 2 diabetes mellitus with hyperglycemia, with long-term current use of insulin (HCC)- (present on admission)  Assessment & Plan  Lab Results   Component Value Date/Time    HBA1C 14.3 (H) 2021 1705    HBA1C 11.8 (H) 2021 1326    HBA1C 7.8 (H) 2021 0217     Results from last 7 days   Lab Units 21  0546 21  1702 21  1200 21  0633 21  1705 21  1214 21  0606 21  1700   ACCU CHECK GLUCOSE 788 mg/dL 219* 205* 200* 154*  119* 168* 201* 214*     I have ordered insulin sliding scale with D50 and glucagon for hypoglycemia per protocol.  Diabetic diet    Improving  He had discontinued insulin therapy due to apathy and poor insight about T2DM  A1c 14.3,  on admission  Weight loss and SHAHID likely due to polyuria from hyperglycemia  Initiated on 10 units glargine (0.2 units/kg) QHS  Diabetes educator consulted  She has been unable to give himself insulin and check blood sugars.      Because of hypoglycemia long-acting insulin was discontinued 6/24  Sliding scale insulin was decreased.  Continue Metformin    Benign prostatic hyperplasia with urinary obstruction- (present on admission)  Assessment & Plan  Chronic indwelling lazo, follows with urology monthly  On finasteride PTA, has not been taking tamsulosin  Tamsulosin discontinued due to frequent falls and pre-renal SHAHID concerning for orthostasis    Normocytic anemia- (present on admission)  Assessment & Plan  Denies signs of bleeding  Elevated Ferritin with normal FeSat% consistent with AOCD  Repeat CBC only if clinical change       VTE prophylaxis: LMWH

## 2021-06-25 NOTE — CARE PLAN
Problem: Pain - Standard  Goal: Alleviation of pain or a reduction in pain to the patient’s comfort goal  Outcome: Progressing  Note: Pt. Verbalizes satisfaction with pain control.       Problem: Fall Risk  Goal: Patient will remain free from falls  Outcome: Progressing  Note: High fall risk precautions, Pt aox4, calls, BA on.   The patient is Stable - Low risk of patient condition declining or worsening    Shift Goals  Clinical Goals: pain control  Patient Goals: rest  Family Goals: N/A    Progress made toward(s) clinical / shift goals: Medicated for pain per MAR. Pt. Slept throughout shift.    Patient is not progressing towards the following goals:

## 2021-06-25 NOTE — THERAPY
Physical Therapy   Daily Treatment     Patient Name: Hugh Núñez  Age:  75 y.o., Sex:  male  Medical Record #: [id number]  Today's Date: 6/25/2021     Precautions: Fall Risk (R hip pain)    Assessment    Pt progressing as expected. He is still primarily limited 2' to RLE hip pain though does appear to be improving from prior visits. He is able to demonstrate ambulatory activity with 4WW with Spv/SBA and note that pt has worsening gait mechanics 2' to decreased LE and trunk endurance with increasing distances (though pain was fairly constant and did not increase with activity). See below for details/recs.     Plan    Continue current treatment plan.    DC Equipment Recommendations: Unable to determine at this time  Discharge Recommendations: Recommend post-acute placement for additional physical therapy services prior to discharge home (would optimally benefit from continued skilled PT prior to some type of long term placement; if finds /placement, he may be able to dc directly to  with HH PT dependent on assist levels at  (as anticipate he would currently need assist with ADls/IADLs, etc)       06/25/21 1107   Cognition    Cognition / Consciousness X   Speech/ Communication Hard of Hearing   Level of Consciousness Alert   Safety Awareness Impaired   Comments very pleasant and cooperative    Balance   Sitting Balance (Static) Fair   Sitting Balance (Dynamic) Fair   Standing Balance (Static) Fair   Standing Balance (Dynamic) Fair -   Weight Shift Sitting Fair   Weight Shift Standing Fair   Skilled Intervention Verbal Cuing;Compensatory Strategies   Comments no joelle LOB during ambulatoin with 4WW; did not decreased RLE clearance and increasing kyphotic posturing with increasing distances; though also not fair balnace on LLE with use of UEs on 4WW (ie SLS)   Gait Analysis   Gait Level Of Assist Supervised   Assistive Device Front Wheel Walker   Distance (Feet) 120   # of Times Distance was Traveled 1    Deviation Bradykinetic;Decreased Heel Strike;Decreased Base Of Support;Other (Comment)  (dec radha;)   # of Stairs Climbed 0   Weight Bearing Status no restrictions   Skilled Intervention Verbal Cuing;Compensatory Strategies   Comments see balance    Bed Mobility    Supine to Sit Supervised   Sit to Supine Modified Independent   Scooting Supervised  (with cueing for use of UEs and LLE )   Skilled Intervention Verbal Cuing;Tactile Cuing;Sequencing;Postural Facilitation;Compensatory Strategies   Functional Mobility   Sit to Stand Supervised   Bed, Chair, Wheelchair Transfer Supervised   Transfer Method Stand Step   Mobility with 4WW   Skilled Intervention Compensatory Strategies;Tactile Cuing;Verbal Cuing;Sequencing;Postural Facilitation   Comments practiced sit<>stands/transfers from 4WW as well with pt utilizing appropriate safety measures (ie locking breaks, etc); also noted when pt attempted to ambulate short distance without AD, pt quite aware of difficulties and utilized 4WW appropriately    How much difficulty does the patient currently have...   Turning over in bed (including adjusting bedclothes, sheets and blankets)? 3   Sitting down on and standing up from a chair with arms (e.g., wheelchair, bedside commode, etc.) 3   Moving from lying on back to sitting on the side of the bed? 3   How much help from another person does the patient currently need...   Moving to and from a bed to a chair (including a wheelchair)? 3   Need to walk in a hospital room? 3   Climbing 3-5 steps with a railing? 3   6 clicks Mobility Score 18   Activity Tolerance   Sitting in Chair at least 5mins   Sitting Edge of Bed at least 4 mins   Standing at least 7 mins   Comments general fatigue and slighlty worsening gait mechanics 2' to decreased trunk and LE endurance   Short Term Goals    Short Term Goal # 1 Pt will perform supine <> sit with HOB flat, no use of rails with SPV in 6 visits in order to progress toward independence  with functional mobility   Goal Outcome # 1 Progressing as expected  (use of rails, though performed without assist)   Short Term Goal # 2 Pt will ambulate >150 ft with 4WW and SPV within 6 visits in order to progress toward community distance ambulation   Goal Outcome # 2 Progressing as expected   Short Term Goal # 3 Pt will ascend/descend 1 step with LRAD and SPV within 6 visits in order to negotiate curbs and community environment   Goal Outcome # 3 Goal not met   Education Group   Education Provided Use of Assistive Device;Gait Training;Role of Physical Therapist   Role of Physical Therapist Patient Response Patient;Acceptance;Explanation;Verbal Demonstration   Gait Training Patient Response Patient;Acceptance;Verbal Demonstration;Explanation;Teach Back;Action Demonstration;Reinforcement Needed   Use of Assistive Device Patient Response Patient;Acceptance;Explanation;Teach Back;Verbal Demonstration;Action Demonstration;Reinforcement Needed

## 2021-06-25 NOTE — CARE PLAN
Problem: Pain - Standard  Goal: Alleviation of pain or a reduction in pain to the patient’s comfort goal  Outcome: Progressing     Problem: Knowledge Deficit - Standard  Goal: Patient and family/care givers will demonstrate understanding of plan of care, disease process/condition, diagnostic tests and medications  Outcome: Progressing   The patient is Stable - Low risk of patient condition declining or worsening    Shift Goals  Clinical Goals: pain control  Patient Goals: rest  Family Goals: N/A    Progress made toward(s) clinical / shift goals:  Patient denies having any pain and declines pain medication.    Patient aware of POC.  All questions answered.  Patient is not progressing towards the following goals:

## 2021-06-26 LAB
ANION GAP SERPL CALC-SCNC: 9 MMOL/L (ref 7–16)
BUN SERPL-MCNC: 21 MG/DL (ref 8–22)
CALCIUM SERPL-MCNC: 8.3 MG/DL (ref 8.5–10.5)
CHLORIDE SERPL-SCNC: 103 MMOL/L (ref 96–112)
CO2 SERPL-SCNC: 25 MMOL/L (ref 20–33)
CREAT SERPL-MCNC: 0.93 MG/DL (ref 0.5–1.4)
GLUCOSE BLD-MCNC: 155 MG/DL (ref 65–99)
GLUCOSE BLD-MCNC: 168 MG/DL (ref 65–99)
GLUCOSE BLD-MCNC: 198 MG/DL (ref 65–99)
GLUCOSE BLD-MCNC: 206 MG/DL (ref 65–99)
GLUCOSE BLD-MCNC: 247 MG/DL (ref 65–99)
GLUCOSE SERPL-MCNC: 193 MG/DL (ref 65–99)
MAGNESIUM SERPL-MCNC: 1.8 MG/DL (ref 1.5–2.5)
PHOSPHATE SERPL-MCNC: 2.8 MG/DL (ref 2.5–4.5)
POTASSIUM SERPL-SCNC: 4.2 MMOL/L (ref 3.6–5.5)
SODIUM SERPL-SCNC: 137 MMOL/L (ref 135–145)

## 2021-06-26 PROCEDURE — 84100 ASSAY OF PHOSPHORUS: CPT

## 2021-06-26 PROCEDURE — A9270 NON-COVERED ITEM OR SERVICE: HCPCS | Performed by: STUDENT IN AN ORGANIZED HEALTH CARE EDUCATION/TRAINING PROGRAM

## 2021-06-26 PROCEDURE — 99232 SBSQ HOSP IP/OBS MODERATE 35: CPT | Performed by: INTERNAL MEDICINE

## 2021-06-26 PROCEDURE — 770004 HCHG ROOM/CARE - ONCOLOGY PRIVATE *

## 2021-06-26 PROCEDURE — 36415 COLL VENOUS BLD VENIPUNCTURE: CPT

## 2021-06-26 PROCEDURE — 700102 HCHG RX REV CODE 250 W/ 637 OVERRIDE(OP): Performed by: STUDENT IN AN ORGANIZED HEALTH CARE EDUCATION/TRAINING PROGRAM

## 2021-06-26 PROCEDURE — 700111 HCHG RX REV CODE 636 W/ 250 OVERRIDE (IP): Performed by: STUDENT IN AN ORGANIZED HEALTH CARE EDUCATION/TRAINING PROGRAM

## 2021-06-26 PROCEDURE — A9270 NON-COVERED ITEM OR SERVICE: HCPCS | Performed by: INTERNAL MEDICINE

## 2021-06-26 PROCEDURE — 700102 HCHG RX REV CODE 250 W/ 637 OVERRIDE(OP): Performed by: INTERNAL MEDICINE

## 2021-06-26 PROCEDURE — 82962 GLUCOSE BLOOD TEST: CPT

## 2021-06-26 PROCEDURE — 80048 BASIC METABOLIC PNL TOTAL CA: CPT

## 2021-06-26 PROCEDURE — 83735 ASSAY OF MAGNESIUM: CPT

## 2021-06-26 RX ADMIN — DOCUSATE SODIUM 50 MG AND SENNOSIDES 8.6 MG 2 TABLET: 8.6; 5 TABLET, FILM COATED ORAL at 06:14

## 2021-06-26 RX ADMIN — Medication 100 MG: at 06:14

## 2021-06-26 RX ADMIN — INSULIN HUMAN 2 UNITS: 100 INJECTION, SOLUTION PARENTERAL at 11:53

## 2021-06-26 RX ADMIN — ENOXAPARIN SODIUM 30 MG: 30 INJECTION SUBCUTANEOUS at 06:14

## 2021-06-26 RX ADMIN — METFORMIN HYDROCHLORIDE 500 MG: 500 TABLET ORAL at 17:00

## 2021-06-26 RX ADMIN — ACETAMINOPHEN 1000 MG: 500 TABLET ORAL at 10:45

## 2021-06-26 RX ADMIN — FINASTERIDE 5 MG: 5 TABLET, FILM COATED ORAL at 06:14

## 2021-06-26 RX ADMIN — INSULIN HUMAN 2 UNITS: 100 INJECTION, SOLUTION PARENTERAL at 06:30

## 2021-06-26 RX ADMIN — INSULIN HUMAN 1 UNITS: 100 INJECTION, SOLUTION PARENTERAL at 17:06

## 2021-06-26 RX ADMIN — DIBASIC SODIUM PHOSPHATE, MONOBASIC POTASSIUM PHOSPHATE AND MONOBASIC SODIUM PHOSPHATE 500 MG: 852; 155; 130 TABLET ORAL at 10:45

## 2021-06-26 RX ADMIN — THERA TABS 1 TABLET: TAB at 06:14

## 2021-06-26 RX ADMIN — OXYCODONE 5 MG: 5 TABLET ORAL at 06:32

## 2021-06-26 RX ADMIN — METFORMIN HYDROCHLORIDE 500 MG: 500 TABLET ORAL at 08:23

## 2021-06-26 RX ADMIN — OXYCODONE 5 MG: 5 TABLET ORAL at 17:00

## 2021-06-26 RX ADMIN — Medication 400 MG: at 10:45

## 2021-06-26 ASSESSMENT — ENCOUNTER SYMPTOMS
SHORTNESS OF BREATH: 0
FEVER: 0
VOMITING: 0
WEAKNESS: 1
NERVOUS/ANXIOUS: 0
NAUSEA: 0
DIARRHEA: 0
FALLS: 1
FLANK PAIN: 0
BLURRED VISION: 0
CONSTIPATION: 0
ABDOMINAL PAIN: 0
SINUS PAIN: 0
DEPRESSION: 1
COUGH: 0
CHILLS: 0
SORE THROAT: 0
BRUISES/BLEEDS EASILY: 0
HEADACHES: 0
PALPITATIONS: 0

## 2021-06-26 ASSESSMENT — PAIN DESCRIPTION - PAIN TYPE
TYPE: ACUTE PAIN

## 2021-06-26 NOTE — CARE PLAN
Problem: Pain - Standard  Goal: Alleviation of pain or a reduction in pain to the patient’s comfort goal  Outcome: Progressing     Problem: Knowledge Deficit - Standard  Goal: Patient and family/care givers will demonstrate understanding of plan of care, disease process/condition, diagnostic tests and medications  Outcome: Progressing     Problem: Skin Integrity  Goal: Skin integrity is maintained or improved  Outcome: Progressing     Problem: Fall Risk  Goal: Patient will remain free from falls  Outcome: Progressing   The patient is alert and oriented.     Shift Goals  Clinical Goals: pain control  Patient Goals: sleep comfortably  Family Goals: N/A    Progress made toward(s) clinical / shift goals: healing    Patient is not progressing towards the following goals:None    The patient is Stable - Low risk of patient condition declining or worsening    Shift Goals  Clinical Goals: pain control  Patient Goals: sleep  Family Goals: N/A    Progress made toward(s) clinical / shift goals: Pt will have good pain control.    Patient is not progressing towards the following goals:

## 2021-06-26 NOTE — PROGRESS NOTES
[facility] Medicine Daily Progress Note    Date of Service  6/26/2021    Chief Complaint  75 y.o. male with T2DM, BPH s/p chronic indwelling lazo, MDD admitted 6/19/2021 with fall and SHAHID.    Hospital Course  Mr. Hugh Núñez is a 75 y.o. male history of diabetes, homelessness, benign prostatic hypertrophy status post chronic indwelling Lazo who presented on 6/19/2021 with ground-level fall, right hip pain.  Patient had a fall in the shower and hit his right hip.  Denies any loss of consciousness or head trauma.  Hip x-ray showed no fracture.  He was found to have an acute kidney injury and uncontrolled blood sugar in the 400s with an A1c of 14.      Interval Problem Update  Patient was seen and examined at bedside.  I have personally reviewed and interpreted vitals, labs, and imaging.    6/22.  Afebrile.  Stable vitals.  On room air.  Blood sugars are improved.  Denies fevers, chills, chest pains, shortness of breath.  Does report some right hip pain.  He is agreeable for placement.  6/23.  Afebrile.  Stable vitals.  On room air.  Replete potassium and magnesium.  Denies fevers, chills, chest pains, shortness of breath.  Reports persistent right hip pain.  Reports abdominal pain localized around umbilicus that is intermittent.  Has not had very good p.o. intake states he is just not hungry.  Denies nausea, vomiting.  Has not been able to check his own blood sugar and give insulin which she will need to do a group home.  Diabetic education is following.  Will start oral Metformin for blood sugar control.  Patient does report some itching and rash on his back from prior history of bedbugs.  Start triamcinolone cream.  6/24.  Afebrile.  Stable vitals.  On room air.  Continue to replete potassium, Phos, and magnesium.  Hypoglycemia on BMP this morning at 55.  Discontinue long-acting insulin and decrease sliding scale.  Started on Metformin.  Patient is eating better today.  Denies fever, chills, chest pains,  shortness of breath.  Right hip pain is controlled.  Still reports generalized itching mostly in his back.  Start Benadryl  6/25.  Afebrile.  1 episode of hypotension initially evening has resolved.  On room air.  Replete mag, potassium, Phos.  Denies fever, chills, chest pain, shortness of breath.  Right hip pain is persistent.  Reports persistent itching from rash.  He is eating better.  He did work with physical therapy this morning.  Because of SHAHID and insulin titration along with Metformin for blood sugar control the patient has been rolled inpatient.  Pending placement.    6/26.  Afebrile.  Stable vitals.  On room air.  Blood sugars better controlled.  Continue to replete mag and Phos.  Denies fever, chills, chest pains, shortness of breath.  States right hip pain is improving.  He is also eating better.  Kidney function is improved.  Continue to titrate insulin regimen.    Consultants/Specialty  Diabetes Educator    Code Status  Full Code    Disposition  Pending post-acute placement.  SNF versus group home with home health.  Covid negative.  QuantiFERON pending    Review of Systems  Review of Systems   Constitutional: Positive for malaise/fatigue. Negative for chills and fever.   HENT: Negative for sinus pain and sore throat.    Eyes: Negative for blurred vision.   Respiratory: Negative for cough and shortness of breath.    Cardiovascular: Negative for chest pain, palpitations and leg swelling.   Gastrointestinal: Negative for abdominal pain, constipation, diarrhea, nausea and vomiting.   Genitourinary: Negative for dysuria, flank pain and hematuria.   Musculoskeletal: Positive for falls and joint pain.   Skin: Positive for itching and rash.   Neurological: Positive for weakness. Negative for headaches.   Endo/Heme/Allergies: Does not bruise/bleed easily.   Psychiatric/Behavioral: Positive for depression. The patient is not nervous/anxious.         Physical Exam  Temp:  [36.9 °C (98.4 °F)-37.4 °C (99.4 °F)]  37.2 °C (99 °F)  Pulse:  [54-74] 70  Resp:  [16-18] 16  BP: (106-111)/(45-58) 106/45  SpO2:  [94 %-95 %] 95 %    Physical Exam  Vitals and nursing note reviewed.   Constitutional:       Appearance: He is cachectic. He is ill-appearing (Chronically).   HENT:      Head:      Comments: Bitemporal and bibuccal wasting.     Right Ear: External ear normal.      Left Ear: External ear normal.      Nose: Nose normal.      Mouth/Throat:      Mouth: Mucous membranes are moist.      Pharynx: Oropharynx is clear.   Eyes:      Extraocular Movements: Extraocular movements intact.      Conjunctiva/sclera: Conjunctivae normal.   Cardiovascular:      Rate and Rhythm: Normal rate and regular rhythm.      Pulses: Normal pulses.      Heart sounds: Normal heart sounds. No murmur heard.   No friction rub. No gallop.    Pulmonary:      Effort: Pulmonary effort is normal. No respiratory distress.      Breath sounds: Normal breath sounds. No wheezing, rhonchi or rales.   Abdominal:      General: Abdomen is flat. Bowel sounds are normal. There is no distension.      Palpations: Abdomen is soft.      Tenderness: There is no abdominal tenderness. There is no guarding or rebound.   Genitourinary:     Comments: +Clement  Musculoskeletal:         General: Tenderness (Right hip, thigh) present.      Cervical back: Normal range of motion.      Right lower leg: No edema.      Left lower leg: No edema.   Neurological:      General: No focal deficit present.      Mental Status: He is alert, oriented to person, place, and time and easily aroused. Mental status is at baseline.      Cranial Nerves: No cranial nerve deficit.   Psychiatric:         Attention and Perception: Attention normal.         Mood and Affect: Mood is depressed. Affect is flat.         Speech: Speech normal.         Behavior: Behavior normal. Behavior is cooperative.         Cognition and Memory: Cognition and memory normal.         Judgment: Judgment normal.          Fluids    Intake/Output Summary (Last 24 hours) at 2021 0820  Last data filed at 2021 0400  Gross per 24 hour   Intake 240 ml   Output 750 ml   Net -510 ml       Laboratory      Recent Labs     21  0029 21  0003 21  0022   SODIUM 139 134* 137   POTASSIUM 3.5* 3.9 4.2   CHLORIDE 104 99 103   CO2 27 26 25   GLUCOSE 55* 200* 193*   BUN 19 23* 21   CREATININE 1.06 1.16 0.93   CALCIUM 7.3* 8.8 8.3*                   Imaging  DX-FEMUR-2+ RIGHT   Final Result      No radiographic evidence of acute traumatic injury.      Moderate right hip osteoarthritis      DX-PELVIS-1 OR 2 VIEWS   Final Result      No radiographic evidence of acute displaced fracture      Moderate right, mild left hip osteoarthritis           Assessment/Plan  Contusion of right hip- (present on admission)  Assessment & Plan  Sustained in fall in the shower PTA  Pelvic XR negative for fracture  APAP PRN for pain, oxycodone PRN for severe pain  PT eval and treat, recommend post-acute placement    SHAHID (acute kidney injury) (HCC)- (present on admission)  Assessment & Plan  Resolved with IVF and glycemic control  Discontinued IVF  Avoid nephrotoxins    Moderate episode of recurrent major depressive disorder (HCC)- (present on admission)  Assessment & Plan  Reports losing home and his wife  this year    Frequent falls- (present on admission)  Assessment & Plan  Likely due to uncontrolled diabetes causing polyuria and weight loss  PT/OT eval and treat, recommending post-acute placement    Frailty syndrome in geriatric patient- (present on admission)  Assessment & Plan  BMI 17.2  Multifactorial including depression, homelessness, uncontrolled T2DM  PT/OT consulted, appreciate Case Management assistance in discharge planning for post-acute placement and eventual group home    Type 2 diabetes mellitus with hyperglycemia, with long-term current use of insulin (HCC)- (present on admission)  Assessment & Plan  Lab Results    Component Value Date/Time    HBA1C 14.3 (H) 06/19/2021 1705    HBA1C 11.8 (H) 03/17/2021 1326    HBA1C 7.8 (H) 01/17/2021 0217     Results from last 7 days   Lab Units 06/26/21  1151 06/26/21  0618 06/25/21  1629 06/25/21  1121 06/25/21  0546 06/24/21  1702 06/24/21  1200 06/24/21  0633   ACCU CHECK GLUCOSE 788 mg/dL 247* 206* 198* 168* 219* 205* 200* 154*     I have ordered insulin sliding scale with D50 and glucagon for hypoglycemia per protocol.  Diabetic diet    Improving  He had discontinued insulin therapy due to apathy and poor insight about T2DM  A1c 14.3,  on admission  Weight loss and SHAHID likely due to polyuria from hyperglycemia  Initiated on 10 units glargine (0.2 units/kg) QHS  Diabetes educator consulted  She has been unable to give himself insulin and check blood sugars.      Because of hypoglycemia long-acting insulin was discontinued 6/24  Sliding scale insulin was decreased.  Continue Metformin    Benign prostatic hyperplasia with urinary obstruction- (present on admission)  Assessment & Plan  Chronic indwelling lazo, follows with urology monthly  On finasteride PTA, has not been taking tamsulosin  Tamsulosin discontinued due to frequent falls and pre-renal SHAHID concerning for orthostasis    Normocytic anemia- (present on admission)  Assessment & Plan  Denies signs of bleeding  Elevated Ferritin with normal FeSat% consistent with AOCD  Repeat CBC only if clinical change       VTE prophylaxis: LMWH

## 2021-06-26 NOTE — CARE PLAN
The patient is Stable - Low risk of patient condition declining or worsening    Shift Goals  Clinical Goals: pain control  Patient Goals: sleep  Family Goals: N/A    Progress made toward(s) clinical / shift goals:  medicated for pain per MAR. Pt. Asleep most of shift.  Problem: Pain - Standard  Goal: Alleviation of pain or a reduction in pain to the patient’s comfort goal  Outcome: Progressing  Note: Pt. Verbalizes satisfaction with pain control.       Problem: Knowledge Deficit - Standard  Goal: Patient and family/care givers will demonstrate understanding of plan of care, disease process/condition, diagnostic tests and medications  Outcome: Progressing  Note: Plan of care discussed, questions answered.         Patient is not progressing towards the following goals:

## 2021-06-27 LAB
GLUCOSE BLD-MCNC: 127 MG/DL (ref 65–99)
GLUCOSE BLD-MCNC: 192 MG/DL (ref 65–99)
GLUCOSE BLD-MCNC: 221 MG/DL (ref 65–99)

## 2021-06-27 PROCEDURE — 700102 HCHG RX REV CODE 250 W/ 637 OVERRIDE(OP): Performed by: STUDENT IN AN ORGANIZED HEALTH CARE EDUCATION/TRAINING PROGRAM

## 2021-06-27 PROCEDURE — 770004 HCHG ROOM/CARE - ONCOLOGY PRIVATE *

## 2021-06-27 PROCEDURE — A9270 NON-COVERED ITEM OR SERVICE: HCPCS | Performed by: STUDENT IN AN ORGANIZED HEALTH CARE EDUCATION/TRAINING PROGRAM

## 2021-06-27 PROCEDURE — A9270 NON-COVERED ITEM OR SERVICE: HCPCS | Performed by: INTERNAL MEDICINE

## 2021-06-27 PROCEDURE — 700102 HCHG RX REV CODE 250 W/ 637 OVERRIDE(OP): Performed by: INTERNAL MEDICINE

## 2021-06-27 PROCEDURE — 82962 GLUCOSE BLOOD TEST: CPT

## 2021-06-27 PROCEDURE — 99232 SBSQ HOSP IP/OBS MODERATE 35: CPT | Performed by: INTERNAL MEDICINE

## 2021-06-27 PROCEDURE — 700111 HCHG RX REV CODE 636 W/ 250 OVERRIDE (IP): Performed by: STUDENT IN AN ORGANIZED HEALTH CARE EDUCATION/TRAINING PROGRAM

## 2021-06-27 RX ADMIN — Medication 100 MG: at 06:05

## 2021-06-27 RX ADMIN — FINASTERIDE 5 MG: 5 TABLET, FILM COATED ORAL at 06:05

## 2021-06-27 RX ADMIN — THERA TABS 1 TABLET: TAB at 06:05

## 2021-06-27 RX ADMIN — DOCUSATE SODIUM 50 MG AND SENNOSIDES 8.6 MG 2 TABLET: 8.6; 5 TABLET, FILM COATED ORAL at 06:05

## 2021-06-27 RX ADMIN — OXYCODONE 5 MG: 5 TABLET ORAL at 09:32

## 2021-06-27 RX ADMIN — INSULIN HUMAN 1 UNITS: 100 INJECTION, SOLUTION PARENTERAL at 18:19

## 2021-06-27 RX ADMIN — OXYCODONE 5 MG: 5 TABLET ORAL at 15:39

## 2021-06-27 RX ADMIN — OXYCODONE 5 MG: 5 TABLET ORAL at 21:43

## 2021-06-27 RX ADMIN — INSULIN HUMAN 2 UNITS: 100 INJECTION, SOLUTION PARENTERAL at 06:12

## 2021-06-27 RX ADMIN — ENOXAPARIN SODIUM 30 MG: 30 INJECTION SUBCUTANEOUS at 06:05

## 2021-06-27 RX ADMIN — METFORMIN HYDROCHLORIDE 500 MG: 500 TABLET ORAL at 19:40

## 2021-06-27 RX ADMIN — OXYCODONE 5 MG: 5 TABLET ORAL at 00:51

## 2021-06-27 RX ADMIN — METFORMIN HYDROCHLORIDE 500 MG: 500 TABLET ORAL at 09:32

## 2021-06-27 ASSESSMENT — ENCOUNTER SYMPTOMS
FALLS: 1
BRUISES/BLEEDS EASILY: 0
PALPITATIONS: 0
CONSTIPATION: 0
DIARRHEA: 0
SHORTNESS OF BREATH: 0
VOMITING: 0
ABDOMINAL PAIN: 0
COUGH: 0
FLANK PAIN: 0
WEAKNESS: 1
NERVOUS/ANXIOUS: 0
SINUS PAIN: 0
BLURRED VISION: 0
NAUSEA: 0
DEPRESSION: 1
CHILLS: 0
FEVER: 0
HEADACHES: 0
SORE THROAT: 0

## 2021-06-27 ASSESSMENT — PAIN DESCRIPTION - PAIN TYPE
TYPE: ACUTE PAIN

## 2021-06-27 NOTE — CARE PLAN
Patient is fatigued today. Pain medication given as needed. Reviewed POC with patient. Bed alarm on for patient safety. Call light within reach. Will continue to monitor patient.

## 2021-06-27 NOTE — CARE PLAN
Problem: Pain - Standard  Goal: Alleviation of pain or a reduction in pain to the patient’s comfort goal  6/27/2021 1525 by Sharon Dawkins R.N.  Outcome: Progressing  6/27/2021 1512 by Sharon Dawkins RMarcelloN.  Outcome: Progressing     Problem: Knowledge Deficit - Standard  Goal: Patient and family/care givers will demonstrate understanding of plan of care, disease process/condition, diagnostic tests and medications  6/27/2021 1525 by Sharon Dawkins R.N.  Outcome: Progressing  6/27/2021 1512 by MARSHAL CamposN.  Outcome: Progressing     Problem: Skin Integrity  Goal: Skin integrity is maintained or improved  6/27/2021 1525 by MARSHAL CamposN.  Outcome: Progressing  6/27/2021 1512 by Sharon Dawkins R.N.  Outcome: Progressing     Problem: Fall Risk  Goal: Patient will remain free from falls  6/27/2021 1525 by Sharon Dawkins RMarcelloN.  Outcome: Progressing  6/27/2021 1512 by Sharon Dawkins RMarcelloN.  Outcome: Progressing   The patient is stable    Shift Goals  Clinical Goals: pain control  Patient Goals: sleep  Family Goals: N/A    Progress made toward(s) clinical / shift goals:  pain control    Patient is not progressing towards the following goals:Ambulating

## 2021-06-27 NOTE — PROGRESS NOTES
Patient is alert and oriented. Pain medication given as needed. Patient is fatigued today. Reviewed POC with patient. Call light within reach. Will continue to monitor patient.

## 2021-06-27 NOTE — CARE PLAN
The patient is resting    Shift Goals  Clinical Goals: pain control  Patient Goals: sleep  Family Goals: N/A    Progress made toward(s) clinical / shift goals: pain control    Patient is not progressing towards the following goals:fatigued

## 2021-06-27 NOTE — CARE PLAN
Problem: Skin Integrity  Goal: Skin integrity is maintained or improved  Outcome: Progressing     Problem: Fall Risk  Goal: Patient will remain free from falls  Outcome: Progressing     The patient is Watcher - Medium risk of patient condition declining or worsening    Shift Goals  Clinical Goals: pain control  Patient Goals: sleep  Family Goals: N/A    Progress made toward(s) clinical / shift goals:  Fall precautions are in place and Q 2 hour turns are in place.     Patient is not progressing towards the following goals:

## 2021-06-27 NOTE — WOUND TEAM
"[facility]  Inpatient Services  Initial Wound and Skin Care Evaluation    Admission Date: 2021     Last order of IP CONSULT TO WOUND CARE was found on 2021 from Hospital Encounter on 2021     HPI, PMH, SH: Reviewed    Past Surgical History:   Procedure Laterality Date   • PB APPENDECTOMY     • TONSILLECTOMY       Social History     Tobacco Use   • Smoking status: Former Smoker     Years: 5.00     Types: Cigarettes     Start date: 1955     Quit date: 1960     Years since quittin.8   • Smokeless tobacco: Never Used   Substance Use Topics   • Alcohol use: Not Currently     Alcohol/week: 0.6 oz     Types: 1 Cans of beer per week     Chief Complaint   Patient presents with   • T-5000 GLF     While at [facility], showering, slipped out of chair in shower, denies hitting head, -LOC   • Hip Pain     R, pedal pulse 2+   • Failure to Thrive   • Bug Bite     t/o body x1 month     Diagnosis: SHAHID (acute kidney injury) (HCC) [N17.9]  Acute kidney injury (HCC) [N17.9]    Unit where seen by Wound Team: R312/00     WOUND CONSULT/FOLLOW UP RELATED TO:  sacrum    WOUND HISTORY: \"75 y.o. male who presented 2021 with past medical history of diabetes, homelessness presented to the ER via EMS for evaluation of right hip pain after he had a fall in the shower hitting his right hip.  Patient denies any head trauma or LOC, respiratory vomiting, fever chills, nausea, vomiting abdominal habitus.  The ER patient received x-ray of right hip no fracture was noted.  However his labs were significant for SHAHID, and uncontrolled blood sugar of 400, A1c of 14.  Bicarb of 26, anion gap 10 patient received IV fluid bolus in the ER, regular insulin admitted to floor for IV hydration, blood sugar control.\"   WOUND ASSESSMENT/LDA  Wound 21 Partial Thickness Wound Sacrum Mid (Active)      21 2100   Site Assessment Pink;Red    Periwound Assessment Scar tissue;Pink    Margins Defined edges  "   Closure Secondary intention    Drainage Amount None    Drainage Description Serosanguineous    Treatments Cleansed    Wound Cleansing Normal Saline Irrigation    Periwound Protectant Skin Moisturizer    Dressing Cleansing/Solutions Not Applicable    Dressing Options Mepilex    Dressing Changed Observed    Dressing Status Intact    Dressing Change/Treatment Frequency Every 72 hrs, and As Needed    NEXT Dressing Change/Treatment Date 06/28/21    NEXT Weekly Photo (Inpatient Only) 06/30/21    Non-staged Wound Description Partial thickness 06/27/21 1100   Wound Length (cm) 1.8 cm 06/27/21 1100   Wound Width (cm) 1.7 cm 06/27/21 1100   Wound Depth (cm) 0.1 cm 06/27/21 1100   Wound Surface Area (cm^2) 3.06 cm^2 06/27/21 1100   Wound Volume (cm^3) 0.31 cm^3 06/27/21 1100   Shape circular    Wound Odor None    Exposed Structures None    Number of days: 6        Vascular:    JUAN:   No results found.    Lab Values:    Lab Results   Component Value Date/Time    WBC 9.3 06/19/2021 05:05 PM    RBC 4.03 (L) 06/19/2021 05:05 PM    HEMOGLOBIN 11.1 (L) 06/19/2021 05:05 PM    HEMATOCRIT 34.3 (L) 06/19/2021 05:05 PM    HBA1C 14.3 (H) 06/19/2021 05:05 PM        Culture Results show:  No results found for this or any previous visit (from the past 720 hour(s)).    Pain Level/Medicated:  Pt already turned to R side, no c/o pain      INTERVENTIONS BY WOUND TEAM:  Chart and images reviewed. Discussed with bedside RN. This RN in to assess patient. Performed standard wound care which includes appropriate positioning, dressing removal and non-selective debridement.   Preparation for Dressing removal: Dressing removed with no trauma to the skin  Cleansed with:  NS and gauze.  Sharp debridement: NA  Denisse wound: Cleansed with NS, dried  Primary Dressing:Mepilex  Secondary (Outer) Dressing: NA    Interdisciplinary consultation: Patient, Bedside RN    EVALUATION / RATIONALE FOR TREATMENT:  Most Recent Date:  6/27: Pt has multiple scabs to body in  various stages of healing. His proximal sacral area has scar tiss and partial thickness wound that blanches. Possible wound from bite as are rest of scabs.      Goals: Steady decrease in wound area and depth weekly.    WOUND TEAM PLAN OF CARE ([X] for frequency of wound follow up,):   Nursing to follow orders written for wound care. Contact wound team if area fails to progress, deteriorates or with any questions/concerns  Dressing changes by wound team:                   Follow up 3 times weekly:                NPWT change 3 times weekly:     Follow up 1-2 times weekly:      Follow up Bi-Monthly:                   Follow up as needed:     Other (explain):     NURSING PLAN OF CARE ORDERS (X):  Dressing changes: See Dressing Care orders:   Skin care: See Skin Care orders:   RN Prevention Protocol: x  Rectal tube care: See Rectal Tube Care orders:   Other orders:    RSKIN:   CURRENTLY IN PLACE (X), APPLIED THIS VISIT (A), ORDERED (O):   Q shift Chevy:  X  Q shift pressure point assessments:  X    Surface/Positioning   Pressure redistribution mattress   x         Low Airloss          Bariatric foam      Bariatric LEANN     Waffle cushion        Waffle Overlay          Reposition q 2 hours  Remind,  assist pt     TAPs Turning system     Z Eren Pillow     Offloading/Redistribution   Sacral Mepilex (Silicone dressing)   x  Heel Mepilex (Silicone dressing)         Heel float boots (Prevalon boot)             Float Heels off Bed with Pillows           Respiratory RA  Silicone O2 tubing         Gray Foam Ear protectors     Cannula fixation Device (Tender )          High flow offloading Clip    Elastic head band offloading device      Anchorfast                                                         Trach with Optifoam split foam             Containment/Moisture Prevention not assessed    Rectal tube or BMS    Purwick/Condom Cath        Clement Catheter    Barrier wipes           Barrier paste       Antifungal tx       Interdry        Mobilization not assessed      Up to chair        Ambulate      PT/OT      Nutrition       Dietician        Diabetes Education      PO   x  TF     TPN     NPO   # days     Other        Anticipated discharge plans: TBD homelessness  LTACH:        SNF/Rehab:                  Home Health Care:           Outpatient Wound Center:            Self/Family Care:        Other:

## 2021-06-27 NOTE — CARE PLAN
Problem: Pain - Standard  Goal: Alleviation of pain or a reduction in pain to the patient’s comfort goal  Outcome: Progressing     Problem: Knowledge Deficit - Standard  Goal: Patient and family/care givers will demonstrate understanding of plan of care, disease process/condition, diagnostic tests and medications  Outcome: Progressing     Problem: Skin Integrity  Goal: Skin integrity is maintained or improved  Outcome: Progressing     Problem: Fall Risk  Goal: Patient will remain free from falls  Outcome: Progressing   The patient is fatigued at this time    Shift Goals  Clinical Goals: pain control  Patient Goals: sleep  Family Goals: N/A    Progress made toward(s) clinical / shift goals: oral intake    Patient is not progressing towards the following goals:Walking

## 2021-06-27 NOTE — PROGRESS NOTES
[facility] Medicine Daily Progress Note    Date of Service  6/27/2021    Chief Complaint  75 y.o. male with T2DM, BPH s/p chronic indwelling lazo, MDD admitted 6/19/2021 with fall and SHAHID.    Hospital Course  Mr. Hugh Núñez is a 75 y.o. male history of diabetes, homelessness, benign prostatic hypertrophy status post chronic indwelling Lazo who presented on 6/19/2021 with ground-level fall, right hip pain.  Patient had a fall in the shower and hit his right hip.  Denies any loss of consciousness or head trauma.  Hip x-ray showed no fracture.  He was found to have an acute kidney injury and uncontrolled blood sugar in the 400s with an A1c of 14.      Interval Problem Update  Patient was seen and examined at bedside.  I have personally reviewed and interpreted vitals, labs, and imaging.    6/22.  Afebrile.  Stable vitals.  On room air.  Blood sugars are improved.  Denies fevers, chills, chest pains, shortness of breath.  Does report some right hip pain.  He is agreeable for placement.  6/23.  Afebrile.  Stable vitals.  On room air.  Replete potassium and magnesium.  Denies fevers, chills, chest pains, shortness of breath.  Reports persistent right hip pain.  Reports abdominal pain localized around umbilicus that is intermittent.  Has not had very good p.o. intake states he is just not hungry.  Denies nausea, vomiting.  Has not been able to check his own blood sugar and give insulin which she will need to do a group home.  Diabetic education is following.  Will start oral Metformin for blood sugar control.  Patient does report some itching and rash on his back from prior history of bedbugs.  Start triamcinolone cream.  6/24.  Afebrile.  Stable vitals.  On room air.  Continue to replete potassium, Phos, and magnesium.  Hypoglycemia on BMP this morning at 55.  Discontinue long-acting insulin and decrease sliding scale.  Started on Metformin.  Patient is eating better today.  Denies fever, chills, chest pains,  shortness of breath.  Right hip pain is controlled.  Still reports generalized itching mostly in his back.  Start Benadryl  6/25.  Afebrile.  1 episode of hypotension initially evening has resolved.  On room air.  Replete mag, potassium, Phos.  Denies fever, chills, chest pain, shortness of breath.  Right hip pain is persistent.  Reports persistent itching from rash.  He is eating better.  He did work with physical therapy this morning.  Because of SHAHID and insulin titration along with Metformin for blood sugar control the patient has been rolled inpatient.  Pending placement.    6/26.  Afebrile.  Stable vitals.  On room air.  Blood sugars better controlled.  Continue to replete mag and Phos.  Denies fever, chills, chest pains, shortness of breath.  States right hip pain is improving.  He is also eating better.  Kidney function is improved.  Continue to titrate insulin regimen.  6/27.  Afebrile.  Stable vitals.  On room air.  Denies fever, chills, chest pains, shortness of breath.  States his right hip pain is improved.  Itching and rash are improved.  Labs pending this morning.  Continue to monitor blood sugars.  Pending placement    Consultants/Specialty  Diabetes Educator    Code Status  Full Code    Disposition  Pending post-acute placement.  SNF versus group home with home health.  Covid negative.  QuantiFERON pending    Review of Systems  Review of Systems   Constitutional: Positive for malaise/fatigue. Negative for chills and fever.   HENT: Negative for sinus pain and sore throat.    Eyes: Negative for blurred vision.   Respiratory: Negative for cough and shortness of breath.    Cardiovascular: Negative for chest pain, palpitations and leg swelling.   Gastrointestinal: Negative for abdominal pain, constipation, diarrhea, nausea and vomiting.   Genitourinary: Negative for dysuria, flank pain and hematuria.   Musculoskeletal: Positive for falls and joint pain.   Skin: Positive for itching and rash.    Neurological: Positive for weakness. Negative for headaches.   Endo/Heme/Allergies: Does not bruise/bleed easily.   Psychiatric/Behavioral: Positive for depression. The patient is not nervous/anxious.         Physical Exam  Temp:  [36.1 °C (97 °F)-37.4 °C (99.3 °F)] 37.4 °C (99.3 °F)  Pulse:  [68-79] 79  Resp:  [16-18] 18  BP: (106-144)/(45-76) 144/59  SpO2:  [93 %-100 %] 93 %    Physical Exam  Vitals and nursing note reviewed.   Constitutional:       Appearance: He is cachectic. He is ill-appearing (Chronically).   HENT:      Head:      Comments: Bitemporal and bibuccal wasting.     Right Ear: External ear normal.      Left Ear: External ear normal.      Nose: Nose normal.      Mouth/Throat:      Mouth: Mucous membranes are moist.      Pharynx: Oropharynx is clear.   Eyes:      Extraocular Movements: Extraocular movements intact.      Conjunctiva/sclera: Conjunctivae normal.   Cardiovascular:      Rate and Rhythm: Normal rate and regular rhythm.      Pulses: Normal pulses.      Heart sounds: Normal heart sounds. No murmur heard.   No friction rub. No gallop.    Pulmonary:      Effort: Pulmonary effort is normal. No respiratory distress.      Breath sounds: Normal breath sounds. No wheezing, rhonchi or rales.   Abdominal:      General: Abdomen is flat. Bowel sounds are normal. There is no distension.      Palpations: Abdomen is soft.      Tenderness: There is no abdominal tenderness. There is no guarding or rebound.   Genitourinary:     Comments: +Clement  Musculoskeletal:         General: Tenderness (Right hip, thigh) present.      Cervical back: Normal range of motion.      Right lower leg: No edema.      Left lower leg: No edema.   Neurological:      General: No focal deficit present.      Mental Status: He is alert, oriented to person, place, and time and easily aroused. Mental status is at baseline.      Cranial Nerves: No cranial nerve deficit.   Psychiatric:         Attention and Perception: Attention normal.          Mood and Affect: Mood is depressed. Affect is flat.         Speech: Speech normal.         Behavior: Behavior normal. Behavior is cooperative.         Cognition and Memory: Cognition and memory normal.         Judgment: Judgment normal.         Fluids    Intake/Output Summary (Last 24 hours) at 2021 0651  Last data filed at 2021 0300  Gross per 24 hour   Intake 240 ml   Output 500 ml   Net -260 ml       Laboratory      Recent Labs     21  0003 21  0022   SODIUM 134* 137   POTASSIUM 3.9 4.2   CHLORIDE 99 103   CO2 26 25   GLUCOSE 200* 193*   BUN 23* 21   CREATININE 1.16 0.93   CALCIUM 8.8 8.3*                   Imaging  DX-FEMUR-2+ RIGHT   Final Result      No radiographic evidence of acute traumatic injury.      Moderate right hip osteoarthritis      DX-PELVIS-1 OR 2 VIEWS   Final Result      No radiographic evidence of acute displaced fracture      Moderate right, mild left hip osteoarthritis           Assessment/Plan  Contusion of right hip- (present on admission)  Assessment & Plan  Sustained in fall in the shower PTA  Pelvic XR negative for fracture  APAP PRN for pain, oxycodone PRN for severe pain  PT eval and treat, recommend post-acute placement    SHAHID (acute kidney injury) (HCC)- (present on admission)  Assessment & Plan  Resolved with IVF and glycemic control  Discontinued IVF  Avoid nephrotoxins    Moderate episode of recurrent major depressive disorder (HCC)- (present on admission)  Assessment & Plan  Reports losing home and his wife  this year    Frequent falls- (present on admission)  Assessment & Plan  Likely due to uncontrolled diabetes causing polyuria and weight loss  PT/OT eval and treat, recommending post-acute placement    Frailty syndrome in geriatric patient- (present on admission)  Assessment & Plan  BMI 17.2  Multifactorial including depression, homelessness, uncontrolled T2DM  PT/OT consulted, appreciate Case Management assistance in discharge planning for  post-acute placement and eventual group home    Type 2 diabetes mellitus with hyperglycemia, with long-term current use of insulin ([facility])- (present on admission)  Assessment & Plan  Lab Results   Component Value Date/Time    HBA1C 14.3 (H) 06/19/2021 1705    HBA1C 11.8 (H) 03/17/2021 1326    HBA1C 7.8 (H) 01/17/2021 0217     Results from last 7 days   Lab Units 06/27/21  0609 06/26/21  1703 06/26/21  1151 06/26/21  0618 06/25/21  1629 06/25/21  1121 06/25/21  0546 06/24/21  1702   ACCU CHECK GLUCOSE 788 mg/dL 221* 155* 247* 206* 198* 168* 219* 205*     I have ordered insulin sliding scale with D50 and glucagon for hypoglycemia per protocol.  Diabetic diet    Improving  He had discontinued insulin therapy due to apathy and poor insight about T2DM  A1c 14.3,  on admission  Weight loss and SHAHID likely due to polyuria from hyperglycemia  Initiated on 10 units glargine (0.2 units/kg) QHS  Diabetes educator consulted  She has been unable to give himself insulin and check blood sugars.      Because of hypoglycemia long-acting insulin was discontinued 6/24  Sliding scale insulin was decreased.  Continue Metformin    Benign prostatic hyperplasia with urinary obstruction- (present on admission)  Assessment & Plan  Chronic indwelling lazo, follows with urology monthly  On finasteride PTA, has not been taking tamsulosin  Tamsulosin discontinued due to frequent falls and pre-renal SHAHID concerning for orthostasis    Normocytic anemia- (present on admission)  Assessment & Plan  Denies signs of bleeding  Elevated Ferritin with normal FeSat% consistent with AOCD  Repeat CBC only if clinical change       VTE prophylaxis: LMWH

## 2021-06-28 LAB
ANION GAP SERPL CALC-SCNC: 9 MMOL/L (ref 7–16)
BUN SERPL-MCNC: 23 MG/DL (ref 8–22)
CALCIUM SERPL-MCNC: 8.7 MG/DL (ref 8.5–10.5)
CHLORIDE SERPL-SCNC: 100 MMOL/L (ref 96–112)
CO2 SERPL-SCNC: 27 MMOL/L (ref 20–33)
CREAT SERPL-MCNC: 1.13 MG/DL (ref 0.5–1.4)
GLUCOSE BLD-MCNC: 201 MG/DL (ref 65–99)
GLUCOSE BLD-MCNC: 204 MG/DL (ref 65–99)
GLUCOSE BLD-MCNC: 222 MG/DL (ref 65–99)
GLUCOSE SERPL-MCNC: 160 MG/DL (ref 65–99)
MAGNESIUM SERPL-MCNC: 1.9 MG/DL (ref 1.5–2.5)
PHOSPHATE SERPL-MCNC: 2.9 MG/DL (ref 2.5–4.5)
POTASSIUM SERPL-SCNC: 4.8 MMOL/L (ref 3.6–5.5)
SODIUM SERPL-SCNC: 136 MMOL/L (ref 135–145)

## 2021-06-28 PROCEDURE — 99232 SBSQ HOSP IP/OBS MODERATE 35: CPT | Performed by: INTERNAL MEDICINE

## 2021-06-28 PROCEDURE — 36415 COLL VENOUS BLD VENIPUNCTURE: CPT

## 2021-06-28 PROCEDURE — 80048 BASIC METABOLIC PNL TOTAL CA: CPT

## 2021-06-28 PROCEDURE — 700102 HCHG RX REV CODE 250 W/ 637 OVERRIDE(OP): Performed by: STUDENT IN AN ORGANIZED HEALTH CARE EDUCATION/TRAINING PROGRAM

## 2021-06-28 PROCEDURE — 82962 GLUCOSE BLOOD TEST: CPT | Mod: 91

## 2021-06-28 PROCEDURE — 700102 HCHG RX REV CODE 250 W/ 637 OVERRIDE(OP): Performed by: INTERNAL MEDICINE

## 2021-06-28 PROCEDURE — A9270 NON-COVERED ITEM OR SERVICE: HCPCS | Performed by: INTERNAL MEDICINE

## 2021-06-28 PROCEDURE — A9270 NON-COVERED ITEM OR SERVICE: HCPCS | Performed by: STUDENT IN AN ORGANIZED HEALTH CARE EDUCATION/TRAINING PROGRAM

## 2021-06-28 PROCEDURE — 83735 ASSAY OF MAGNESIUM: CPT

## 2021-06-28 PROCEDURE — 700111 HCHG RX REV CODE 636 W/ 250 OVERRIDE (IP): Performed by: STUDENT IN AN ORGANIZED HEALTH CARE EDUCATION/TRAINING PROGRAM

## 2021-06-28 PROCEDURE — 84100 ASSAY OF PHOSPHORUS: CPT

## 2021-06-28 PROCEDURE — 770004 HCHG ROOM/CARE - ONCOLOGY PRIVATE *

## 2021-06-28 PROCEDURE — 97535 SELF CARE MNGMENT TRAINING: CPT

## 2021-06-28 RX ADMIN — METFORMIN HYDROCHLORIDE 500 MG: 500 TABLET ORAL at 17:01

## 2021-06-28 RX ADMIN — Medication 100 MG: at 05:47

## 2021-06-28 RX ADMIN — INSULIN HUMAN 2 UNITS: 100 INJECTION, SOLUTION PARENTERAL at 17:02

## 2021-06-28 RX ADMIN — FINASTERIDE 5 MG: 5 TABLET, FILM COATED ORAL at 05:47

## 2021-06-28 RX ADMIN — INSULIN HUMAN 2 UNITS: 100 INJECTION, SOLUTION PARENTERAL at 11:41

## 2021-06-28 RX ADMIN — DIBASIC SODIUM PHOSPHATE, MONOBASIC POTASSIUM PHOSPHATE AND MONOBASIC SODIUM PHOSPHATE 500 MG: 852; 155; 130 TABLET ORAL at 08:35

## 2021-06-28 RX ADMIN — METFORMIN HYDROCHLORIDE 500 MG: 500 TABLET ORAL at 08:34

## 2021-06-28 RX ADMIN — Medication 400 MG: at 08:34

## 2021-06-28 RX ADMIN — ENOXAPARIN SODIUM 30 MG: 30 INJECTION SUBCUTANEOUS at 05:47

## 2021-06-28 RX ADMIN — INSULIN HUMAN 2 UNITS: 100 INJECTION, SOLUTION PARENTERAL at 05:57

## 2021-06-28 RX ADMIN — OXYCODONE 5 MG: 5 TABLET ORAL at 05:47

## 2021-06-28 RX ADMIN — THERA TABS 1 TABLET: TAB at 05:47

## 2021-06-28 ASSESSMENT — COGNITIVE AND FUNCTIONAL STATUS - GENERAL
DRESSING REGULAR LOWER BODY CLOTHING: A LOT
TOILETING: A LOT
DAILY ACTIVITIY SCORE: 18
SUGGESTED CMS G CODE MODIFIER DAILY ACTIVITY: CK
HELP NEEDED FOR BATHING: A LOT

## 2021-06-28 ASSESSMENT — ENCOUNTER SYMPTOMS
SORE THROAT: 0
SINUS PAIN: 0
PALPITATIONS: 0
BRUISES/BLEEDS EASILY: 0
NERVOUS/ANXIOUS: 0
DIARRHEA: 0
WEAKNESS: 1
ABDOMINAL PAIN: 0
SHORTNESS OF BREATH: 0
CHILLS: 0
BLURRED VISION: 0
HEADACHES: 0
NAUSEA: 0
CONSTIPATION: 0
COUGH: 0
FEVER: 0
FLANK PAIN: 0
DEPRESSION: 1
FALLS: 1
VOMITING: 0

## 2021-06-28 ASSESSMENT — PAIN DESCRIPTION - PAIN TYPE
TYPE: ACUTE PAIN

## 2021-06-28 ASSESSMENT — GAIT ASSESSMENTS: DISTANCE (FEET): 5

## 2021-06-28 NOTE — DISCHARGE PLANNING
owner, Morales Davidson met with this RN,  after assessment, per Morales patient will be accepted to his group home, [facility].      owner is requesting clinicals faxed to, [phone].  owner is also requesting patient has a rent payee. [facility] to send a request to [facility] after speaking with patient on 06/29/2021.     Ania Kaba RN,

## 2021-06-28 NOTE — CARE PLAN
Problem: Skin Integrity  Goal: Skin integrity is maintained or improved  Outcome: Progressing     Problem: Fall Risk  Goal: Patient will remain free from falls  Outcome: Progressing   The patient is Stable - Low risk of patient condition declining or worsening    Shift Goals  Clinical Goals: pain control, rest, mobility  Patient Goals: ambulate   Family Goals: N/A    Progress made toward(s) clinical / shift goals:  cooperative with position change this shift, participated with therapy, up in chair.     Patient is not progressing towards the following goals:

## 2021-06-28 NOTE — DISCHARGE PLANNING
Anticipated Discharge Disposition:  placement    Action: This RN,  sent an Email to  owner, Maris from [facility] asking if she would be interested in patient for assessment.     Barriers to Discharge: Placement    Plan: HCM will continue to reach out to 's for available beds.     1141: This RN,  received an e-mail from  owner Maris Miller, she is aware of a group home that may have a male bed opening, I responded to her Email stating patient will need a 2-3 level of care. Supervisor Darvin Gutierrez is also working on placement.      Ania Kaba RN,

## 2021-06-28 NOTE — DISCHARGE PLANNING
Anticipated Discharge Disposition: Group Home    Action: WISAM called [facility] [phone]. Provided brief medical description of patient with PT/OT notes, and oriented x4. Morales is willing to assess, will be by between 2:00pm and 3:00pm today. Will bring paperwork if patient is agreeable. This is a private room, Morales was given information that patient will eventually need Medicaid GH waiver, after he pays down his savings. He reports he is willing to work with patient.     Barriers to Discharge: GH acceptance/placement.     Plan: WISAM updated RN/Ania MALCOLM of assessment.

## 2021-06-28 NOTE — CARE PLAN
Problem: Pain - Standard  Goal: Alleviation of pain or a reduction in pain to the patient’s comfort goal  Outcome: Progressing     Problem: Skin Integrity  Goal: Skin integrity is maintained or improved  Outcome: Not Progressing     The patient is Watcher - Medium risk of patient condition declining or worsening    Shift Goals  Clinical Goals: pain control  Patient Goals: sleep  Family Goals: N/A    Progress made toward(s) clinical / shift goals:  Pt refusing turns despite education.     Patient is not progressing towards the following goals:      Problem: Skin Integrity  Goal: Skin integrity is maintained or improved  Outcome: Not Progressing

## 2021-06-28 NOTE — THERAPY
Occupational Therapy  Daily Treatment     Patient Name: Hugh Núñez  Age:  75 y.o., Sex:  male  Medical Record #: [id number]  Today's Date: 6/28/2021     Precautions  Precautions: Fall Risk  Comments: R hip pain    Assessment    Pt continues to be limited by R hip pain, weakness, and fatigue affecting his independence with ADLs and functional mobility. Pt required maxA for LB dressing, spv for transfers, spv for UB dressing, spv for seated grooming/hygiene, and setup for eating. Pt required cues for safety with 4WW, to lock and to use even for short distances. Pt will benefit from continued acute OT tx to address ADLs, endurance, balance, and strength to improve independence.     Plan    Continue current treatment plan.    DC Equipment Recommendations: Unable to determine at this time  Discharge Recommendations: Possible group home placement per chart which would be appropriate as he needs a supportive setting that assists with ADLs/IADLs.    Subjective    Pt alert, pleasant, and cooperative. C/o R hip pain 8/10.      Objective       06/28/21 1027   Cognition    Cognition / Consciousness X   Speech/ Communication Hard of Hearing   Level of Consciousness Alert   Safety Awareness Impaired   Comments pleasant, cooperative, [place], attempts to walk short distances without 4WW   Strength Upper Body   Upper Body Strength  X   Gross Strength Generalized Weakness, Equal Bilaterally.    Balance   Comments no overt LOB   Bed Mobility    Supine to Sit Moderate Assist  (assist for trunk)   Activities of Daily Living   Eating Supervision   Grooming Supervision;Seated  (washing face)   Upper Body Dressing Supervision  (gown)   Lower Body Dressing Maximal Assist  (socks)   Toileting   (NT, lazo in place, declined needing to have BM)   Functional Mobility   Sit to Stand Supervised   Bed, Chair, Wheelchair Transfer Supervised   Transfer Method Stand Step   Mobility in room with 4WW   Distance (Feet) 5   # of Times Distance was  Traveled 1   Comments cues to lock brakes on 4WW   Activity Tolerance   Comments limited by fatigue, weakness, hip pain   Patient / Family Goals   Patient / Family Goal #1 To get more help   Goal #1 Outcome Progressing as expected   Short Term Goals   Short Term Goal # 1 Pt will complete ADL transfers with supervision   Goal Outcome # 1 Progressing as expected   Short Term Goal # 2 Pt will complete LB dressing with supervision   Goal Outcome # 2 Progressing slower than expected   Short Term Goal # 3 Pt will complete toileting with supervision   Goal Outcome # 3 Progressing as expected

## 2021-06-28 NOTE — PROGRESS NOTES
[facility] Medicine Daily Progress Note    Date of Service  6/28/2021    Chief Complaint  75 y.o. male with T2DM, BPH s/p chronic indwelling laoz, MDD admitted 6/19/2021 with fall and SHAHID.    Hospital Course  Mr. Hugh Núñez is a 75 y.o. male history of diabetes, homelessness, benign prostatic hypertrophy status post chronic indwelling Lazo who presented on 6/19/2021 with ground-level fall, right hip pain.  Patient had a fall in the shower and hit his right hip.  Denies any loss of consciousness or head trauma.  Hip x-ray showed no fracture.  He was found to have an acute kidney injury and uncontrolled blood sugar in the 400s with an A1c of 14.      Interval Problem Update  Patient was seen and examined at bedside.  I have personally reviewed and interpreted vitals, labs, and imaging.    6/22.  Afebrile.  Stable vitals.  On room air.  Blood sugars are improved.  Denies fevers, chills, chest pains, shortness of breath.  Does report some right hip pain.  He is agreeable for placement.  6/23.  Afebrile.  Stable vitals.  On room air.  Replete potassium and magnesium.  Denies fevers, chills, chest pains, shortness of breath.  Reports persistent right hip pain.  Reports abdominal pain localized around umbilicus that is intermittent.  Has not had very good p.o. intake states he is just not hungry.  Denies nausea, vomiting.  Has not been able to check his own blood sugar and give insulin which she will need to do a group home.  Diabetic education is following.  Will start oral Metformin for blood sugar control.  Patient does report some itching and rash on his back from prior history of bedbugs.  Start triamcinolone cream.  6/24.  Afebrile.  Stable vitals.  On room air.  Continue to replete potassium, Phos, and magnesium.  Hypoglycemia on BMP this morning at 55.  Discontinue long-acting insulin and decrease sliding scale.  Started on Metformin.  Patient is eating better today.  Denies fever, chills, chest pains,  shortness of breath.  Right hip pain is controlled.  Still reports generalized itching mostly in his back.  Start Benadryl  6/25.  Afebrile.  1 episode of hypotension initially evening has resolved.  On room air.  Replete mag, potassium, Phos.  Denies fever, chills, chest pain, shortness of breath.  Right hip pain is persistent.  Reports persistent itching from rash.  He is eating better.  He did work with physical therapy this morning.  Because of SHAHID and insulin titration along with Metformin for blood sugar control the patient has been rolled inpatient.  Pending placement.    6/26.  Afebrile.  Stable vitals.  On room air.  Blood sugars better controlled.  Continue to replete mag and Phos.  Denies fever, chills, chest pains, shortness of breath.  States right hip pain is improving.  He is also eating better.  Kidney function is improved.  Continue to titrate insulin regimen.  6/27.  Afebrile.  Stable vitals.  On room air.  Denies fever, chills, chest pains, shortness of breath.  States his right hip pain is improved.  Itching and rash are improved.  Labs pending this morning.  Continue to monitor blood sugars.  Pending placement  6/28.  Afebrile.  Episode of hypotension tachycardia yesterday afternoon has resolved.  On room air.  Replete mag and K-Phos.  Denies fever, chills, chest pains, shortness of breath.  States his right hip pain is improving but he is frustrated is taking so long.  Working with social work to get him to a group home.    Consultants/Specialty  Diabetes Educator    Code Status  Full Code    Disposition  Pending post-acute placement.  SNF versus group home with home health.  Covid and QuantiFERON negative    Review of Systems  Review of Systems   Constitutional: Positive for malaise/fatigue. Negative for chills and fever.   HENT: Negative for sinus pain and sore throat.    Eyes: Negative for blurred vision.   Respiratory: Negative for cough and shortness of breath.    Cardiovascular: Negative  for chest pain, palpitations and leg swelling.   Gastrointestinal: Negative for abdominal pain, constipation, diarrhea, nausea and vomiting.   Genitourinary: Negative for dysuria, flank pain and hematuria.   Musculoskeletal: Positive for falls and joint pain.   Skin: Positive for itching and rash.   Neurological: Positive for weakness. Negative for headaches.   Endo/Heme/Allergies: Does not bruise/bleed easily.   Psychiatric/Behavioral: Positive for depression. The patient is not nervous/anxious.         Physical Exam  Temp:  [37.1 °C (98.7 °F)-37.7 °C (99.9 °F)] 37.7 °C (99.9 °F)  Pulse:  [] 78  Resp:  [16-18] 18  BP: ()/(42-57) 105/57  SpO2:  [95 %-97 %] 95 %    Physical Exam  Vitals and nursing note reviewed.   Constitutional:       Appearance: He is cachectic. He is ill-appearing (Chronically).   HENT:      Head:      Comments: Bitemporal and bibuccal wasting.     Right Ear: External ear normal.      Left Ear: External ear normal.      Nose: Nose normal.      Mouth/Throat:      Mouth: Mucous membranes are moist.      Pharynx: Oropharynx is clear.   Eyes:      Extraocular Movements: Extraocular movements intact.      Conjunctiva/sclera: Conjunctivae normal.   Cardiovascular:      Rate and Rhythm: Normal rate and regular rhythm.      Pulses: Normal pulses.      Heart sounds: Normal heart sounds. No murmur heard.   No friction rub. No gallop.    Pulmonary:      Effort: Pulmonary effort is normal. No respiratory distress.      Breath sounds: Normal breath sounds. No wheezing, rhonchi or rales.   Abdominal:      General: Abdomen is flat. Bowel sounds are normal. There is no distension.      Palpations: Abdomen is soft.      Tenderness: There is no abdominal tenderness. There is no guarding or rebound.   Genitourinary:     Comments: +Clement  Musculoskeletal:         General: Tenderness (Right hip, thigh) present.      Cervical back: Normal range of motion.      Right lower leg: No edema.      Left lower  leg: No edema.   Neurological:      General: No focal deficit present.      Mental Status: He is alert, oriented to person, place, and time and easily aroused. Mental status is at baseline.      Cranial Nerves: No cranial nerve deficit.   Psychiatric:         Attention and Perception: Attention normal.         Mood and Affect: Mood is depressed. Affect is flat.         Speech: Speech normal.         Behavior: Behavior normal. Behavior is cooperative.         Cognition and Memory: Cognition and memory normal.         Judgment: Judgment normal.         Fluids    Intake/Output Summary (Last 24 hours) at 2021 0610  Last data filed at 2021 0435  Gross per 24 hour   Intake --   Output 1500 ml   Net -1500 ml       Laboratory      Recent Labs     21  0022 21  0037   SODIUM 137 136   POTASSIUM 4.2 4.8   CHLORIDE 103 100   CO2 25 27   GLUCOSE 193* 160*   BUN 21 23*   CREATININE 0.93 1.13   CALCIUM 8.3* 8.7                   Imaging  DX-FEMUR-2+ RIGHT   Final Result      No radiographic evidence of acute traumatic injury.      Moderate right hip osteoarthritis      DX-PELVIS-1 OR 2 VIEWS   Final Result      No radiographic evidence of acute displaced fracture      Moderate right, mild left hip osteoarthritis           Assessment/Plan  Contusion of right hip- (present on admission)  Assessment & Plan  Sustained in fall in the shower PTA  Pelvic XR negative for fracture  APAP PRN for pain, oxycodone PRN for severe pain  PT eval and treat, recommend post-acute placement    SHAHID (acute kidney injury) (HCC)- (present on admission)  Assessment & Plan  Resolved with IVF and glycemic control  Discontinued IVF  Avoid nephrotoxins    Moderate episode of recurrent major depressive disorder (HCC)- (present on admission)  Assessment & Plan  Reports losing home and his wife  this year    Frequent falls- (present on admission)  Assessment & Plan  Likely due to uncontrolled diabetes causing polyuria and weight  loss  PT/OT eval and treat, recommending post-acute placement    Frailty syndrome in geriatric patient- (present on admission)  Assessment & Plan  BMI 17.2  Multifactorial including depression, homelessness, uncontrolled T2DM  PT/OT consulted, appreciate Case Management assistance in discharge planning for post-acute placement and eventual group home    Type 2 diabetes mellitus with hyperglycemia, with long-term current use of insulin (HCC)- (present on admission)  Assessment & Plan  Lab Results   Component Value Date/Time    HBA1C 14.3 (H) 06/19/2021 1705    HBA1C 11.8 (H) 03/17/2021 1326    HBA1C 7.8 (H) 01/17/2021 0217     Results from last 7 days   Lab Units 06/27/21  0609 06/26/21  1703 06/26/21  1151 06/26/21  0618 06/25/21  1629 06/25/21  1121 06/25/21  0546 06/24/21  1702   ACCU CHECK GLUCOSE 788 mg/dL 221* 155* 247* 206* 198* 168* 219* 205*     I have ordered insulin sliding scale with D50 and glucagon for hypoglycemia per protocol.  Diabetic diet    Improving  He had discontinued insulin therapy due to apathy and poor insight about T2DM  A1c 14.3,  on admission  Weight loss and SHAHID likely due to polyuria from hyperglycemia  Initiated on 10 units glargine (0.2 units/kg) QHS  Diabetes educator consulted  She has been unable to give himself insulin and check blood sugars.      Because of hypoglycemia long-acting insulin was discontinued 6/24  Sliding scale insulin was decreased.  Continue Metformin    Benign prostatic hyperplasia with urinary obstruction- (present on admission)  Assessment & Plan  Chronic indwelling lazo, follows with urology monthly  On finasteride PTA, has not been taking tamsulosin  Tamsulosin discontinued due to frequent falls and pre-renal SHAHID concerning for orthostasis    Normocytic anemia- (present on admission)  Assessment & Plan  Denies signs of bleeding  Elevated Ferritin with normal FeSat% consistent with AOCD  Repeat CBC only if clinical change       VTE prophylaxis:  LMWH

## 2021-06-29 ENCOUNTER — PATIENT OUTREACH (OUTPATIENT)
Dept: HEALTH INFORMATION MANAGEMENT | Facility: OTHER | Age: 76
End: 2021-06-29

## 2021-06-29 LAB
GLUCOSE BLD-MCNC: 146 MG/DL (ref 65–99)
GLUCOSE BLD-MCNC: 222 MG/DL (ref 65–99)
GLUCOSE BLD-MCNC: 237 MG/DL (ref 65–99)

## 2021-06-29 PROCEDURE — 770004 HCHG ROOM/CARE - ONCOLOGY PRIVATE *

## 2021-06-29 PROCEDURE — 97116 GAIT TRAINING THERAPY: CPT

## 2021-06-29 PROCEDURE — 82962 GLUCOSE BLOOD TEST: CPT

## 2021-06-29 PROCEDURE — 99232 SBSQ HOSP IP/OBS MODERATE 35: CPT | Performed by: INTERNAL MEDICINE

## 2021-06-29 PROCEDURE — A9270 NON-COVERED ITEM OR SERVICE: HCPCS | Performed by: INTERNAL MEDICINE

## 2021-06-29 PROCEDURE — A9270 NON-COVERED ITEM OR SERVICE: HCPCS | Performed by: STUDENT IN AN ORGANIZED HEALTH CARE EDUCATION/TRAINING PROGRAM

## 2021-06-29 PROCEDURE — 700102 HCHG RX REV CODE 250 W/ 637 OVERRIDE(OP): Performed by: INTERNAL MEDICINE

## 2021-06-29 PROCEDURE — 700111 HCHG RX REV CODE 636 W/ 250 OVERRIDE (IP): Performed by: STUDENT IN AN ORGANIZED HEALTH CARE EDUCATION/TRAINING PROGRAM

## 2021-06-29 PROCEDURE — 700102 HCHG RX REV CODE 250 W/ 637 OVERRIDE(OP): Performed by: STUDENT IN AN ORGANIZED HEALTH CARE EDUCATION/TRAINING PROGRAM

## 2021-06-29 RX ORDER — FLUCONAZOLE 100 MG/1
100 TABLET ORAL DAILY
Status: DISCONTINUED | OUTPATIENT
Start: 2021-06-29 | End: 2021-06-30

## 2021-06-29 RX ADMIN — INSULIN HUMAN 2 UNITS: 100 INJECTION, SOLUTION PARENTERAL at 12:21

## 2021-06-29 RX ADMIN — Medication 100 MG: at 05:24

## 2021-06-29 RX ADMIN — INSULIN HUMAN 2 UNITS: 100 INJECTION, SOLUTION PARENTERAL at 17:52

## 2021-06-29 RX ADMIN — ACETAMINOPHEN 1000 MG: 500 TABLET ORAL at 17:52

## 2021-06-29 RX ADMIN — METFORMIN HYDROCHLORIDE 500 MG: 500 TABLET ORAL at 09:40

## 2021-06-29 RX ADMIN — METFORMIN HYDROCHLORIDE 500 MG: 500 TABLET ORAL at 17:49

## 2021-06-29 RX ADMIN — FINASTERIDE 5 MG: 5 TABLET, FILM COATED ORAL at 05:25

## 2021-06-29 RX ADMIN — FLUCONAZOLE 100 MG: 100 TABLET ORAL at 17:52

## 2021-06-29 RX ADMIN — ENOXAPARIN SODIUM 30 MG: 30 INJECTION SUBCUTANEOUS at 05:25

## 2021-06-29 RX ADMIN — THERA TABS 1 TABLET: TAB at 05:25

## 2021-06-29 RX ADMIN — DOCUSATE SODIUM 50 MG AND SENNOSIDES 8.6 MG 2 TABLET: 8.6; 5 TABLET, FILM COATED ORAL at 17:49

## 2021-06-29 RX ADMIN — OXYCODONE 5 MG: 5 TABLET ORAL at 09:49

## 2021-06-29 RX ADMIN — DOCUSATE SODIUM 50 MG AND SENNOSIDES 8.6 MG 2 TABLET: 8.6; 5 TABLET, FILM COATED ORAL at 05:25

## 2021-06-29 SDOH — ECONOMIC STABILITY: TRANSPORTATION INSECURITY
IN THE PAST 12 MONTHS, HAS LACK OF TRANSPORTATION KEPT YOU FROM MEETINGS, WORK, OR FROM GETTING THINGS NEEDED FOR DAILY LIVING?: NO

## 2021-06-29 SDOH — ECONOMIC STABILITY: TRANSPORTATION INSECURITY
IN THE PAST 12 MONTHS, HAS THE LACK OF TRANSPORTATION KEPT YOU FROM MEDICAL APPOINTMENTS OR FROM GETTING MEDICATIONS?: NO

## 2021-06-29 SDOH — ECONOMIC STABILITY: FOOD INSECURITY: WITHIN THE PAST 12 MONTHS, YOU WORRIED THAT YOUR FOOD WOULD RUN OUT BEFORE YOU GOT MONEY TO BUY MORE.: NEVER TRUE

## 2021-06-29 SDOH — ECONOMIC STABILITY: FOOD INSECURITY: WITHIN THE PAST 12 MONTHS, THE FOOD YOU BOUGHT JUST DIDN'T LAST AND YOU DIDN'T HAVE MONEY TO GET MORE.: NEVER TRUE

## 2021-06-29 ASSESSMENT — GAIT ASSESSMENTS
DEVIATION: BRADYKINETIC;DECREASED HEEL STRIKE;DECREASED TOE OFF;DECREASED BASE OF SUPPORT
ASSISTIVE DEVICE: 4 WHEEL WALKER
DISTANCE (FEET): 120
GAIT LEVEL OF ASSIST: MINIMAL ASSIST

## 2021-06-29 ASSESSMENT — COGNITIVE AND FUNCTIONAL STATUS - GENERAL
TURNING FROM BACK TO SIDE WHILE IN FLAT BAD: A LOT
SUGGESTED CMS G CODE MODIFIER MOBILITY: CL
WALKING IN HOSPITAL ROOM: A LITTLE
MOVING TO AND FROM BED TO CHAIR: UNABLE
CLIMB 3 TO 5 STEPS WITH RAILING: A LITTLE
MOVING FROM LYING ON BACK TO SITTING ON SIDE OF FLAT BED: A LOT
MOBILITY SCORE: 14
STANDING UP FROM CHAIR USING ARMS: A LITTLE

## 2021-06-29 ASSESSMENT — PAIN DESCRIPTION - PAIN TYPE
TYPE: ACUTE PAIN

## 2021-06-29 ASSESSMENT — SOCIAL DETERMINANTS OF HEALTH (SDOH): HOW HARD IS IT FOR YOU TO PAY FOR THE VERY BASICS LIKE FOOD, HOUSING, MEDICAL CARE, AND HEATING?: NOT VERY HARD

## 2021-06-29 ASSESSMENT — ENCOUNTER SYMPTOMS
PSYCHIATRIC NEGATIVE: 1
CARDIOVASCULAR NEGATIVE: 1
NEUROLOGICAL NEGATIVE: 1
EYES NEGATIVE: 1
CONSTITUTIONAL NEGATIVE: 1
RESPIRATORY NEGATIVE: 1
GASTROINTESTINAL NEGATIVE: 1

## 2021-06-29 NOTE — DISCHARGE PLANNING
Anticipated Discharge Disposition: [facility]    Action: This RN, Case Manger spoke with patient at bedside, patient is very interested in living at [facility], he realized the monthly rent is 3,800 for a private room. Per [employer]  owner he will fax the application packet in morning.     Barriers to Discharge: Completion of  application, cost of care paid to  owner.    Plan: [facility] to have application completed by Provider on 06/30/21, send Email to [facility] for rent payee application.     Ania Kaba RN Case Manager

## 2021-06-29 NOTE — PROGRESS NOTES
Diabetes education: Pt had previously refused insulin and will need to do it himself if going to a group home. Pt is currently on regular sliding scale coverage tid meals with blood sugars of 222 ( 2 units), 201 ( 2 units) and 204 (2 units). Pt is currently on Metformin 500 mg bid as well. If not taking insulin pt may need to have another oral agent added to his MAR ( low dose glyburide, januvia and or increase metformin).

## 2021-06-29 NOTE — CARE PLAN
The patient is Stable - Low risk of patient condition declining or worsening    Shift Goals  Clinical Goals: Mobility  Patient Goals: Rest  Family Goals: N/A    Progress made toward(s) clinical / shift goals:  Patient refuses to turn, demonstrated ambulation and ROM of lower extremities. Rests comfortably in bed and in chair.     Problem: Skin Integrity  Goal: Skin integrity is maintained or improved  Outcome: Progressing     Problem: Mobility  Goal: Patient's capacity to carry out activities will improve  Outcome: Progressing       Patient is not progressing towards the following goals:      Problem: Bowel Elimination  Goal: Establish and maintain regular bowel function  Outcome: Not Progressing

## 2021-06-29 NOTE — PROGRESS NOTES
Bedside SBAR report received from night shift RN. Patient is in bed sleeping with call light within reach. Bed in lowest locked position. Patient appears in no acute distress at this time

## 2021-06-29 NOTE — PROGRESS NOTES
CHW Bayron went bedside and introduced Community Care Management. Patient states that he would like to discuss housing, transportation, food, and primary care needs after he discharges from the hospital because at this moment, he isn't clear on what resources will be needed. Patient states that he is anticipating to DC to a group home and he use to utilize taxi's to get around. Patient has no food needs.     Community Health Worker Intake  • Contact information provided to Hugh Núñez  • Inpatient assessment completed.    Plan: CHW will call the patient after DC to complete social determinants of health assessment.

## 2021-06-29 NOTE — THERAPY
Physical Therapy   Daily Treatment     Patient Name: Hugh Núñez  Age:  75 y.o., Sex:  male  Medical Record #: [id number]  Today's Date: 6/29/2021     Precautions: Fall Risk    Assessment  Pt seen for PT treatment session. Pt able to ambulate 120ft with 4WW and min A for safety, postural cues, 4WW management. Incr assist required with fatigue. Pt c/o R hip pain incr with mobility and declined further LE exercises or sitting up in chair at this time. Encouraged continued ambulation with nursing. PT will follow.     Plan  3x/wk  DC Equipment Recommendations: Unable to determine at this time  Discharge Recommendations: Recommend post-acute placement for additional physical therapy services prior to discharge home (or HHPT if to DC to  setting with 24/7 assist)       06/29/21 1432   Cognition    Speech/ Communication Hard of Hearing   Level of Consciousness Alert   Safety Awareness Impaired   Comments pleasant and cooperative, cues for safety with using 4WW brakes   Balance   Sitting Balance (Static) Fair   Sitting Balance (Dynamic) Fair   Standing Balance (Static) Fair   Standing Balance (Dynamic) Fair -   Weight Shift Sitting Fair   Weight Shift Standing Fair   Comments standing with 4WW   Gait Analysis   Gait Level Of Assist Minimal Assist (spv, min with fatigue)   Assistive Device 4 Wheel Walker   Distance (Feet) 120   Deviation Bradykinetic;Decreased Heel Strike;Decreased Toe Off;Decreased Base Of Support   Weight Bearing Status no restrictions   Comments cues for upright posture, 4WW management with brakes and not pushing too far fwd   Bed Mobility    Supine to Sit Supervised (HOB elevated, use of rail, incr time/effort to complete)   Sit to Supine Minimal Assist   Scooting Minimal Assist   Functional Mobility   Sit to Stand Supervised   Comments declined up to chair d/t R hip pain and fatigue   Short Term Goals    Short Term Goal # 1 Pt will perform supine <> sit with HOB flat, no use of rails with SPV in  6 visits in order to progress toward independence with functional mobility   Goal Outcome # 1 Progressing as expected   Short Term Goal # 2 Pt will ambulate >150 ft with 4WW and SPV within 6 visits in order to progress toward community distance ambulation   Goal Outcome # 2 Progressing as expected   Short Term Goal # 3 Pt will ascend/descend 1 step with LRAD and SPV within 6 visits in order to negotiate curbs and community environment   Goal Outcome # 3 Goal not met

## 2021-06-29 NOTE — PROGRESS NOTES
"A&Ox4. Patient denies pain at rest, reports 8/10 pain in right hip with movement but denies medication. Prefers to lay on his right hip, education provided to reposition and off load. Stated, \"I don't understand why it's taking so long to heal\". Encouraged mobility and offloading. Up 1PA with FWW. Call light and belongings within reach. Hourly rounding and fall precautions in place.  "

## 2021-06-29 NOTE — PROGRESS NOTES
[facility] Medicine Daily Progress Note    Date of Service  6/29/2021    Chief Complaint  75 y.o. male admitted 6/19/2021 due to a ground-level fall, and right hip pain.  He is homeless, and has a history of BPH with a chronic indwelling Clement (follows outpatient urology monthly).    Hospital Course  On admission, patient was noted to have SHAHID, hyperglycemia.  Right hip x-ray showed no evidence of fracture. Hemoglobin A1c was 14. UA showed packed wbcs and yeast.     Interval Problem Update  No acute issues.  Afebrile, hemodynamically stable.    Consultants/Specialty  Diabetic educator    Code Status  Full Code    Disposition  Social work assisting with placement in group home vs. SNF.  Covid and QuantiFERON were negative    Review of Systems  Review of Systems   Constitutional: Negative.    HENT: Negative.    Eyes: Negative.    Respiratory: Negative.    Cardiovascular: Negative.    Gastrointestinal: Negative.    Genitourinary: Negative.    Musculoskeletal: Positive for joint pain.        Right hip pain   Skin: Negative.    Neurological: Negative.    Endo/Heme/Allergies: Negative.    Psychiatric/Behavioral: Negative.         Physical Exam  Temp:  [36.7 °C (98.1 °F)-37.2 °C (99 °F)] 36.7 °C (98.1 °F)  Pulse:  [68-74] 71  Resp:  [17-18] 18  BP: (104-127)/(57-68) 115/58  SpO2:  [90 %-97 %] 96 %    Physical Exam  HENT:      Head: Normocephalic.      Nose: Nose normal.      Mouth/Throat:      Mouth: Mucous membranes are moist.   Eyes:      Pupils: Pupils are equal, round, and reactive to light.   Cardiovascular:      Rate and Rhythm: Normal rate.   Pulmonary:      Effort: Pulmonary effort is normal.   Abdominal:      Palpations: Abdomen is soft.   Genitourinary:     Comments: Chronic Clement in place  Musculoskeletal:      Cervical back: Normal range of motion.   Skin:     Comments: Slight erythema around Clement insertion   Neurological:      General: No focal deficit present.      Mental Status: He is alert.   Psychiatric:          Behavior: Behavior normal.         Fluids    Intake/Output Summary (Last 24 hours) at 2021 1449  Last data filed at 2021 0951  Gross per 24 hour   Intake 850 ml   Output 650 ml   Net 200 ml       Laboratory      Recent Labs     21  0037   SODIUM 136   POTASSIUM 4.8   CHLORIDE 100   CO2 27   GLUCOSE 160*   BUN 23*   CREATININE 1.13   CALCIUM 8.7                   Imaging  DX-FEMUR-2+ RIGHT   Final Result      No radiographic evidence of acute traumatic injury.      Moderate right hip osteoarthritis      DX-PELVIS-1 OR 2 VIEWS   Final Result      No radiographic evidence of acute displaced fracture      Moderate right, mild left hip osteoarthritis           Assessment/Plan  * Contusion of right hip- (present on admission)  Assessment & Plan  Pelvic x-ray was negative for fracture.  Continue pain control as needed.  PT/OT recommend postacute placement.  Monitor    Frequent falls- (present on admission)  Assessment & Plan  Frail, poor intake with uncontrolled diabetes.  PT/OT recommend postacute placement.    Type 2 diabetes mellitus with hyperglycemia, with long-term current use of insulin (HCC)- (present on admission)  Assessment & Plan  Poorly controlled. Hemoglobin A1c was 14.3 on 2021.  Continue SSI with Accu-Cheks and hypoglycemia protocol.  Long-acting insulin was discontinued due to episodes of hypoglycemia.  Diabetic educator was consulted.  Continue Metformin.    Benign prostatic hyperplasia with urinary obstruction- (present on admission)  Assessment & Plan  Chronic indwelling Clement.  Follows outpatient urology every month.  Tamsulosin was discontinued due to frequent falls and prerenal SHAHID, with concerns for orthostasis.  On finasteride    Frailty syndrome in geriatric patient- (present on admission)  Assessment & Plan  BMI 17.2.  Patient is homeless.  His wife  this year. Case management is assisting in postacute placement, with goals of eventual group home.  Monitor    SHAHID  (acute kidney injury) (HCC)- (present on admission)  Assessment & Plan  Resolved.  Avoid nephrotoxins    Moderate episode of recurrent major depressive disorder (HCC)- (present on admission)  Assessment & Plan  Reports losing home and his wife  this year    Normocytic anemia- (present on admission)  Assessment & Plan  Studies consistent with anemia of chronic disease.  No evidence of bleeding.  Monitor       VTE prophylaxis: Lovenox

## 2021-06-30 LAB
ANION GAP SERPL CALC-SCNC: 11 MMOL/L (ref 7–16)
BASOPHILS # BLD AUTO: 0.5 % (ref 0–1.8)
BASOPHILS # BLD: 0.04 K/UL (ref 0–0.12)
BUN SERPL-MCNC: 35 MG/DL (ref 8–22)
CALCIUM SERPL-MCNC: 8.9 MG/DL (ref 8.5–10.5)
CHLORIDE SERPL-SCNC: 98 MMOL/L (ref 96–112)
CO2 SERPL-SCNC: 25 MMOL/L (ref 20–33)
CREAT SERPL-MCNC: 1.07 MG/DL (ref 0.5–1.4)
EOSINOPHIL # BLD AUTO: 0.56 K/UL (ref 0–0.51)
EOSINOPHIL NFR BLD: 7 % (ref 0–6.9)
ERYTHROCYTE [DISTWIDTH] IN BLOOD BY AUTOMATED COUNT: 47.1 FL (ref 35.9–50)
GLUCOSE BLD-MCNC: 134 MG/DL (ref 65–99)
GLUCOSE BLD-MCNC: 196 MG/DL (ref 65–99)
GLUCOSE BLD-MCNC: 207 MG/DL (ref 65–99)
GLUCOSE SERPL-MCNC: 160 MG/DL (ref 65–99)
HCT VFR BLD AUTO: 30.4 % (ref 42–52)
HGB BLD-MCNC: 10 G/DL (ref 14–18)
IMM GRANULOCYTES # BLD AUTO: 0.04 K/UL (ref 0–0.11)
IMM GRANULOCYTES NFR BLD AUTO: 0.5 % (ref 0–0.9)
LYMPHOCYTES # BLD AUTO: 1.29 K/UL (ref 1–4.8)
LYMPHOCYTES NFR BLD: 16.1 % (ref 22–41)
MCH RBC QN AUTO: 27.8 PG (ref 27–33)
MCHC RBC AUTO-ENTMCNC: 32.9 G/DL (ref 33.7–35.3)
MCV RBC AUTO: 84.4 FL (ref 81.4–97.8)
MONOCYTES # BLD AUTO: 0.73 K/UL (ref 0–0.85)
MONOCYTES NFR BLD AUTO: 9.1 % (ref 0–13.4)
NEUTROPHILS # BLD AUTO: 5.33 K/UL (ref 1.82–7.42)
NEUTROPHILS NFR BLD: 66.8 % (ref 44–72)
NRBC # BLD AUTO: 0 K/UL
NRBC BLD-RTO: 0 /100 WBC
PLATELET # BLD AUTO: 244 K/UL (ref 164–446)
PMV BLD AUTO: 8.9 FL (ref 9–12.9)
POTASSIUM SERPL-SCNC: 4 MMOL/L (ref 3.6–5.5)
RBC # BLD AUTO: 3.6 M/UL (ref 4.7–6.1)
SODIUM SERPL-SCNC: 134 MMOL/L (ref 135–145)
WBC # BLD AUTO: 8 K/UL (ref 4.8–10.8)

## 2021-06-30 PROCEDURE — 82962 GLUCOSE BLOOD TEST: CPT | Mod: 91

## 2021-06-30 PROCEDURE — 36415 COLL VENOUS BLD VENIPUNCTURE: CPT

## 2021-06-30 PROCEDURE — A9270 NON-COVERED ITEM OR SERVICE: HCPCS | Performed by: INTERNAL MEDICINE

## 2021-06-30 PROCEDURE — 770004 HCHG ROOM/CARE - ONCOLOGY PRIVATE *

## 2021-06-30 PROCEDURE — A9270 NON-COVERED ITEM OR SERVICE: HCPCS | Performed by: STUDENT IN AN ORGANIZED HEALTH CARE EDUCATION/TRAINING PROGRAM

## 2021-06-30 PROCEDURE — 99232 SBSQ HOSP IP/OBS MODERATE 35: CPT | Performed by: INTERNAL MEDICINE

## 2021-06-30 PROCEDURE — 85025 COMPLETE CBC W/AUTO DIFF WBC: CPT

## 2021-06-30 PROCEDURE — 700102 HCHG RX REV CODE 250 W/ 637 OVERRIDE(OP): Performed by: STUDENT IN AN ORGANIZED HEALTH CARE EDUCATION/TRAINING PROGRAM

## 2021-06-30 PROCEDURE — 80048 BASIC METABOLIC PNL TOTAL CA: CPT

## 2021-06-30 PROCEDURE — 700111 HCHG RX REV CODE 636 W/ 250 OVERRIDE (IP): Performed by: STUDENT IN AN ORGANIZED HEALTH CARE EDUCATION/TRAINING PROGRAM

## 2021-06-30 PROCEDURE — 700102 HCHG RX REV CODE 250 W/ 637 OVERRIDE(OP): Performed by: INTERNAL MEDICINE

## 2021-06-30 RX ADMIN — THERA TABS 1 TABLET: TAB at 06:13

## 2021-06-30 RX ADMIN — FLUCONAZOLE 100 MG: 100 TABLET ORAL at 06:13

## 2021-06-30 RX ADMIN — DOCUSATE SODIUM 50 MG AND SENNOSIDES 8.6 MG 2 TABLET: 8.6; 5 TABLET, FILM COATED ORAL at 18:18

## 2021-06-30 RX ADMIN — ENOXAPARIN SODIUM 30 MG: 30 INJECTION SUBCUTANEOUS at 06:13

## 2021-06-30 RX ADMIN — INSULIN HUMAN 1 UNITS: 100 INJECTION, SOLUTION PARENTERAL at 18:22

## 2021-06-30 RX ADMIN — INSULIN HUMAN 2 UNITS: 100 INJECTION, SOLUTION PARENTERAL at 06:20

## 2021-06-30 RX ADMIN — FINASTERIDE 5 MG: 5 TABLET, FILM COATED ORAL at 06:13

## 2021-06-30 RX ADMIN — ACETAMINOPHEN 1000 MG: 500 TABLET ORAL at 06:13

## 2021-06-30 RX ADMIN — METFORMIN HYDROCHLORIDE 500 MG: 500 TABLET ORAL at 08:14

## 2021-06-30 RX ADMIN — METFORMIN HYDROCHLORIDE 500 MG: 500 TABLET ORAL at 17:30

## 2021-06-30 RX ADMIN — DOCUSATE SODIUM 50 MG AND SENNOSIDES 8.6 MG 2 TABLET: 8.6; 5 TABLET, FILM COATED ORAL at 06:12

## 2021-06-30 RX ADMIN — ACETAMINOPHEN 1000 MG: 500 TABLET ORAL at 20:56

## 2021-06-30 RX ADMIN — Medication 100 MG: at 06:13

## 2021-06-30 ASSESSMENT — PAIN DESCRIPTION - PAIN TYPE
TYPE: ACUTE PAIN

## 2021-06-30 NOTE — CARE PLAN
The patient is Stable - Low risk of patient condition declining or worsening      Pt is aaox4-very pleasant.  Reports hip pain - heat pad and prn pain med with tolerable pain control.  Worked with PT today and ambulating in munoz.  Good po intake - bm today.  Clement patent/leg bag-hazy - started diflucan today.  Fall precautions in place - pt calls for assist up with x1/fww.  Pt makes needs known - will continue to round.          Shift Goals  Clinical Goals: Mobility/start own fsbs/insulin/pain control/bm  Patient Goals: Rest  Family Goals: N/A    Progress made toward(s) clinical / shift goals:  yes    Patient is not progressing towards the following goals:

## 2021-06-30 NOTE — PROGRESS NOTES
Diabetes education: Met with pt this afternoon. Pt seems more reserved/frail than last visit ( where he strongly disagreed on taking insulin or doing finger sticks at discharge). Pt agreed to inject saline syringe into practice device. Not sure if he could do this or finger sticks without supervision ( group home pt must take own medications) . Still believe oral agents are a better choice.( increase metformin and add either Januvia or low dose glipizide). Discussed with RN.  Pt still on regular insulin sliding scale coverage ac and hs with blood sugars of 204 ( 2 units), 146, and 237.      Online Visit    Date/Time: 11/8/2018 9:51:08 AM  To: MATHEUS OSULLIVAN  From: EMILIANO KRAMER  Subject:    Your tests are negative.  Dr. Kramer    Verified Results  CHLAMYDIA / GC BY NUCLEIC ACID AMPLIFICATION 06Nov2018 09:15AM EMILIANO KRAMER     Test Name Result Flag Reference   CHLAMYDIA TRACHOMATIS BY NUCLEIC ACID AMPLIFICATION NEGATIVE  NEGATIVE   Positive results are reported to the State Department of Public Health.  The Aptima Combo 2 Assay is not intended for the evaluation of suspected sexual abuse or for other medico-legal indications, nor has it been evaluated in adolescents less than 14 years of age.   In these scenarios, and in clinical settings where the prevalence of infection is low, confirmatory testing on positive results is recommended.     The Aptima Combo 2 Assay is not FDA approved for testing on throat, rectal and female urine specimens. [employer] has internally validated these specimen sources. The expected normal reference range is negative.   NEISSERIA GONORRHOEAE BY NUCLEIC ACID AMPLIFICATION NEGATIVE  NEGATIVE   Positive results are reported to the State Department of Public Health.  The Aptima Combo 2 Assay is not intended for the evaluation of suspected sexual abuse or for other medico-legal indications, nor has it been evaluated in adolescents less than 14 years of age.   In these scenarios, and in clinical settings where the prevalence of infection is low, confirmatory testing on positive results is recommended.     The Aptima Combo 2 Assay is not FDA approved for testing on throat, rectal and female urine specimens. [employer] has internally validated these specimen sources. The expected normal reference range is negative.   SOURCE URINE

## 2021-06-30 NOTE — PROGRESS NOTES
[facility] Medicine Daily Progress Note    Date of Service  6/30/2021    Chief Complaint  75 y.o. male admitted 6/19/2021 due to a ground-level fall, and right hip pain.  He is homeless, and has a history of BPH with a chronic indwelling Clement (follows outpatient urology monthly).     Hospital Course  On admission, patient was noted to have SHAHID, hyperglycemia.  Right hip x-ray showed no evidence of fracture. Hemoglobin A1c was 14. UA showed packed wbcs and yeast.      Interval Problem Update  Discussed with Dr. Sherwood on 6/29/2021.  Patient had his Clement changed as an outpatient on 5/20/2021, will change today.  Afebrile, hemodynamically stable.     Consultants/Specialty  Diabetic educator     Code Status  Full Code     Disposition  Social work assisting with placement in group home vs. SNF.  Covid and QuantiFERON were negative     Review of Systems  Review of Systems   Constitutional: Negative.    HENT: Negative.    Eyes: Negative.    Respiratory: Negative.    Cardiovascular: Negative.    Gastrointestinal: Negative.    Genitourinary: Negative.    Musculoskeletal: Positive for joint pain.        Right hip pain   Skin: Negative.    Neurological: Negative.    Endo/Heme/Allergies: Negative.    Psychiatric/Behavioral: Negative.            Physical Exam  Temp:  [36.6 °C (97.8 °F)-36.8 °C (98.3 °F)] 36.6 °C (97.8 °F)  Pulse:  [68-89] 68  Resp:  [16-18] 16  BP: (106-135)/(57-69) 120/59  SpO2:  [96 %-97 %] 97 %    Physical Exam  HENT:      Head: Normocephalic.      Nose: Nose normal.      Mouth/Throat:      Mouth: Mucous membranes are moist.   Eyes:      Pupils: Pupils are equal, round, and reactive to light.   Cardiovascular:      Rate and Rhythm: Normal rate.   Pulmonary:      Effort: Pulmonary effort is normal.   Abdominal:      Palpations: Abdomen is soft.   Genitourinary:     Comments: Chronic Clement in place  Musculoskeletal:      Cervical back: Normal range of motion.   Skin:     Comments: Slight erythema around Clement  insertion   Neurological:      General: No focal deficit present.      Mental Status: He is alert.   Psychiatric:         Behavior: Behavior normal.     Fluids    Intake/Output Summary (Last 24 hours) at 6/30/2021 1040  Last data filed at 6/30/2021 0534  Gross per 24 hour   Intake 770 ml   Output 1200 ml   Net -430 ml       Laboratory  Recent Labs     06/30/21  0009   WBC 8.0   RBC 3.60*   HEMOGLOBIN 10.0*   HEMATOCRIT 30.4*   MCV 84.4   MCH 27.8   MCHC 32.9*   RDW 47.1   PLATELETCT 244   MPV 8.9*     Recent Labs     06/28/21  0037 06/30/21  0009   SODIUM 136 134*   POTASSIUM 4.8 4.0   CHLORIDE 100 98   CO2 27 25   GLUCOSE 160* 160*   BUN 23* 35*   CREATININE 1.13 1.07   CALCIUM 8.7 8.9                   Imaging  DX-FEMUR-2+ RIGHT   Final Result      No radiographic evidence of acute traumatic injury.      Moderate right hip osteoarthritis      DX-PELVIS-1 OR 2 VIEWS   Final Result      No radiographic evidence of acute displaced fracture      Moderate right, mild left hip osteoarthritis           Assessment/Plan  * Contusion of right hip- (present on admission)  Assessment & Plan  Pelvic x-ray was negative for fracture.  Continue pain control as needed.  PT/OT recommend postacute placement.  Monitor    Frequent falls- (present on admission)  Assessment & Plan  Frail, poor intake with uncontrolled diabetes.  PT/OT recommend postacute placement.    Type 2 diabetes mellitus with hyperglycemia, with long-term current use of insulin (HCC)- (present on admission)  Assessment & Plan  Poorly controlled. Hemoglobin A1c was 14.3 on 6/19/2021.  Continue SSI with Accu-Cheks and hypoglycemia protocol.  Long-acting insulin was discontinued due to episodes of hypoglycemia. Continue Metformin.  Patient will need insulin for now as blood glucose is not going to be adequately controlled with oral hypoglycemics alone. Diabetic educator was consulted.    Benign prostatic hyperplasia with urinary obstruction- (present on  admission)  Assessment & Plan  Chronic indwelling Clement.  Follows outpatient urology every month.  Tamsulosin was discontinued due to frequent falls and prerenal SHAHID, with concerns for orthostasis.  On finasteride. Discussed with Dr. Sherwood on 2021, Clement was changed on 2021, will change today.     Frailty syndrome in geriatric patient- (present on admission)  Assessment & Plan  BMI 17.2.  Patient is homeless.  His wife  this year. Case management is assisting in postacute placement, with goals of eventual group home.  Monitor    SHAHID (acute kidney injury) (HCC)- (present on admission)  Assessment & Plan  Resolved.  Avoid nephrotoxins    Moderate episode of recurrent major depressive disorder (HCC)- (present on admission)  Assessment & Plan  Reports losing home and his wife  this year    Normocytic anemia- (present on admission)  Assessment & Plan  Studies consistent with anemia of chronic disease.  No evidence of bleeding.  Monitor       VTE prophylaxis: Lovenox

## 2021-06-30 NOTE — CARE PLAN
The patient is Stable - Low risk of patient condition declining or worsening    Shift Goals  Clinical Goals: Mobility  Patient Goals: Rest  Family Goals: N/A    Progress made toward(s) clinical / shift goals:  Patient demonstrates ROM exercises and agrees to offloading R hip. Resting comfortably in bed, up with occasional complaints of gas.     Patient is not progressing towards the following goals:

## 2021-07-01 LAB
ANION GAP SERPL CALC-SCNC: 9 MMOL/L (ref 7–16)
APPEARANCE UR: ABNORMAL
BACTERIA #/AREA URNS HPF: NEGATIVE /HPF
BASOPHILS # BLD AUTO: 0.5 % (ref 0–1.8)
BASOPHILS # BLD: 0.06 K/UL (ref 0–0.12)
BILIRUB UR QL STRIP.AUTO: NEGATIVE
BUN SERPL-MCNC: 25 MG/DL (ref 8–22)
CALCIUM SERPL-MCNC: 9.5 MG/DL (ref 8.5–10.5)
CHLORIDE SERPL-SCNC: 101 MMOL/L (ref 96–112)
CO2 SERPL-SCNC: 27 MMOL/L (ref 20–33)
COLOR UR: YELLOW
CREAT SERPL-MCNC: 0.91 MG/DL (ref 0.5–1.4)
EOSINOPHIL # BLD AUTO: 0.37 K/UL (ref 0–0.51)
EOSINOPHIL NFR BLD: 3.2 % (ref 0–6.9)
EPI CELLS #/AREA URNS HPF: NEGATIVE /HPF
ERYTHROCYTE [DISTWIDTH] IN BLOOD BY AUTOMATED COUNT: 47.8 FL (ref 35.9–50)
GLUCOSE BLD-MCNC: 161 MG/DL (ref 65–99)
GLUCOSE SERPL-MCNC: 151 MG/DL (ref 65–99)
GLUCOSE UR STRIP.AUTO-MCNC: NEGATIVE MG/DL
HCT VFR BLD AUTO: 32.4 % (ref 42–52)
HGB BLD-MCNC: 10.3 G/DL (ref 14–18)
HYALINE CASTS #/AREA URNS LPF: ABNORMAL /LPF
IMM GRANULOCYTES # BLD AUTO: 0.05 K/UL (ref 0–0.11)
IMM GRANULOCYTES NFR BLD AUTO: 0.4 % (ref 0–0.9)
KETONES UR STRIP.AUTO-MCNC: NEGATIVE MG/DL
LEUKOCYTE ESTERASE UR QL STRIP.AUTO: ABNORMAL
LYMPHOCYTES # BLD AUTO: 1.5 K/UL (ref 1–4.8)
LYMPHOCYTES NFR BLD: 13.1 % (ref 22–41)
MCH RBC QN AUTO: 27.3 PG (ref 27–33)
MCHC RBC AUTO-ENTMCNC: 31.8 G/DL (ref 33.7–35.3)
MCV RBC AUTO: 85.9 FL (ref 81.4–97.8)
MICRO URNS: ABNORMAL
MONOCYTES # BLD AUTO: 0.88 K/UL (ref 0–0.85)
MONOCYTES NFR BLD AUTO: 7.7 % (ref 0–13.4)
NEUTROPHILS # BLD AUTO: 8.63 K/UL (ref 1.82–7.42)
NEUTROPHILS NFR BLD: 75.1 % (ref 44–72)
NITRITE UR QL STRIP.AUTO: NEGATIVE
NRBC # BLD AUTO: 0 K/UL
NRBC BLD-RTO: 0 /100 WBC
PH UR STRIP.AUTO: 5.5 [PH] (ref 5–8)
PLATELET # BLD AUTO: 277 K/UL (ref 164–446)
PMV BLD AUTO: 9.2 FL (ref 9–12.9)
POTASSIUM SERPL-SCNC: 4.3 MMOL/L (ref 3.6–5.5)
PROT UR QL STRIP: NEGATIVE MG/DL
RBC # BLD AUTO: 3.77 M/UL (ref 4.7–6.1)
RBC # URNS HPF: ABNORMAL /HPF
RBC UR QL AUTO: NEGATIVE
SODIUM SERPL-SCNC: 137 MMOL/L (ref 135–145)
SP GR UR STRIP.AUTO: 1.02
UROBILINOGEN UR STRIP.AUTO-MCNC: 0.2 MG/DL
WBC # BLD AUTO: 11.5 K/UL (ref 4.8–10.8)
WBC #/AREA URNS HPF: ABNORMAL /HPF

## 2021-07-01 PROCEDURE — 700102 HCHG RX REV CODE 250 W/ 637 OVERRIDE(OP): Performed by: INTERNAL MEDICINE

## 2021-07-01 PROCEDURE — 99232 SBSQ HOSP IP/OBS MODERATE 35: CPT | Performed by: INTERNAL MEDICINE

## 2021-07-01 PROCEDURE — 700111 HCHG RX REV CODE 636 W/ 250 OVERRIDE (IP): Performed by: STUDENT IN AN ORGANIZED HEALTH CARE EDUCATION/TRAINING PROGRAM

## 2021-07-01 PROCEDURE — 97535 SELF CARE MNGMENT TRAINING: CPT

## 2021-07-01 PROCEDURE — 36415 COLL VENOUS BLD VENIPUNCTURE: CPT

## 2021-07-01 PROCEDURE — A9270 NON-COVERED ITEM OR SERVICE: HCPCS | Performed by: STUDENT IN AN ORGANIZED HEALTH CARE EDUCATION/TRAINING PROGRAM

## 2021-07-01 PROCEDURE — 80048 BASIC METABOLIC PNL TOTAL CA: CPT

## 2021-07-01 PROCEDURE — 85025 COMPLETE CBC W/AUTO DIFF WBC: CPT

## 2021-07-01 PROCEDURE — A9270 NON-COVERED ITEM OR SERVICE: HCPCS | Performed by: INTERNAL MEDICINE

## 2021-07-01 PROCEDURE — 97116 GAIT TRAINING THERAPY: CPT

## 2021-07-01 PROCEDURE — 87186 SC STD MICRODIL/AGAR DIL: CPT

## 2021-07-01 PROCEDURE — 700102 HCHG RX REV CODE 250 W/ 637 OVERRIDE(OP): Performed by: STUDENT IN AN ORGANIZED HEALTH CARE EDUCATION/TRAINING PROGRAM

## 2021-07-01 PROCEDURE — 87077 CULTURE AEROBIC IDENTIFY: CPT | Mod: 91

## 2021-07-01 PROCEDURE — 97530 THERAPEUTIC ACTIVITIES: CPT

## 2021-07-01 PROCEDURE — 82962 GLUCOSE BLOOD TEST: CPT

## 2021-07-01 PROCEDURE — 87086 URINE CULTURE/COLONY COUNT: CPT

## 2021-07-01 PROCEDURE — 97110 THERAPEUTIC EXERCISES: CPT

## 2021-07-01 PROCEDURE — 770004 HCHG ROOM/CARE - ONCOLOGY PRIVATE *

## 2021-07-01 PROCEDURE — 81001 URINALYSIS AUTO W/SCOPE: CPT

## 2021-07-01 RX ADMIN — ACETAMINOPHEN 1000 MG: 500 TABLET ORAL at 06:11

## 2021-07-01 RX ADMIN — Medication 100 MG: at 06:11

## 2021-07-01 RX ADMIN — FINASTERIDE 5 MG: 5 TABLET, FILM COATED ORAL at 06:11

## 2021-07-01 RX ADMIN — METFORMIN HYDROCHLORIDE 500 MG: 500 TABLET ORAL at 07:30

## 2021-07-01 RX ADMIN — INSULIN HUMAN 1 UNITS: 100 INJECTION, SOLUTION PARENTERAL at 06:32

## 2021-07-01 RX ADMIN — ENOXAPARIN SODIUM 30 MG: 30 INJECTION SUBCUTANEOUS at 06:12

## 2021-07-01 RX ADMIN — DOCUSATE SODIUM 50 MG AND SENNOSIDES 8.6 MG 2 TABLET: 8.6; 5 TABLET, FILM COATED ORAL at 06:11

## 2021-07-01 RX ADMIN — METFORMIN HYDROCHLORIDE 1000 MG: 500 TABLET ORAL at 17:49

## 2021-07-01 RX ADMIN — ACETAMINOPHEN 1000 MG: 500 TABLET ORAL at 22:49

## 2021-07-01 RX ADMIN — THERA TABS 1 TABLET: TAB at 06:11

## 2021-07-01 ASSESSMENT — GAIT ASSESSMENTS
DEVIATION: DECREASED BASE OF SUPPORT;DECREASED HEEL STRIKE;DECREASED TOE OFF;OTHER (COMMENT)
DISTANCE (FEET): 40
GAIT LEVEL OF ASSIST: MINIMAL ASSIST
ASSISTIVE DEVICE: 4 WHEEL WALKER
DISTANCE (FEET): 70

## 2021-07-01 ASSESSMENT — COGNITIVE AND FUNCTIONAL STATUS - GENERAL
WALKING IN HOSPITAL ROOM: A LITTLE
DAILY ACTIVITIY SCORE: 17
TURNING FROM BACK TO SIDE WHILE IN FLAT BAD: A LOT
SUGGESTED CMS G CODE MODIFIER MOBILITY: CK
TOILETING: A LOT
MOBILITY SCORE: 15
SUGGESTED CMS G CODE MODIFIER DAILY ACTIVITY: CK
CLIMB 3 TO 5 STEPS WITH RAILING: A LITTLE
DRESSING REGULAR LOWER BODY CLOTHING: A LOT
STANDING UP FROM CHAIR USING ARMS: A LITTLE
HELP NEEDED FOR BATHING: A LOT
PERSONAL GROOMING: A LITTLE
MOVING FROM LYING ON BACK TO SITTING ON SIDE OF FLAT BED: A LOT
MOVING TO AND FROM BED TO CHAIR: A LOT

## 2021-07-01 ASSESSMENT — PAIN DESCRIPTION - PAIN TYPE
TYPE: ACUTE PAIN

## 2021-07-01 ASSESSMENT — FIBROSIS 4 INDEX: FIB4 SCORE: 1.2

## 2021-07-01 NOTE — PROGRESS NOTES
[facility] Medicine Daily Progress Note    Date of Service  7/1/2021    Chief Complaint  75 y.o. male admitted 6/19/2021 due to a ground-level fall, and right hip pain.  He is homeless, and has a history of BPH with a chronic indwelling Clement (follows outpatient urology monthly).     Hospital Course  On admission, patient was noted to have SHAHID, hyperglycemia.  Right hip x-ray showed no evidence of fracture. Hemoglobin A1c was 14. UA showed packed wbcs and yeast.      Interval Problem Update  Discussed with Dr. Sherwood on 6/29/2021.  Patient had his Clement changed as an outpatient on 5/20/2021, will changed again on 6/30/2021 today.  Afebrile, hemodynamically stable.    WBCs are elevated today (11.5).  Afebrile and hemodynamically stable.  UA ordered.      Consultants/Specialty  Diabetic educator     Code Status  Full Code     Disposition  Social work assisting with placement in group home vs. SNF.  Covid and QuantiFERON were negative     Review of Systems  Review of Systems   Constitutional: Negative.    HENT: Negative.    Eyes: Negative.    Respiratory: Negative.    Cardiovascular: Negative.    Gastrointestinal: Negative.    Genitourinary: Negative.    Musculoskeletal: Positive for joint pain.        Right hip pain   Skin: Negative.    Neurological: Negative.    Endo/Heme/Allergies: Negative.    Psychiatric/Behavioral: Negative.         Physical Exam  Temp:  [36.6 °C (97.9 °F)-37 °C (98.6 °F)] 37 °C (98.6 °F)  Pulse:  [72-81] 73  Resp:  [16] 16  BP: (110-127)/(52-73) 115/59  SpO2:  [93 %-98 %] 98 %      Physical Exam  HENT:      Head: Normocephalic.      Nose: Nose normal.      Mouth/Throat:      Mouth: Mucous membranes are moist.   Eyes:      Pupils: Pupils are equal, round, and reactive to light.   Cardiovascular:      Rate and Rhythm: Normal rate.   Pulmonary:      Effort: Pulmonary effort is normal.   Abdominal:      Palpations: Abdomen is soft.   Genitourinary:     Comments: Chronic Clement in place  Musculoskeletal:       Cervical back: Normal range of motion.   Skin:     Comments: Warm  Neurological:      General: No focal deficit present.      Mental Status: He is alert.   Psychiatric:         Behavior: Behavior normal.     Fluids    Intake/Output Summary (Last 24 hours) at 7/1/2021 1317  Last data filed at 7/1/2021 0542  Gross per 24 hour   Intake --   Output 850 ml   Net -850 ml       Laboratory  Recent Labs     06/30/21  0009 07/01/21  0336   WBC 8.0 11.5*   RBC 3.60* 3.77*   HEMOGLOBIN 10.0* 10.3*   HEMATOCRIT 30.4* 32.4*   MCV 84.4 85.9   MCH 27.8 27.3   MCHC 32.9* 31.8*   RDW 47.1 47.8   PLATELETCT 244 277   MPV 8.9* 9.2     Recent Labs     06/30/21  0009 07/01/21  0336   SODIUM 134* 137   POTASSIUM 4.0 4.3   CHLORIDE 98 101   CO2 25 27   GLUCOSE 160* 151*   BUN 35* 25*   CREATININE 1.07 0.91   CALCIUM 8.9 9.5                   Imaging  DX-FEMUR-2+ RIGHT   Final Result      No radiographic evidence of acute traumatic injury.      Moderate right hip osteoarthritis      DX-PELVIS-1 OR 2 VIEWS   Final Result      No radiographic evidence of acute displaced fracture      Moderate right, mild left hip osteoarthritis           Assessment/Plan  * Contusion of right hip- (present on admission)  Assessment & Plan  Pelvic x-ray was negative for fracture.  Continue pain control as needed.  PT/OT recommend postacute placement.  Monitor    Frequent falls- (present on admission)  Assessment & Plan  Frail, poor intake with uncontrolled diabetes.  PT/OT recommend postacute placement.    Type 2 diabetes mellitus with hyperglycemia, with long-term current use of insulin ([facility])- (present on admission)  Assessment & Plan  Poorly controlled. Hemoglobin A1c was 14.3 on 6/19/2021.  It seems unlikely patient is taking his Metformin at home.  He does not seem as if he will be able to learn how to take insulin himself.  His insulin requirements have been low.  Metformin has been increased to 1000 mg twice daily. Long-acting insulin was  discontinued due to episodes of hypoglycemia. Diabetic educator was consulted.  Continue diabetic diet and monitor closely.    Benign prostatic hyperplasia with urinary obstruction- (present on admission)  Assessment & Plan  Chronic indwelling Clement.  Follows outpatient urology every month.  Tamsulosin was discontinued due to frequent falls and prerenal SHAHID, with concerns for orthostasis.  On finasteride. Discussed with Dr. Sherwood on 2021, Clement was changed on 2021, will change today.     Frailty syndrome in geriatric patient- (present on admission)  Assessment & Plan  BMI 17.2.  Patient is homeless.  His wife  this year. Case management is assisting in postacute placement, with goals of eventual group home.  Monitor    SHAHID (acute kidney injury) (HCC)- (present on admission)  Assessment & Plan  Resolved.  Avoid nephrotoxins    Moderate episode of recurrent major depressive disorder (HCC)- (present on admission)  Assessment & Plan  Reports losing home and his wife  this year    Normocytic anemia- (present on admission)  Assessment & Plan  Studies consistent with anemia of chronic disease.  No evidence of bleeding.  Monitor       VTE prophylaxis: Lovenox

## 2021-07-01 NOTE — DISCHARGE PLANNING
This RN,  left documents from [facility] with patient for signature, will  and fax back to  owner [employer] later today. Called [employer], he is asking for two months payment from patient prior to accepting, per Morales, if he receives the first two months rent, patient can discharge tomorrow on 7/2/21.     Ania Kaba RN,

## 2021-07-01 NOTE — PROGRESS NOTES
A&Ox4. Placed new lazo. Complaints of mild pain in right hip. Q2 turns in place, patient often removes pillows and tries to stay on right hip despite intervention. Denies nausea. 1PA with FWW. Call light and belongings within reach, hourly rounding and fall precautions in place.

## 2021-07-01 NOTE — DIETARY
Brief Nutrition Note:  Patient was on a renal, consistent carbohydrate diet.    SHAHID resolved per MD note. D/W MD.  Diet changed to consistent carbohydrate.   Nutrition Representative will continue to see him for ongoing meal and snack preferences.  RD following per department policy.

## 2021-07-01 NOTE — CARE PLAN
The patient is Stable - Low risk of patient condition declining or worsening    Shift Goals  Clinical Goals: Mobility  Patient Goals: Rest  Family Goals: N/A    Progress made toward(s) clinical / shift goals:  Patient refuses to mobilize and reposition. Education and encouragement provided. Rested comfortably throughout shift    Patient is not progressing towards the following goals:      Problem: Knowledge Deficit - Standard  Goal: Patient and family/care givers will demonstrate understanding of plan of care, disease process/condition, diagnostic tests and medications  Outcome: Not Progressing     Problem: Mobility  Goal: Patient's capacity to carry out activities will improve  Outcome: Not Progressing     Problem: Discharge Barriers/Planning  Goal: Patient's continuum of care needs are met  Outcome: Not Progressing

## 2021-07-01 NOTE — THERAPY
"Occupational Therapy  Daily Treatment     Patient Name: Hugh Núñez  Age:  75 y.o., Sex:  male  Medical Record #: [id number]  Today's Date: 7/1/2021     Precautions  Precautions: Fall Risk  Comments: R hip pain    Assessment    Pt is progressing with OT goals and is very motivated to participate in OT tx. Pt completed UB dressing with spv, standing grooming/hygiene with Bello, and LB dressing with maxA. Pt also participated in UE exercise for improved posture. Pt had one LOB while seated EOB requiring physical assist to maintain seated balance. Pt continues to be limited by decreased activity tolerance, decreased functional mobility, impaired balance/posture, decreased safety awareness, and R hip pain affecting his independence with ADLs, ADL txfs, and functional mobility. Will continue to follow.     Plan     Continue current treatment plan.    DC Equipment Recommendations: Unable to determine at this time  Discharge Recommendations: Recommend post-acute placement for additional occupational therapy services prior to discharge home    Subjective    \"I'm not 20 anymore, I go slow.\"      Objective       07/01/21 1019   Pain 0 - 10 Group   Location Hip   Location Orientation Right   Description Aching;Constant;Sore   Therapist Pain Assessment Nurse Notified;Post Activity Pain Same as Prior to Activity;During Activity  (unrated c/o hip pain )   Non Verbal Descriptors   Non Verbal Scale  Calm   Cognition    Cognition / Consciousness X   Speech/ Communication Hard of Hearing   Level of Consciousness Alert   Safety Awareness Impaired   Comments Pt was alert, cooperative, pleasant, and motivated to participate in OT. Impulsively stood before locking fww brakes when told the brakes were not locked.    Other Treatments   Other Treatments Provided Pt educated on scapular retraction, neck extension/flexion, and arm circles to reduce UE tightness. Pt was receptive to education and demonstrated knowledge.    Balance "   Sitting Balance (Static) Fair   Sitting Balance (Dynamic) Fair -   Standing Balance (Static) Fair -   Standing Balance (Dynamic) Fair -   Weight Shift Sitting Fair   Weight Shift Standing Fair   Comments w/ fww. Once pt sat EOB, pt had a LOB requiring physical assist to maintain seated balance. No LOB during standing balance. Required Bello during standing balance for safety.    Bed Mobility    Supine to Sit Supervised   Sit to Supine Supervised   Scooting Moderate Assist   Comments Pt performs bed mobility very slowly and insists to not recieve any help    Activities of Daily Living   Grooming Standing;Minimal Assist  (washed face & mouth wash )   Upper Body Dressing Supervision  (gown )   Lower Body Dressing Maximal Assist  (pants )   Comments Required Bello during standing balance for safety during ADLs    How much help from another person does the patient currently need...   6 Clicks Daily Activity Score 17   Functional Mobility   Sit to Stand Supervised   Bed, Chair, Wheelchair Transfer Supervised   Toilet Transfers Refused   Transfer Method Stand Step   Mobility In room, bathroom, and hallway   Distance (Feet) 70   # of Times Distance was Traveled 1   Comments w/ fww. no LOB   Activity Tolerance   Sitting in Chair NT   Sitting Edge of Bed 15 mins   Standing 10 mins   Comments Limited by pain and fatigue    Patient / Family Goals   Patient / Family Goal #1 To get more help   Goal #1 Outcome Progressing as expected   Short Term Goals   Short Term Goal # 1 Pt will complete ADL transfers with supervision   Goal Outcome # 1 Progressing as expected   Short Term Goal # 2 Pt will complete LB dressing with supervision   Goal Outcome # 2 Progressing slower than expected   Short Term Goal # 3 Pt will complete toileting with supervision   Goal Outcome # 3 Progressing as expected

## 2021-07-02 ENCOUNTER — APPOINTMENT (OUTPATIENT)
Dept: RADIOLOGY | Facility: MEDICAL CENTER | Age: 76
DRG: 638 | End: 2021-07-02
Attending: INTERNAL MEDICINE
Payer: MEDICARE

## 2021-07-02 LAB
ANION GAP SERPL CALC-SCNC: 10 MMOL/L (ref 7–16)
BASOPHILS # BLD AUTO: 0.4 % (ref 0–1.8)
BASOPHILS # BLD: 0.06 K/UL (ref 0–0.12)
BUN SERPL-MCNC: 21 MG/DL (ref 8–22)
CALCIUM SERPL-MCNC: 8.7 MG/DL (ref 8.5–10.5)
CHLORIDE SERPL-SCNC: 101 MMOL/L (ref 96–112)
CO2 SERPL-SCNC: 25 MMOL/L (ref 20–33)
CREAT SERPL-MCNC: 1.02 MG/DL (ref 0.5–1.4)
EOSINOPHIL # BLD AUTO: 0.13 K/UL (ref 0–0.51)
EOSINOPHIL NFR BLD: 1 % (ref 0–6.9)
ERYTHROCYTE [DISTWIDTH] IN BLOOD BY AUTOMATED COUNT: 47.4 FL (ref 35.9–50)
GLUCOSE BLD-MCNC: 125 MG/DL (ref 65–99)
GLUCOSE BLD-MCNC: 137 MG/DL (ref 65–99)
GLUCOSE BLD-MCNC: 148 MG/DL (ref 65–99)
GLUCOSE SERPL-MCNC: 187 MG/DL (ref 65–99)
HCT VFR BLD AUTO: 30.8 % (ref 42–52)
HGB BLD-MCNC: 9.7 G/DL (ref 14–18)
IMM GRANULOCYTES # BLD AUTO: 0.08 K/UL (ref 0–0.11)
IMM GRANULOCYTES NFR BLD AUTO: 0.6 % (ref 0–0.9)
LYMPHOCYTES # BLD AUTO: 1.2 K/UL (ref 1–4.8)
LYMPHOCYTES NFR BLD: 9 % (ref 22–41)
MCH RBC QN AUTO: 27.2 PG (ref 27–33)
MCHC RBC AUTO-ENTMCNC: 31.5 G/DL (ref 33.7–35.3)
MCV RBC AUTO: 86.5 FL (ref 81.4–97.8)
MONOCYTES # BLD AUTO: 0.74 K/UL (ref 0–0.85)
MONOCYTES NFR BLD AUTO: 5.5 % (ref 0–13.4)
NEUTROPHILS # BLD AUTO: 11.16 K/UL (ref 1.82–7.42)
NEUTROPHILS NFR BLD: 83.5 % (ref 44–72)
NRBC # BLD AUTO: 0 K/UL
NRBC BLD-RTO: 0 /100 WBC
PLATELET # BLD AUTO: 266 K/UL (ref 164–446)
PMV BLD AUTO: 8.9 FL (ref 9–12.9)
POTASSIUM SERPL-SCNC: 4 MMOL/L (ref 3.6–5.5)
RBC # BLD AUTO: 3.56 M/UL (ref 4.7–6.1)
SODIUM SERPL-SCNC: 136 MMOL/L (ref 135–145)
WBC # BLD AUTO: 13.4 K/UL (ref 4.8–10.8)

## 2021-07-02 PROCEDURE — 80048 BASIC METABOLIC PNL TOTAL CA: CPT

## 2021-07-02 PROCEDURE — 700111 HCHG RX REV CODE 636 W/ 250 OVERRIDE (IP): Performed by: STUDENT IN AN ORGANIZED HEALTH CARE EDUCATION/TRAINING PROGRAM

## 2021-07-02 PROCEDURE — 82962 GLUCOSE BLOOD TEST: CPT | Mod: 91

## 2021-07-02 PROCEDURE — 71045 X-RAY EXAM CHEST 1 VIEW: CPT

## 2021-07-02 PROCEDURE — 700102 HCHG RX REV CODE 250 W/ 637 OVERRIDE(OP): Performed by: STUDENT IN AN ORGANIZED HEALTH CARE EDUCATION/TRAINING PROGRAM

## 2021-07-02 PROCEDURE — 99232 SBSQ HOSP IP/OBS MODERATE 35: CPT | Performed by: INTERNAL MEDICINE

## 2021-07-02 PROCEDURE — 700102 HCHG RX REV CODE 250 W/ 637 OVERRIDE(OP): Performed by: INTERNAL MEDICINE

## 2021-07-02 PROCEDURE — A9270 NON-COVERED ITEM OR SERVICE: HCPCS | Performed by: STUDENT IN AN ORGANIZED HEALTH CARE EDUCATION/TRAINING PROGRAM

## 2021-07-02 PROCEDURE — 85025 COMPLETE CBC W/AUTO DIFF WBC: CPT

## 2021-07-02 PROCEDURE — 700105 HCHG RX REV CODE 258: Performed by: INTERNAL MEDICINE

## 2021-07-02 PROCEDURE — 87040 BLOOD CULTURE FOR BACTERIA: CPT

## 2021-07-02 PROCEDURE — 770004 HCHG ROOM/CARE - ONCOLOGY PRIVATE *

## 2021-07-02 PROCEDURE — A9270 NON-COVERED ITEM OR SERVICE: HCPCS | Performed by: INTERNAL MEDICINE

## 2021-07-02 PROCEDURE — 36415 COLL VENOUS BLD VENIPUNCTURE: CPT

## 2021-07-02 PROCEDURE — 700111 HCHG RX REV CODE 636 W/ 250 OVERRIDE (IP): Performed by: INTERNAL MEDICINE

## 2021-07-02 RX ORDER — SODIUM CHLORIDE 9 MG/ML
INJECTION, SOLUTION INTRAVENOUS CONTINUOUS
Status: DISCONTINUED | OUTPATIENT
Start: 2021-07-02 | End: 2021-07-11

## 2021-07-02 RX ADMIN — METFORMIN HYDROCHLORIDE 1000 MG: 500 TABLET ORAL at 16:31

## 2021-07-02 RX ADMIN — METFORMIN HYDROCHLORIDE 1000 MG: 500 TABLET ORAL at 08:37

## 2021-07-02 RX ADMIN — ACETAMINOPHEN 1000 MG: 500 TABLET ORAL at 16:31

## 2021-07-02 RX ADMIN — FINASTERIDE 5 MG: 5 TABLET, FILM COATED ORAL at 04:14

## 2021-07-02 RX ADMIN — THERA TABS 1 TABLET: TAB at 04:14

## 2021-07-02 RX ADMIN — Medication 100 MG: at 04:14

## 2021-07-02 RX ADMIN — SODIUM CHLORIDE: 9 INJECTION, SOLUTION INTRAVENOUS at 12:09

## 2021-07-02 RX ADMIN — ENOXAPARIN SODIUM 30 MG: 30 INJECTION SUBCUTANEOUS at 04:14

## 2021-07-02 RX ADMIN — CEFTRIAXONE 1 G: 10 INJECTION, POWDER, FOR SOLUTION INTRAVENOUS at 13:10

## 2021-07-02 RX ADMIN — ACETAMINOPHEN 1000 MG: 500 TABLET ORAL at 08:41

## 2021-07-02 ASSESSMENT — PAIN DESCRIPTION - PAIN TYPE
TYPE: ACUTE PAIN

## 2021-07-02 NOTE — PROGRESS NOTES
[facility] Medicine Daily Progress Note    Date of Service  7/2/2021    Chief Complaint  75 y.o. male admitted 6/19/2021 due to a ground-level fall, and right hip pain.  He is homeless, and has a history of BPH with a chronic indwelling Clement (follows outpatient urology monthly).     Hospital Course  On admission, patient was noted to have SHAHID, hyperglycemia.  Right hip x-ray showed no evidence of fracture. Hemoglobin A1c was 14. UA showed packed wbcs and yeast.      Discussed with Dr. Sherwood on 6/29/2021.  Patient had his Clement changed as an outpatient on 5/20/2021, last changed on 6/30/2021.  Afebrile, hemodynamically stable.     7/1/2021: WBCs are elevated today (11.5).  Afebrile and hemodynamically stable.  UA ordered.     7/2/2021: Further increase in WBCs (13.4).  Afebrile.  Blood and urine cultures pending.  Chest x-ray ordered.     Consultants/Specialty  Diabetic educator     Code Status  Full Code     Disposition  Social work assisting with placement in group home vs. SNF.  Covid and QuantiFERON were negative     Review of Systems  Review of Systems   Constitutional: Negative.    HENT: Negative.    Eyes: Negative.    Respiratory: Negative.    Cardiovascular: Negative.    Gastrointestinal: Negative.    Genitourinary: Negative.    Musculoskeletal: Positive for joint pain.        Right hip pain   Skin: Negative.    Neurological: Negative.    Endo/Heme/Allergies: Negative.    Psychiatric/Behavioral: Negative.            Physical Exam  Temp:  [36.7 °C (98.1 °F)-37.2 °C (98.9 °F)] 36.8 °C (98.2 °F)  Pulse:  [69-87] 69  Resp:  [16-18] 16  BP: (114-122)/(55-65) 120/59  SpO2:  [97 %] 97 %    Physical Exam  HENT:      Head: Normocephalic.      Nose: Nose normal.      Mouth/Throat:      Mouth: Mucous membranes are moist.   Eyes:      Pupils: Pupils are equal, round, and reactive to light.   Cardiovascular:      Rate and Rhythm: Normal rate.   Pulmonary:      Effort: Pulmonary effort is normal.   Abdominal:       Palpations: Abdomen is soft.   Genitourinary:     Comments: Chronic Clement in place  Musculoskeletal:      Cervical back: Normal range of motion.   Skin:     Comments: Warm  Neurological:      General: No focal deficit present.      Mental Status: He is alert.   Psychiatric:         Behavior: Behavior normal.     Fluids    Intake/Output Summary (Last 24 hours) at 7/2/2021 0939  Last data filed at 7/2/2021 0600  Gross per 24 hour   Intake 820 ml   Output 300 ml   Net 520 ml       Laboratory  Recent Labs     06/30/21  0009 07/01/21  0336 07/02/21  0030   WBC 8.0 11.5* 13.4*   RBC 3.60* 3.77* 3.56*   HEMOGLOBIN 10.0* 10.3* 9.7*   HEMATOCRIT 30.4* 32.4* 30.8*   MCV 84.4 85.9 86.5   MCH 27.8 27.3 27.2   MCHC 32.9* 31.8* 31.5*   RDW 47.1 47.8 47.4   PLATELETCT 244 277 266   MPV 8.9* 9.2 8.9*     Recent Labs     06/30/21  0009 07/01/21  0336 07/02/21  0030   SODIUM 134* 137 136   POTASSIUM 4.0 4.3 4.0   CHLORIDE 98 101 101   CO2 25 27 25   GLUCOSE 160* 151* 187*   BUN 35* 25* 21   CREATININE 1.07 0.91 1.02   CALCIUM 8.9 9.5 8.7                   Imaging  DX-FEMUR-2+ RIGHT   Final Result      No radiographic evidence of acute traumatic injury.      Moderate right hip osteoarthritis      DX-PELVIS-1 OR 2 VIEWS   Final Result      No radiographic evidence of acute displaced fracture      Moderate right, mild left hip osteoarthritis      DX-CHEST-PORTABLE (1 VIEW)    (Results Pending)        Assessment/Plan  * Contusion of right hip- (present on admission)  Assessment & Plan  Pelvic x-ray was negative for fracture.  Continue pain control as needed.  PT/OT recommend postacute placement.  Monitor    Frequent falls- (present on admission)  Assessment & Plan  Frail, poor intake with uncontrolled diabetes.  PT/OT recommend postacute placement.    Type 2 diabetes mellitus with hyperglycemia, with long-term current use of insulin (HCC)- (present on admission)  Assessment & Plan  Poorly controlled. Hemoglobin A1c was 14.3 on  2021.  It seems unlikely patient is taking his Metformin at home.  He does not seem as if he will be able to learn how to take insulin himself.  His insulin requirements have been low.  Metformin has been increased to 1000 mg twice daily. Long-acting insulin was discontinued due to episodes of hypoglycemia. Diabetic educator was consulted.  Continue diabetic diet and monitor closely.    Benign prostatic hyperplasia with urinary obstruction- (present on admission)  Assessment & Plan  Chronic indwelling Clement.  Follows outpatient urology every month.  Tamsulosin was discontinued due to frequent falls and prerenal SHAHID, with concerns for orthostasis.  On finasteride. Discussed with Dr. Sherwood on 2021, Clement was changed on 2021, changed again on 2021    Frailty syndrome in geriatric patient- (present on admission)  Assessment & Plan  BMI 17.2.  Patient is homeless.  His wife  this year. Case management is assisting in postacute placement, with goals of eventual group home.  Monitor    SHAHID (acute kidney injury) (HCC)- (present on admission)  Assessment & Plan  Resolved.  Avoid nephrotoxins    Moderate episode of recurrent major depressive disorder (HCC)- (present on admission)  Assessment & Plan  Reports losing home and his wife  this year    Normocytic anemia- (present on admission)  Assessment & Plan  Studies consistent with anemia of chronic disease.  No evidence of bleeding.  Monitor       VTE prophylaxis: enoxaparin ppx    I have performed a physical exam and reviewed and updated ROS and Plan today (2021). In review of yesterday's note (2021), there are no changes except as documented above.

## 2021-07-02 NOTE — CARE PLAN
The patient is Stable - Low risk of patient condition declining or worsening    Shift Goals  Clinical Goals: Blood glucose management  Patient Goals: pain control; figure out discharge plan  Family Goals: N/A    Progress made toward(s) clinical / shift goals:      Problem: Pain - Standard  Goal: Alleviation of pain or a reduction in pain to the patient’s comfort goal  Outcome: Progressing  Note: Patient is s/p fall on right hip. Patient continues to c/o pain in right hip but it is improving. Per patient, his pain has improved since admission. Pain is tolerable with PRN tylenol. Educated patient on pain management plan and encouraged non pharmacological interventions to improve pain such as heat packs and ice.        Patient is not progressing towards the following goals:      Problem: Discharge Barriers/Planning  Goal: Patient's continuum of care needs are met  Outcome: Progressing  Note: CM working on group home placement for patient.

## 2021-07-02 NOTE — DISCHARGE PLANNING
This RN,  is attempting to help patient transfer funds from his account into the group home owner's banking account. I met with patient at bedside, patient's debit card, with banking information was put inside large red plastic bags in his room. Patient was admitted with bed bugs. The bags have been tied for two weeks. I called [facility] bank on [address] [phone] asking for assistance helping this patient transfer funds to the Group Home owner, Morales Landerosalexandra from [facility].  owner is requesting two months of rent payment, which is [phone]    [facility] representative stated that patient would need to replace his bank card if retrieving his old bank card could not be done because of the bed bugs. Patient would need to call [phone] to have bank card replaced. Card could then be mailed to [facility], once card is replaced, patient could transfer the funds to  owner's account. This RN,  has  owner's account and routing numbers filed.     Will follow up with Leadership on Tuesday, 07/06/21 for next steps on how to proceed with this unique case.     Ania Kaba RN,

## 2021-07-02 NOTE — CARE PLAN
The patient is Stable - Low risk of patient condition declining or worsening    Shift Goals  Clinical Goals: Blood glucose management  Patient Goals: Help w/getting money from bank to pay group home and DC tomorrow.     Problem: Pain - Standard  Goal: Alleviation of pain or a reduction in pain to the patient’s comfort goal  Outcome: Progressing     Problem: Knowledge Deficit - Standard  Goal: Patient and family/care givers will demonstrate understanding of plan of care, disease process/condition, diagnostic tests and medications  Outcome: Progressing     Problem: Skin Integrity  Goal: Skin integrity is maintained or improved  Outcome: Progressing     Problem: Mobility  Goal: Patient's capacity to carry out activities will improve  Outcome: Progressing     Problem: Discharge Barriers/Planning  Goal: Patient's continuum of care needs are met  Outcome: Progressing     Problem: Wound/ / Incision Healing  Goal: Patient's wound/surgical incision will decrease in size and heals properly  Outcome: Progressing       Progress made toward(s) clinical / shift goals:  Patients blood glucose remained stable overnight w/DC of sliding scale insulin and increase in Metformin. Will let day RN know patient would like to speak w/social work to get financial assistance for DC to group home.     Patient is not progressing towards the following goals:     Problem: Bowel Elimination  Goal: Establish and maintain regular bowel function  Outcome: Not Progressing  Patient states large diarrhea BM.

## 2021-07-02 NOTE — CARE PLAN
The patient is Stable - Low risk of patient condition declining or worsening    Shift Goals  Clinical Goals: mobility/train fsbs/insulin pain control  Patient Goals: eat/rest  Family Goals: N/A    Progress made toward(s) clinical / shift goals:  yes    Patient is not progressing towards the following goals:      Problem: Pain - Standard  Goal: Alleviation of pain or a reduction in pain to the patient’s comfort goal  Outcome: Progressing     Problem: Knowledge Deficit - Standard  Goal: Patient and family/care givers will demonstrate understanding of plan of care, disease process/condition, diagnostic tests and medications  Outcome: Progressing     Problem: Skin Integrity  Goal: Skin integrity is maintained or improved  Outcome: Progressing

## 2021-07-02 NOTE — THERAPY
Physical Therapy   Daily Treatment     Patient Name: Hugh Núñez  Age:  75 y.o., Sex:  male  Medical Record #: [id number]  Today's Date: 7/1/2021     Precautions: Fall Risk    Assessment    Pt was able to demonstrate short distance ambulation with 4WW with min A. His current cognitive deficits are exacerbating his functional impairments and risk for falls. See below for details/recs.     Plan    Continue current treatment plan.    DC Equipment Recommendations: Unable to determine at this time  Discharge Recommendations: Recommend post-acute placement for additional physical therapy services prior to discharge home (unless GH with true 24/7 assist and  PT)       07/01/21 5006   Pain 0 - 10 Group   Location Hip   Location Orientation Right   Description Aching;Constant;Sore   Therapist Pain Assessment During Activity;Post Activity Pain Same as Prior to Activity;Nurse Notified  (appears moderate with mobility)   Cognition    Cognition / Consciousness X   Speech/ Communication Hard of Hearing   Level of Consciousness Alert   Safety Awareness Impaired   Comments pleasant and cooperative; appears somewhat confused throughout (ie talking to self, poor safety awarness, dec insight, etc)   Balance   Sitting Balance (Static) Fair   Sitting Balance (Dynamic) Fair -   Standing Balance (Static) Fair -   Standing Balance (Dynamic) Fair -   Weight Shift Sitting Fair   Weight Shift Standing Fair   Skilled Intervention Verbal Cuing;Tactile Cuing;Sequencing;Postural Facilitation;Compensatory Strategies;Facilitation   Comments no joelle LOB durng ambulation with 4WW; however when statically standing and attempting to exit bed pt demos poor safety awareness with re: surroundings and fall risk; at increased risk with min/mod  perturbations to balance   Gait Analysis   Gait Level Of Assist Minimal Assist   Assistive Device 4 Wheel Walker   Distance (Feet) 40   # of Times Distance was Traveled 2   Deviation Decreased Base Of  Support;Decreased Heel Strike;Decreased Toe Off;Other (Comment)  (reciprocal gait; narrow RACHEAL)   # of Stairs Climbed 0   Weight Bearing Status no restrictions   Skilled Intervention Compensatory Strategies;Sequencing;Tactile Cuing;Verbal Cuing   Bed Mobility    Supine to Sit Modified Independent   Sit to Supine Modified Independent   Skilled Intervention Compensatory Strategies;Tactile Cuing;Verbal Cuing;Sequencing   Comments effortful with bed mobility, use of UEs to assist RLE to enter bed   Functional Mobility   Sit to Stand Modified Independent   Bed, Chair, Wheelchair Transfer Modified Independent   Transfer Method Stand Step   Mobility with 4WW   Skilled Intervention Compensatory Strategies;Verbal Cuing   Comments cueing throughout visit poor safety awareness with re: compensatory mechanics   How much difficulty does the patient currently have...   Turning over in bed (including adjusting bedclothes, sheets and blankets)? 2   Sitting down on and standing up from a chair with arms (e.g., wheelchair, bedside commode, etc.) 2   Moving from lying on back to sitting on the side of the bed? 2   How much help from another person does the patient currently need...   Moving to and from a bed to a chair (including a wheelchair)? 3   Need to walk in a hospital room? 3   Climbing 3-5 steps with a railing? 3   6 clicks Mobility Score 15   Activity Tolerance   Sitting in Chair at least 15 mins   Sitting Edge of Bed at least 4 mins   Standing ~5 mins   Comments pain and fatigue limiting;   Short Term Goals    Short Term Goal # 1 Pt will perform supine <> sit with HOB flat, no use of rails with SPV in 6 visits in order to progress toward independence with functional mobility   Goal Outcome # 1 Progressing as expected   Short Term Goal # 2 Pt will ambulate >150 ft with 4WW and SPV within 6 visits in order to progress toward community distance ambulation   Goal Outcome # 2 Progressing as expected   Short Term Goal # 3 Pt will  ascend/descend 1 step with LRAD and SPV within 6 visits in order to negotiate curbs and community environment   Goal Outcome # 3 Goal not met   Education Group   Role of Physical Therapist Patient Response Patient;Acceptance;Explanation;Verbal Demonstration   Gait Training Patient Response Patient;Acceptance;Verbal Demonstration;Explanation;Teach Back;Action Demonstration;Reinforcement Needed   Use of Assistive Device Patient Response Patient;Acceptance;Explanation;Teach Back;Verbal Demonstration;Action Demonstration;Reinforcement Needed

## 2021-07-03 LAB
ANION GAP SERPL CALC-SCNC: 9 MMOL/L (ref 7–16)
BASOPHILS # BLD AUTO: 0.5 % (ref 0–1.8)
BASOPHILS # BLD: 0.06 K/UL (ref 0–0.12)
BUN SERPL-MCNC: 22 MG/DL (ref 8–22)
CALCIUM SERPL-MCNC: 8.6 MG/DL (ref 8.5–10.5)
CHLORIDE SERPL-SCNC: 103 MMOL/L (ref 96–112)
CO2 SERPL-SCNC: 22 MMOL/L (ref 20–33)
CREAT SERPL-MCNC: 0.93 MG/DL (ref 0.5–1.4)
EOSINOPHIL # BLD AUTO: 0.23 K/UL (ref 0–0.51)
EOSINOPHIL NFR BLD: 1.9 % (ref 0–6.9)
ERYTHROCYTE [DISTWIDTH] IN BLOOD BY AUTOMATED COUNT: 47.1 FL (ref 35.9–50)
GLUCOSE BLD-MCNC: 114 MG/DL (ref 65–99)
GLUCOSE BLD-MCNC: 144 MG/DL (ref 65–99)
GLUCOSE SERPL-MCNC: 143 MG/DL (ref 65–99)
HCT VFR BLD AUTO: 31.3 % (ref 42–52)
HGB BLD-MCNC: 9.8 G/DL (ref 14–18)
IMM GRANULOCYTES # BLD AUTO: 0.07 K/UL (ref 0–0.11)
IMM GRANULOCYTES NFR BLD AUTO: 0.6 % (ref 0–0.9)
LYMPHOCYTES # BLD AUTO: 1.6 K/UL (ref 1–4.8)
LYMPHOCYTES NFR BLD: 13.2 % (ref 22–41)
MCH RBC QN AUTO: 27.1 PG (ref 27–33)
MCHC RBC AUTO-ENTMCNC: 31.3 G/DL (ref 33.7–35.3)
MCV RBC AUTO: 86.5 FL (ref 81.4–97.8)
MONOCYTES # BLD AUTO: 0.69 K/UL (ref 0–0.85)
MONOCYTES NFR BLD AUTO: 5.7 % (ref 0–13.4)
NEUTROPHILS # BLD AUTO: 9.51 K/UL (ref 1.82–7.42)
NEUTROPHILS NFR BLD: 78.1 % (ref 44–72)
NRBC # BLD AUTO: 0 K/UL
NRBC BLD-RTO: 0 /100 WBC
PLATELET # BLD AUTO: 289 K/UL (ref 164–446)
PMV BLD AUTO: 9.1 FL (ref 9–12.9)
POTASSIUM SERPL-SCNC: 3.9 MMOL/L (ref 3.6–5.5)
RBC # BLD AUTO: 3.62 M/UL (ref 4.7–6.1)
SODIUM SERPL-SCNC: 134 MMOL/L (ref 135–145)
WBC # BLD AUTO: 12.2 K/UL (ref 4.8–10.8)

## 2021-07-03 PROCEDURE — 80048 BASIC METABOLIC PNL TOTAL CA: CPT

## 2021-07-03 PROCEDURE — A9270 NON-COVERED ITEM OR SERVICE: HCPCS | Performed by: INTERNAL MEDICINE

## 2021-07-03 PROCEDURE — 36415 COLL VENOUS BLD VENIPUNCTURE: CPT

## 2021-07-03 PROCEDURE — 700105 HCHG RX REV CODE 258: Performed by: INTERNAL MEDICINE

## 2021-07-03 PROCEDURE — A9270 NON-COVERED ITEM OR SERVICE: HCPCS | Performed by: STUDENT IN AN ORGANIZED HEALTH CARE EDUCATION/TRAINING PROGRAM

## 2021-07-03 PROCEDURE — 99232 SBSQ HOSP IP/OBS MODERATE 35: CPT | Performed by: INTERNAL MEDICINE

## 2021-07-03 PROCEDURE — 85025 COMPLETE CBC W/AUTO DIFF WBC: CPT

## 2021-07-03 PROCEDURE — 700102 HCHG RX REV CODE 250 W/ 637 OVERRIDE(OP): Performed by: STUDENT IN AN ORGANIZED HEALTH CARE EDUCATION/TRAINING PROGRAM

## 2021-07-03 PROCEDURE — 700101 HCHG RX REV CODE 250: Performed by: INTERNAL MEDICINE

## 2021-07-03 PROCEDURE — 700102 HCHG RX REV CODE 250 W/ 637 OVERRIDE(OP): Performed by: INTERNAL MEDICINE

## 2021-07-03 PROCEDURE — 700111 HCHG RX REV CODE 636 W/ 250 OVERRIDE (IP): Performed by: INTERNAL MEDICINE

## 2021-07-03 PROCEDURE — 770004 HCHG ROOM/CARE - ONCOLOGY PRIVATE *

## 2021-07-03 PROCEDURE — 700111 HCHG RX REV CODE 636 W/ 250 OVERRIDE (IP): Performed by: STUDENT IN AN ORGANIZED HEALTH CARE EDUCATION/TRAINING PROGRAM

## 2021-07-03 PROCEDURE — 82962 GLUCOSE BLOOD TEST: CPT

## 2021-07-03 RX ADMIN — SODIUM CHLORIDE: 9 INJECTION, SOLUTION INTRAVENOUS at 18:03

## 2021-07-03 RX ADMIN — ENOXAPARIN SODIUM 30 MG: 30 INJECTION SUBCUTANEOUS at 04:53

## 2021-07-03 RX ADMIN — METFORMIN HYDROCHLORIDE 1000 MG: 500 TABLET ORAL at 08:18

## 2021-07-03 RX ADMIN — CEFTRIAXONE 1 G: 10 INJECTION, POWDER, FOR SOLUTION INTRAVENOUS at 04:53

## 2021-07-03 RX ADMIN — Medication 100 MG: at 04:53

## 2021-07-03 RX ADMIN — DOCUSATE SODIUM 50 MG AND SENNOSIDES 8.6 MG 2 TABLET: 8.6; 5 TABLET, FILM COATED ORAL at 18:03

## 2021-07-03 RX ADMIN — METFORMIN HYDROCHLORIDE 1000 MG: 500 TABLET ORAL at 18:02

## 2021-07-03 RX ADMIN — THERA TABS 1 TABLET: TAB at 04:53

## 2021-07-03 RX ADMIN — FINASTERIDE 5 MG: 5 TABLET, FILM COATED ORAL at 04:53

## 2021-07-03 ASSESSMENT — PAIN DESCRIPTION - PAIN TYPE
TYPE: ACUTE PAIN
TYPE: ACUTE PAIN

## 2021-07-03 NOTE — PROGRESS NOTES
[facility] Medicine Daily Progress Note    Date of Service  7/3/2021    Chief Complaint  75 y.o. male admitted 6/19/2021 due to a ground-level fall, and right hip pain.  He is homeless, and has a history of BPH with a chronic indwelling Clement (follows outpatient urology monthly).     Hospital Course  On admission, patient was noted to have SHAHID, hyperglycemia.  Right hip x-ray showed no evidence of fracture. Hemoglobin A1c was 14. UA showed packed wbcs and yeast.      Discussed with Dr. Sherwood on 6/29/2021.  Patient had his Clement changed as an outpatient on 5/20/2021, last changed on 6/30/2021.  Afebrile, hemodynamically stable.     7/1/2021: WBCs are elevated today (11.5).  Afebrile and hemodynamically stable.  UA ordered.      7/2/2021: Further increase in WBCs (13.4).  Afebrile.  UA was positive. Blood and urine cultures pending. Chest x-ray ordered. Ceftriaxone        Interval Problem Update  Chest x-ray reported COPD without acute cardiopulmonary abnormality.  Denies shortness of breath today, saturating 99% on room air. WBCs are 12.2. Afebrile.       Consultants/Specialty  Diabetic educator     Code Status  Full Code     Disposition  Social work assisting with placement in group home vs. SNF.  Covid and QuantiFERON were negative    Review of Systems  Review of Systems   Constitutional: Negative.    HENT: Negative.    Eyes: Negative.    Respiratory: Negative.    Cardiovascular: Negative.    Gastrointestinal: Negative.    Genitourinary: Negative.    Musculoskeletal: Positive for joint pain.        Right hip pain   Skin: Negative.    Neurological: Negative.    Endo/Heme/Allergies: Negative.    Psychiatric/Behavioral: Negative.        Physical Exam  Temp:  [37.2 °C (98.9 °F)-37.6 °C (99.7 °F)] 37.2 °C (99 °F)  Pulse:  [59-78] 68  Resp:  [16-20] 16  BP: (118-138)/(60-69) 138/63  SpO2:  [92 %-99 %] 99 %    Physical Exam  HENT:      Head: Normocephalic.      Nose: Nose normal.      Mouth/Throat:      Mouth: Mucous  membranes are moist.   Eyes:      Pupils: Pupils are equal, round, and reactive to light.   Cardiovascular:      Rate and Rhythm: Normal rate.   Pulmonary:      Effort: Pulmonary effort is normal.   Abdominal:      Palpations: Abdomen is soft.   Genitourinary:     Comments: Chronic Clement in place  Musculoskeletal:      Cervical back: Normal range of motion.   Skin:     Comments: Warm  Neurological:      General: No focal deficit present.      Mental Status: He is alert.   Psychiatric:         Behavior: Behavior normal.     Fluids    Intake/Output Summary (Last 24 hours) at 7/3/2021 0904  Last data filed at 7/3/2021 0821  Gross per 24 hour   Intake 630 ml   Output 1850 ml   Net -1220 ml       Laboratory  Recent Labs     07/01/21  0336 07/02/21  0030 07/03/21  0015   WBC 11.5* 13.4* 12.2*   RBC 3.77* 3.56* 3.62*   HEMOGLOBIN 10.3* 9.7* 9.8*   HEMATOCRIT 32.4* 30.8* 31.3*   MCV 85.9 86.5 86.5   MCH 27.3 27.2 27.1   MCHC 31.8* 31.5* 31.3*   RDW 47.8 47.4 47.1   PLATELETCT 277 266 289   MPV 9.2 8.9* 9.1     Recent Labs     07/01/21  0336 07/02/21  0030 07/03/21  0015   SODIUM 137 136 134*   POTASSIUM 4.3 4.0 3.9   CHLORIDE 101 101 103   CO2 27 25 22   GLUCOSE 151* 187* 143*   BUN 25* 21 22   CREATININE 0.91 1.02 0.93   CALCIUM 9.5 8.7 8.6                   Imaging  DX-CHEST-PORTABLE (1 VIEW)   Final Result      COPD without acute cardiopulmonary abnormality.      DX-FEMUR-2+ RIGHT   Final Result      No radiographic evidence of acute traumatic injury.      Moderate right hip osteoarthritis      DX-PELVIS-1 OR 2 VIEWS   Final Result      No radiographic evidence of acute displaced fracture      Moderate right, mild left hip osteoarthritis           Assessment/Plan  * Contusion of right hip- (present on admission)  Assessment & Plan  Pelvic x-ray was negative for fracture.  Continue pain control as needed.  PT/OT recommend postacute placement.  Monitor    Frequent falls- (present on admission)  Assessment & Plan  Frail,  poor intake with uncontrolled diabetes.  PT/OT recommend postacute placement.    Type 2 diabetes mellitus with hyperglycemia, with long-term current use of insulin (HCC)- (present on admission)  Assessment & Plan  Poorly controlled. Hemoglobin A1c was 14.3 on 2021.  It seems unlikely patient is taking his Metformin at home.  He does not seem as if he will be able to learn how to take insulin himself.  His insulin requirements have been low.  Metformin has been increased to 1000 mg twice daily. Long-acting insulin was discontinued due to episodes of hypoglycemia. Diabetic educator was consulted.  Continue diabetic diet and monitor closely.    Benign prostatic hyperplasia with urinary obstruction- (present on admission)  Assessment & Plan  Chronic indwelling Clement.  Follows outpatient urology every month.  Tamsulosin was discontinued due to frequent falls and prerenal SHAHID, with concerns for orthostasis.  On finasteride. Discussed with Dr. Sherwood on 2021, Clement was changed on 2021, changed again on 2021    Frailty syndrome in geriatric patient- (present on admission)  Assessment & Plan  BMI 17.2.  Patient is homeless.  His wife  this year. Case management is assisting in postacute placement, with goals of eventual group home.  Monitor    SHAHID (acute kidney injury) (HCC)- (present on admission)  Assessment & Plan  Resolved.  Avoid nephrotoxins    Moderate episode of recurrent major depressive disorder (HCC)- (present on admission)  Assessment & Plan  Reports losing home and his wife  this year    Normocytic anemia- (present on admission)  Assessment & Plan  Studies consistent with anemia of chronic disease.  No evidence of bleeding.  Monitor         VTE prophylaxis: enoxaparin ppx    I have performed a physical exam and reviewed and updated ROS and Plan today (7/3/2021). In review of yesterday's note (2021), there are no changes except as documented above.

## 2021-07-03 NOTE — CARE PLAN
Problem: Pain - Standard  Goal: Alleviation of pain or a reduction in pain to the patient’s comfort goal  Outcome: Progressing     Problem: Discharge Barriers/Planning  Goal: Patient's continuum of care needs are met  Outcome: Progressing   The patient is Stable - Low risk of patient condition declining or worsening    Shift Goals  Clinical Goals: pain management  Patient Goals: safe discharge, comfort  Family Goals: N/A    Progress made toward(s) clinical / shift goals:  patient has no complaints of pain at this time. Case management working on discharge for patient to a group home.    Patient is not progressing towards the following goals:

## 2021-07-04 LAB
ANION GAP SERPL CALC-SCNC: 9 MMOL/L (ref 7–16)
BASOPHILS # BLD AUTO: 0.9 % (ref 0–1.8)
BASOPHILS # BLD: 0.08 K/UL (ref 0–0.12)
BUN SERPL-MCNC: 18 MG/DL (ref 8–22)
CALCIUM SERPL-MCNC: 8.4 MG/DL (ref 8.5–10.5)
CHLORIDE SERPL-SCNC: 107 MMOL/L (ref 96–112)
CO2 SERPL-SCNC: 23 MMOL/L (ref 20–33)
CREAT SERPL-MCNC: 0.84 MG/DL (ref 0.5–1.4)
EOSINOPHIL # BLD AUTO: 0.37 K/UL (ref 0–0.51)
EOSINOPHIL NFR BLD: 4 % (ref 0–6.9)
ERYTHROCYTE [DISTWIDTH] IN BLOOD BY AUTOMATED COUNT: 47.7 FL (ref 35.9–50)
GLUCOSE SERPL-MCNC: 129 MG/DL (ref 65–99)
HCT VFR BLD AUTO: 31.3 % (ref 42–52)
HGB BLD-MCNC: 9.9 G/DL (ref 14–18)
IMM GRANULOCYTES # BLD AUTO: 0.05 K/UL (ref 0–0.11)
IMM GRANULOCYTES NFR BLD AUTO: 0.5 % (ref 0–0.9)
LYMPHOCYTES # BLD AUTO: 1.75 K/UL (ref 1–4.8)
LYMPHOCYTES NFR BLD: 19 % (ref 22–41)
MCH RBC QN AUTO: 27.5 PG (ref 27–33)
MCHC RBC AUTO-ENTMCNC: 31.6 G/DL (ref 33.7–35.3)
MCV RBC AUTO: 86.9 FL (ref 81.4–97.8)
MONOCYTES # BLD AUTO: 0.6 K/UL (ref 0–0.85)
MONOCYTES NFR BLD AUTO: 6.5 % (ref 0–13.4)
NEUTROPHILS # BLD AUTO: 6.38 K/UL (ref 1.82–7.42)
NEUTROPHILS NFR BLD: 69.1 % (ref 44–72)
NRBC # BLD AUTO: 0 K/UL
NRBC BLD-RTO: 0 /100 WBC
PLATELET # BLD AUTO: 295 K/UL (ref 164–446)
PMV BLD AUTO: 8.7 FL (ref 9–12.9)
POTASSIUM SERPL-SCNC: 4.1 MMOL/L (ref 3.6–5.5)
RBC # BLD AUTO: 3.6 M/UL (ref 4.7–6.1)
SODIUM SERPL-SCNC: 139 MMOL/L (ref 135–145)
WBC # BLD AUTO: 9.2 K/UL (ref 4.8–10.8)

## 2021-07-04 PROCEDURE — A9270 NON-COVERED ITEM OR SERVICE: HCPCS | Performed by: STUDENT IN AN ORGANIZED HEALTH CARE EDUCATION/TRAINING PROGRAM

## 2021-07-04 PROCEDURE — 700111 HCHG RX REV CODE 636 W/ 250 OVERRIDE (IP): Performed by: INTERNAL MEDICINE

## 2021-07-04 PROCEDURE — 700111 HCHG RX REV CODE 636 W/ 250 OVERRIDE (IP): Performed by: STUDENT IN AN ORGANIZED HEALTH CARE EDUCATION/TRAINING PROGRAM

## 2021-07-04 PROCEDURE — 99232 SBSQ HOSP IP/OBS MODERATE 35: CPT | Performed by: INTERNAL MEDICINE

## 2021-07-04 PROCEDURE — 700102 HCHG RX REV CODE 250 W/ 637 OVERRIDE(OP): Performed by: INTERNAL MEDICINE

## 2021-07-04 PROCEDURE — 700105 HCHG RX REV CODE 258: Performed by: INTERNAL MEDICINE

## 2021-07-04 PROCEDURE — 700102 HCHG RX REV CODE 250 W/ 637 OVERRIDE(OP): Performed by: STUDENT IN AN ORGANIZED HEALTH CARE EDUCATION/TRAINING PROGRAM

## 2021-07-04 PROCEDURE — 80048 BASIC METABOLIC PNL TOTAL CA: CPT

## 2021-07-04 PROCEDURE — 36415 COLL VENOUS BLD VENIPUNCTURE: CPT

## 2021-07-04 PROCEDURE — 85025 COMPLETE CBC W/AUTO DIFF WBC: CPT

## 2021-07-04 PROCEDURE — A9270 NON-COVERED ITEM OR SERVICE: HCPCS | Performed by: INTERNAL MEDICINE

## 2021-07-04 PROCEDURE — 770004 HCHG ROOM/CARE - ONCOLOGY PRIVATE *

## 2021-07-04 RX ADMIN — METFORMIN HYDROCHLORIDE 1000 MG: 500 TABLET ORAL at 08:07

## 2021-07-04 RX ADMIN — OXYCODONE 5 MG: 5 TABLET ORAL at 08:09

## 2021-07-04 RX ADMIN — METFORMIN HYDROCHLORIDE 1000 MG: 500 TABLET ORAL at 17:25

## 2021-07-04 RX ADMIN — CEFTRIAXONE 1 G: 10 INJECTION, POWDER, FOR SOLUTION INTRAVENOUS at 05:24

## 2021-07-04 RX ADMIN — THERA TABS 1 TABLET: TAB at 05:24

## 2021-07-04 RX ADMIN — SODIUM CHLORIDE: 9 INJECTION, SOLUTION INTRAVENOUS at 17:32

## 2021-07-04 RX ADMIN — ENOXAPARIN SODIUM 30 MG: 30 INJECTION SUBCUTANEOUS at 05:24

## 2021-07-04 RX ADMIN — Medication 100 MG: at 05:24

## 2021-07-04 RX ADMIN — DOCUSATE SODIUM 50 MG AND SENNOSIDES 8.6 MG 2 TABLET: 8.6; 5 TABLET, FILM COATED ORAL at 05:24

## 2021-07-04 RX ADMIN — FINASTERIDE 5 MG: 5 TABLET, FILM COATED ORAL at 05:24

## 2021-07-04 RX ADMIN — SODIUM CHLORIDE: 9 INJECTION, SOLUTION INTRAVENOUS at 05:23

## 2021-07-04 ASSESSMENT — PAIN DESCRIPTION - PAIN TYPE
TYPE: ACUTE PAIN

## 2021-07-04 NOTE — CARE PLAN
Problem: Pain - Standard  Goal: Alleviation of pain or a reduction in pain to the patient’s comfort goal  Outcome: Progressing     Problem: Skin Integrity  Goal: Skin integrity is maintained or improved  Outcome: Progressing   The patient is Stable - Low risk of patient condition declining or worsening

## 2021-07-04 NOTE — CARE PLAN
Problem: Pain - Standard  Goal: Alleviation of pain or a reduction in pain to the patient’s comfort goal  Outcome: Progressing  Flowsheets (Taken 7/4/2021 0900)  Non Verbal Scale:   Calm   Unlabored Breathing  Pain Rating Scale (NPRS): 0  Note: Pain well controlled w/ use of pain meds.     Problem: Mobility  Goal: Patient's capacity to carry out activities will improve  Outcome: Progressing       The patient is Watcher - Medium risk of patient condition declining or worsening    Shift Goals  Clinical Goals: pain management; placement  Patient Goals: pain relief; safe discharge; rest  Family Goals: N/A

## 2021-07-04 NOTE — PROGRESS NOTES
[facility] Medicine Daily Progress Note    Date of Service  7/4/2021    Chief Complaint  75 y.o. male admitted 6/19/2021 due to a ground-level fall, and right hip pain.  He is homeless, and has a history of BPH with a chronic indwelling Clement (follows outpatient urology monthly).     Hospital Course  On admission, patient was noted to have SHAHID, hyperglycemia.  Right hip x-ray showed no evidence of fracture. Hemoglobin A1c was 14. UA showed packed wbcs and yeast.      Discussed with Dr. Sherwood on 6/29/2021.  Patient had his Clement changed as an outpatient on 5/20/2021, last changed on 6/30/2021.  Afebrile, hemodynamically stable.     7/1/2021: WBCs are elevated today (11.5).  Afebrile and hemodynamically stable.  UA ordered.      7/2/2021: Further increase in WBCs (13.4).  Afebrile.  UA was positive. Blood and urine cultures pending. Chest x-ray ordered. Ceftriaxone was empirically started.     7/3/2021: Chest x-ray reported COPD without acute cardiopulmonary abnormality.  Denies shortness of breath today, saturating 99% on room air. WBCs are 12.2. Afebrile.        Interval Problem Update  Leukocytosis has resolved, wbcs are 9.2, afebrile.      Consultants/Specialty  Diabetic educator     Code Status  Full Code     Disposition  Social work assisting with placement in group home vs. SNF.  Covid and QuantiFERON were negative     Review of Systems  Review of Systems   Constitutional: Negative.    HENT: Negative.    Eyes: Negative.    Respiratory: Negative.    Cardiovascular: Negative.    Gastrointestinal: Negative.    Genitourinary: Negative.    Musculoskeletal: Positive for joint pain.        Right hip pain   Skin: Negative.    Neurological: Negative.    Endo/Heme/Allergies: Negative.    Psychiatric/Behavioral: Negative.         Physical Exam  Temp:  [36.8 °C (98.2 °F)-37.2 °C (99 °F)] 37.1 °C (98.7 °F)  Pulse:  [62-69] 65  Resp:  [16-17] 16  BP: (111-142)/(61-66) 142/62  SpO2:  [94 %-97 %] 97 %      Physical Exam  HENT:       Head: Normocephalic.      Nose: Nose normal.      Mouth/Throat:      Mouth: Mucous membranes are moist.   Eyes:      Pupils: Pupils are equal, round, and reactive to light.   Cardiovascular:      Rate and Rhythm: Normal rate.   Pulmonary:      Effort: Pulmonary effort is normal.   Abdominal:      Palpations: Abdomen is soft.   Genitourinary:     Comments: Chronic Clement in place  Musculoskeletal:      Cervical back: Normal range of motion.   Skin:     Comments: Warm  Neurological:      General: No focal deficit present.      Mental Status: He is alert.   Psychiatric:         Behavior: Behavior normal.     Fluids    Intake/Output Summary (Last 24 hours) at 7/4/2021 1019  Last data filed at 7/4/2021 0821  Gross per 24 hour   Intake --   Output 2000 ml   Net -2000 ml       Laboratory  Recent Labs     07/02/21  0030 07/03/21  0015 07/04/21  0031   WBC 13.4* 12.2* 9.2   RBC 3.56* 3.62* 3.60*   HEMOGLOBIN 9.7* 9.8* 9.9*   HEMATOCRIT 30.8* 31.3* 31.3*   MCV 86.5 86.5 86.9   MCH 27.2 27.1 27.5   MCHC 31.5* 31.3* 31.6*   RDW 47.4 47.1 47.7   PLATELETCT 266 289 295   MPV 8.9* 9.1 8.7*     Recent Labs     07/02/21  0030 07/03/21  0015 07/04/21  0031   SODIUM 136 134* 139   POTASSIUM 4.0 3.9 4.1   CHLORIDE 101 103 107   CO2 25 22 23   GLUCOSE 187* 143* 129*   BUN 21 22 18   CREATININE 1.02 0.93 0.84   CALCIUM 8.7 8.6 8.4*                   Imaging  DX-CHEST-PORTABLE (1 VIEW)   Final Result      COPD without acute cardiopulmonary abnormality.      DX-FEMUR-2+ RIGHT   Final Result      No radiographic evidence of acute traumatic injury.      Moderate right hip osteoarthritis      DX-PELVIS-1 OR 2 VIEWS   Final Result      No radiographic evidence of acute displaced fracture      Moderate right, mild left hip osteoarthritis           Assessment/Plan  * Contusion of right hip- (present on admission)  Assessment & Plan  Pelvic x-ray was negative for fracture.  Continue pain control as needed.  PT/OT recommend postacute  placement.  Monitor    Frequent falls- (present on admission)  Assessment & Plan  Frail, poor intake with uncontrolled diabetes.  PT/OT recommend postacute placement.    Type 2 diabetes mellitus with hyperglycemia, with long-term current use of insulin ([facility])- (present on admission)  Assessment & Plan  Poorly controlled. Hemoglobin A1c was 14.3 on 2021.  It seems unlikely patient is taking his Metformin at home.  He does not seem as if he will be able to learn how to take insulin himself.  His insulin requirements have been low.  Metformin has been increased to 1000 mg twice daily. Long-acting insulin was discontinued due to episodes of hypoglycemia. Diabetic educator was consulted.  Continue diabetic diet and monitor closely.    Benign prostatic hyperplasia with urinary obstruction- (present on admission)  Assessment & Plan  Chronic indwelling Clement.  Follows outpatient urology every month.  Tamsulosin was discontinued due to frequent falls and prerenal SHAHID, with concerns for orthostasis.  On finasteride. Discussed with Dr. Sherwood on 2021, Clement was changed on 2021, changed again on 2021    Frailty syndrome in geriatric patient- (present on admission)  Assessment & Plan  BMI 17.2.  Patient is homeless.  His wife  this year. Case management is assisting in postacute placement, with goals of eventual group home.  Monitor    Leukocytosis  Assessment & Plan  Noted to have leukocytosis on 2021.  UA was positive, cultures pending.  Ceftriaxone was empirically started on 2021. WBCs are 9.2 today.  Continue ceftriaxone for now    SHAHID (acute kidney injury) (HCC)- (present on admission)  Assessment & Plan  Resolved.  Suspected to be due to UTI.  Avoid nephrotoxins    Moderate episode of recurrent major depressive disorder (HCC)- (present on admission)  Assessment & Plan  Reports losing home and his wife  this year    Normocytic anemia- (present on admission)  Assessment & Plan  Studies  consistent with anemia of chronic disease.  No evidence of bleeding.  Monitor       VTE prophylaxis: enoxaparin ppx    I have performed a physical exam and reviewed and updated ROS and Plan today (7/4/2021). In review of yesterday's note (7/3/2021), there are no changes except as documented above.

## 2021-07-05 LAB
ANION GAP SERPL CALC-SCNC: 9 MMOL/L (ref 7–16)
BACTERIA UR CULT: ABNORMAL
BASOPHILS # BLD AUTO: 0.8 % (ref 0–1.8)
BASOPHILS # BLD: 0.07 K/UL (ref 0–0.12)
BUN SERPL-MCNC: 15 MG/DL (ref 8–22)
CALCIUM SERPL-MCNC: 8.6 MG/DL (ref 8.5–10.5)
CHLORIDE SERPL-SCNC: 105 MMOL/L (ref 96–112)
CO2 SERPL-SCNC: 24 MMOL/L (ref 20–33)
CREAT SERPL-MCNC: 0.79 MG/DL (ref 0.5–1.4)
EOSINOPHIL # BLD AUTO: 0.26 K/UL (ref 0–0.51)
EOSINOPHIL NFR BLD: 3.1 % (ref 0–6.9)
ERYTHROCYTE [DISTWIDTH] IN BLOOD BY AUTOMATED COUNT: 46.7 FL (ref 35.9–50)
GLUCOSE SERPL-MCNC: 155 MG/DL (ref 65–99)
HCT VFR BLD AUTO: 31.3 % (ref 42–52)
HGB BLD-MCNC: 9.9 G/DL (ref 14–18)
IMM GRANULOCYTES # BLD AUTO: 0.04 K/UL (ref 0–0.11)
IMM GRANULOCYTES NFR BLD AUTO: 0.5 % (ref 0–0.9)
LYMPHOCYTES # BLD AUTO: 1.29 K/UL (ref 1–4.8)
LYMPHOCYTES NFR BLD: 15.2 % (ref 22–41)
MCH RBC QN AUTO: 27.3 PG (ref 27–33)
MCHC RBC AUTO-ENTMCNC: 31.6 G/DL (ref 33.7–35.3)
MCV RBC AUTO: 86.2 FL (ref 81.4–97.8)
MONOCYTES # BLD AUTO: 0.55 K/UL (ref 0–0.85)
MONOCYTES NFR BLD AUTO: 6.5 % (ref 0–13.4)
NEUTROPHILS # BLD AUTO: 6.29 K/UL (ref 1.82–7.42)
NEUTROPHILS NFR BLD: 73.9 % (ref 44–72)
NRBC # BLD AUTO: 0 K/UL
NRBC BLD-RTO: 0 /100 WBC
PLATELET # BLD AUTO: 311 K/UL (ref 164–446)
PMV BLD AUTO: 8.6 FL (ref 9–12.9)
POTASSIUM SERPL-SCNC: 4 MMOL/L (ref 3.6–5.5)
RBC # BLD AUTO: 3.63 M/UL (ref 4.7–6.1)
SIGNIFICANT IND 70042: ABNORMAL
SITE SITE: ABNORMAL
SODIUM SERPL-SCNC: 138 MMOL/L (ref 135–145)
SOURCE SOURCE: ABNORMAL
WBC # BLD AUTO: 8.5 K/UL (ref 4.8–10.8)

## 2021-07-05 PROCEDURE — A9270 NON-COVERED ITEM OR SERVICE: HCPCS | Performed by: STUDENT IN AN ORGANIZED HEALTH CARE EDUCATION/TRAINING PROGRAM

## 2021-07-05 PROCEDURE — 700111 HCHG RX REV CODE 636 W/ 250 OVERRIDE (IP): Performed by: STUDENT IN AN ORGANIZED HEALTH CARE EDUCATION/TRAINING PROGRAM

## 2021-07-05 PROCEDURE — 700102 HCHG RX REV CODE 250 W/ 637 OVERRIDE(OP): Performed by: STUDENT IN AN ORGANIZED HEALTH CARE EDUCATION/TRAINING PROGRAM

## 2021-07-05 PROCEDURE — 85025 COMPLETE CBC W/AUTO DIFF WBC: CPT

## 2021-07-05 PROCEDURE — 700111 HCHG RX REV CODE 636 W/ 250 OVERRIDE (IP): Performed by: INTERNAL MEDICINE

## 2021-07-05 PROCEDURE — 700102 HCHG RX REV CODE 250 W/ 637 OVERRIDE(OP): Performed by: INTERNAL MEDICINE

## 2021-07-05 PROCEDURE — 36415 COLL VENOUS BLD VENIPUNCTURE: CPT

## 2021-07-05 PROCEDURE — 99232 SBSQ HOSP IP/OBS MODERATE 35: CPT | Performed by: INTERNAL MEDICINE

## 2021-07-05 PROCEDURE — 770004 HCHG ROOM/CARE - ONCOLOGY PRIVATE *

## 2021-07-05 PROCEDURE — 80048 BASIC METABOLIC PNL TOTAL CA: CPT

## 2021-07-05 PROCEDURE — A9270 NON-COVERED ITEM OR SERVICE: HCPCS | Performed by: INTERNAL MEDICINE

## 2021-07-05 RX ADMIN — Medication 100 MG: at 05:40

## 2021-07-05 RX ADMIN — FINASTERIDE 5 MG: 5 TABLET, FILM COATED ORAL at 05:40

## 2021-07-05 RX ADMIN — METFORMIN HYDROCHLORIDE 1000 MG: 500 TABLET ORAL at 18:06

## 2021-07-05 RX ADMIN — CEFTRIAXONE 1 G: 10 INJECTION, POWDER, FOR SOLUTION INTRAVENOUS at 05:41

## 2021-07-05 RX ADMIN — ENOXAPARIN SODIUM 30 MG: 30 INJECTION SUBCUTANEOUS at 05:40

## 2021-07-05 RX ADMIN — ONDANSETRON 4 MG: 2 INJECTION INTRAMUSCULAR; INTRAVENOUS at 00:04

## 2021-07-05 RX ADMIN — DOCUSATE SODIUM 50 MG AND SENNOSIDES 8.6 MG 2 TABLET: 8.6; 5 TABLET, FILM COATED ORAL at 18:06

## 2021-07-05 RX ADMIN — THERA TABS 1 TABLET: TAB at 05:40

## 2021-07-05 RX ADMIN — METFORMIN HYDROCHLORIDE 1000 MG: 500 TABLET ORAL at 09:03

## 2021-07-05 ASSESSMENT — PAIN DESCRIPTION - PAIN TYPE: TYPE: ACUTE PAIN

## 2021-07-05 NOTE — PROGRESS NOTES
Assumed care of pt at shift change. Patient is laying in bed with eyes closed. Breathing is even and unlabored. Running NS at 83ml/hr. Clement in place, CDI. All comfort measures in place. Call light by bedside. Bed locked and in lowest position. Hourly rounding in place.

## 2021-07-05 NOTE — PROGRESS NOTES
[facility] Medicine Daily Progress Note    Date of Service  7/5/2021    Chief Complaint  75 y.o. male admitted 6/19/2021 due to a ground-level fall, and right hip pain.  He is homeless, and has a history of BPH with a chronic indwelling Clement (follows outpatient urology monthly).     Hospital Course  On admission, patient was noted to have SHAHID, hyperglycemia.  Right hip x-ray showed no evidence of fracture. Hemoglobin A1c was 14. UA showed packed wbcs and yeast.      Discussed with Dr. Sherwood on 6/29/2021.  Patient had his Clement changed as an outpatient on 5/20/2021, last changed on 6/30/2021.  Afebrile, hemodynamically stable.     7/1/2021: WBCs are elevated today (11.5).  Afebrile and hemodynamically stable.  UA ordered.      7/2/2021: Further increase in WBCs (13.4).  Afebrile.  UA was positive. Blood and urine cultures pending. Chest x-ray ordered. Ceftriaxone was empirically started.      7/3/2021: Chest x-ray reported COPD without acute cardiopulmonary abnormality.  Denies shortness of breath today, saturating 99% on room air. WBCs are 12.2. Afebrile.        Interval Problem Update  Leukocytosis has resolved, wbcs are 8.5 today, afebrile.  Urine culture from 7/1/2021 grew Enterococcus faecalis. Improved right hip pain     Consultants/Specialty  Diabetic educator     Code Status  Full Code     Disposition  Social work assisting with placement in group home vs. SNF.  Covid and QuantiFERON were negative     Review of Systems  Review of Systems   Constitutional: Negative.    HENT: Negative.    Eyes: Negative.    Respiratory: Negative.    Cardiovascular: Negative.    Gastrointestinal: Negative.    Genitourinary: Negative.    Musculoskeletal: Improved right hip pin  Skin: Negative.    Neurological: Negative.    Endo/Heme/Allergies: Negative.    Psychiatric/Behavioral: Negative.         Physical Exam  Temp:  [36.8 °C (98.2 °F)-36.9 °C (98.4 °F)] 36.8 °C (98.2 °F)  Pulse:  [62-76] 63  Resp:  [16-17] 16  BP:  (103-133)/() 103/49  SpO2:  [94 %-99 %] 97 %      Physical Exam  HENT:      Head: Normocephalic.      Nose: Nose normal.      Mouth/Throat:      Mouth: Mucous membranes are moist.   Eyes:      Pupils: Pupils are equal, round, and reactive to light.   Cardiovascular:      Rate and Rhythm: Normal rate.   Pulmonary:      Effort: Pulmonary effort is normal.   Abdominal:      Palpations: Abdomen is soft.   Genitourinary:     Comments: Chronic Clement in place  Musculoskeletal:      Cervical back: Normal range of motion.   Skin:     Comments: Warm  Neurological:      General: No focal deficit present.      Mental Status: He is alert.   Psychiatric:         Behavior: Behavior normal.     Fluids    Intake/Output Summary (Last 24 hours) at 7/5/2021 1028  Last data filed at 7/5/2021 0456  Gross per 24 hour   Intake 600 ml   Output 2250 ml   Net -1650 ml       Laboratory  Recent Labs     07/03/21  0015 07/04/21  0031 07/05/21  0054   WBC 12.2* 9.2 8.5   RBC 3.62* 3.60* 3.63*   HEMOGLOBIN 9.8* 9.9* 9.9*   HEMATOCRIT 31.3* 31.3* 31.3*   MCV 86.5 86.9 86.2   MCH 27.1 27.5 27.3   MCHC 31.3* 31.6* 31.6*   RDW 47.1 47.7 46.7   PLATELETCT 289 295 311   MPV 9.1 8.7* 8.6*     Recent Labs     07/03/21  0015 07/04/21  0031 07/05/21  0054   SODIUM 134* 139 138   POTASSIUM 3.9 4.1 4.0   CHLORIDE 103 107 105   CO2 22 23 24   GLUCOSE 143* 129* 155*   BUN 22 18 15   CREATININE 0.93 0.84 0.79   CALCIUM 8.6 8.4* 8.6                   Imaging  DX-CHEST-PORTABLE (1 VIEW)   Final Result      COPD without acute cardiopulmonary abnormality.      DX-FEMUR-2+ RIGHT   Final Result      No radiographic evidence of acute traumatic injury.      Moderate right hip osteoarthritis      DX-PELVIS-1 OR 2 VIEWS   Final Result      No radiographic evidence of acute displaced fracture      Moderate right, mild left hip osteoarthritis           Assessment/Plan  * Contusion of right hip- (present on admission)  Assessment & Plan  Pain has improved. Pelvic x-ray  was negative for fracture. PT/OT recommend postacute placement.  Monitor    Frequent falls- (present on admission)  Assessment & Plan  Frail, poor intake with uncontrolled diabetes.  PT/OT recommend postacute placement.    Type 2 diabetes mellitus with hyperglycemia, with long-term current use of insulin ([facility])- (present on admission)  Assessment & Plan  Poorly controlled. Hemoglobin A1c was 14.3 on 2021.  It seems unlikely patient is taking his Metformin at home.  He does not seem as if he will be able to learn how to take insulin himself.  His insulin requirements have been low.  Metformin has been increased to 1000 mg twice daily. Long-acting insulin was discontinued due to episodes of hypoglycemia. Diabetic educator was consulted.  Continue diabetic diet and monitor closely.    Benign prostatic hyperplasia with urinary obstruction- (present on admission)  Assessment & Plan  Chronic indwelling Clement.  Follows outpatient urology every month.  Tamsulosin was discontinued due to frequent falls and prerenal SHAHID, with concerns for orthostasis.  On finasteride. Discussed with Dr. Sherwood on 2021, Clement was changed on 2021, changed again on 2021    Frailty syndrome in geriatric patient- (present on admission)  Assessment & Plan  BMI 17.2.  Patient is homeless.  His wife  this year. Case management is assisting in postacute placement, with goals of eventual group home.  Monitor    Leukocytosis  Assessment & Plan  Noted to have leukocytosis on 2021.  Urine culture from 2021 grew Enterococcus faecalis. Ceftriaxone was empirically started on 2021. WBCs are 8.5 today.     SHAHID (acute kidney injury) ([facility])- (present on admission)  Assessment & Plan  Resolved.  Suspected to be due to UTI.  Avoid nephrotoxins    Moderate episode of recurrent major depressive disorder (HCC)- (present on admission)  Assessment & Plan  Reports losing home and his wife  this year    Normocytic anemia- (present on  admission)  Assessment & Plan  Studies consistent with anemia of chronic disease.  No evidence of bleeding.  Monitor       VTE prophylaxis: enoxaparin ppx    I have performed a physical exam and reviewed and updated ROS and Plan today (7/5/2021). In review of yesterday's note (7/4/2021), there are no changes except as documented above.

## 2021-07-05 NOTE — CARE PLAN
Problem: Pain - Standard  Goal: Alleviation of pain or a reduction in pain to the patient’s comfort goal  Outcome: Progressing     Problem: Knowledge Deficit - Standard  Goal: Patient and family/care givers will demonstrate understanding of plan of care, disease process/condition, diagnostic tests and medications  Outcome: Progressing     Problem: Skin Integrity  Goal: Skin integrity is maintained or improved  Outcome: Progressing   The patient is Stable - Low risk of patient condition declining or worsening

## 2021-07-05 NOTE — CARE PLAN
The patient is Stable - Low risk of patient condition declining or worsening    Shift Goals  Clinical Goals: Pain management  Patient Goals: Pt will remain free from falls  Family Goals: N/A    Progress made toward(s) clinical / shift goals:  Pt denied any pain. All fall precautions in place, safety education provided.    Patient is not progressing towards the following goals:

## 2021-07-06 LAB
ANION GAP SERPL CALC-SCNC: 8 MMOL/L (ref 7–16)
BASOPHILS # BLD AUTO: 0.9 % (ref 0–1.8)
BASOPHILS # BLD: 0.06 K/UL (ref 0–0.12)
BUN SERPL-MCNC: 14 MG/DL (ref 8–22)
CALCIUM SERPL-MCNC: 8.3 MG/DL (ref 8.5–10.5)
CHLORIDE SERPL-SCNC: 105 MMOL/L (ref 96–112)
CO2 SERPL-SCNC: 22 MMOL/L (ref 20–33)
CREAT SERPL-MCNC: 0.83 MG/DL (ref 0.5–1.4)
EOSINOPHIL # BLD AUTO: 0.29 K/UL (ref 0–0.51)
EOSINOPHIL NFR BLD: 4.2 % (ref 0–6.9)
ERYTHROCYTE [DISTWIDTH] IN BLOOD BY AUTOMATED COUNT: 46.7 FL (ref 35.9–50)
GLUCOSE SERPL-MCNC: 116 MG/DL (ref 65–99)
HCT VFR BLD AUTO: 28.1 % (ref 42–52)
HGB BLD-MCNC: 9.1 G/DL (ref 14–18)
IMM GRANULOCYTES # BLD AUTO: 0.06 K/UL (ref 0–0.11)
IMM GRANULOCYTES NFR BLD AUTO: 0.9 % (ref 0–0.9)
LYMPHOCYTES # BLD AUTO: 1.77 K/UL (ref 1–4.8)
LYMPHOCYTES NFR BLD: 25.5 % (ref 22–41)
MCH RBC QN AUTO: 27.7 PG (ref 27–33)
MCHC RBC AUTO-ENTMCNC: 32.4 G/DL (ref 33.7–35.3)
MCV RBC AUTO: 85.4 FL (ref 81.4–97.8)
MONOCYTES # BLD AUTO: 0.5 K/UL (ref 0–0.85)
MONOCYTES NFR BLD AUTO: 7.2 % (ref 0–13.4)
NEUTROPHILS # BLD AUTO: 4.27 K/UL (ref 1.82–7.42)
NEUTROPHILS NFR BLD: 61.3 % (ref 44–72)
NRBC # BLD AUTO: 0 K/UL
NRBC BLD-RTO: 0 /100 WBC
PLATELET # BLD AUTO: 270 K/UL (ref 164–446)
PMV BLD AUTO: 8.6 FL (ref 9–12.9)
POTASSIUM SERPL-SCNC: 3.8 MMOL/L (ref 3.6–5.5)
RBC # BLD AUTO: 3.29 M/UL (ref 4.7–6.1)
SODIUM SERPL-SCNC: 135 MMOL/L (ref 135–145)
WBC # BLD AUTO: 7 K/UL (ref 4.8–10.8)

## 2021-07-06 PROCEDURE — 99232 SBSQ HOSP IP/OBS MODERATE 35: CPT | Performed by: INTERNAL MEDICINE

## 2021-07-06 PROCEDURE — 700102 HCHG RX REV CODE 250 W/ 637 OVERRIDE(OP): Performed by: INTERNAL MEDICINE

## 2021-07-06 PROCEDURE — 770004 HCHG ROOM/CARE - ONCOLOGY PRIVATE *

## 2021-07-06 PROCEDURE — 700111 HCHG RX REV CODE 636 W/ 250 OVERRIDE (IP): Performed by: STUDENT IN AN ORGANIZED HEALTH CARE EDUCATION/TRAINING PROGRAM

## 2021-07-06 PROCEDURE — 36415 COLL VENOUS BLD VENIPUNCTURE: CPT

## 2021-07-06 PROCEDURE — 97116 GAIT TRAINING THERAPY: CPT

## 2021-07-06 PROCEDURE — A9270 NON-COVERED ITEM OR SERVICE: HCPCS | Performed by: STUDENT IN AN ORGANIZED HEALTH CARE EDUCATION/TRAINING PROGRAM

## 2021-07-06 PROCEDURE — 80048 BASIC METABOLIC PNL TOTAL CA: CPT

## 2021-07-06 PROCEDURE — A9270 NON-COVERED ITEM OR SERVICE: HCPCS | Performed by: INTERNAL MEDICINE

## 2021-07-06 PROCEDURE — 700102 HCHG RX REV CODE 250 W/ 637 OVERRIDE(OP): Performed by: STUDENT IN AN ORGANIZED HEALTH CARE EDUCATION/TRAINING PROGRAM

## 2021-07-06 PROCEDURE — 85025 COMPLETE CBC W/AUTO DIFF WBC: CPT

## 2021-07-06 PROCEDURE — 97535 SELF CARE MNGMENT TRAINING: CPT

## 2021-07-06 PROCEDURE — 700111 HCHG RX REV CODE 636 W/ 250 OVERRIDE (IP): Performed by: INTERNAL MEDICINE

## 2021-07-06 PROCEDURE — 700105 HCHG RX REV CODE 258: Performed by: INTERNAL MEDICINE

## 2021-07-06 RX ADMIN — CEFTRIAXONE 1 G: 10 INJECTION, POWDER, FOR SOLUTION INTRAVENOUS at 06:01

## 2021-07-06 RX ADMIN — Medication 100 MG: at 06:00

## 2021-07-06 RX ADMIN — METFORMIN HYDROCHLORIDE 1000 MG: 500 TABLET ORAL at 07:43

## 2021-07-06 RX ADMIN — SODIUM CHLORIDE: 9 INJECTION, SOLUTION INTRAVENOUS at 16:41

## 2021-07-06 RX ADMIN — THERA TABS 1 TABLET: TAB at 06:01

## 2021-07-06 RX ADMIN — ENOXAPARIN SODIUM 30 MG: 30 INJECTION SUBCUTANEOUS at 06:01

## 2021-07-06 RX ADMIN — FINASTERIDE 5 MG: 5 TABLET, FILM COATED ORAL at 06:00

## 2021-07-06 RX ADMIN — METFORMIN HYDROCHLORIDE 1000 MG: 500 TABLET ORAL at 16:41

## 2021-07-06 RX ADMIN — SODIUM CHLORIDE: 9 INJECTION, SOLUTION INTRAVENOUS at 06:01

## 2021-07-06 ASSESSMENT — COGNITIVE AND FUNCTIONAL STATUS - GENERAL
CLIMB 3 TO 5 STEPS WITH RAILING: A LITTLE
SUGGESTED CMS G CODE MODIFIER DAILY ACTIVITY: CJ
TURNING FROM BACK TO SIDE WHILE IN FLAT BAD: A LOT
WALKING IN HOSPITAL ROOM: A LITTLE
DAILY ACTIVITIY SCORE: 20
MOBILITY SCORE: 15
TOILETING: A LITTLE
HELP NEEDED FOR BATHING: A LITTLE
STANDING UP FROM CHAIR USING ARMS: A LITTLE
MOVING TO AND FROM BED TO CHAIR: A LOT
DRESSING REGULAR LOWER BODY CLOTHING: A LOT
SUGGESTED CMS G CODE MODIFIER MOBILITY: CK
MOVING FROM LYING ON BACK TO SITTING ON SIDE OF FLAT BED: A LOT

## 2021-07-06 ASSESSMENT — GAIT ASSESSMENTS
DISTANCE (FEET): 350
GAIT LEVEL OF ASSIST: SUPERVISED
DEVIATION: BRADYKINETIC
DISTANCE (FEET): 20
ASSISTIVE DEVICE: 4 WHEEL WALKER

## 2021-07-06 ASSESSMENT — PAIN DESCRIPTION - PAIN TYPE
TYPE: ACUTE PAIN
TYPE: ACUTE PAIN

## 2021-07-06 NOTE — PROGRESS NOTES
[facility] Medicine Daily Progress Note    Date of Service  7/6/2021    Chief Complaint  75 y.o. male admitted 6/19/2021 due to a ground-level fall, and right hip pain.  He is homeless, and has a history of BPH with a chronic indwelling Clement (follows outpatient urology monthly).     Hospital Course  On admission, patient was noted to have SHAHID, hyperglycemia.  Right hip x-ray showed no evidence of fracture. Hemoglobin A1c was 14. UA showed packed wbcs and yeast.      Discussed with Dr. Sherwood on 6/29/2021.  Patient had his Clement changed as an outpatient on 5/20/2021, last changed on 6/30/2021.  Afebrile, hemodynamically stable.     7/1/2021: WBCs are elevated today (11.5).  Afebrile and hemodynamically stable.  UA ordered.      7/2/2021: Further increase in WBCs (13.4).  Afebrile.  UA was positive. Blood and urine cultures pending. Chest x-ray ordered. Ceftriaxone was empirically started.      7/3/2021: Chest x-ray reported COPD without acute cardiopulmonary abnormality.  Denies shortness of breath today, saturating 99% on room air. WBCs are 12.2. Afebrile.        Interval Problem Update  Leukocytosis has resolved, wbcs are 7.0 today, afebrile.  Urine culture from 7/1/2021 grew Enterococcus faecalis. Completed 5 day course of Ceftriaxone.      Consultants/Specialty  Diabetic educator     Code Status  Full Code     Disposition  Social work assisting with placement in group home vs. SNF.  Covid and QuantiFERON were negative     Review of Systems  Review of Systems   Constitutional: Negative.    HENT: Negative.    Eyes: Negative.    Respiratory: Negative.    Cardiovascular: Negative.    Gastrointestinal: Negative.    Genitourinary: Negative.    Musculoskeletal: Improved right hip pin  Skin: Negative.    Neurological: Negative.    Endo/Heme/Allergies: Negative.    Psychiatric/Behavioral: Negative.         Physical Exam  Temp:  [36.6 °C (97.9 °F)-37 °C (98.6 °F)] 36.6 °C (97.9 °F)  Pulse:  [60-67] 60  Resp:  [16] 16  BP:  (114-120)/(43-57) 120/49  SpO2:  [97 %-99 %] 98 %      Physical Exam  HENT:      Head: Normocephalic.      Nose: Nose normal.      Mouth/Throat:      Mouth: Mucous membranes are moist.   Eyes:      Pupils: Pupils are equal, round, and reactive to light.   Cardiovascular:      Rate and Rhythm: Normal rate.   Pulmonary:      Effort: Pulmonary effort is normal.   Abdominal:      Palpations: Abdomen is soft.   Genitourinary:     Comments: Chronic Clement in place  Musculoskeletal:      Cervical back: Normal range of motion.   Skin:     Comments: Warm  Neurological:      General: No focal deficit present.      Mental Status: He is alert.   Psychiatric:         Behavior: Behavior normal.        Fluids    Intake/Output Summary (Last 24 hours) at 7/6/2021 1131  Last data filed at 7/6/2021 0539  Gross per 24 hour   Intake --   Output 2250 ml   Net -2250 ml       Laboratory  Recent Labs     07/04/21  0031 07/05/21  0054 07/06/21  0044   WBC 9.2 8.5 7.0   RBC 3.60* 3.63* 3.29*   HEMOGLOBIN 9.9* 9.9* 9.1*   HEMATOCRIT 31.3* 31.3* 28.1*   MCV 86.9 86.2 85.4   MCH 27.5 27.3 27.7   MCHC 31.6* 31.6* 32.4*   RDW 47.7 46.7 46.7   PLATELETCT 295 311 270   MPV 8.7* 8.6* 8.6*     Recent Labs     07/04/21  0031 07/05/21  0054 07/06/21  0044   SODIUM 139 138 135   POTASSIUM 4.1 4.0 3.8   CHLORIDE 107 105 105   CO2 23 24 22   GLUCOSE 129* 155* 116*   BUN 18 15 14   CREATININE 0.84 0.79 0.83   CALCIUM 8.4* 8.6 8.3*                   Imaging  DX-CHEST-PORTABLE (1 VIEW)   Final Result      COPD without acute cardiopulmonary abnormality.      DX-FEMUR-2+ RIGHT   Final Result      No radiographic evidence of acute traumatic injury.      Moderate right hip osteoarthritis      DX-PELVIS-1 OR 2 VIEWS   Final Result      No radiographic evidence of acute displaced fracture      Moderate right, mild left hip osteoarthritis           Assessment/Plan  * Contusion of right hip- (present on admission)  Assessment & Plan  Pain has improved. Pelvic x-ray  was negative for fracture. PT/OT recommend postacute placement.  Monitor    Frequent falls- (present on admission)  Assessment & Plan  Frail, poor intake with uncontrolled diabetes.  PT/OT recommend postacute placement.    Type 2 diabetes mellitus with hyperglycemia, with long-term current use of insulin ([facility])- (present on admission)  Assessment & Plan  Poorly controlled. Hemoglobin A1c was 14.3 on 2021.  Patient was unlikely taking his Metformin at home.  He does not seem as if he will be able to learn how to take insulin himself.  His insulin requirements have been low.  Metformin was increased to 1000 mg twice daily. Long-acting insulin was discontinued due to episodes of hypoglycemia. Diabetic educator was consulted.  Blood glucose has been stable.  Continue diabetic diet and monitor closely.    Benign prostatic hyperplasia with urinary obstruction- (present on admission)  Assessment & Plan  Chronic indwelling Clement.  Follows outpatient urology every month.  Tamsulosin was discontinued due to frequent falls and prerenal SHAHID, with concerns for orthostasis.  On finasteride. Discussed with Dr. Sherwood on 2021, Clement was changed on 2021, changed again on 2021    Frailty syndrome in geriatric patient- (present on admission)  Assessment & Plan  BMI 17.2.  Patient is homeless.  His wife  this year. Case management is assisting in postacute placement, with goals of eventual group home.  Monitor    Leukocytosis  Assessment & Plan  Noted to have leukocytosis on 2021.  Urine culture from 2021 grew Enterococcus faecalis. Completed 5-day course of ceftriaxone.  Leukocytosis has resolved.  Monitor    SHAHID (acute kidney injury) (HCC)- (present on admission)  Assessment & Plan  Resolved.  Suspected was due to UTI.  Avoid nephrotoxins    Moderate episode of recurrent major depressive disorder (HCC)- (present on admission)  Assessment & Plan  Reports losing home and his wife  this  year    Normocytic anemia- (present on admission)  Assessment & Plan  Studies consistent with anemia of chronic disease.  No evidence of bleeding.  Monitor       VTE prophylaxis: enoxaparin ppx    I have performed a physical exam and reviewed and updated ROS and Plan today (7/6/2021). In review of yesterday's note (7/5/2021), there are no changes except as documented above.

## 2021-07-06 NOTE — DISCHARGE PLANNING
Anticipated Discharge Disposition: Home to     Action: LSW met with pt at bedside to call pt's bank, [employer], to see if it were possible to transfer funds. Pt's bag with belongings in bed bug bag and is unable to open currently, if opened needs to be opened outside.     [employer] would not transfer funds to group home, and would not allow the pt to order a new card delivered to the address due to inability to verify pt. Pt was with LSW in the room and on the phone with the bank, pt gave SSN, and  but still they are unable to obtain new card unless pt physically shows up to a bank or the pt gives the old card number. The only way we will obtain that information is to open pt's sealed bag with belongings. LSW discussed this with bedside RN, treatment team to decide best option for opening bag.     Barriers to Discharge: Unable to pay [facility] due to access to funds    Plan: LSW to follow up with leadership

## 2021-07-06 NOTE — CARE PLAN
The patient is Stable - Low risk of patient condition declining or worsening    Shift Goals  Clinical Goals: Patient will remain free from falls   Patient Goals: Patient will remain free from falls      Progress made toward(s) clinical / shift goals:  Patient calls appropriately for assistance, bed alarm active and sounding, bed locked and in lowest position, call light and personal belongings within reach. Patient has not had a fall this admit.       Problem: Pain - Standard  Goal: Alleviation of pain or a reduction in pain to the patient’s comfort goal  Outcome: Progressing     Problem: Knowledge Deficit - Standard  Goal: Patient and family/care givers will demonstrate understanding of plan of care, disease process/condition, diagnostic tests and medications  Outcome: Progressing     Problem: Skin Integrity  Goal: Skin integrity is maintained or improved  Outcome: Progressing     Problem: Mobility  Goal: Patient's capacity to carry out activities will improve  Outcome: Progressing     Problem: Discharge Barriers/Planning  Goal: Patient's continuum of care needs are met  Outcome: Progressing     Problem: Wound/ / Incision Healing  Goal: Patient's wound/surgical incision will decrease in size and heals properly  Outcome: Progressing       Patient is not progressing towards the following goals:    Problem: Bowel Elimination  Goal: Establish and maintain regular bowel function  Outcome: Not Progressing  Patient reports episode of watery stool today x1.

## 2021-07-06 NOTE — THERAPY
Physical Therapy   Daily Treatment     Patient Name: Hugh Núñez  Age:  75 y.o., Sex:  male  Medical Record #: [id number]  Today's Date: 7/6/2021     Precautions: Fall Risk    Assessment  Pt seen for PT treatment session. Pt demonstrated ambulation with 4WW with SPV, no LOB. Pt reports R hip pain still present but improving daily. Pt appears at his new functional baseline and would benefit from  setting with 24/7 supervision and assist as needed. Encouraged continued ambulation with nursing to prevent deconditioning. Patient will no longer be actively followed for physical therapy services at this time, however may be seen if requested by physician for 1 more visit within 30 days to address any discharge or equipment needs.       Plan  Discharge secondary to goals met.  DC Equipment Recommendations: None  Discharge Recommendations: Other - group home setting with 24/7 assist for safety       07/06/21 0946   Cognition    Speech/ Communication Hard of Hearing   Level of Consciousness Alert   Comments pleasant and cooperative   Balance   Sitting Balance (Static) Fair +   Sitting Balance (Dynamic) Fair   Standing Balance (Static) Fair   Standing Balance (Dynamic) Fair   Weight Shift Sitting Fair   Weight Shift Standing Fair   Comments standing with 4WW   Gait Analysis   Gait Level Of Assist Supervised   Assistive Device 4 Wheel Walker   Distance (Feet) 350   Deviation Bradykinetic (kyphotic, steady pace)   Weight Bearing Status no restrictions   Comments no LOB   Bed Mobility    Supine to Sit Modified Independent   Sit to Supine Modified Independent   Functional Mobility   Sit to Stand Supervised   Short Term Goals    Short Term Goal # 1 Pt will perform supine <> sit with HOB flat, no use of rails with SPV in 6 visits in order to progress toward independence with functional mobility   Goal Outcome # 1 Goal met   Short Term Goal # 2 Pt will ambulate >150 ft with 4WW and SPV within 6 visits in order to progress  toward community distance ambulation   Goal Outcome # 2 Goal met   Short Term Goal # 3 Pt will ascend/descend 1 step with LRAD and SPV within 6 visits in order to negotiate curbs and community environment   Goal Outcome # 3 functionally capable, NT

## 2021-07-06 NOTE — CARE PLAN
The patient is Stable - Low risk of patient condition declining or worsening    Shift Goals  Clinical Goals: Remain free from falls  Patient Goals: Pain control  Family Goals: N/A    Progress made toward(s) clinical / shift goals:        Problem: Pain - Standard  Goal: Alleviation of pain or a reduction in pain to the patient’s comfort goal  Outcome: Progressing  Note: Pt ambulated with PT, states he has some R hip pain but it is improving. Declines medication intervention at this time.      Problem: Mobility  Goal: Patient's capacity to carry out activities will improve  Outcome: Progressing  Note: Pt is A&Ox4, up x1 assist with fww. Bed alarm on for safety, pt calls appropriately for assistance.

## 2021-07-06 NOTE — THERAPY
Occupational Therapy  Daily Treatment     Patient Name: Hugh Núñez  Age:  75 y.o., Sex:  male  Medical Record #: [id number]  Today's Date: 7/6/2021     Precautions  Precautions: Fall Risk  Comments: R hip pain    Assessment    Pt progressing towards meeting OT goals. He was able to complete standing grooming/hygiene with spv, toilet transfer with spv, and LB dressing with modA. Pt would benefit from training using reacher and sock aid for LB dressing in next OT session.     Plan    Continue current treatment plan.    DC Equipment Recommendations: Unable to determine at this time  Discharge Recommendations: Other - Pt plans to discharge to  that is able to provide assist for ADLs as needed.    Subjective    Pt alert, pleasant, and cooperative.      Objective       07/06/21 1144   Cognition    Cognition / Consciousness X   Speech/ Communication Hard of Hearing   Level of Consciousness Alert   Safety Awareness Impaired   New Learning Impaired   Comments pleasant, cooperative, needs encouragement to learn new ways to complete ADLs   Balance   Comments standing with 4WW, no overt LOB   Bed Mobility    Supine to Sit Modified Independent   Comments slow/effortful bed mobility but able to complete with HOB flat and use of bed rails   Activities of Daily Living   Grooming Supervision;Standing  (mouthwash and washing face at sink)   Lower Body Dressing Moderate Assist  (socks)   Toileting   (declined need, lazo in place)   Comments Attempted education LB dressing techniques but pt not very receptive to new learning, states he would be receptive to trying sock aid and reacher next session   Functional Mobility   Sit to Stand Supervised   Toilet Transfers Supervised   Transfer Method Stand Step   Mobility in room/bathroom with 4WW   Distance (Feet) 20   # of Times Distance was Traveled 2   Activity Tolerance   Comments limited by pain/fatigue   Patient / Family Goals   Patient / Family Goal #1 To get more help   Goal  #1 Outcome Goal met   Short Term Goals   Short Term Goal # 1 Pt will complete ADL transfers with supervision   Goal Outcome # 1 Goal met   Short Term Goal # 2 Pt will complete LB dressing with supervision   Goal Outcome # 2 Progressing as expected   Short Term Goal # 3 Pt will complete toileting with supervision   Goal Outcome # 3 Progressing as expected

## 2021-07-07 PROCEDURE — A9270 NON-COVERED ITEM OR SERVICE: HCPCS | Performed by: INTERNAL MEDICINE

## 2021-07-07 PROCEDURE — 770004 HCHG ROOM/CARE - ONCOLOGY PRIVATE *

## 2021-07-07 PROCEDURE — A9270 NON-COVERED ITEM OR SERVICE: HCPCS | Performed by: STUDENT IN AN ORGANIZED HEALTH CARE EDUCATION/TRAINING PROGRAM

## 2021-07-07 PROCEDURE — 700102 HCHG RX REV CODE 250 W/ 637 OVERRIDE(OP): Performed by: INTERNAL MEDICINE

## 2021-07-07 PROCEDURE — 700111 HCHG RX REV CODE 636 W/ 250 OVERRIDE (IP): Performed by: STUDENT IN AN ORGANIZED HEALTH CARE EDUCATION/TRAINING PROGRAM

## 2021-07-07 PROCEDURE — 700105 HCHG RX REV CODE 258: Performed by: INTERNAL MEDICINE

## 2021-07-07 PROCEDURE — 700102 HCHG RX REV CODE 250 W/ 637 OVERRIDE(OP): Performed by: STUDENT IN AN ORGANIZED HEALTH CARE EDUCATION/TRAINING PROGRAM

## 2021-07-07 PROCEDURE — 99231 SBSQ HOSP IP/OBS SF/LOW 25: CPT | Performed by: INTERNAL MEDICINE

## 2021-07-07 RX ADMIN — FINASTERIDE 5 MG: 5 TABLET, FILM COATED ORAL at 05:56

## 2021-07-07 RX ADMIN — Medication 100 MG: at 05:56

## 2021-07-07 RX ADMIN — DOCUSATE SODIUM 50 MG AND SENNOSIDES 8.6 MG 2 TABLET: 8.6; 5 TABLET, FILM COATED ORAL at 05:56

## 2021-07-07 RX ADMIN — METFORMIN HYDROCHLORIDE 1000 MG: 500 TABLET ORAL at 16:46

## 2021-07-07 RX ADMIN — ENOXAPARIN SODIUM 30 MG: 30 INJECTION SUBCUTANEOUS at 05:57

## 2021-07-07 RX ADMIN — THERA TABS 1 TABLET: TAB at 05:56

## 2021-07-07 RX ADMIN — SODIUM CHLORIDE: 9 INJECTION, SOLUTION INTRAVENOUS at 08:20

## 2021-07-07 RX ADMIN — DOCUSATE SODIUM 50 MG AND SENNOSIDES 8.6 MG 2 TABLET: 8.6; 5 TABLET, FILM COATED ORAL at 16:47

## 2021-07-07 RX ADMIN — SODIUM CHLORIDE: 9 INJECTION, SOLUTION INTRAVENOUS at 20:20

## 2021-07-07 RX ADMIN — METFORMIN HYDROCHLORIDE 1000 MG: 500 TABLET ORAL at 08:05

## 2021-07-07 ASSESSMENT — PAIN DESCRIPTION - PAIN TYPE
TYPE: ACUTE PAIN
TYPE: ACUTE PAIN

## 2021-07-07 NOTE — CARE PLAN
The patient is Stable - Low risk of patient condition declining or worsening    Shift Goals  Clinical Goals: Remain free from falls  Patient Goals: Find belongings       Progress made toward(s) clinical / shift goals:  Patient calls appropriately for assistance, bed locked and in lowest position, call light and personal belongings within reach, alarms active and sounding. Patient has not fallen this admit.     Problem: Pain - Standard  Goal: Alleviation of pain or a reduction in pain to the patient’s comfort goal  Outcome: Progressing     Problem: Knowledge Deficit - Standard  Goal: Patient and family/care givers will demonstrate understanding of plan of care, disease process/condition, diagnostic tests and medications  Outcome: Progressing     Problem: Skin Integrity  Goal: Skin integrity is maintained or improved  Outcome: Progressing     Problem: Bowel Elimination  Goal: Establish and maintain regular bowel function  Outcome: Progressing     Problem: Mobility  Goal: Patient's capacity to carry out activities will improve  Outcome: Progressing     Problem: Discharge Barriers/Planning  Goal: Patient's continuum of care needs are met  Outcome: Progressing     Problem: Wound/ / Incision Healing  Goal: Patient's wound/surgical incision will decrease in size and heals properly  Outcome: Progressing     Patient is not progressing towards the following goals: Patient's belongings were in corner of room in red biohazard bags x3 r/t code clear called previously in admit. Today it was noticed that these bags were no longer in patient's room and also not found in unit safekeeping. Escalated to appropriate staff per day RN. Patient states all belongings were in these bags, including clothes, wallet w/ID, SS card, bank cards, etc. Patient very anxious and distraught. Provided psychosocial support and active listening.

## 2021-07-07 NOTE — CARE PLAN
The patient is Watcher - Medium risk of patient condition declining or worsening    Shift Goals  Clinical Goals: Establish plan for replacing belongings and establish discharge plan. Progress in bed bug lesion healing.   Patient Goals: Find belongings or replace belongings.   Family Goals: N/A    Progress made toward(s) clinical / shift goals:    Problem: Skin Integrity  Goal: Skin integrity is maintained or improved  7/7/2021 1142 by Christine Parsons R.N.  Outcome: Progressing  7/7/2021 1141 by Christine Parsons RMarcelloN.  Outcome: Progressing     Problem: Discharge Barriers/Planning  Goal: Patient's continuum of care needs are met  Outcome: Progressing

## 2021-07-07 NOTE — PROGRESS NOTES
[facility] Medicine Daily Progress Note    Date of Service  7/7/2021      Chief Complaint  75 y.o. male admitted 6/19/2021 due to a ground-level fall, and right hip pain.  He is homeless, and has a history of BPH with a chronic indwelling Clement (follows outpatient urology monthly).     Hospital Course  On admission, patient was noted to have SHAHID, hyperglycemia.  Right hip x-ray showed no evidence of fracture. Hemoglobin A1c was 14. UA showed packed wbcs and yeast.      Discussed with Dr. Shewrood on 6/29/2021.  Patient had his Clement changed as an outpatient on 5/20/2021, last changed on 6/30/2021.  Afebrile, hemodynamically stable.     7/1/2021: WBCs are elevated today (11.5).  Afebrile and hemodynamically stable.  UA ordered.      7/2/2021: Further increase in WBCs (13.4).  Afebrile.  UA was positive. Blood and urine cultures pending. Chest x-ray ordered. Ceftriaxone was empirically started.      7/3/2021: Chest x-ray reported COPD without acute cardiopulmonary abnormality.  Denies shortness of breath today, saturating 99% on room air. WBCs are 12.2. Afebrile.        Interval Problem Update  Leukocytosis has resolved, wbcs were 7.0 today, afebrile.  Urine culture from 7/1/2021 grew Enterococcus faecalis. Completed 5 day course of Ceftriaxone. Medically cleared for discharge.  Awaiting placement     Consultants/Specialty  Diabetic educator     Code Status  Full Code     Disposition  Social work assisting with placement in group home vs. SNF.  Covid and QuantiFERON were negative     Review of Systems  Review of Systems   Constitutional: Negative.    HENT: Negative.    Eyes: Negative.    Respiratory: Negative.    Cardiovascular: Negative.    Gastrointestinal: Negative.    Genitourinary: Negative.    Musculoskeletal: Improved right hip pin  Skin: Negative.    Neurological: Negative.    Endo/Heme/Allergies: Negative.    Psychiatric/Behavioral: Negative.         Physical Exam  Temp:  [36.7 °C (98 °F)-36.9 °C (98.4 °F)] 36.9  °C (98.4 °F)  Pulse:  [64-77] 74  Resp:  [17-18] 17  BP: (127-156)/(45-76) 127/45  SpO2:  [94 %-98 %] 96 %    Physical Exam  HENT:      Head: Normocephalic.      Nose: Nose normal.      Mouth/Throat:      Mouth: Mucous membranes are moist.   Eyes:      Pupils: Pupils are equal, round, and reactive to light.   Cardiovascular:      Rate and Rhythm: Normal rate.   Pulmonary:      Effort: Pulmonary effort is normal.   Abdominal:      Palpations: Abdomen is soft.   Genitourinary:     Comments: Chronic Clement in place  Musculoskeletal:      Cervical back: Normal range of motion.   Skin:     Comments: Warm  Neurological:      General: No focal deficit present.      Mental Status: He is alert.   Psychiatric:         Behavior: Behavior normal.        Fluids    Intake/Output Summary (Last 24 hours) at 7/7/2021 1428  Last data filed at 7/7/2021 0412  Gross per 24 hour   Intake --   Output 825 ml   Net -825 ml       Laboratory  Recent Labs     07/05/21  0054 07/06/21  0044   WBC 8.5 7.0   RBC 3.63* 3.29*   HEMOGLOBIN 9.9* 9.1*   HEMATOCRIT 31.3* 28.1*   MCV 86.2 85.4   MCH 27.3 27.7   MCHC 31.6* 32.4*   RDW 46.7 46.7   PLATELETCT 311 270   MPV 8.6* 8.6*     Recent Labs     07/05/21  0054 07/06/21  0044   SODIUM 138 135   POTASSIUM 4.0 3.8   CHLORIDE 105 105   CO2 24 22   GLUCOSE 155* 116*   BUN 15 14   CREATININE 0.79 0.83   CALCIUM 8.6 8.3*                   Imaging  DX-CHEST-PORTABLE (1 VIEW)   Final Result      COPD without acute cardiopulmonary abnormality.      DX-FEMUR-2+ RIGHT   Final Result      No radiographic evidence of acute traumatic injury.      Moderate right hip osteoarthritis      DX-PELVIS-1 OR 2 VIEWS   Final Result      No radiographic evidence of acute displaced fracture      Moderate right, mild left hip osteoarthritis           Assessment/Plan  * Contusion of right hip- (present on admission)  Assessment & Plan  Pain has improved. Pelvic x-ray was negative for fracture. PT/OT recommend postacute placement.   Monitor    Frequent falls- (present on admission)  Assessment & Plan  Frail, poor intake with uncontrolled diabetes.  PT/OT recommend postacute placement.    Type 2 diabetes mellitus with hyperglycemia, with long-term current use of insulin ([facility])- (present on admission)  Assessment & Plan  Poorly controlled. Hemoglobin A1c was 14.3 on 2021.  Patient was unlikely taking his Metformin at home.  He does not seem as if he will be able to learn how to take insulin himself.  His insulin requirements have been low.  Metformin was increased to 1000 mg twice daily. Long-acting insulin was discontinued due to episodes of hypoglycemia. Diabetic educator was consulted.  Blood glucose has been stable.  Continue diabetic diet and monitor closely.    Benign prostatic hyperplasia with urinary obstruction- (present on admission)  Assessment & Plan  Chronic indwelling Clement.  Follows outpatient urology every month.  Tamsulosin was discontinued due to frequent falls and prerenal SHAHID, with concerns for orthostasis.  On finasteride. Discussed with Dr. Sherwood on 2021, Clement was changed on 2021, changed again on 2021    Frailty syndrome in geriatric patient- (present on admission)  Assessment & Plan  BMI 17.2.  Patient is homeless.  His wife  this year. Case management is assisting in postacute placement, with goals of eventual group home.  Monitor    Leukocytosis  Assessment & Plan  Noted to have leukocytosis on 2021.  Urine culture from 2021 grew Enterococcus faecalis. Completed 5-day course of ceftriaxone.  Leukocytosis has resolved.  Monitor    SHAHID (acute kidney injury) (HCC)- (present on admission)  Assessment & Plan  Resolved.  Suspected was due to UTI.  Avoid nephrotoxins    Moderate episode of recurrent major depressive disorder (HCC)- (present on admission)  Assessment & Plan  Reports losing home and his wife  this year    Normocytic anemia- (present on admission)  Assessment & Plan  Studies  consistent with anemia of chronic disease.  No evidence of bleeding.  Monitor       VTE prophylaxis: enoxaparin ppx    I have performed a physical exam and reviewed and updated ROS and Plan today (7/7/2021). In review of yesterday's note (7/6/2021), there are no changes except as documented above.

## 2021-07-08 LAB
ANION GAP SERPL CALC-SCNC: 10 MMOL/L (ref 7–16)
BASOPHILS # BLD AUTO: 0.6 % (ref 0–1.8)
BASOPHILS # BLD: 0.05 K/UL (ref 0–0.12)
BUN SERPL-MCNC: 16 MG/DL (ref 8–22)
CALCIUM SERPL-MCNC: 8.6 MG/DL (ref 8.5–10.5)
CHLORIDE SERPL-SCNC: 105 MMOL/L (ref 96–112)
CO2 SERPL-SCNC: 25 MMOL/L (ref 20–33)
CREAT SERPL-MCNC: 0.82 MG/DL (ref 0.5–1.4)
EOSINOPHIL # BLD AUTO: 0.2 K/UL (ref 0–0.51)
EOSINOPHIL NFR BLD: 2.4 % (ref 0–6.9)
ERYTHROCYTE [DISTWIDTH] IN BLOOD BY AUTOMATED COUNT: 47.8 FL (ref 35.9–50)
GLUCOSE SERPL-MCNC: 126 MG/DL (ref 65–99)
HCT VFR BLD AUTO: 30.6 % (ref 42–52)
HGB BLD-MCNC: 9.6 G/DL (ref 14–18)
IMM GRANULOCYTES # BLD AUTO: 0.08 K/UL (ref 0–0.11)
IMM GRANULOCYTES NFR BLD AUTO: 1 % (ref 0–0.9)
LYMPHOCYTES # BLD AUTO: 1.75 K/UL (ref 1–4.8)
LYMPHOCYTES NFR BLD: 21 % (ref 22–41)
MCH RBC QN AUTO: 27.3 PG (ref 27–33)
MCHC RBC AUTO-ENTMCNC: 31.4 G/DL (ref 33.7–35.3)
MCV RBC AUTO: 86.9 FL (ref 81.4–97.8)
MONOCYTES # BLD AUTO: 0.63 K/UL (ref 0–0.85)
MONOCYTES NFR BLD AUTO: 7.6 % (ref 0–13.4)
NEUTROPHILS # BLD AUTO: 5.62 K/UL (ref 1.82–7.42)
NEUTROPHILS NFR BLD: 67.4 % (ref 44–72)
NRBC # BLD AUTO: 0 K/UL
NRBC BLD-RTO: 0 /100 WBC
PLATELET # BLD AUTO: 295 K/UL (ref 164–446)
PMV BLD AUTO: 8.8 FL (ref 9–12.9)
POTASSIUM SERPL-SCNC: 3.9 MMOL/L (ref 3.6–5.5)
RBC # BLD AUTO: 3.52 M/UL (ref 4.7–6.1)
SODIUM SERPL-SCNC: 140 MMOL/L (ref 135–145)
WBC # BLD AUTO: 8.3 K/UL (ref 4.8–10.8)

## 2021-07-08 PROCEDURE — 97535 SELF CARE MNGMENT TRAINING: CPT

## 2021-07-08 PROCEDURE — 700102 HCHG RX REV CODE 250 W/ 637 OVERRIDE(OP): Performed by: INTERNAL MEDICINE

## 2021-07-08 PROCEDURE — 700102 HCHG RX REV CODE 250 W/ 637 OVERRIDE(OP): Performed by: STUDENT IN AN ORGANIZED HEALTH CARE EDUCATION/TRAINING PROGRAM

## 2021-07-08 PROCEDURE — A9270 NON-COVERED ITEM OR SERVICE: HCPCS | Performed by: STUDENT IN AN ORGANIZED HEALTH CARE EDUCATION/TRAINING PROGRAM

## 2021-07-08 PROCEDURE — 770004 HCHG ROOM/CARE - ONCOLOGY PRIVATE *

## 2021-07-08 PROCEDURE — 99231 SBSQ HOSP IP/OBS SF/LOW 25: CPT | Performed by: INTERNAL MEDICINE

## 2021-07-08 PROCEDURE — 700105 HCHG RX REV CODE 258: Performed by: INTERNAL MEDICINE

## 2021-07-08 PROCEDURE — 36415 COLL VENOUS BLD VENIPUNCTURE: CPT

## 2021-07-08 PROCEDURE — 700111 HCHG RX REV CODE 636 W/ 250 OVERRIDE (IP): Performed by: STUDENT IN AN ORGANIZED HEALTH CARE EDUCATION/TRAINING PROGRAM

## 2021-07-08 PROCEDURE — 85025 COMPLETE CBC W/AUTO DIFF WBC: CPT

## 2021-07-08 PROCEDURE — A9270 NON-COVERED ITEM OR SERVICE: HCPCS | Performed by: INTERNAL MEDICINE

## 2021-07-08 PROCEDURE — 80048 BASIC METABOLIC PNL TOTAL CA: CPT

## 2021-07-08 RX ADMIN — ACETAMINOPHEN 1000 MG: 500 TABLET ORAL at 05:42

## 2021-07-08 RX ADMIN — SODIUM CHLORIDE: 9 INJECTION, SOLUTION INTRAVENOUS at 21:42

## 2021-07-08 RX ADMIN — METFORMIN HYDROCHLORIDE 1000 MG: 500 TABLET ORAL at 09:18

## 2021-07-08 RX ADMIN — THERA TABS 1 TABLET: TAB at 05:42

## 2021-07-08 RX ADMIN — Medication 100 MG: at 05:42

## 2021-07-08 RX ADMIN — ENOXAPARIN SODIUM 30 MG: 30 INJECTION SUBCUTANEOUS at 05:42

## 2021-07-08 RX ADMIN — METFORMIN HYDROCHLORIDE 1000 MG: 500 TABLET ORAL at 17:04

## 2021-07-08 RX ADMIN — SODIUM CHLORIDE: 9 INJECTION, SOLUTION INTRAVENOUS at 09:19

## 2021-07-08 RX ADMIN — FINASTERIDE 5 MG: 5 TABLET, FILM COATED ORAL at 05:42

## 2021-07-08 ASSESSMENT — GAIT ASSESSMENTS: DISTANCE (FEET): 250

## 2021-07-08 ASSESSMENT — COGNITIVE AND FUNCTIONAL STATUS - GENERAL
DAILY ACTIVITIY SCORE: 21
SUGGESTED CMS G CODE MODIFIER DAILY ACTIVITY: CJ
DRESSING REGULAR LOWER BODY CLOTHING: A LOT
HELP NEEDED FOR BATHING: A LITTLE

## 2021-07-08 ASSESSMENT — PAIN DESCRIPTION - PAIN TYPE
TYPE: ACUTE PAIN

## 2021-07-08 NOTE — CARE PLAN
"The patient is Stable - Low risk of patient condition declining or worsening    Pt A&Ox4. VS: /68   Pulse 79   Temp 36.4 °C (97.5 °F) (Temporal)   Resp 16   Ht 1.778 m (5' 10\")   Wt 54.2 kg (119 lb 7.8 oz)   SpO2 100%   BMI 17.14 kg/m² . Pt denies pain, n/v, numbness, tingling, SOB and chest pain. PIV patent with no blood return. Clement to gravity. Pt needs met at this time, call light within reach, hourly rounding in effect, and will continue to monitor.       Shift Goals  Clinical Goals: Walk  Patient Goals: Find belongings      Progress made toward(s) clinical / shift goals:    Problem: Pain - Standard  Goal: Alleviation of pain or a reduction in pain to the patient’s comfort goal  Outcome: Progressing     Problem: Knowledge Deficit - Standard  Goal: Patient and family/care givers will demonstrate understanding of plan of care, disease process/condition, diagnostic tests and medications  Outcome: Progressing     Problem: Skin Integrity  Goal: Skin integrity is maintained or improved  Outcome: Progressing     Problem: Mobility  Goal: Patient's capacity to carry out activities will improve  Outcome: Progressing       Patient is not progressing towards the following goals:      "

## 2021-07-08 NOTE — CARE PLAN
The patient is Stable - Low risk of patient condition declining or worsening    Shift Goals  Clinical Goals: Decreased anxiety  Patient Goals: Find belongings    Progress made toward(s) clinical / shift goals:        Problem: Pain - Standard  Goal: Alleviation of pain or a reduction in pain to the patient’s comfort goal  Outcome: Progressing     Problem: Knowledge Deficit - Standard  Goal: Patient and family/care givers will demonstrate understanding of plan of care, disease process/condition, diagnostic tests and medications  Outcome: Progressing     Problem: Skin Integrity  Goal: Skin integrity is maintained or improved  Outcome: Progressing     Problem: Bowel Elimination  Goal: Establish and maintain regular bowel function  Outcome: Progressing     Problem: Mobility  Goal: Patient's capacity to carry out activities will improve  Outcome: Progressing     Problem: Discharge Barriers/Planning  Goal: Patient's continuum of care needs are met  Outcome: Progressing     Problem: Wound/ / Incision Healing  Goal: Patient's wound/surgical incision will decrease in size and heals properly  Outcome: Progressing       Patient is not progressing towards the following goals: Patient extremely anxious and distraught about lost belongings. Frequent reminders needed that we will not DC patient to and unsafe situation or without clothes. Provided psychosocial support and active listening to patient as needed.

## 2021-07-08 NOTE — DISCHARGE PLANNING
SW supervisor called [facility] [phone]    Discussed situation with [facility] staff, Janet  In order to get a new card: Social, , name, verify deposits, and recent checks, etc. They suggest trying again by calling the above number and if SW needs to help, they just need to verbally ask patient if speaking to us is okay.    WISAM updated RN/CM, Ania

## 2021-07-08 NOTE — PROGRESS NOTES
[facility] Medicine Daily Progress Note    Date of Service  7/8/2021    Chief Complaint  75 y.o. male admitted 6/19/2021 due to a ground-level fall, and right hip pain.  He is homeless, and has a history of BPH with a chronic indwelling Clement (follows outpatient urology monthly).     Hospital Course  On admission, patient was noted to have SHAHID, hyperglycemia.  Right hip x-ray showed no evidence of fracture. Hemoglobin A1c was 14. UA showed packed wbcs and yeast.      Discussed with Dr. Sherwood on 6/29/2021.  Patient had his Clement changed as an outpatient on 5/20/2021, last changed on 6/30/2021.  Afebrile, hemodynamically stable.     7/1/2021: WBCs are elevated today (11.5).  Afebrile and hemodynamically stable.  UA ordered.      7/2/2021: Further increase in WBCs (13.4).  Afebrile.  UA was positive. Blood and urine cultures pending. Chest x-ray ordered. Ceftriaxone was empirically started.      7/3/2021: Chest x-ray reported COPD without acute cardiopulmonary abnormality.  Denies shortness of breath today, saturating 99% on room air. WBCs are 12.2. Afebrile.        Interval Problem Update  Leukocytosis has resolved, wbcs were 7.0 today, afebrile.  Urine culture from 7/1/2021 grew Enterococcus faecalis. Completed 5 day course of Ceftriaxone. Medically cleared for discharge.  Awaiting placement     Consultants/Specialty  Diabetic educator     Code Status  Full Code     Disposition  Social work assisting with placement in group home vs. SNF.  Covid and QuantiFERON were negative     Review of Systems  Review of Systems   Constitutional: Negative.    HENT: Negative.    Eyes: Negative.    Respiratory: Negative.    Cardiovascular: Negative.    Gastrointestinal: Negative.    Genitourinary: Negative.    Musculoskeletal: Improved right hip pin  Skin: Negative.    Neurological: Negative.    Endo/Heme/Allergies: Negative.    Psychiatric/Behavioral: Negative.      Physical Exam  Temp:  [36.4 °C (97.5 °F)-37 °C (98.6 °F)] 36.4 °C  (97.5 °F)  Pulse:  [64-79] 79  Resp:  [16-18] 16  BP: ()/(49-68) 141/68  SpO2:  [97 %-100 %] 100 %    Physical Exam  HENT:      Head: Normocephalic.      Nose: Nose normal.      Mouth/Throat:      Mouth: Mucous membranes are moist.   Eyes:      Pupils: Pupils are equal, round, and reactive to light.   Cardiovascular:      Rate and Rhythm: Normal rate.   Pulmonary:      Effort: Pulmonary effort is normal.   Abdominal:      Palpations: Abdomen is soft.   Genitourinary:     Comments: Chronic Clement in place  Musculoskeletal:      Cervical back: Normal range of motion.   Skin:     Comments: Warm  Neurological:      General: No focal deficit present.      Mental Status: He is alert.   Psychiatric:         Behavior: Behavior normal.     Fluids    Intake/Output Summary (Last 24 hours) at 7/8/2021 1350  Last data filed at 7/8/2021 0919  Gross per 24 hour   Intake --   Output 250 ml   Net -250 ml       Laboratory  Recent Labs     07/06/21  0044 07/08/21  0030   WBC 7.0 8.3   RBC 3.29* 3.52*   HEMOGLOBIN 9.1* 9.6*   HEMATOCRIT 28.1* 30.6*   MCV 85.4 86.9   MCH 27.7 27.3   MCHC 32.4* 31.4*   RDW 46.7 47.8   PLATELETCT 270 295   MPV 8.6* 8.8*     Recent Labs     07/06/21  0044 07/08/21  0030   SODIUM 135 140   POTASSIUM 3.8 3.9   CHLORIDE 105 105   CO2 22 25   GLUCOSE 116* 126*   BUN 14 16   CREATININE 0.83 0.82   CALCIUM 8.3* 8.6                   Imaging  DX-CHEST-PORTABLE (1 VIEW)   Final Result      COPD without acute cardiopulmonary abnormality.      DX-FEMUR-2+ RIGHT   Final Result      No radiographic evidence of acute traumatic injury.      Moderate right hip osteoarthritis      DX-PELVIS-1 OR 2 VIEWS   Final Result      No radiographic evidence of acute displaced fracture      Moderate right, mild left hip osteoarthritis           Assessment/Plan  * Contusion of right hip- (present on admission)  Assessment & Plan  Pain has improved. Pelvic x-ray was negative for fracture. PT/OT recommend postacute placement.   Monitor    Frequent falls- (present on admission)  Assessment & Plan  Frail, poor intake with uncontrolled diabetes.  PT/OT recommend postacute placement.    Type 2 diabetes mellitus with hyperglycemia, with long-term current use of insulin ([facility])- (present on admission)  Assessment & Plan  Poorly controlled. Hemoglobin A1c was 14.3 on 2021.  Patient was unlikely taking his Metformin at home.  He does not seem as if he will be able to learn how to take insulin himself.  His insulin requirements have been low.  Metformin was increased to 1000 mg twice daily. Long-acting insulin was discontinued due to episodes of hypoglycemia. Diabetic educator was consulted.  Blood glucose has been stable.  Continue diabetic diet and monitor closely.    Benign prostatic hyperplasia with urinary obstruction- (present on admission)  Assessment & Plan  Chronic indwelling Clement.  Follows outpatient urology every month.  Tamsulosin was discontinued due to frequent falls and prerenal SHAHID, with concerns for orthostasis.  On finasteride. Discussed with Dr. Sherwood on 2021, Clement was changed on 2021, changed again on 2021    Frailty syndrome in geriatric patient- (present on admission)  Assessment & Plan  BMI 17.2.  Patient is homeless.  His wife  this year. Case management is assisting in postacute placement, with goals of eventual group home.  Monitor    Leukocytosis  Assessment & Plan  Noted to have leukocytosis on 2021.  Urine culture from 2021 grew Enterococcus faecalis. Completed 5-day course of ceftriaxone.  Leukocytosis has resolved.  Monitor    SHAHID (acute kidney injury) (HCC)- (present on admission)  Assessment & Plan  Resolved.  Suspected was due to UTI.  Avoid nephrotoxins    Moderate episode of recurrent major depressive disorder (HCC)- (present on admission)  Assessment & Plan  Reports losing home and his wife  this year    Normocytic anemia- (present on admission)  Assessment & Plan  Studies  consistent with anemia of chronic disease.  No evidence of bleeding.  Monitor         VTE prophylaxis: enoxaparin ppx    I have performed a physical exam and reviewed and updated ROS and Plan today (7/8/2021). In review of yesterday's note (7/7/2021), there are no changes except as documented above.

## 2021-07-09 LAB
ANION GAP SERPL CALC-SCNC: 14 MMOL/L (ref 7–16)
BASOPHILS # BLD AUTO: 0.9 % (ref 0–1.8)
BASOPHILS # BLD: 0.07 K/UL (ref 0–0.12)
BUN SERPL-MCNC: 18 MG/DL (ref 8–22)
CALCIUM SERPL-MCNC: 8.3 MG/DL (ref 8.5–10.5)
CHLORIDE SERPL-SCNC: 107 MMOL/L (ref 96–112)
CO2 SERPL-SCNC: 22 MMOL/L (ref 20–33)
CREAT SERPL-MCNC: 0.79 MG/DL (ref 0.5–1.4)
EOSINOPHIL # BLD AUTO: 0.25 K/UL (ref 0–0.51)
EOSINOPHIL NFR BLD: 3.2 % (ref 0–6.9)
ERYTHROCYTE [DISTWIDTH] IN BLOOD BY AUTOMATED COUNT: 49.2 FL (ref 35.9–50)
GLUCOSE SERPL-MCNC: 130 MG/DL (ref 65–99)
HCT VFR BLD AUTO: 28.8 % (ref 42–52)
HGB BLD-MCNC: 9.2 G/DL (ref 14–18)
IMM GRANULOCYTES # BLD AUTO: 0.05 K/UL (ref 0–0.11)
IMM GRANULOCYTES NFR BLD AUTO: 0.6 % (ref 0–0.9)
LYMPHOCYTES # BLD AUTO: 1.78 K/UL (ref 1–4.8)
LYMPHOCYTES NFR BLD: 23.1 % (ref 22–41)
MCH RBC QN AUTO: 27.8 PG (ref 27–33)
MCHC RBC AUTO-ENTMCNC: 31.9 G/DL (ref 33.7–35.3)
MCV RBC AUTO: 87 FL (ref 81.4–97.8)
MONOCYTES # BLD AUTO: 0.63 K/UL (ref 0–0.85)
MONOCYTES NFR BLD AUTO: 8.2 % (ref 0–13.4)
NEUTROPHILS # BLD AUTO: 4.93 K/UL (ref 1.82–7.42)
NEUTROPHILS NFR BLD: 64 % (ref 44–72)
NRBC # BLD AUTO: 0 K/UL
NRBC BLD-RTO: 0 /100 WBC
PLATELET # BLD AUTO: 273 K/UL (ref 164–446)
PMV BLD AUTO: 8.6 FL (ref 9–12.9)
POTASSIUM SERPL-SCNC: 3.9 MMOL/L (ref 3.6–5.5)
RBC # BLD AUTO: 3.31 M/UL (ref 4.7–6.1)
SODIUM SERPL-SCNC: 143 MMOL/L (ref 135–145)
WBC # BLD AUTO: 7.7 K/UL (ref 4.8–10.8)

## 2021-07-09 PROCEDURE — A9270 NON-COVERED ITEM OR SERVICE: HCPCS | Performed by: INTERNAL MEDICINE

## 2021-07-09 PROCEDURE — 36415 COLL VENOUS BLD VENIPUNCTURE: CPT

## 2021-07-09 PROCEDURE — 700111 HCHG RX REV CODE 636 W/ 250 OVERRIDE (IP): Performed by: STUDENT IN AN ORGANIZED HEALTH CARE EDUCATION/TRAINING PROGRAM

## 2021-07-09 PROCEDURE — 80048 BASIC METABOLIC PNL TOTAL CA: CPT

## 2021-07-09 PROCEDURE — 770004 HCHG ROOM/CARE - ONCOLOGY PRIVATE *

## 2021-07-09 PROCEDURE — 99231 SBSQ HOSP IP/OBS SF/LOW 25: CPT | Performed by: INTERNAL MEDICINE

## 2021-07-09 PROCEDURE — 700105 HCHG RX REV CODE 258: Performed by: INTERNAL MEDICINE

## 2021-07-09 PROCEDURE — 85025 COMPLETE CBC W/AUTO DIFF WBC: CPT

## 2021-07-09 PROCEDURE — A9270 NON-COVERED ITEM OR SERVICE: HCPCS | Performed by: STUDENT IN AN ORGANIZED HEALTH CARE EDUCATION/TRAINING PROGRAM

## 2021-07-09 PROCEDURE — 700102 HCHG RX REV CODE 250 W/ 637 OVERRIDE(OP): Performed by: STUDENT IN AN ORGANIZED HEALTH CARE EDUCATION/TRAINING PROGRAM

## 2021-07-09 PROCEDURE — 700102 HCHG RX REV CODE 250 W/ 637 OVERRIDE(OP): Performed by: INTERNAL MEDICINE

## 2021-07-09 RX ADMIN — FINASTERIDE 5 MG: 5 TABLET, FILM COATED ORAL at 05:38

## 2021-07-09 RX ADMIN — METFORMIN HYDROCHLORIDE 1000 MG: 500 TABLET ORAL at 17:26

## 2021-07-09 RX ADMIN — SODIUM CHLORIDE: 9 INJECTION, SOLUTION INTRAVENOUS at 20:23

## 2021-07-09 RX ADMIN — ENOXAPARIN SODIUM 30 MG: 30 INJECTION SUBCUTANEOUS at 05:38

## 2021-07-09 RX ADMIN — THERA TABS 1 TABLET: TAB at 05:38

## 2021-07-09 RX ADMIN — SODIUM CHLORIDE: 9 INJECTION, SOLUTION INTRAVENOUS at 09:45

## 2021-07-09 RX ADMIN — DOCUSATE SODIUM 50 MG AND SENNOSIDES 8.6 MG 2 TABLET: 8.6; 5 TABLET, FILM COATED ORAL at 05:38

## 2021-07-09 RX ADMIN — Medication 100 MG: at 05:38

## 2021-07-09 RX ADMIN — METFORMIN HYDROCHLORIDE 1000 MG: 500 TABLET ORAL at 09:27

## 2021-07-09 NOTE — DISCHARGE PLANNING
Anticipated Discharge Disposition: Group Home     Action: This RN,  spoke with Darvin, supervisor of Case Management, [facility]. I was asked to call Group Home owner from [facility] asking if GH owner would accept two weeks rent with an KIANA from [facility], asking if GH owner would take patient to bank to get a new debit card. This RN,  was informed by [facility] owner from [facility], he cannot do that, (will not take the risk unless he receives two months of rent from patient or [facility]). This RN,  notified  Supervisor Darvin Gutierrez of the above information.    Barriers to Discharge: Patient unable to access his funds, pay GH.     Plan: HCM to follow up with case management leadership, call Rito Albright, P: [phone]  In order to get a new card: Social, , name, verify deposits, and recent checks, etc. They suggest trying again by calling the above number and if SW needs to help, they just need to verbally ask patient if speaking to us is okay.    UPDATE, 1532: This RN,  spoke with [facility] representative with patient's permission. Unless patient has his debit card number, or checking account number they cannot issue another card telephonically. Manager suggested patient go to his branch bank, (on  in [place]) it will be the teller's or 's discretion if they issue a new debit card or not. Total time spent today on this issue was 45 minutes. Darvin, Supervisor of Case Management notified.    Ania Kaba RN,     Ania Kaba RN,

## 2021-07-09 NOTE — PROGRESS NOTES
[facility] Medicine Daily Progress Note    Date of Service  7/9/2021    Chief Complaint  75 y.o. male admitted 6/19/2021 due to a ground-level fall, and right hip pain.  He is homeless, and has a history of BPH with a chronic indwelling Clement (follows outpatient urology monthly).     Hospital Course  On admission, patient was noted to have SHAHID, hyperglycemia.  Right hip x-ray showed no evidence of fracture. Hemoglobin A1c was 14. UA showed packed wbcs and yeast.      Discussed with Dr. Sherwood on 6/29/2021.  Patient had his Clement changed as an outpatient on 5/20/2021, last changed on 6/30/2021.  Afebrile, hemodynamically stable.     7/1/2021: WBCs are elevated today (11.5).  Afebrile and hemodynamically stable.  UA ordered.      7/2/2021: Further increase in WBCs (13.4).  Afebrile.  UA was positive. Blood and urine cultures pending. Chest x-ray ordered. Ceftriaxone was empirically started.      7/3/2021: Chest x-ray reported COPD without acute cardiopulmonary abnormality.  Denies shortness of breath today, saturating 99% on room air. WBCs are 12.2. Afebrile.        Interval Problem Update  Leukocytosis has resolved. Urine culture from 7/1/2021 grew Enterococcus faecalis. Completed 5 day course of Ceftriaxone. Medically cleared for discharge.  Awaiting placement     Consultants/Specialty  Diabetic educator     Code Status  Full Code     Disposition  Social work assisting with placement in group home vs. SNF.  Covid and QuantiFERON were negative     Review of Systems  Review of Systems   Constitutional: Negative.    HENT: Negative.    Eyes: Negative.    Respiratory: Negative.    Cardiovascular: Negative.    Gastrointestinal: Negative.    Genitourinary: Negative.    Musculoskeletal: Improved right hip pin  Skin: Negative.    Neurological: Negative.    Endo/Heme/Allergies: Negative.    Psychiatric/Behavioral: Negative.         Physical Exam  Temp:  [36.9 °C (98.4 °F)-37.1 °C (98.7 °F)] 37.1 °C (98.7 °F)  Pulse:  [61-76]  61  Resp:  [14-18] 14  BP: (102-113)/(46-56) 110/46  SpO2:  [97 %-98 %] 97 %    Physical Exam  HENT:      Head: Normocephalic.      Nose: Nose normal.      Mouth/Throat:      Mouth: Mucous membranes are moist.   Eyes:      Pupils: Pupils are equal, round, and reactive to light.   Cardiovascular:      Rate and Rhythm: Normal rate.   Pulmonary:      Effort: Pulmonary effort is normal.   Abdominal:      Palpations: Abdomen is soft.   Genitourinary:     Comments: Chronic Clement in place  Musculoskeletal:      Cervical back: Normal range of motion.   Skin:     Comments: Warm  Neurological:      General: No focal deficit present.      Mental Status: He is alert.   Psychiatric:         Behavior: Behavior normal.     Fluids    Intake/Output Summary (Last 24 hours) at 7/9/2021 1033  Last data filed at 7/9/2021 1026  Gross per 24 hour   Intake 250 ml   Output 1600 ml   Net -1350 ml       Laboratory  Recent Labs     07/08/21  0030 07/09/21  0029   WBC 8.3 7.7   RBC 3.52* 3.31*   HEMOGLOBIN 9.6* 9.2*   HEMATOCRIT 30.6* 28.8*   MCV 86.9 87.0   MCH 27.3 27.8   MCHC 31.4* 31.9*   RDW 47.8 49.2   PLATELETCT 295 273   MPV 8.8* 8.6*     Recent Labs     07/08/21  0030 07/09/21  0029   SODIUM 140 143   POTASSIUM 3.9 3.9   CHLORIDE 105 107   CO2 25 22   GLUCOSE 126* 130*   BUN 16 18   CREATININE 0.82 0.79   CALCIUM 8.6 8.3*                   Imaging  DX-CHEST-PORTABLE (1 VIEW)   Final Result      COPD without acute cardiopulmonary abnormality.      DX-FEMUR-2+ RIGHT   Final Result      No radiographic evidence of acute traumatic injury.      Moderate right hip osteoarthritis      DX-PELVIS-1 OR 2 VIEWS   Final Result      No radiographic evidence of acute displaced fracture      Moderate right, mild left hip osteoarthritis           Assessment/Plan  * Contusion of right hip- (present on admission)  Assessment & Plan  Pain has improved. Pelvic x-ray was negative for fracture. PT/OT recommend postacute placement.  Monitor    Frequent  falls- (present on admission)  Assessment & Plan  Frail, poor intake with uncontrolled diabetes.  PT/OT recommend postacute placement.    Type 2 diabetes mellitus with hyperglycemia, with long-term current use of insulin (HCC)- (present on admission)  Assessment & Plan  Poorly controlled. Hemoglobin A1c was 14.3 on 2021.  Patient was unlikely taking his Metformin at home.  He does not seem as if he will be able to learn how to take insulin himself.  His insulin requirements have been low.  Metformin was increased to 1000 mg twice daily. Long-acting insulin was discontinued due to episodes of hypoglycemia. Diabetic educator was consulted.  Blood glucose has been stable.  Continue diabetic diet and monitor closely.    Benign prostatic hyperplasia with urinary obstruction- (present on admission)  Assessment & Plan  Chronic indwelling Clement.  Follows outpatient urology every month.  Tamsulosin was discontinued due to frequent falls and prerenal SHAHID, with concerns for orthostasis.  On finasteride. Discussed with Dr. Sherwood on 2021, Clement was changed on 2021, changed again on 2021    Frailty syndrome in geriatric patient- (present on admission)  Assessment & Plan  BMI 17.2.  Patient is homeless.  His wife  this year. Case management is assisting in postacute placement, with goals of eventual group home.  Monitor    Leukocytosis  Assessment & Plan  Noted to have leukocytosis on 2021.  Urine culture from 2021 grew Enterococcus faecalis. Completed 5-day course of ceftriaxone.  Leukocytosis has resolved.  Monitor    SHAHID (acute kidney injury) (HCC)- (present on admission)  Assessment & Plan  Resolved.  Suspected was due to UTI.  Avoid nephrotoxins    Moderate episode of recurrent major depressive disorder (HCC)- (present on admission)  Assessment & Plan  Reports losing home and his wife  this year    Normocytic anemia- (present on admission)  Assessment & Plan  Studies consistent with anemia  of chronic disease.  No evidence of bleeding.  Monitor         VTE prophylaxis: enoxaparin ppx    I have performed a physical exam and reviewed and updated ROS and Plan today (7/9/2021). In review of yesterday's note (7/8/2021), there are no changes except as documented above.

## 2021-07-09 NOTE — CARE PLAN
Problem: Pain - Standard  Goal: Alleviation of pain or a reduction in pain to the patient’s comfort goal  Outcome: Progressing     Problem: Skin Integrity  Goal: Skin integrity is maintained or improved  Outcome: Progressing     The patient is Stable - Low risk of patient condition declining or worsening    Shift Goals  Clinical Goals: safety  Patient Goals: adequate rest  Family Goals: n/a    Pt is A&Ox4. Pt bed is locked and in the lowest position. Call light and personal belongings within reach. Bed alarm on and sounding. Pt sitting at edge of bed for dinner. Pt complaining of 4/10 R hip pain, but declining need for medication. Pt is resting and appears comfortable at this time. Will continue to monitor and intervene as needed.

## 2021-07-09 NOTE — PROGRESS NOTES
Patient is alert and oriented. Patient is very upset that his belonging were taken. Patient is angry. The  was talking to the patient which was very upsetting for the patient. Calmed patient down at this time. Will continue to monitor patient.

## 2021-07-09 NOTE — CARE PLAN
Problem: Pain - Standard  Goal: Alleviation of pain or a reduction in pain to the patient’s comfort goal  Outcome: Progressing     Problem: Knowledge Deficit - Standard  Goal: Patient and family/care givers will demonstrate understanding of plan of care, disease process/condition, diagnostic tests and medications  Outcome: Progressing     Problem: Skin Integrity  Goal: Skin integrity is maintained or improved  Outcome: Progressing     Problem: Bowel Elimination  Goal: Establish and maintain regular bowel function  Outcome: Progressing   The patient is alert and oriented    Shift Goals  Clinical Goals: safety  Patient Goals: adequate rest  Family Goals: n/a    Progress made toward(s) clinical / shift goals: pain control    Patient is not progressing towards the following goals: None

## 2021-07-09 NOTE — THERAPY
"Occupational Therapy  Daily Treatment     Patient Name: Hugh Núñez  Age:  75 y.o., Sex:  male  Medical Record #: [id number]  Today's Date: 7/8/2021     Precautions  Precautions: Fall Risk  Comments: R hip pain    Assessment    Pt seen for OT session focused on LB dressing. He was able to don socks and shoes with Bello using reacher and sock aid. Pt would benefit from 1-2 more sessions to practice but is progressing towards meeting all acute OT goals.     Plan    Continue current treatment plan.    DC Equipment Recommendations: Other (Comments) (reacher and sock aid)  Discharge Recommendations: Other - Pt plans to discharge to . Anticipate that the patient will have no further occupational therapy needs after discharge from the hospital.     Subjective    Pt alert, very pleasant, and cooperative. Pt states, \"I'm very disappointed I'm not getting my stuff back.\"     Objective       07/08/21 1605   Cognition    Cognition / Consciousness X   Speech/ Communication Hard of Hearing   Level of Consciousness Alert   Comments pleasant, cooperative   Other Treatments   Other Treatments Provided Educated on use of reacher and sock aid to don socks and shoes. He would benefit from 1-2 more practice sessions.   Balance   Comments standing with 4WW, no LOB   Bed Mobility    Supine to Sit   (seated EOB upon entry to room)   Sit to Supine   (Left seated EOB)   Activities of Daily Living   Lower Body Dressing Minimal Assist  (socks and shoes with AE)   Functional Mobility   Sit to Stand Supervised   Transfer Method Stand Step   Mobility in room/hallway with 4WW   Distance (Feet) 250   # of Times Distance was Traveled 1   Activity Tolerance   Comments no signs/symptoms or c/o fatigue   Patient / Family Goals   Patient / Family Goal #1 To get more help   Short Term Goals   Short Term Goal # 1 Pt will complete ADL transfers with supervision   Goal Outcome # 1 Goal met   Short Term Goal # 2 Pt will complete LB dressing with " supervision   Goal Outcome # 2 Progressing as expected   Short Term Goal # 3 Pt will complete toileting with supervision   Goal Outcome # 3 Goal met

## 2021-07-10 LAB
ANION GAP SERPL CALC-SCNC: 14 MMOL/L (ref 7–16)
BASOPHILS # BLD AUTO: 0.8 % (ref 0–1.8)
BASOPHILS # BLD: 0.06 K/UL (ref 0–0.12)
BUN SERPL-MCNC: 19 MG/DL (ref 8–22)
CALCIUM SERPL-MCNC: 8.8 MG/DL (ref 8.5–10.5)
CHLORIDE SERPL-SCNC: 103 MMOL/L (ref 96–112)
CO2 SERPL-SCNC: 21 MMOL/L (ref 20–33)
CREAT SERPL-MCNC: 0.83 MG/DL (ref 0.5–1.4)
EOSINOPHIL # BLD AUTO: 0.19 K/UL (ref 0–0.51)
EOSINOPHIL NFR BLD: 2.4 % (ref 0–6.9)
ERYTHROCYTE [DISTWIDTH] IN BLOOD BY AUTOMATED COUNT: 50.5 FL (ref 35.9–50)
GLUCOSE SERPL-MCNC: 119 MG/DL (ref 65–99)
HCT VFR BLD AUTO: 30.3 % (ref 42–52)
HGB BLD-MCNC: 9.4 G/DL (ref 14–18)
IMM GRANULOCYTES # BLD AUTO: 0.06 K/UL (ref 0–0.11)
IMM GRANULOCYTES NFR BLD AUTO: 0.8 % (ref 0–0.9)
LYMPHOCYTES # BLD AUTO: 1.68 K/UL (ref 1–4.8)
LYMPHOCYTES NFR BLD: 21.5 % (ref 22–41)
MCH RBC QN AUTO: 27.6 PG (ref 27–33)
MCHC RBC AUTO-ENTMCNC: 31 G/DL (ref 33.7–35.3)
MCV RBC AUTO: 89.1 FL (ref 81.4–97.8)
MONOCYTES # BLD AUTO: 0.5 K/UL (ref 0–0.85)
MONOCYTES NFR BLD AUTO: 6.4 % (ref 0–13.4)
NEUTROPHILS # BLD AUTO: 5.31 K/UL (ref 1.82–7.42)
NEUTROPHILS NFR BLD: 68.1 % (ref 44–72)
NRBC # BLD AUTO: 0 K/UL
NRBC BLD-RTO: 0 /100 WBC
PLATELET # BLD AUTO: 291 K/UL (ref 164–446)
PMV BLD AUTO: 8.8 FL (ref 9–12.9)
POTASSIUM SERPL-SCNC: 3.9 MMOL/L (ref 3.6–5.5)
RBC # BLD AUTO: 3.4 M/UL (ref 4.7–6.1)
SODIUM SERPL-SCNC: 138 MMOL/L (ref 135–145)
WBC # BLD AUTO: 7.8 K/UL (ref 4.8–10.8)

## 2021-07-10 PROCEDURE — 99231 SBSQ HOSP IP/OBS SF/LOW 25: CPT | Performed by: INTERNAL MEDICINE

## 2021-07-10 PROCEDURE — A9270 NON-COVERED ITEM OR SERVICE: HCPCS | Performed by: STUDENT IN AN ORGANIZED HEALTH CARE EDUCATION/TRAINING PROGRAM

## 2021-07-10 PROCEDURE — A9270 NON-COVERED ITEM OR SERVICE: HCPCS | Performed by: INTERNAL MEDICINE

## 2021-07-10 PROCEDURE — 770004 HCHG ROOM/CARE - ONCOLOGY PRIVATE *

## 2021-07-10 PROCEDURE — 700102 HCHG RX REV CODE 250 W/ 637 OVERRIDE(OP): Performed by: STUDENT IN AN ORGANIZED HEALTH CARE EDUCATION/TRAINING PROGRAM

## 2021-07-10 PROCEDURE — 36415 COLL VENOUS BLD VENIPUNCTURE: CPT

## 2021-07-10 PROCEDURE — 700105 HCHG RX REV CODE 258: Performed by: INTERNAL MEDICINE

## 2021-07-10 PROCEDURE — 700102 HCHG RX REV CODE 250 W/ 637 OVERRIDE(OP): Performed by: INTERNAL MEDICINE

## 2021-07-10 PROCEDURE — 80048 BASIC METABOLIC PNL TOTAL CA: CPT

## 2021-07-10 PROCEDURE — 700111 HCHG RX REV CODE 636 W/ 250 OVERRIDE (IP): Performed by: STUDENT IN AN ORGANIZED HEALTH CARE EDUCATION/TRAINING PROGRAM

## 2021-07-10 PROCEDURE — 85025 COMPLETE CBC W/AUTO DIFF WBC: CPT

## 2021-07-10 RX ADMIN — SODIUM CHLORIDE: 9 INJECTION, SOLUTION INTRAVENOUS at 17:08

## 2021-07-10 RX ADMIN — Medication 100 MG: at 05:06

## 2021-07-10 RX ADMIN — METFORMIN HYDROCHLORIDE 1000 MG: 500 TABLET ORAL at 08:36

## 2021-07-10 RX ADMIN — FINASTERIDE 5 MG: 5 TABLET, FILM COATED ORAL at 05:06

## 2021-07-10 RX ADMIN — ENOXAPARIN SODIUM 30 MG: 30 INJECTION SUBCUTANEOUS at 05:06

## 2021-07-10 RX ADMIN — SODIUM CHLORIDE: 9 INJECTION, SOLUTION INTRAVENOUS at 05:06

## 2021-07-10 RX ADMIN — THERA TABS 1 TABLET: TAB at 05:06

## 2021-07-10 RX ADMIN — METFORMIN HYDROCHLORIDE 1000 MG: 500 TABLET ORAL at 17:07

## 2021-07-10 ASSESSMENT — FIBROSIS 4 INDEX: FIB4 SCORE: 1.14

## 2021-07-10 ASSESSMENT — PAIN DESCRIPTION - PAIN TYPE
TYPE: ACUTE PAIN

## 2021-07-10 NOTE — PROGRESS NOTES
[facility] Medicine Daily Progress Note    Date of Service  7/10/2021    Chief Complaint  75 y.o. male admitted 6/19/2021 due to a ground-level fall, and right hip pain.  He is homeless, and has a history of BPH with a chronic indwelling Clement (follows outpatient urology monthly).     Hospital Course  On admission, patient was noted to have SHAHID, hyperglycemia.  Right hip x-ray showed no evidence of fracture. Hemoglobin A1c was 14. UA showed packed wbcs and yeast.      Discussed with Dr. Sherwood on 6/29/2021.  Patient had his Clement changed as an outpatient on 5/20/2021, last changed on 6/30/2021.  Afebrile, hemodynamically stable.     7/1/2021: WBCs are elevated today (11.5).  Afebrile and hemodynamically stable.  UA ordered.      7/2/2021: Further increase in WBCs (13.4).  Afebrile.  UA was positive. Blood and urine cultures pending. Chest x-ray ordered. Ceftriaxone was empirically started.      7/3/2021: Chest x-ray reported COPD without acute cardiopulmonary abnormality.  Denies shortness of breath today, saturating 99% on room air. WBCs are 12.2. Afebrile.        Interval Problem Update  Leukocytosis has resolved. Urine culture from 7/1/2021 grew Enterococcus faecalis. Completed 5 day course of Ceftriaxone. Medically cleared for discharge.  Awaiting placement     Consultants/Specialty  Diabetic educator     Code Status  Full Code     Disposition  Social work assisting with placement in group home vs. SNF.  Covid and QuantiFERON were negative     Review of Systems  Review of Systems   Constitutional: Negative.    HENT: Negative.    Eyes: Negative.    Respiratory: Negative.    Cardiovascular: Negative.    Gastrointestinal: Negative.    Genitourinary: Negative.    Musculoskeletal: Improved right hip pin  Skin: Negative.    Neurological: Negative.    Endo/Heme/Allergies: Negative.    Psychiatric/Behavioral: Negative.      Physical Exam  Temp:  [36.6 °C (97.8 °F)-36.8 °C (98.2 °F)] 36.6 °C (97.8 °F)  Pulse:  [63-72]  69  Resp:  [16-18] 18  BP: (131-153)/(63-80) 131/63  SpO2:  [95 %-99 %] 95 %       Physical Exam  HENT:      Head: Normocephalic.      Nose: Nose normal.      Mouth/Throat:      Mouth: Mucous membranes are moist.   Eyes:      Pupils: Pupils are equal, round, and reactive to light.   Cardiovascular:      Rate and Rhythm: Normal rate.   Pulmonary:      Effort: Pulmonary effort is normal.   Abdominal:      Palpations: Abdomen is soft.   Genitourinary:     Comments: Chronic Clement in place  Musculoskeletal:      Cervical back: Normal range of motion.   Skin:     Comments: Warm  Neurological:      General: No focal deficit present.      Mental Status: He is alert.   Psychiatric:         Behavior: Behavior normal.     Fluids    Intake/Output Summary (Last 24 hours) at 7/10/2021 0932  Last data filed at 7/9/2021 1756  Gross per 24 hour   Intake 750 ml   Output 1000 ml   Net -250 ml       Laboratory  Recent Labs     07/08/21  0030 07/09/21  0029 07/10/21  0020   WBC 8.3 7.7 7.8   RBC 3.52* 3.31* 3.40*   HEMOGLOBIN 9.6* 9.2* 9.4*   HEMATOCRIT 30.6* 28.8* 30.3*   MCV 86.9 87.0 89.1   MCH 27.3 27.8 27.6   MCHC 31.4* 31.9* 31.0*   RDW 47.8 49.2 50.5*   PLATELETCT 295 273 291   MPV 8.8* 8.6* 8.8*     Recent Labs     07/08/21  0030 07/09/21  0029 07/10/21  0020   SODIUM 140 143 138   POTASSIUM 3.9 3.9 3.9   CHLORIDE 105 107 103   CO2 25 22 21   GLUCOSE 126* 130* 119*   BUN 16 18 19   CREATININE 0.82 0.79 0.83   CALCIUM 8.6 8.3* 8.8                   Imaging  DX-CHEST-PORTABLE (1 VIEW)   Final Result      COPD without acute cardiopulmonary abnormality.      DX-FEMUR-2+ RIGHT   Final Result      No radiographic evidence of acute traumatic injury.      Moderate right hip osteoarthritis      DX-PELVIS-1 OR 2 VIEWS   Final Result      No radiographic evidence of acute displaced fracture      Moderate right, mild left hip osteoarthritis           Assessment/Plan  * Contusion of right hip- (present on admission)  Assessment & Plan  Pain  has improved. Pelvic x-ray was negative for fracture. PT/OT recommend postacute placement.  Monitor    Frequent falls- (present on admission)  Assessment & Plan  Frail, poor intake with uncontrolled diabetes.  PT/OT recommend postacute placement.    Type 2 diabetes mellitus with hyperglycemia, with long-term current use of insulin (HCC)- (present on admission)  Assessment & Plan  Poorly controlled. Hemoglobin A1c was 14.3 on 2021.  Patient was unlikely taking his Metformin at home.  He does not seem as if he will be able to learn how to take insulin himself.  His insulin requirements have been low.  Metformin was increased to 1000 mg twice daily. Long-acting insulin was discontinued due to episodes of hypoglycemia. Diabetic educator was consulted.  Blood glucose has been stable.  Continue diabetic diet and monitor closely.    Benign prostatic hyperplasia with urinary obstruction- (present on admission)  Assessment & Plan  Chronic indwelling Clement.  Follows outpatient urology every month.  Tamsulosin was discontinued due to frequent falls and prerenal SHAHID, with concerns for orthostasis.  On finasteride. Discussed with Dr. Sherwood on 2021, Clement was changed on 2021, changed again on 2021    Frailty syndrome in geriatric patient- (present on admission)  Assessment & Plan  BMI 17.2.  Patient is homeless.  His wife  this year. Case management is assisting in postacute placement, with goals of eventual group home.  Monitor    Leukocytosis  Assessment & Plan  Noted to have leukocytosis on 2021.  Urine culture from 2021 grew Enterococcus faecalis. Completed 5-day course of ceftriaxone.  Leukocytosis has resolved.  Monitor    SHAHID (acute kidney injury) (HCC)- (present on admission)  Assessment & Plan  Resolved.  Suspected was due to UTI.  Avoid nephrotoxins    Moderate episode of recurrent major depressive disorder (HCC)- (present on admission)  Assessment & Plan  Reports losing home and his wife   this year    Normocytic anemia- (present on admission)  Assessment & Plan  Studies consistent with anemia of chronic disease.  No evidence of bleeding.  Monitor         VTE prophylaxis: enoxaparin ppx    I have performed a physical exam and reviewed and updated ROS and Plan today (7/10/2021). In review of yesterday's note (2021), there are no changes except as documented above.

## 2021-07-10 NOTE — CARE PLAN
"The patient is Stable - Low risk of patient condition declining or worsening    Pt A&Ox4. VS: /63   Pulse 69   Temp 36.6 °C (97.8 °F) (Temporal)   Resp 18   Ht 1.778 m (5' 10\")   Wt 53.9 kg (118 lb 13.3 oz)   SpO2 95%   BMI 17.05 kg/m² . Pt denies n/v, numbness, tingling, SOB and chest pain. Pt states some pain in his right hip this morning, declined interventions at this time. Pt ambulated in hallway with FWW. PIV patent, with no blood return. Pt needs met at this time, call light within reach, hourly rounding in effect, and will continue to monitor.       Shift Goals  Clinical Goals: Ambulate hallway  Patient Goals: Rest, belongings, discharge  Family Goals: n/a    Progress made toward(s) clinical / shift goals:    Problem: Pain - Standard  Goal: Alleviation of pain or a reduction in pain to the patient’s comfort goal  Outcome: Progressing     Problem: Knowledge Deficit - Standard  Goal: Patient and family/care givers will demonstrate understanding of plan of care, disease process/condition, diagnostic tests and medications  Outcome: Progressing     Problem: Skin Integrity  Goal: Skin integrity is maintained or improved  Outcome: Progressing     Problem: Mobility  Goal: Patient's capacity to carry out activities will improve  Outcome: Progressing       Patient is not progressing towards the following goals:      "

## 2021-07-10 NOTE — CARE PLAN
Problem: Knowledge Deficit - Standard  Goal: Patient and family/care givers will demonstrate understanding of plan of care, disease process/condition, diagnostic tests and medications  Outcome: Progressing  Note: Plan of care discussed, questions answered.       Problem: Discharge Barriers/Planning  Goal: Patient's continuum of care needs are met  Outcome: Not Met  Flowsheets (Taken 7/10/2021 0051)  Continuum of Care Needs:   Assessed for discharge barriers   Communicated discharge barriers to interdisciplinary sandra   Collaborated with Case Management/   The patient is Stable - Low risk of patient condition declining or worsening    Shift Goals  Clinical Goals: safety  Patient Goals: rest/ emotional support  Family Goals: n/a    Progress made toward(s) clinical / shift goals:  moderate fall risk precautions. Emotional support provided. Pt. Very upset about his belongings being thrown away.     Patient is not progressing towards the following goals:      Problem: Discharge Barriers/Planning  Goal: Patient's continuum of care needs are met  Outcome: Not Met  Flowsheets (Taken 7/10/2021 0051)  Continuum of Care Needs:   Assessed for discharge barriers   Communicated discharge barriers to interdisciplinary sunnyam   Collaborated with Case Management/

## 2021-07-11 LAB
ANION GAP SERPL CALC-SCNC: 13 MMOL/L (ref 7–16)
BASOPHILS # BLD AUTO: 0.9 % (ref 0–1.8)
BASOPHILS # BLD: 0.07 K/UL (ref 0–0.12)
BUN SERPL-MCNC: 21 MG/DL (ref 8–22)
CALCIUM SERPL-MCNC: 8.4 MG/DL (ref 8.5–10.5)
CHLORIDE SERPL-SCNC: 107 MMOL/L (ref 96–112)
CO2 SERPL-SCNC: 22 MMOL/L (ref 20–33)
CREAT SERPL-MCNC: 0.74 MG/DL (ref 0.5–1.4)
EOSINOPHIL # BLD AUTO: 0.37 K/UL (ref 0–0.51)
EOSINOPHIL NFR BLD: 4.8 % (ref 0–6.9)
ERYTHROCYTE [DISTWIDTH] IN BLOOD BY AUTOMATED COUNT: 49.3 FL (ref 35.9–50)
GLUCOSE SERPL-MCNC: 138 MG/DL (ref 65–99)
HCT VFR BLD AUTO: 28.9 % (ref 42–52)
HGB BLD-MCNC: 9.2 G/DL (ref 14–18)
IMM GRANULOCYTES # BLD AUTO: 0.05 K/UL (ref 0–0.11)
IMM GRANULOCYTES NFR BLD AUTO: 0.7 % (ref 0–0.9)
LYMPHOCYTES # BLD AUTO: 1.81 K/UL (ref 1–4.8)
LYMPHOCYTES NFR BLD: 23.6 % (ref 22–41)
MCH RBC QN AUTO: 27.5 PG (ref 27–33)
MCHC RBC AUTO-ENTMCNC: 31.8 G/DL (ref 33.7–35.3)
MCV RBC AUTO: 86.3 FL (ref 81.4–97.8)
MONOCYTES # BLD AUTO: 0.58 K/UL (ref 0–0.85)
MONOCYTES NFR BLD AUTO: 7.6 % (ref 0–13.4)
NEUTROPHILS # BLD AUTO: 4.78 K/UL (ref 1.82–7.42)
NEUTROPHILS NFR BLD: 62.4 % (ref 44–72)
NRBC # BLD AUTO: 0 K/UL
NRBC BLD-RTO: 0 /100 WBC
PLATELET # BLD AUTO: 246 K/UL (ref 164–446)
PMV BLD AUTO: 8.5 FL (ref 9–12.9)
POTASSIUM SERPL-SCNC: 3.9 MMOL/L (ref 3.6–5.5)
RBC # BLD AUTO: 3.35 M/UL (ref 4.7–6.1)
SODIUM SERPL-SCNC: 142 MMOL/L (ref 135–145)
WBC # BLD AUTO: 7.7 K/UL (ref 4.8–10.8)

## 2021-07-11 PROCEDURE — 80048 BASIC METABOLIC PNL TOTAL CA: CPT

## 2021-07-11 PROCEDURE — 700102 HCHG RX REV CODE 250 W/ 637 OVERRIDE(OP): Performed by: STUDENT IN AN ORGANIZED HEALTH CARE EDUCATION/TRAINING PROGRAM

## 2021-07-11 PROCEDURE — 770004 HCHG ROOM/CARE - ONCOLOGY PRIVATE *

## 2021-07-11 PROCEDURE — A9270 NON-COVERED ITEM OR SERVICE: HCPCS | Performed by: INTERNAL MEDICINE

## 2021-07-11 PROCEDURE — 700105 HCHG RX REV CODE 258: Performed by: INTERNAL MEDICINE

## 2021-07-11 PROCEDURE — 700111 HCHG RX REV CODE 636 W/ 250 OVERRIDE (IP): Performed by: STUDENT IN AN ORGANIZED HEALTH CARE EDUCATION/TRAINING PROGRAM

## 2021-07-11 PROCEDURE — 700102 HCHG RX REV CODE 250 W/ 637 OVERRIDE(OP): Performed by: INTERNAL MEDICINE

## 2021-07-11 PROCEDURE — 85025 COMPLETE CBC W/AUTO DIFF WBC: CPT

## 2021-07-11 PROCEDURE — 36415 COLL VENOUS BLD VENIPUNCTURE: CPT

## 2021-07-11 PROCEDURE — A9270 NON-COVERED ITEM OR SERVICE: HCPCS | Performed by: STUDENT IN AN ORGANIZED HEALTH CARE EDUCATION/TRAINING PROGRAM

## 2021-07-11 PROCEDURE — 99231 SBSQ HOSP IP/OBS SF/LOW 25: CPT | Performed by: INTERNAL MEDICINE

## 2021-07-11 RX ADMIN — METFORMIN HYDROCHLORIDE 1000 MG: 500 TABLET ORAL at 17:11

## 2021-07-11 RX ADMIN — FINASTERIDE 5 MG: 5 TABLET, FILM COATED ORAL at 04:58

## 2021-07-11 RX ADMIN — SODIUM CHLORIDE: 9 INJECTION, SOLUTION INTRAVENOUS at 04:57

## 2021-07-11 RX ADMIN — ENOXAPARIN SODIUM 30 MG: 30 INJECTION SUBCUTANEOUS at 04:58

## 2021-07-11 RX ADMIN — Medication 100 MG: at 04:58

## 2021-07-11 RX ADMIN — METFORMIN HYDROCHLORIDE 1000 MG: 500 TABLET ORAL at 07:59

## 2021-07-11 RX ADMIN — THERA TABS 1 TABLET: TAB at 04:58

## 2021-07-11 ASSESSMENT — PAIN DESCRIPTION - PAIN TYPE: TYPE: ACUTE PAIN

## 2021-07-11 NOTE — PROGRESS NOTES
[facility] Medicine Daily Progress Note    Date of Service  7/11/2021    Chief Complaint  75 y.o. male admitted 6/19/2021 due to a ground-level fall, and right hip pain.  He is homeless, and has a history of BPH with a chronic indwelling Clement (follows outpatient urology monthly).     Hospital Course  On admission, patient was noted to have SHAHID, hyperglycemia.  Right hip x-ray showed no evidence of fracture. Hemoglobin A1c was 14. UA showed packed wbcs and yeast.      Discussed with Dr. Sherwood on 6/29/2021.  Patient had his Clement changed as an outpatient on 5/20/2021, last changed on 6/30/2021.  Afebrile, hemodynamically stable.     7/1/2021: WBCs are elevated today (11.5).  Afebrile and hemodynamically stable.  UA ordered.      7/2/2021: Further increase in WBCs (13.4).  Afebrile.  UA was positive. Blood and urine cultures pending. Chest x-ray ordered. Ceftriaxone was empirically started.      7/3/2021: Chest x-ray reported COPD without acute cardiopulmonary abnormality.  Denies shortness of breath today, saturating 99% on room air. WBCs are 12.2. Afebrile.        Interval Problem Update  Leukocytosis has resolved. Urine culture from 7/1/2021 grew Enterococcus faecalis. Completed 5 day course of Ceftriaxone. Medically cleared for discharge.  Awaiting placement     Consultants/Specialty  Diabetic educator     Code Status  Full Code     Disposition  Social work assisting with placement in group home vs. SNF.  Covid and QuantiFERON were negative     Review of Systems  Review of Systems   Constitutional: Negative.    HENT: Negative.    Eyes: Negative.    Respiratory: Negative.    Cardiovascular: Negative.    Gastrointestinal: Negative.    Genitourinary: Negative.    Musculoskeletal: Improved right hip pin  Skin: Negative.    Neurological: Negative.    Endo/Heme/Allergies: Negative.    Psychiatric/Behavioral: Negative.         Physical Exam  Temp:  [36.6 °C (97.8 °F)-36.8 °C (98.2 °F)] 36.6 °C (97.9 °F)  Pulse:  [64-77]  64  Resp:  [17-18] 17  BP: (114-140)/(52-69) 137/69  SpO2:  [97 %-98 %] 97 %      Physical Exam  HENT:      Head: Normocephalic.      Nose: Nose normal.      Mouth/Throat:      Mouth: Mucous membranes are moist.   Eyes:      Pupils: Pupils are equal, round, and reactive to light.   Cardiovascular:      Rate and Rhythm: Normal rate.   Pulmonary:      Effort: Pulmonary effort is normal.   Abdominal:      Palpations: Abdomen is soft.   Genitourinary:     Comments: Chronic Clement in place  Musculoskeletal:      Cervical back: Normal range of motion.   Skin:     Comments: Warm  Neurological:      General: No focal deficit present.      Mental Status: He is alert.   Psychiatric:         Behavior: Behavior normal.     Fluids    Intake/Output Summary (Last 24 hours) at 7/11/2021 1123  Last data filed at 7/11/2021 0400  Gross per 24 hour   Intake 120 ml   Output 2900 ml   Net -2780 ml       Laboratory  Recent Labs     07/09/21  0029 07/10/21  0020 07/11/21  0017   WBC 7.7 7.8 7.7   RBC 3.31* 3.40* 3.35*   HEMOGLOBIN 9.2* 9.4* 9.2*   HEMATOCRIT 28.8* 30.3* 28.9*   MCV 87.0 89.1 86.3   MCH 27.8 27.6 27.5   MCHC 31.9* 31.0* 31.8*   RDW 49.2 50.5* 49.3   PLATELETCT 273 291 246   MPV 8.6* 8.8* 8.5*     Recent Labs     07/09/21  0029 07/10/21  0020 07/11/21  0017   SODIUM 143 138 142   POTASSIUM 3.9 3.9 3.9   CHLORIDE 107 103 107   CO2 22 21 22   GLUCOSE 130* 119* 138*   BUN 18 19 21   CREATININE 0.79 0.83 0.74   CALCIUM 8.3* 8.8 8.4*                   Imaging  DX-CHEST-PORTABLE (1 VIEW)   Final Result      COPD without acute cardiopulmonary abnormality.      DX-FEMUR-2+ RIGHT   Final Result      No radiographic evidence of acute traumatic injury.      Moderate right hip osteoarthritis      DX-PELVIS-1 OR 2 VIEWS   Final Result      No radiographic evidence of acute displaced fracture      Moderate right, mild left hip osteoarthritis           Assessment/Plan  * Contusion of right hip- (present on admission)  Assessment &  Plan  Pain has improved. Pelvic x-ray was negative for fracture. PT/OT recommend postacute placement.  Monitor    Frequent falls- (present on admission)  Assessment & Plan  Frail, poor intake with uncontrolled diabetes.  PT/OT recommend postacute placement.    Type 2 diabetes mellitus with hyperglycemia, with long-term current use of insulin ([facility])- (present on admission)  Assessment & Plan  Poorly controlled. Hemoglobin A1c was 14.3 on 2021.  Patient was unlikely taking his Metformin at home.  He does not seem as if he will be able to learn how to take insulin himself.  His insulin requirements have been low.  Metformin was increased to 1000 mg twice daily. Long-acting insulin was discontinued due to episodes of hypoglycemia. Diabetic educator was consulted.  Blood glucose has been stable.  Continue diabetic diet and monitor closely.    Benign prostatic hyperplasia with urinary obstruction- (present on admission)  Assessment & Plan  Chronic indwelling Clement.  Follows outpatient urology every month.  Tamsulosin was discontinued due to frequent falls and prerenal SHAHID, with concerns for orthostasis.  On finasteride. Discussed with Dr. Sherwood on 2021, Clement was changed on 2021, changed again on 2021    Frailty syndrome in geriatric patient- (present on admission)  Assessment & Plan  BMI 17.2.  Patient is homeless.  His wife  this year. Case management is assisting in postacute placement, with goals of eventual group home.  Monitor    Leukocytosis  Assessment & Plan  Noted to have leukocytosis on 2021.  Urine culture from 2021 grew Enterococcus faecalis. Completed 5-day course of ceftriaxone.  Leukocytosis has resolved.  Monitor    SHAHID (acute kidney injury) (HCC)- (present on admission)  Assessment & Plan  Resolved.  Suspected was due to UTI.  Avoid nephrotoxins    Moderate episode of recurrent major depressive disorder (HCC)- (present on admission)  Assessment & Plan  Reports losing home  and his wife  this year    Normocytic anemia- (present on admission)  Assessment & Plan  Studies consistent with anemia of chronic disease.  No evidence of bleeding.  Monitor         VTE prophylaxis: enoxaparin ppx    I have performed a physical exam and reviewed and updated ROS and Plan today (2021). In review of yesterday's note (7/10/2021), there are no changes except as documented above.

## 2021-07-11 NOTE — CARE PLAN
The patient is Stable - Low risk of patient condition declining or worsening    Shift Goals  Clinical Goals: Ambulate hallway  Patient Goals: Rest, belongings, discharge  Family Goals: n/a    Progress made toward(s) clinical / shift goals:  patient ambulating, no pain meds required at this time per patient pain scale.     Patient is not progressing towards the following goals:  Barriers to discharge include patient belongings gone missing, case management and management working on resolving this

## 2021-07-12 LAB
ANION GAP SERPL CALC-SCNC: 6 MMOL/L (ref 7–16)
BASOPHILS # BLD AUTO: 0.6 % (ref 0–1.8)
BASOPHILS # BLD: 0.05 K/UL (ref 0–0.12)
BUN SERPL-MCNC: 19 MG/DL (ref 8–22)
CALCIUM SERPL-MCNC: 8.9 MG/DL (ref 8.5–10.5)
CHLORIDE SERPL-SCNC: 104 MMOL/L (ref 96–112)
CO2 SERPL-SCNC: 29 MMOL/L (ref 20–33)
CREAT SERPL-MCNC: 0.76 MG/DL (ref 0.5–1.4)
EOSINOPHIL # BLD AUTO: 0.35 K/UL (ref 0–0.51)
EOSINOPHIL NFR BLD: 4.4 % (ref 0–6.9)
ERYTHROCYTE [DISTWIDTH] IN BLOOD BY AUTOMATED COUNT: 50.4 FL (ref 35.9–50)
GLUCOSE SERPL-MCNC: 164 MG/DL (ref 65–99)
HCT VFR BLD AUTO: 30.1 % (ref 42–52)
HGB BLD-MCNC: 9.8 G/DL (ref 14–18)
IMM GRANULOCYTES # BLD AUTO: 0.03 K/UL (ref 0–0.11)
IMM GRANULOCYTES NFR BLD AUTO: 0.4 % (ref 0–0.9)
LYMPHOCYTES # BLD AUTO: 1.56 K/UL (ref 1–4.8)
LYMPHOCYTES NFR BLD: 19.8 % (ref 22–41)
MCH RBC QN AUTO: 28.2 PG (ref 27–33)
MCHC RBC AUTO-ENTMCNC: 32.6 G/DL (ref 33.7–35.3)
MCV RBC AUTO: 86.5 FL (ref 81.4–97.8)
MONOCYTES # BLD AUTO: 0.59 K/UL (ref 0–0.85)
MONOCYTES NFR BLD AUTO: 7.5 % (ref 0–13.4)
NEUTROPHILS # BLD AUTO: 5.3 K/UL (ref 1.82–7.42)
NEUTROPHILS NFR BLD: 67.3 % (ref 44–72)
NRBC # BLD AUTO: 0 K/UL
NRBC BLD-RTO: 0 /100 WBC
PLATELET # BLD AUTO: 230 K/UL (ref 164–446)
PMV BLD AUTO: 8.8 FL (ref 9–12.9)
POTASSIUM SERPL-SCNC: 3.5 MMOL/L (ref 3.6–5.5)
RBC # BLD AUTO: 3.48 M/UL (ref 4.7–6.1)
SODIUM SERPL-SCNC: 139 MMOL/L (ref 135–145)
WBC # BLD AUTO: 7.9 K/UL (ref 4.8–10.8)

## 2021-07-12 PROCEDURE — 36415 COLL VENOUS BLD VENIPUNCTURE: CPT

## 2021-07-12 PROCEDURE — 97535 SELF CARE MNGMENT TRAINING: CPT

## 2021-07-12 PROCEDURE — 80048 BASIC METABOLIC PNL TOTAL CA: CPT

## 2021-07-12 PROCEDURE — A9270 NON-COVERED ITEM OR SERVICE: HCPCS | Performed by: INTERNAL MEDICINE

## 2021-07-12 PROCEDURE — 770004 HCHG ROOM/CARE - ONCOLOGY PRIVATE *

## 2021-07-12 PROCEDURE — 700111 HCHG RX REV CODE 636 W/ 250 OVERRIDE (IP): Performed by: STUDENT IN AN ORGANIZED HEALTH CARE EDUCATION/TRAINING PROGRAM

## 2021-07-12 PROCEDURE — 700102 HCHG RX REV CODE 250 W/ 637 OVERRIDE(OP): Performed by: STUDENT IN AN ORGANIZED HEALTH CARE EDUCATION/TRAINING PROGRAM

## 2021-07-12 PROCEDURE — A9270 NON-COVERED ITEM OR SERVICE: HCPCS | Performed by: STUDENT IN AN ORGANIZED HEALTH CARE EDUCATION/TRAINING PROGRAM

## 2021-07-12 PROCEDURE — 700102 HCHG RX REV CODE 250 W/ 637 OVERRIDE(OP): Performed by: INTERNAL MEDICINE

## 2021-07-12 PROCEDURE — 85025 COMPLETE CBC W/AUTO DIFF WBC: CPT

## 2021-07-12 PROCEDURE — 99231 SBSQ HOSP IP/OBS SF/LOW 25: CPT | Performed by: INTERNAL MEDICINE

## 2021-07-12 RX ADMIN — METFORMIN HYDROCHLORIDE 1000 MG: 500 TABLET ORAL at 07:47

## 2021-07-12 RX ADMIN — Medication 100 MG: at 04:36

## 2021-07-12 RX ADMIN — THERA TABS 1 TABLET: TAB at 04:36

## 2021-07-12 RX ADMIN — DOCUSATE SODIUM 50 MG AND SENNOSIDES 8.6 MG 2 TABLET: 8.6; 5 TABLET, FILM COATED ORAL at 16:15

## 2021-07-12 RX ADMIN — FINASTERIDE 5 MG: 5 TABLET, FILM COATED ORAL at 04:36

## 2021-07-12 RX ADMIN — METFORMIN HYDROCHLORIDE 1000 MG: 500 TABLET ORAL at 16:15

## 2021-07-12 RX ADMIN — ENOXAPARIN SODIUM 30 MG: 30 INJECTION SUBCUTANEOUS at 04:37

## 2021-07-12 ASSESSMENT — COGNITIVE AND FUNCTIONAL STATUS - GENERAL
DAILY ACTIVITIY SCORE: 22
SUGGESTED CMS G CODE MODIFIER DAILY ACTIVITY: CJ
HELP NEEDED FOR BATHING: A LITTLE
DRESSING REGULAR LOWER BODY CLOTHING: A LITTLE

## 2021-07-12 NOTE — THERAPY
"Occupational Therapy  Daily Treatment     Patient Name: Hugh Núñez  Age:  75 y.o., Sex:  male  Medical Record #: [id number]  Today's Date: 7/12/2021     Precautions  Precautions: Fall Risk  Comments: R  hip pain    Assessment    Pt able to complete LB dressing with spv and extra time. He was able to don socks and shoes using reacher and chair in front of him to prop up feet. Pt has met all acute OT goals at this time. Patient will not be actively followed for occupational therapy services at this time, however may be seen if requested by physician for 1 more visit within 30 days to address any discharge or equipment needs.     Plan    Continue current treatment plan.    DC Equipment Recommendations: Other (Comments) (reacher and sock aid)  Discharge Recommendations: Other - Recommend supportive living environment such as group home that can assist with IADLs.    Subjective    Pt alert, pleasant, and cooperative. Pt asking, \"What am I going to do about my bank card? I'm scared. I can't be homeless at my age.\"     Objective       07/12/21 1543   Cognition    Cognition / Consciousness X   Speech/ Communication Hard of Hearing   Level of Consciousness Alert   New Learning Impaired   Comments pleasant, cooperative   Balance   Comments standing with 4WW, no LOB   Bed Mobility    Supine to Sit Supervised   Comments HOB flat, use of bed rails   Activities of Daily Living   Lower Body Dressing Supervision  (socks and shoes)   Comments LB dressing with reacher and chair in front to prop feet on   Functional Mobility   Sit to Stand Supervised   Bed, Chair, Wheelchair Transfer Supervised   Mobility in room with 4WW   Activity Tolerance   Comments no signs/symptoms or c/o fatigue   Patient / Family Goals   Patient / Family Goal #1 To get more help   Short Term Goals   Short Term Goal # 1 Pt will complete ADL transfers with supervision   Goal Outcome # 1 Goal met   Short Term Goal # 2 Pt will complete LB dressing with " supervision   Goal Outcome # 2 Goal met   Short Term Goal # 3 Pt will complete toileting with supervision   Goal Outcome # 3 Goal met

## 2021-07-12 NOTE — PROGRESS NOTES
[facility] Medicine Daily Progress Note    Date of Service  7/12/2021    Chief Complaint  75 y.o. male admitted 6/19/2021 due to a ground-level fall, and right hip pain.  He is homeless, and has a history of BPH with a chronic indwelling Clement (follows outpatient urology monthly).     Hospital Course  On admission, patient was noted to have SHAHID, hyperglycemia.  Right hip x-ray showed no evidence of fracture. Hemoglobin A1c was 14. UA showed packed wbcs and yeast.      Discussed with Dr. Sherwood on 6/29/2021.  Patient had his Clement changed as an outpatient on 5/20/2021, last changed on 6/30/2021.  Afebrile, hemodynamically stable.     7/1/2021: WBCs are elevated today (11.5).  Afebrile and hemodynamically stable.  UA ordered.      7/2/2021: Further increase in WBCs (13.4).  Afebrile.  UA was positive. Blood and urine cultures pending. Chest x-ray ordered. Ceftriaxone was empirically started.      7/3/2021: Chest x-ray reported COPD without acute cardiopulmonary abnormality.  Denies shortness of breath today, saturating 99% on room air. WBCs are 12.2. Afebrile.        Interval Problem Update  Leukocytosis has resolved. Urine culture from 7/1/2021 grew Enterococcus faecalis. Completed 5 day course of Ceftriaxone. Medically cleared for discharge.  Awaiting placement     Consultants/Specialty  Diabetic educator     Code Status  Full Code     Disposition  Social work assisting with placement in group home vs. SNF.  Covid and QuantiFERON were negative     Review of Systems  Review of Systems   Constitutional: Negative.    HENT: Negative.    Eyes: Negative.    Respiratory: Negative.    Cardiovascular: Negative.    Gastrointestinal: Negative.    Genitourinary: Negative.    Musculoskeletal: Improved right hip pin  Skin: Negative.    Neurological: Negative.    Endo/Heme/Allergies: Negative.    Psychiatric/Behavioral: Negative.      Physical Exam  Temp:  [36.7 °C (98 °F)-37.1 °C (98.7 °F)] 37.1 °C (98.7 °F)  Pulse:  [70-82]  74  Resp:  [14-19] 14  BP: (130-137)/(57-75) 134/59  SpO2:  [96 %-98 %] 97 %      Physical Exam  HENT:      Head: Normocephalic.      Nose: Nose normal.      Mouth/Throat:      Mouth: Mucous membranes are moist.   Eyes:      Pupils: Pupils are equal, round, and reactive to light.   Cardiovascular:      Rate and Rhythm: Normal rate.   Pulmonary:      Effort: Pulmonary effort is normal.   Abdominal:      Palpations: Abdomen is soft.   Genitourinary:     Comments: Chronic Clement in place  Musculoskeletal:      Cervical back: Normal range of motion.   Skin:     Comments: Warm  Neurological:      General: No focal deficit present.      Mental Status: He is alert.   Psychiatric:         Behavior: Behavior normal.     Fluids    Intake/Output Summary (Last 24 hours) at 7/12/2021 1010  Last data filed at 7/12/2021 0438  Gross per 24 hour   Intake 39082.55 ml   Output 1800 ml   Net 18957.55 ml       Laboratory  Recent Labs     07/10/21  0020 07/11/21  0017 07/12/21  0822   WBC 7.8 7.7 7.9   RBC 3.40* 3.35* 3.48*   HEMOGLOBIN 9.4* 9.2* 9.8*   HEMATOCRIT 30.3* 28.9* 30.1*   MCV 89.1 86.3 86.5   MCH 27.6 27.5 28.2   MCHC 31.0* 31.8* 32.6*   RDW 50.5* 49.3 50.4*   PLATELETCT 291 246 230   MPV 8.8* 8.5* 8.8*     Recent Labs     07/10/21  0020 07/11/21  0017 07/12/21  0822   SODIUM 138 142 139   POTASSIUM 3.9 3.9 3.5*   CHLORIDE 103 107 104   CO2 21 22 29   GLUCOSE 119* 138* 164*   BUN 19 21 19   CREATININE 0.83 0.74 0.76   CALCIUM 8.8 8.4* 8.9                   Imaging  DX-CHEST-PORTABLE (1 VIEW)   Final Result      COPD without acute cardiopulmonary abnormality.      DX-FEMUR-2+ RIGHT   Final Result      No radiographic evidence of acute traumatic injury.      Moderate right hip osteoarthritis      DX-PELVIS-1 OR 2 VIEWS   Final Result      No radiographic evidence of acute displaced fracture      Moderate right, mild left hip osteoarthritis           Assessment/Plan  * Contusion of right hip- (present on admission)  Assessment &  Plan  Pain has improved. Pelvic x-ray was negative for fracture. PT/OT recommend postacute placement.  Monitor    Frequent falls- (present on admission)  Assessment & Plan  Frail, poor intake with uncontrolled diabetes.  PT/OT recommend postacute placement.    Type 2 diabetes mellitus with hyperglycemia, with long-term current use of insulin ([facility])- (present on admission)  Assessment & Plan  Poorly controlled. Hemoglobin A1c was 14.3 on 2021.  Patient was unlikely taking his Metformin at home.  He does not seem as if he will be able to learn how to take insulin himself.  His insulin requirements have been low.  Metformin was increased to 1000 mg twice daily. Long-acting insulin was discontinued due to episodes of hypoglycemia. Diabetic educator was consulted.  Blood glucose has been stable.  Continue diabetic diet and monitor closely.    Benign prostatic hyperplasia with urinary obstruction- (present on admission)  Assessment & Plan  Chronic indwelling Clement.  Follows outpatient urology every month.  Tamsulosin was discontinued due to frequent falls and prerenal SHAHID, with concerns for orthostasis.  On finasteride. Discussed with Dr. Sherwood on 2021, Clement was changed on 2021, changed again on 2021    Frailty syndrome in geriatric patient- (present on admission)  Assessment & Plan  BMI 17.2.  Patient is homeless.  His wife  this year. Case management is assisting in postacute placement, with goals of eventual group home.  Monitor    Leukocytosis  Assessment & Plan  Noted to have leukocytosis on 2021.  Urine culture from 2021 grew Enterococcus faecalis. Completed 5-day course of ceftriaxone.  Leukocytosis has resolved.  Monitor    SHAHID (acute kidney injury) (HCC)- (present on admission)  Assessment & Plan  Resolved.  Suspected was due to UTI.  Avoid nephrotoxins    Moderate episode of recurrent major depressive disorder (HCC)- (present on admission)  Assessment & Plan  Reports losing home  and his wife  this year    Normocytic anemia- (present on admission)  Assessment & Plan  Studies consistent with anemia of chronic disease.  No evidence of bleeding.  Monitor         VTE prophylaxis: enoxaparin ppx    I have performed a physical exam and reviewed and updated ROS and Plan today (2021). In review of yesterday's note (2021), there are no changes except as documented above.

## 2021-07-12 NOTE — CARE PLAN
Problem: Bowel Elimination  Goal: Establish and maintain regular bowel function  Outcome: Not Progressing     Problem: Mobility  Goal: Patient's capacity to carry out activities will improve  Outcome: Progressing     Problem: Discharge Barriers/Planning  Goal: Patient's continuum of care needs are met  Outcome: Not Progressing     The patient is Stable - Low risk of patient condition declining or worsening    Shift Goals  Clinical Goals: ambulate  Patient Goals: discharge to group home  Family Goals: N/A    Progress made toward(s) clinical / shift goals:  continue to encourage ambulation, patient likes to sit EOB. Work with OT today.     Patient is not progressing towards the following goals:    Awaiting discharge to group home, pending KIANA      Problem: Bowel Elimination  Goal: Establish and maintain regular bowel function  Outcome: Not Progressing     Problem: Discharge Barriers/Planning  Goal: Patient's continuum of care needs are met  Outcome: Not Progressing

## 2021-07-12 NOTE — DISCHARGE PLANNING
Anticipated Discharge Disposition:     Action: LSW contacted Morales  owner @ [facility]. Asked if he would be open to a 30 day KIANA. GH owner said he would be open to it as long as it is worded in the agreement that if the pt leaves prior to the end of the 30 days he still gets the full agreed upon amount for that 30 day stay in the . LSW stated she would ask. LSW asked if the  was able to take the pt to the DMV to get him a new ID so he could get his banking information. Morales stated he did not know his ID was missing and that he wouldn't be able to take him without an ID, because the DMV doesn't have appointments until the end of August.     Pt has ID in media tab, LSW contacted [employer] Albright off [facility] to ask if they would accept the copy of the ID in order for the pt to receive a new debit card/checks.     Barriers to Discharge: Unable to access funds for     Plan: JUWANW to find out with [employer] [facility] if pt is able to use copy in media tab

## 2021-07-12 NOTE — CARE PLAN
The patient is Stable - Low risk of patient condition declining or worsening    Shift Goals  Clinical Goals: sleep comfortably  Patient Goals: rest/social work to find group home  Family Goals: N/A    Progress made toward(s) clinical / shift goals:    Problem: Knowledge Deficit - Standard  Goal: Patient and family/care givers will demonstrate understanding of plan of care, disease process/condition, diagnostic tests and medications  Outcome: Progressing     Problem: Skin Integrity  Goal: Skin integrity is maintained or improved  Outcome: Progressing     Problem: Bowel Elimination  Goal: Establish and maintain regular bowel function  Outcome: Progressing     Problem: Mobility  Goal: Patient's capacity to carry out activities will improve  Outcome: Progressing     Problem: Discharge Barriers/Planning  Goal: Patient's continuum of care needs are met  Outcome: Progressing     Problem: Wound/ / Incision Healing  Goal: Patient's wound/surgical incision will decrease in size and heals properly  Outcome: Progressing       Patient is not progressing towards the following goals:

## 2021-07-13 LAB
ANION GAP SERPL CALC-SCNC: 12 MMOL/L (ref 7–16)
BASOPHILS # BLD AUTO: 0.6 % (ref 0–1.8)
BASOPHILS # BLD: 0.06 K/UL (ref 0–0.12)
BUN SERPL-MCNC: 21 MG/DL (ref 8–22)
CALCIUM SERPL-MCNC: 9.5 MG/DL (ref 8.5–10.5)
CHLORIDE SERPL-SCNC: 99 MMOL/L (ref 96–112)
CO2 SERPL-SCNC: 26 MMOL/L (ref 20–33)
CREAT SERPL-MCNC: 0.78 MG/DL (ref 0.5–1.4)
EOSINOPHIL # BLD AUTO: 0.41 K/UL (ref 0–0.51)
EOSINOPHIL NFR BLD: 4.1 % (ref 0–6.9)
ERYTHROCYTE [DISTWIDTH] IN BLOOD BY AUTOMATED COUNT: 52.5 FL (ref 35.9–50)
GLUCOSE SERPL-MCNC: 144 MG/DL (ref 65–99)
HCT VFR BLD AUTO: 31.5 % (ref 42–52)
HGB BLD-MCNC: 9.8 G/DL (ref 14–18)
IMM GRANULOCYTES # BLD AUTO: 0.05 K/UL (ref 0–0.11)
IMM GRANULOCYTES NFR BLD AUTO: 0.5 % (ref 0–0.9)
LYMPHOCYTES # BLD AUTO: 1.89 K/UL (ref 1–4.8)
LYMPHOCYTES NFR BLD: 18.9 % (ref 22–41)
MCH RBC QN AUTO: 27.7 PG (ref 27–33)
MCHC RBC AUTO-ENTMCNC: 31.1 G/DL (ref 33.7–35.3)
MCV RBC AUTO: 89 FL (ref 81.4–97.8)
MONOCYTES # BLD AUTO: 0.62 K/UL (ref 0–0.85)
MONOCYTES NFR BLD AUTO: 6.2 % (ref 0–13.4)
NEUTROPHILS # BLD AUTO: 6.97 K/UL (ref 1.82–7.42)
NEUTROPHILS NFR BLD: 69.7 % (ref 44–72)
NRBC # BLD AUTO: 0 K/UL
NRBC BLD-RTO: 0 /100 WBC
PLATELET # BLD AUTO: 265 K/UL (ref 164–446)
PMV BLD AUTO: 9 FL (ref 9–12.9)
POTASSIUM SERPL-SCNC: 4.1 MMOL/L (ref 3.6–5.5)
RBC # BLD AUTO: 3.54 M/UL (ref 4.7–6.1)
SODIUM SERPL-SCNC: 137 MMOL/L (ref 135–145)
WBC # BLD AUTO: 10 K/UL (ref 4.8–10.8)

## 2021-07-13 PROCEDURE — A9270 NON-COVERED ITEM OR SERVICE: HCPCS | Performed by: STUDENT IN AN ORGANIZED HEALTH CARE EDUCATION/TRAINING PROGRAM

## 2021-07-13 PROCEDURE — A9270 NON-COVERED ITEM OR SERVICE: HCPCS | Performed by: INTERNAL MEDICINE

## 2021-07-13 PROCEDURE — 700111 HCHG RX REV CODE 636 W/ 250 OVERRIDE (IP): Performed by: STUDENT IN AN ORGANIZED HEALTH CARE EDUCATION/TRAINING PROGRAM

## 2021-07-13 PROCEDURE — 99231 SBSQ HOSP IP/OBS SF/LOW 25: CPT | Performed by: STUDENT IN AN ORGANIZED HEALTH CARE EDUCATION/TRAINING PROGRAM

## 2021-07-13 PROCEDURE — 36415 COLL VENOUS BLD VENIPUNCTURE: CPT

## 2021-07-13 PROCEDURE — 770004 HCHG ROOM/CARE - ONCOLOGY PRIVATE *

## 2021-07-13 PROCEDURE — 700102 HCHG RX REV CODE 250 W/ 637 OVERRIDE(OP): Performed by: INTERNAL MEDICINE

## 2021-07-13 PROCEDURE — 85025 COMPLETE CBC W/AUTO DIFF WBC: CPT

## 2021-07-13 PROCEDURE — 700102 HCHG RX REV CODE 250 W/ 637 OVERRIDE(OP): Performed by: STUDENT IN AN ORGANIZED HEALTH CARE EDUCATION/TRAINING PROGRAM

## 2021-07-13 PROCEDURE — 80048 BASIC METABOLIC PNL TOTAL CA: CPT

## 2021-07-13 RX ADMIN — THERA TABS 1 TABLET: TAB at 05:11

## 2021-07-13 RX ADMIN — METFORMIN HYDROCHLORIDE 1000 MG: 500 TABLET ORAL at 08:49

## 2021-07-13 RX ADMIN — FINASTERIDE 5 MG: 5 TABLET, FILM COATED ORAL at 05:11

## 2021-07-13 RX ADMIN — METFORMIN HYDROCHLORIDE 1000 MG: 500 TABLET ORAL at 17:28

## 2021-07-13 RX ADMIN — Medication 100 MG: at 05:10

## 2021-07-13 RX ADMIN — ENOXAPARIN SODIUM 30 MG: 30 INJECTION SUBCUTANEOUS at 05:11

## 2021-07-13 NOTE — DISCHARGE PLANNING
Anticipated Discharge Disposition:  with      Action: JUWANW received update from RN Shelley, stated that she was able to locate a DMV appointment tomorrow @ 8138. LSW reached out to GH owner, he stated that he would still want 2 months KIANA in case patient doesn't actually have the funds he claims to have, or changes his mind upon discharge. JUWANW updated RN of kristin.     LSW contacted [employer] @ [phone] do discuss barriers. Office closed @ noon today, lvm with Isis.     Barriers to Discharge: Unable to access funds for     Plan: Discuss plan of action with [facility] and leadership     @1762  LSW received voicemail from Isis with [facility], stated she would leave the office @ 1430 and to call back tomorrow morning. She stated in her voicemail that she did not have a referral for this pt. JUWANW reviewed chart review, misunderstood the [facility] note. HCM plan was to send request to have [facility] follow up.     Plan: LSW contact Isis back in the morning and see if it is possible to start services if the patient does not have access to his financial records

## 2021-07-13 NOTE — CARE PLAN
The patient is Stable - Low risk of patient condition declining or worsening    Shift Goals  Clinical Goals: ambulate  Patient Goals: discharge to group home  Family Goals: N/A    Progress made toward(s) clinical / shift goals:    Problem: Knowledge Deficit - Standard  Goal: Patient and family/care givers will demonstrate understanding of plan of care, disease process/condition, diagnostic tests and medications  Outcome: Progressing     Problem: Skin Integrity  Goal: Skin integrity is maintained or improved  Outcome: Progressing     Problem: Bowel Elimination  Goal: Establish and maintain regular bowel function  Outcome: Progressing     Problem: Mobility  Goal: Patient's capacity to carry out activities will improve  Outcome: Progressing     Problem: Discharge Barriers/Planning  Goal: Patient's continuum of care needs are met  Outcome: Progressing     Problem: Wound/ / Incision Healing  Goal: Patient's wound/surgical incision will decrease in size and heals properly  Outcome: Progressing       Patient is not progressing towards the following goals:

## 2021-07-13 NOTE — DISCHARGE PLANNING
":     LSW met with pt at bedside, pt is very distraught about his items being missing and worries that the hospital will \"get rid of him\". LSW stated that we are doing our best to find out how we can help him. LSW asked if he had any family or relatives that might be able to assist. Pt stated they were all dead, LSW stated the importance, even if he doesn't have a good relationship with them to still reach out. Pt stated he has a daughter named Nicky Marquez that lives in [place], a step brother named Austyn Lara, and a nephew named Austyn Lara, a niece named Elsi Zavaleta Marco (unknown last name, likely ). JUWANW requested a naaya search from [facility].     @1550  LSW received results from naaya search, was able to locate a Noelle Singer who is related to the pt's daughter. She forwarded LSW's number. LSW then spoke with Nicky Singer (pt's daughter). Informed Nicky of circumstance and what the pt needs at this time. Nicky does not know how she can help him as she lives in [place]. LSW asked if there were any relatives or friends in the area that she knows of that may be able to help. Nicky will do some research and attempt to locate someone. Nicky would like a medical update, LSW offered RN's number, and notified Dr Garcia.     TREVON Curtisres  "

## 2021-07-13 NOTE — PROGRESS NOTES
Bedside report received. Assumed care of patient this morning. Assessment completed      Patient is A&O x 4, pt calls for assistance appropriately  Reports 0 /10 pain.  Pt is on room air.  Mobility 1x with FWW Bed alarm is on.  Voiding +  Flatus +            Plan of care reviewed with the patient. Bed is locked and in the lowest position. Call light is within reach. Patient encouraged to voice needs and concerns, all needs met at this time. Hourly rounding in place.

## 2021-07-13 NOTE — CARE PLAN
The patient is Stable - Low risk of patient condition declining or worsening    Shift Goals  Clinical Goals: patient will remain free of fall or injury   Patient Goals: discharge and speak to SW   Family Goals: N/A    Progress made toward(s) clinical / shift goals:  pt bed alarm in use. Pt re-educated during hourly rounding to use call light to notify staff he needs assistance, pt verbalizes understanding.        Problem: Knowledge Deficit - Standard  Goal: Patient and family/care givers will demonstrate understanding of plan of care, disease process/condition, diagnostic tests and medications  Outcome: Progressing     Problem: Skin Integrity  Goal: Skin integrity is maintained or improved  Outcome: Progressing     Problem: Mobility  Goal: Patient's capacity to carry out activities will improve  Outcome: Progressing       Patient is not progressing towards the following goals:

## 2021-07-14 PROCEDURE — 700102 HCHG RX REV CODE 250 W/ 637 OVERRIDE(OP): Performed by: INTERNAL MEDICINE

## 2021-07-14 PROCEDURE — A9270 NON-COVERED ITEM OR SERVICE: HCPCS | Performed by: STUDENT IN AN ORGANIZED HEALTH CARE EDUCATION/TRAINING PROGRAM

## 2021-07-14 PROCEDURE — 770004 HCHG ROOM/CARE - ONCOLOGY PRIVATE *

## 2021-07-14 PROCEDURE — A9270 NON-COVERED ITEM OR SERVICE: HCPCS | Performed by: INTERNAL MEDICINE

## 2021-07-14 PROCEDURE — 700102 HCHG RX REV CODE 250 W/ 637 OVERRIDE(OP): Performed by: STUDENT IN AN ORGANIZED HEALTH CARE EDUCATION/TRAINING PROGRAM

## 2021-07-14 PROCEDURE — 700111 HCHG RX REV CODE 636 W/ 250 OVERRIDE (IP): Performed by: STUDENT IN AN ORGANIZED HEALTH CARE EDUCATION/TRAINING PROGRAM

## 2021-07-14 PROCEDURE — 99231 SBSQ HOSP IP/OBS SF/LOW 25: CPT | Performed by: STUDENT IN AN ORGANIZED HEALTH CARE EDUCATION/TRAINING PROGRAM

## 2021-07-14 RX ORDER — ACETAMINOPHEN 500 MG
1000 TABLET ORAL 3 TIMES DAILY PRN
Qty: 100 TABLET | Refills: 0 | Status: SHIPPED | OUTPATIENT
Start: 2021-07-14

## 2021-07-14 RX ADMIN — ENOXAPARIN SODIUM 30 MG: 30 INJECTION SUBCUTANEOUS at 06:01

## 2021-07-14 RX ADMIN — METFORMIN HYDROCHLORIDE 1000 MG: 500 TABLET ORAL at 18:34

## 2021-07-14 RX ADMIN — Medication 100 MG: at 06:01

## 2021-07-14 RX ADMIN — FINASTERIDE 5 MG: 5 TABLET, FILM COATED ORAL at 06:02

## 2021-07-14 RX ADMIN — THERA TABS 1 TABLET: TAB at 06:01

## 2021-07-14 RX ADMIN — METFORMIN HYDROCHLORIDE 1000 MG: 500 TABLET ORAL at 08:18

## 2021-07-14 RX ADMIN — DOCUSATE SODIUM 50 MG AND SENNOSIDES 8.6 MG 2 TABLET: 8.6; 5 TABLET, FILM COATED ORAL at 18:33

## 2021-07-14 ASSESSMENT — ENCOUNTER SYMPTOMS
NERVOUS/ANXIOUS: 0
FEVER: 0
HEMOPTYSIS: 0
SHORTNESS OF BREATH: 0
VOMITING: 0
MYALGIAS: 0
NECK PAIN: 0
CONSTIPATION: 0
HEADACHES: 0
CHILLS: 0
SORE THROAT: 0
DEPRESSION: 0
WEAKNESS: 0
DIARRHEA: 0
BRUISES/BLEEDS EASILY: 0
NAUSEA: 0
BACK PAIN: 0
SINUS PAIN: 0
ABDOMINAL PAIN: 0
FLANK PAIN: 0
BLOOD IN STOOL: 0
EYE PAIN: 0

## 2021-07-14 ASSESSMENT — PAIN DESCRIPTION - PAIN TYPE
TYPE: ACUTE PAIN
TYPE: ACUTE PAIN;CHRONIC PAIN

## 2021-07-14 NOTE — DISCHARGE PLANNING
Anticipated Discharge Disposition: GH with     Action: LSW received update from leadership to contact community outreach agency working with homeless population in assisting with pt's case (. LSW spoke with Franco Chavira #[phone] , Franco stated he is able to assist the pt getting a new ID and bank card, get the pt his basic needs (clothes, hygiene supplies) and bring him to the group home. Franco also stated there may be more affordable options than a GH for the patient and he can review it and let me know.     Able to obtain a DMV appointment for the pt tomorrow 7/15 @ 0915.  Spoke with Morales @ [facility] , stated he is still able to accept the patient and room is available. JUWANW lvm with Franco to review the plan for expected discharge tomorrow. Pt will discharge will leg catheter and will need to follow up with [facility], pt is aware and has OP f/u appointments. LSW requested wheelchair for patient, even if he mostly gets around with seated walker. Pt is a fall risk and would beneficial to have when he is in the community for extended periods of time.     Barriers to Discharge: Confirm coordination with Franco @ [employer]     Plan: JUWANW to follow up with Franco after rounds     @1300  JUWANW received update from Director of [facility], Whitney Crandall, asking if Franco was able to come assess the patient in person. There may be a potential placement that is more affordable to the patient. Franco will be by to assess the pt @ 1600 today.     @1330  TREVON called Nicky Singer (pt's daughter) again to see if there were a local contact here that may assist with discharge planning. Nicky stated her son Thor lives here and she will provide 's number. LSW asked if it were possible to have number, stated that she usually texts him through his email so she doesn't have his actual number. Nicky's son is Thor.

## 2021-07-14 NOTE — CARE PLAN
The patient is Stable - Low risk of patient condition declining or worsening    Shift Goals  Clinical Goals: patient will remain free of fall or injury   Patient Goals: discharge and speak to SW   Family Goals: N/A    Progress made toward(s) clinical / shift goals:        Problem: Knowledge Deficit - Standard  Goal: Patient and family/care givers will demonstrate understanding of plan of care, disease process/condition, diagnostic tests and medications  Outcome: Progressing  Note: Pt involved in POC. Addressed questionns/concerns     Problem: Skin Integrity  Goal: Skin integrity is maintained or improved  7/13/2021 2213 by Lacy Mann R.N.  Outcome: Progressing  Note: Pt turn self side to side. Up frequently. Barrier cream provided  7/13/2021 2212 by Lacy Mann R.N.  Outcome: Progressing  Note: Pt turns self side to side, frequently up out of bed. Barrier cream provided       Patient is not progressing towards the following goals:

## 2021-07-14 NOTE — PROGRESS NOTES
Hospital Medicine Daily Progress Note    Date of Service  7/13/2021    Chief Complaint  Hugh Núñez is a 75 y.o. homeless male admitted 6/19/2021 with fall and SHAHID due to uncontrolled diabetes.    Hospital Course  No notes on file    Interval Problem Update  KOFFI ON  He has no complaints today. Hip pain has resolved and he can walk on it.  Denies pain, dyspnea, N/V, F/C, bowel/bladder dysfunction, dyspnea, bleeding.  He is inquiring about getting access to his money for placement.    Discussed updates and POC with his daughter, Nicky. She will get in contact with her son in [place] and work with the  to assist with discharge planning.    I have personally seen and examined the patient at bedside. I discussed the plan of care with patient, family, bedside RN, charge RN,  and pharmacy.    Consultants/Specialty  Diabetes Education    Code Status  Full Code    Disposition  Patient is medically cleared.   Anticipate discharge to group home.  I have placed the appropriate orders for post-discharge needs.    Review of Systems  Review of Systems   Constitutional: Negative for chills and fever.   HENT: Negative for ear pain, nosebleeds, sinus pain and sore throat.    Eyes: Negative for pain.   Respiratory: Negative for hemoptysis and shortness of breath.    Cardiovascular: Negative for chest pain.   Gastrointestinal: Negative for abdominal pain, blood in stool, constipation, diarrhea, melena, nausea and vomiting.   Genitourinary: Negative for dysuria, flank pain and hematuria.   Musculoskeletal: Negative for back pain, joint pain, myalgias and neck pain.   Neurological: Negative for weakness and headaches.   Endo/Heme/Allergies: Does not bruise/bleed easily.   Psychiatric/Behavioral: Negative for depression. The patient is not nervous/anxious.         Physical Exam  Temp:  [36.5 °C (97.7 °F)-36.8 °C (98.3 °F)] 36.5 °C (97.7 °F)  Pulse:  [68-74] 69  Resp:  [18] 18  BP: (119-135)/(50-66)  135/65  SpO2:  [97 %-100 %] 97 %    Physical Exam  Vitals reviewed.   Constitutional:       General: He is not in acute distress.     Appearance: He is cachectic. He is ill-appearing (Chronically). He is not toxic-appearing.   HENT:      Head:      Comments: Bitemporal wasting     Nose: Nose normal.      Mouth/Throat:      Mouth: Mucous membranes are moist.      Pharynx: Oropharynx is clear.   Eyes:      General: No scleral icterus.     Conjunctiva/sclera: Conjunctivae normal.   Cardiovascular:      Rate and Rhythm: Normal rate and regular rhythm.      Pulses: Normal pulses.      Heart sounds: Normal heart sounds. No murmur heard.   No friction rub. No gallop.    Pulmonary:      Effort: Pulmonary effort is normal. No respiratory distress.      Breath sounds: Normal breath sounds. No wheezing, rhonchi or rales.   Abdominal:      General: Abdomen is flat. Bowel sounds are normal. There is no distension.      Palpations: Abdomen is soft.      Tenderness: There is no abdominal tenderness. There is no guarding or rebound.   Genitourinary:     Comments: +Clement. Urine yellow / clear.  Musculoskeletal:      Cervical back: Neck supple.      Right lower leg: No edema.      Left lower leg: No edema.   Skin:     General: Skin is warm and dry.   Neurological:      Mental Status: He is alert.      Comments: Appropriately conversant   Psychiatric:         Mood and Affect: Mood normal.         Behavior: Behavior normal.         Thought Content: Thought content normal.         Judgment: Judgment normal.         Fluids    Intake/Output Summary (Last 24 hours) at 7/13/2021 1831  Last data filed at 7/13/2021 1738  Gross per 24 hour   Intake 370 ml   Output 1350 ml   Net -980 ml       Laboratory  Recent Labs     07/11/21  0017 07/12/21  0822 07/13/21  0113   WBC 7.7 7.9 10.0   RBC 3.35* 3.48* 3.54*   HEMOGLOBIN 9.2* 9.8* 9.8*   HEMATOCRIT 28.9* 30.1* 31.5*   MCV 86.3 86.5 89.0   MCH 27.5 28.2 27.7   MCHC 31.8* 32.6* 31.1*   RDW 49.3  50.4* 52.5*   PLATELETCT 246 230 265   MPV 8.5* 8.8* 9.0     Recent Labs     21  0017 21  0822 21  0113   SODIUM 142 139 137   POTASSIUM 3.9 3.5* 4.1   CHLORIDE 107 104 99   CO2 22 29 26   GLUCOSE 138* 164* 144*   BUN 21 19 21   CREATININE 0.74 0.76 0.78   CALCIUM 8.4* 8.9 9.5                   Imaging  DX-CHEST-PORTABLE (1 VIEW)   Final Result      COPD without acute cardiopulmonary abnormality.      DX-FEMUR-2+ RIGHT   Final Result      No radiographic evidence of acute traumatic injury.      Moderate right hip osteoarthritis      DX-PELVIS-1 OR 2 VIEWS   Final Result      No radiographic evidence of acute displaced fracture      Moderate right, mild left hip osteoarthritis           Assessment/Plan  * Contusion of right hip- (present on admission)  Assessment & Plan  Due [facility]  Pain has improved.   Pelvic x-ray was negative for fracture.   PT/OT recommends postacute placement.  Continue PRN acetaminophen    Frequent falls- (present on admission)  Assessment & Plan  Frail, poor intake with uncontrolled diabetes.    PT/OT recommend postacute placement.    Type 2 diabetes mellitus with hyperglycemia, with long-term current use of insulin (HCC)- (present on admission)  Assessment & Plan  A1c 14.3 on 2021.    Insulin discontinued due to recurrent hypoglycemia  Controlled on metformin and low-carbohydrate diet  Diabetes educator consulted    SHAHID (acute kidney injury) (HCC)- (present on admission)  Assessment & Plan  Resolved    Frailty syndrome in geriatric patient- (present on admission)  Assessment & Plan  BMI 17.2.    He is homeless and wife  this past year  Estranged from his daughter, who is now in contact and will assist with discharge planning  Plan for post-acute placement in Group Home when able to access finances    Benign prostatic hyperplasia with urinary obstruction- (present on admission)  Assessment & Plan  Chronic indwelling Clement.    Follows  outpatient urology every month.    Continue finasteride.  Tamsulosin discontinued due to orthostasis and falls.  Clement was changed on 2021 and  2021    Normocytic anemia- (present on admission)  Assessment & Plan  Ferritin and FeSat consistent with AOCD  Repeat CBC only if clinical change    Moderate episode of recurrent major depressive disorder (HCC)- (present on admission)  Assessment & Plan  Reports losing home and his wife  this year  Declined pharmacotherapy    Leukocytosis  Assessment & Plan  Resolved  Due to E faecalis CAUTI       VTE prophylaxis: enoxaparin ppx    I have performed a physical exam and reviewed and updated ROS and Plan today (2021). In review of yesterday's note (2021), there are no changes except as documented above.

## 2021-07-14 NOTE — FACE TO FACE
Face to Face Note  -  Durable Medical Equipment    Gumaro Garcia M.D. - NPI: [id number]  I certify that this patient is under my care and that they had a durable medical equipment(DME)face to face encounter by myself that meets the physician DME face-to-face encounter requirements with this patient on:    Date of encounter:   Patient:                    MRN:                       YOB: 2022  Hugh Núñez  [id number]  1945     The encounter with the patient was in whole, or in part, for the following medical condition, which is the primary reason for durable medical equipment:  Other - Hip contusion    I certify that, based on my findings, the following durable medical equipment is medically necessary:  Wheel Chair.    HOME O2 Saturation Measurements:(Values must be present for Home Oxygen orders)         ,     ,         My Clinical findings support the need for the above equipment due to:  Abnormal Gait    Supporting Symptoms: weakness, hip pain.    If patient feels more short of breath, they can go up to 6 liters per minute and contact healthcare provider.

## 2021-07-14 NOTE — CARE PLAN
The patient is Stable - Low risk of patient condition declining or worsening    Shift Goals  Clinical Goals: patient will remain free of fall or injury   Patient Goals: discharge and speak to SW   Family Goals: N/A    Progress made toward(s) clinical / shift goals: pt turns self in bed; pt understands POC      Problem: Knowledge Deficit - Standard  Goal: Patient and family/care givers will demonstrate understanding of plan of care, disease process/condition, diagnostic tests and medications  Outcome: Progressing     Problem: Skin Integrity  Goal: Skin integrity is maintained or improved  Outcome: Progressing       Patient is not progressing towards the following goals:

## 2021-07-14 NOTE — PROGRESS NOTES
I certify that the patient requires continued medically necessary hospital services for the treatment of hip contusion / diabetes mellitus and will remain in the hospital for 28 days.  Discharge plan is anticipated to be Group Home.

## 2021-07-14 NOTE — PROGRESS NOTES
Assumed care of patient at 0700hrs; bedside report from NOC RN. Updated on POC. Patient currently A & O x 4; on RA; up 1A w. FWW; without complaints of acute pain. Assessment completed. Call light within reach. Whiteboard updated. Fall precautions in place. Bed locked and in lowest position. All questions answered. No other needs indicated at this time.

## 2021-07-15 ENCOUNTER — PATIENT OUTREACH (OUTPATIENT)
Dept: HEALTH INFORMATION MANAGEMENT | Facility: OTHER | Age: 76
End: 2021-07-15

## 2021-07-15 ENCOUNTER — PHARMACY VISIT (OUTPATIENT)
Dept: PHARMACY | Facility: MEDICAL CENTER | Age: 76
End: 2021-07-15
Payer: COMMERCIAL

## 2021-07-15 VITALS
RESPIRATION RATE: 18 BRPM | HEART RATE: 70 BPM | BODY MASS INDEX: 17.01 KG/M2 | DIASTOLIC BLOOD PRESSURE: 69 MMHG | SYSTOLIC BLOOD PRESSURE: 118 MMHG | HEIGHT: 70 IN | WEIGHT: 118.83 LBS | TEMPERATURE: 98.2 F | OXYGEN SATURATION: 99 %

## 2021-07-15 PROCEDURE — 700102 HCHG RX REV CODE 250 W/ 637 OVERRIDE(OP): Performed by: STUDENT IN AN ORGANIZED HEALTH CARE EDUCATION/TRAINING PROGRAM

## 2021-07-15 PROCEDURE — 700111 HCHG RX REV CODE 636 W/ 250 OVERRIDE (IP): Performed by: STUDENT IN AN ORGANIZED HEALTH CARE EDUCATION/TRAINING PROGRAM

## 2021-07-15 PROCEDURE — 99239 HOSP IP/OBS DSCHRG MGMT >30: CPT | Performed by: STUDENT IN AN ORGANIZED HEALTH CARE EDUCATION/TRAINING PROGRAM

## 2021-07-15 PROCEDURE — A9270 NON-COVERED ITEM OR SERVICE: HCPCS | Performed by: INTERNAL MEDICINE

## 2021-07-15 PROCEDURE — A9270 NON-COVERED ITEM OR SERVICE: HCPCS | Performed by: STUDENT IN AN ORGANIZED HEALTH CARE EDUCATION/TRAINING PROGRAM

## 2021-07-15 PROCEDURE — 700102 HCHG RX REV CODE 250 W/ 637 OVERRIDE(OP): Performed by: INTERNAL MEDICINE

## 2021-07-15 PROCEDURE — RXMED WILLOW AMBULATORY MEDICATION CHARGE: Performed by: STUDENT IN AN ORGANIZED HEALTH CARE EDUCATION/TRAINING PROGRAM

## 2021-07-15 RX ADMIN — DOCUSATE SODIUM 50 MG AND SENNOSIDES 8.6 MG 2 TABLET: 8.6; 5 TABLET, FILM COATED ORAL at 04:47

## 2021-07-15 RX ADMIN — FINASTERIDE 5 MG: 5 TABLET, FILM COATED ORAL at 04:48

## 2021-07-15 RX ADMIN — ENOXAPARIN SODIUM 30 MG: 30 INJECTION SUBCUTANEOUS at 04:48

## 2021-07-15 RX ADMIN — Medication 100 MG: at 04:48

## 2021-07-15 RX ADMIN — METFORMIN HYDROCHLORIDE 1000 MG: 500 TABLET ORAL at 08:25

## 2021-07-15 RX ADMIN — THERA TABS 1 TABLET: TAB at 04:48

## 2021-07-15 NOTE — DISCHARGE PLANNING
Care Transition Team Discharge Planning    Anticipated Discharge Information  Discharge Disposition: Discharged to home/self care (01) (Group Home)  Discharge Address: Unkown  Discharge Contact Phone Number: [phone]        Discharge Plan:  Patient is discharging today to [facility] on an KIANA. Previous , Mike Reynaga set up appointment at [facility] for new ID card, [facility] agency, (Franco Chavira) will be picking patient up today with a wheel chair from [facility].     This RN,  faxed Approved Services for discharge medications, patient does not have pharmacy coverage.     Acetaminophen: 8.30, Metformin: 8.28, Theram, vitamin: 7.77, total cost: 24.35. When Meds to Beds are delivered, bedside RN will call Franco Chavira for  in wheel chair.     Ania Kaba RN,

## 2021-07-15 NOTE — DISCHARGE PLANNING
Meds-to-Beds: Discharge prescription orders listed below delivered to patient's bedside by pharmacy technician Shari. RN notified. Patient counseled via phone. Case management utilized approved services.        Hugh Núñez   Home Medication Instructions JEFF:[id number]    Printed on:07/15/21 1075   Medication Information                      acetaminophen (TYLENOL) 500 MG Tab  Take 2 Tablets by mouth 3 times a day as needed.             metformin (GLUCOPHAGE) 1000 MG tablet  Take 1 tablet by mouth 2 times a day with meals.             multivitamin (THERAGRAN) Tab  Take 1 tablet by mouth every day.               Tracee Stokes, PharmD

## 2021-07-15 NOTE — DISCHARGE INSTRUCTIONS
Discharge Instructions    Discharged to group home by car with friend. Discharged via wheelchair, hospital escort: Yes.  Special equipment needed: Wheelchair    DISCHARGE HOME WITH [email or web address] BOX STAFF TO BE [facility]SP[facility]    Be sure to schedule a follow-up appointment with your primary care doctor or any specialists as instructed.     Discharge Plan:   Diet Plan: Discussed  Activity Level: Discussed  Confirmed Follow up Appointment: Patient to Call and Schedule Appointment  Confirmed Symptoms Management: Discussed  Medication Reconciliation Updated: Yes    I understand that a diet low in cholesterol, fat, and sodium is recommended for good health. Unless I have been given specific instructions below for another diet, I accept this instruction as my diet prescription.   Other diet: regular    Special Instructions: None    · Is patient discharged on Warfarin / Coumadin?   No         Acute Kidney Injury, Adult    Acute kidney injury is a sudden worsening of kidney function. The kidneys are organs that have several jobs. They filter the blood to remove waste products and extra fluid. They also maintain a healthy balance of minerals and hormones in the body, which helps control blood pressure and keep bones strong. With this condition, your kidneys do not do their jobs as well as they should.  This condition ranges from mild to severe. Over time it may develop into long-lasting (chronic) kidney disease. Early detection and treatment may prevent acute kidney injury from developing into a chronic condition.  What are the causes?  Common causes of this condition include:  · A problem with blood flow to the kidneys. This may be caused by:  ? Low blood pressure (hypotension) or shock.  ? Blood loss.  ? Heart and blood vessel (cardiovascular) disease.  ? Severe burns.  ? Liver disease.  · Direct damage to the kidneys. This may be caused by:  ? Certain medicines.  ? A kidney  infection.  ? Poisoning.  ? Being around or in contact with toxic substances.  ? A surgical wound.  ? A hard, direct hit to the kidney area.  · A sudden blockage of urine flow. This may be caused by:  ? Cancer.  ? Kidney stones.  ? An enlarged prostate in males.  What are the signs or symptoms?  Symptoms of this condition may not be obvious until the condition becomes severe. Symptoms of this condition can include:  · Tiredness (lethargy), or difficulty staying awake.  · Nausea or vomiting.  · Swelling (edema) of the face, legs, ankles, or feet.  · Problems with urination, such as:  ? Abdominal pain, or pain along the side of your stomach (flank).  ? Decreased urine production.  ? Decrease in the force of urine flow.  · Muscle twitches and cramps, especially in the legs.  · Confusion or trouble concentrating.  · Loss of appetite.  · Fever.  How is this diagnosed?  This condition may be diagnosed with tests, including:  · Blood tests.  · Urine tests.  · Imaging tests.  · A test in which a sample of tissue is removed from the kidneys to be examined under a microscope (kidney biopsy).  How is this treated?  Treatment for this condition depends on the cause and how severe the condition is. In mild cases, treatment may not be needed. The kidneys may heal on their own. In more severe cases, treatment will involve:  · Treating the cause of the kidney injury. This may involve changing any medicines you are taking or adjusting your dosage.  · Fluids. You may need specialized IV fluids to balance your body's needs.  · Having a catheter placed to drain urine and prevent blockages.  · Preventing problems from occurring. This may mean avoiding certain medicines or procedures that can cause further injury to the kidneys.  In some cases treatment may also require:  · A procedure to remove toxic wastes from the body (dialysis or continuous renal replacement therapy - CRRT).  · Surgery. This may be done to repair a torn kidney, or  to remove the blockage from the urinary system.  Follow these instructions at home:  Medicines  · Take over-the-counter and prescription medicines only as told by your health care provider.  · Do not take any new medicines without your health care provider's approval. Many medicines can worsen your kidney damage.  · Do not take any vitamin and mineral supplements without your health care provider's approval. Many nutritional supplements can worsen your kidney damage.  Lifestyle  · If your health care provider prescribed changes to your diet, follow them. You may need to decrease the amount of protein you eat.  · Achieve and maintain a healthy weight. If you need help with this, ask your health care provider.  · Start or continue an exercise plan. Try to exercise at least 30 minutes a day, 5 days a week.  · Do not use any tobacco products, such as cigarettes, chewing tobacco, and e-cigarettes. If you need help quitting, ask your health care provider.  General instructions  · Keep track of your blood pressure. Report changes in your blood pressure as told by your health care provider.  · Stay up to date with immunizations. Ask your health care provider which immunizations you need.  · Keep all follow-up visits as told by your health care provider. This is important.  Where to find more information  · American Association of Kidney Patients: www.aakp.org  · National Kidney Foundation: www.kidney.org  · American Kidney Fund: www.akfinc.org  · Life Options Rehabilitation Program:  ? www.lifeoptions.org  ? www.kidneyschool.org  Contact a health care provider if:  · Your symptoms get worse.  · You develop new symptoms.  Get help right away if:  · You develop symptoms of worsening kidney disease, which include:  ? Headaches.  ? Abnormally dark or light skin.  ? Easy bruising.  ? Frequent hiccups.  ? Chest pain.  ? Shortness of breath.  ? End of menstruation in women.  ? Seizures.  ? Confusion or altered mental  status.  ? Abdominal or back pain.  ? Itchiness.  · You have a fever.  · Your body is producing less urine.  · You have pain or bleeding when you urinate.  Summary  · Acute kidney injury is a sudden worsening of kidney function.  · Acute kidney injury can be caused by problems with blood flow to the kidneys, direct damage to the kidneys, and sudden blockage of urine flow.  · Symptoms of this condition may not be obvious until it becomes severe. Symptoms may include edema, lethargy, confusion, nausea or vomiting, and problems passing urine.  · This condition can usually be diagnosed with blood tests, urine tests, and imaging tests. Sometimes a kidney biopsy is done to diagnose this condition.  · Treatment for this condition often involves treating the underlying cause. It is treated with fluids, medicines, dialysis, diet changes, or surgery.  This information is not intended to replace advice given to you by your health care provider. Make sure you discuss any questions you have with your health care provider.  Document Released: 07/02/2012 Document Revised: 11/30/2018 Document Reviewed: 12/08/2017  [employer] Patient Education © 2020 [employer]      Understanding Your Risk for Falls  Each year, millions of people suffer serious injuries from falls. It is important to understand your risk for falling. Talk with your health care provider about your risk and what you can do to lower it. There are actions you can take at home to lower your risk.  If you do have a serious fall, it is important to tell your health care provider. Falling once raises your risk for falling again.  How can falls affect me?  Serious injuries from falls are common. These include:  · Broken bones. Most hip fractures are caused by falls.  · Traumatic brain injury (TBI). Falls are the most common cause of TBI.  Fear of falling can also cause you to avoid activities and stay at home. This can make your muscles weaker and actually raise your risk  for a fall.  What can increase my risk?  Serious injuries from a fall most often happen to people older than age 65. Children and young adults ages 15-29 are also at higher risk. The more risk factors you have for falling, the higher your risk. Risk factors include:  · Weakness in the lower body.  · Lack (deficiency) of vitamin D.  · Weak bones (osteoporosis).  · Being generally weak or confused due to long-term (chronic) illness.  · Dizziness or balance problems.  · Poor vision.  · Having depression.  · Medicine that causes dizziness or drowsiness. These can include medicines for your blood pressure, heart, anxiety, insomnia, or edema, as well as pain medicines and muscle relaxants.  · Drinking alcohol.  · Foot pain or improper footwear.  · Working at a dangerous job.  · Having had a fall in the past.  · Tripping hazards at home, such as floor clutter or loose rugs, or poor lighting.  · Having pets or clutter in your home.  What actions can I take to lower my risk of falling?         · Maintain physical fitness:  ? Do strength and balance exercises. Consider taking a regular class to build strength and balance. Yoga and kapil chi are good options.  ? Have your eyes checked every year and your vision prescription updated as needed.  · Remove all clutter from walkways and stairways, including extension cords.  · Use a cordless phone.  · Do not use throw rugs. Make sure all carpeting is taped or tacked down securely.  · Use good lighting in all rooms. Keep a flashlight near your bed.  · Make sure there is a clear path from your bed to the bathroom. Use night-lights.  · Install grab bars for your tub, shower, and toilet. Use a bath mat in your tub or shower.  · Attach secure railings on both sides of your stairs.  · Repair uneven or broken steps.  · Use a cane or walker as directed by your health care provider.  · Wear nonskid shoes. Do not wear high heels. Do not walk around the house in socks or slippers.  · Avoid  walking on icy or slippery surfaces. Walk on the grass instead of on icy or slick sidewalks. Where you can, use ice melt to get rid of ice on walkways.  Questions to ask your health care provider  · Can you help me evaluate my risk for a fall?  · Do any of my medicines make me more likely to fall?  · Should I take a vitamin D supplement?  · What exercises can I do to improve my strength and balance?  · Should I make an appointment to have my vision checked?  · Do I need a bone density test to check for osteoporosis?  · Would it help to use a cane or a walker?  Where to find more information  · Centers for Disease Control and Prevention, STEADI: cdc.gov  · Community-Based Fall Prevention Programs: cdc.gov  · National [place] on Aging: yr0aknm.vineet.nih.gov  Contact a health care provider if:  · You fall at home.  · You are afraid of falling at home.  · You feel weak, drowsy, or dizzy at home.  Summary  · People 65 and older are at high risk for falling. However, older people are not the only ones injured in falls. Children and young adults have a higher-than-normal risk, too.  · Talk with your health care provider about your risks for falling and how to lower those risks.  · Taking certain precautions at home can lower your risk for falling.  · If you fall, always tell your health care provider.  This information is not intended to replace advice given to you by your health care provider. Make sure you discuss any questions you have with your health care provider.  Document Released: 08/01/2018 Document Revised: 03/19/2019 Document Reviewed: 08/01/2018  Elsevier Patient Education © 2020 [employer]      Depression / Suicide Risk    As you are discharged from this [facility] facility, it is important to learn how to keep safe from harming yourself.    Recognize the warning signs:  · Abrupt changes in personality, positive or negative- including increase in energy   · Giving away possessions  · Change in eating  patterns- significant weight changes-  positive or negative  · Change in sleeping patterns- unable to sleep or sleeping all the time   · Unwillingness or inability to communicate  · Depression  · Unusual sadness, discouragement and loneliness  · Talk of wanting to die  · Neglect of personal appearance   · Rebelliousness- reckless behavior  · Withdrawal from people/activities they love  · Confusion- inability to concentrate     If you or a loved one observes any of these behaviors or has concerns about self-harm, here's what you can do:  · Talk about it- your feelings and reasons for harming yourself  · Remove any means that you might use to hurt yourself (examples: pills, rope, extension cords, firearm)  · Get professional help from the community (Mental Health, Substance Abuse, psychological counseling)  · Do not be alone:Call your Safe Contact- someone whom you trust who will be there for you.  · Call your local CRISIS HOTLINE 221-3175 or 918-306-1326  · Call your local Children's Mobile Crisis Response Team [place] (750) 897-3598 or www.[facility]  · Call the toll free National Suicide Prevention Hotlines   · National Suicide Prevention Lifeline 850-359-FGRL (8246)  · National Hope Line Network 800-SUICIDE (840-7768)

## 2021-07-15 NOTE — CARE PLAN
The patient is Stable - Low risk of patient condition declining or worsening    Shift Goals  Clinical Goals: patient will remain free of fall or injury   Patient Goals: discharge   Family Goals: N/A    Progress made toward(s) clinical / shift goals:      Problem: Skin Integrity  Goal: Skin integrity is maintained or improved  Outcome: Progressing  Note: Educated to turn self side to side, barrier cream, ambulatory     Problem: Mobility  Goal: Patient's capacity to carry out activities will improve  Outcome: Progressing     Patient is not progressing towards the following goals:       ED MD

## 2021-07-15 NOTE — DISCHARGE SUMMARY
Discharge Summary    CHIEF COMPLAINT ON ADMISSION  Chief Complaint   Patient presents with   • T-5000 GLF     While at [facility], showering, slipped out of chair in shower, denies hitting head, -LOC   • Hip Pain     R, pedal pulse 2+   • Failure to Thrive   • Bug Bite     t/o body x1 month       Reason for Admission  Syncope     Admission Date  6/19/2021    CODE STATUS  Full Code    HPI & HOSPITAL COURSE  This is a 75 y.o. homeless male with uncontrolled insulin-dependent diabetes (A1c 14.3), BPH s/p chronic indwelling lazo catheter with who presented from a homeless shelter after syncopal episode. He was noted to have SHAHID and hyperglycemia from discontinuing his insulin, resulting in polyuria. This had gone unnoticed to him due to indwelling lazo catheter, for which he had to empty the bag multiple times daily. Xray for Right hip pain was negative for fracture. SHAHID resolved with IVF. Hyperglycemia resolved with insulin, which ultimately had to be discontinued due to recurrent hypoglycemia. Blood sugars were controlled with diabetic diet and metformin. Workup of normocytic anemia was indicative of AOCD. During admission he was treated with ceftriaxone for E faecalis CAUTI. PT/OT evaluated him and recommended 24/7 assistance at Group Home, which he was looking into prior to admission.    On admission he was noted to have pediculosis, prompting quarantine of his belongings. Unfortunately, his belongings were accidentally discarded and he was unable to access his bank information nor ID to replace the bank information. [place] estranged [place] was located and was very helpful in contacting her son in [place] to assist with transportation and coordination of DMV appointment and to the bank.    Therefore, he is discharged in fair and stable condition to home with close outpatient follow-up.    The patient met 2-midnight criteria for an inpatient stay at the time of discharge.    Discharge  Date  7/15/2021    FOLLOW UP ITEMS POST DISCHARGE  1. Diabetes - continue metformin and follow up with [facility] for diabetes monitoring  2. Chronic indwelling lazo - follow up with urology for exchange    DISCHARGE DIAGNOSES  Principal Problem:    Contusion of right hip POA: Yes  Active Problems:    Type 2 diabetes mellitus with hyperglycemia, with long-term current use of insulin (HCC) POA: Yes    Frequent falls POA: Yes    Normocytic anemia POA: Yes    Benign prostatic hyperplasia with urinary obstruction POA: Yes    Frailty syndrome in geriatric patient POA: Yes    SHAHID (acute kidney injury) (HCC) POA: Yes    Leukocytosis POA: No    Moderate episode of recurrent major depressive disorder (HCC) POA: Yes  Resolved Problems:    * No resolved hospital problems. *      FOLLOW UP  No future appointments.  No follow-up provider specified.    MEDICATIONS ON DISCHARGE     Medication List      START taking these medications      Instructions   acetaminophen 500 MG Tabs  Commonly known as: TYLENOL   Take 2 Tablets by mouth 3 times a day as needed.  Dose: 1,000 mg     metformin 1000 MG tablet  Commonly known as: GLUCOPHAGE   Take 1 tablet by mouth 2 times a day with meals.  Dose: 1,000 mg     multivitamin Tabs   Take 1 tablet by mouth every day.  Dose: 1 tablet        CONTINUE taking these medications      Instructions   finasteride 5 MG Tabs  Commonly known as: PROSCAR   Take 1 Tab by mouth every day.  Dose: 5 mg        STOP taking these medications    cyanocobalamin 1000 MCG Tabs  Commonly known as: VITAMIN B12     insulin glargine 100 UNIT/ML Soln  Commonly known as: Lantus     insulin regular 100 Unit/mL Soln  Commonly known as: HumuLIN R     polyethylene glycol/lytes 17 g Pack  Commonly known as: MIRALAX     sulfamethoxazole-trimethoprim 800-160 MG tablet  Commonly known as: BACTRIM DS     tamsulosin 0.4 MG capsule  Commonly known as: FLOMAX     vitamin D 1000 UNIT Tabs  Commonly known as: VITAMIND D3             Allergies  No Known Allergies    DIET  Orders Placed This Encounter   Procedures   • Diet Order Diet: Consistent CHO (Diabetic)     Standing Status:   Standing     Number of Occurrences:   1     Order Specific Question:   Diet:     Answer:   Consistent CHO (Diabetic) [4]       ACTIVITY  As tolerated.  Weight bearing as tolerated    CONSULTATIONS  Diabetes Educator    PROCEDURES  None    LABORATORY  Lab Results   Component Value Date    SODIUM 137 07/13/2021    POTASSIUM 4.1 07/13/2021    CHLORIDE 99 07/13/2021    CO2 26 07/13/2021    GLUCOSE 144 (H) 07/13/2021    BUN 21 07/13/2021    CREATININE 0.78 07/13/2021        Lab Results   Component Value Date    WBC 10.0 07/13/2021    HEMOGLOBIN 9.8 (L) 07/13/2021    HEMATOCRIT 31.5 (L) 07/13/2021    PLATELETCT 265 07/13/2021        Total time of the discharge process exceeds 35 minutes.

## 2021-07-15 NOTE — PROGRESS NOTES
Hospital Medicine Daily Progress Note    Date of Service  7/14/2021    Chief Complaint  Hugh Núñez is a 75 y.o. homeless male admitted 6/19/2021 with fall and SHAHID due to uncontrolled diabetes.    Hospital Course  No notes on file    Interval Problem Update  KOFFI ON  He has pain in his Right hip that doesn't prevent him from walking.  Pain improves with tylenol.  He denies dyspnea, N/V, F/C, bowel/bladder dysfunction, bleeding, other pain.  He really appreciates how much everyone tries to help him.    I have personally seen and examined the patient at bedside. I discussed the plan of care with patient, family, bedside RN, charge RN,  and pharmacy.    Discussed discharge planning with LCSANDRES Mckeon, who recommended wheelchair order as she will coordinate with his grandson Thor and a homeless  to get him to the [facility]V tomorrow. They are requesting discharge at approximately 8 am tomorrow. Medication reconciliation and prescription are complete.    Consultants/Specialty  Diabetes Education    Code Status  Full Code    Disposition  Patient is medically cleared.   Anticipate discharge to to home with close outpatient follow-up.  I have placed the appropriate orders for post-discharge needs.    Review of Systems  Review of Systems   Constitutional: Negative for chills and fever.   HENT: Negative for ear pain, nosebleeds, sinus pain and sore throat.    Eyes: Negative for pain.   Respiratory: Negative for hemoptysis and shortness of breath.    Cardiovascular: Negative for chest pain.   Gastrointestinal: Negative for abdominal pain, blood in stool, constipation, diarrhea, melena, nausea and vomiting.   Genitourinary: Negative for dysuria, flank pain and hematuria.   Musculoskeletal: Positive for joint pain. Negative for back pain, myalgias and neck pain.   Neurological: Negative for weakness and headaches.   Endo/Heme/Allergies: Does not bruise/bleed easily.   Psychiatric/Behavioral:  Negative for depression. The patient is not nervous/anxious.         Physical Exam  Temp:  [36.7 °C (98 °F)-36.8 °C (98.3 °F)] 36.8 °C (98.3 °F)  Pulse:  [61-75] 70  Resp:  [16-18] 16  BP: (100-130)/(37-66) 124/53  SpO2:  [96 %-98 %] 98 %    Physical Exam  Vitals reviewed.   Constitutional:       General: He is not in acute distress.     Appearance: He is cachectic. He is ill-appearing (Chronically). He is not toxic-appearing.   HENT:      Head:      Comments: Bitemporal wasting     Nose: Nose normal.      Mouth/Throat:      Mouth: Mucous membranes are moist.      Pharynx: Oropharynx is clear.   Eyes:      General: No scleral icterus.     Conjunctiva/sclera: Conjunctivae normal.   Cardiovascular:      Rate and Rhythm: Normal rate and regular rhythm.      Pulses: Normal pulses.      Heart sounds: Normal heart sounds. No murmur heard.   No friction rub. No gallop.    Pulmonary:      Effort: Pulmonary effort is normal. No respiratory distress.      Breath sounds: Normal breath sounds. No wheezing, rhonchi or rales.   Abdominal:      General: Abdomen is flat. Bowel sounds are normal. There is no distension.      Palpations: Abdomen is soft.      Tenderness: There is no abdominal tenderness. There is no guarding or rebound.   Genitourinary:     Comments: +Clement. Urine yellow / clear.  Musculoskeletal:      Cervical back: Neck supple.      Right lower leg: No edema.      Left lower leg: No edema.   Skin:     General: Skin is warm and dry.   Neurological:      Mental Status: He is alert.      Comments: Appropriately conversant   Psychiatric:         Mood and Affect: Mood normal.         Behavior: Behavior normal.         Thought Content: Thought content normal.         Judgment: Judgment normal.         Fluids    Intake/Output Summary (Last 24 hours) at 7/14/2021 1839  Last data filed at 7/14/2021 1643  Gross per 24 hour   Intake 220 ml   Output 1175 ml   Net -955 ml       Laboratory  Recent Labs     07/12/21  0822  21  0113   WBC 7.9 10.0   RBC 3.48* 3.54*   HEMOGLOBIN 9.8* 9.8*   HEMATOCRIT 30.1* 31.5*   MCV 86.5 89.0   MCH 28.2 27.7   MCHC 32.6* 31.1*   RDW 50.4* 52.5*   PLATELETCT 230 265   MPV 8.8* 9.0     Recent Labs     21  0822 21  0113   SODIUM 139 137   POTASSIUM 3.5* 4.1   CHLORIDE 104 99   CO2 29 26   GLUCOSE 164* 144*   BUN 19 21   CREATININE 0.76 0.78   CALCIUM 8.9 9.5                   Imaging  DX-CHEST-PORTABLE (1 VIEW)   Final Result      COPD without acute cardiopulmonary abnormality.      DX-FEMUR-2+ RIGHT   Final Result      No radiographic evidence of acute traumatic injury.      Moderate right hip osteoarthritis      DX-PELVIS-1 OR 2 VIEWS   Final Result      No radiographic evidence of acute displaced fracture      Moderate right, mild left hip osteoarthritis           Assessment/Plan  * Contusion of right hip- (present on admission)  Assessment & Plan  Due to fall at homeless shelter [facility]  Pain has improved.   Pelvic x-ray was negative for fracture.   PT/OT recommends postacute placement.  Continue PRN acetaminophen    Frequent falls- (present on admission)  Assessment & Plan  Frail, poor intake with uncontrolled diabetes.    PT/OT recommend postacute placement.  Plan to discharge with family for DMV and Bank errands, follow up with homeless outreach LCSW    Type 2 diabetes mellitus with hyperglycemia, with long-term current use of insulin (HCC)- (present on admission)  Assessment & Plan  A1c 14.3 on 2021.    Insulin discontinued due to recurrent hypoglycemia  Controlled on metformin and low-carbohydrate diet  Diabetes educator consulted    SHAHID (acute kidney injury) (HCC)- (present on admission)  Assessment & Plan  Resolved    Frailty syndrome in geriatric patient- (present on admission)  Assessment & Plan  BMI 17.2.    He is homeless and wife  this past year  Estranged from his daughter, who is now in contact and will assist with discharge planning  Plan for long-term placement  in Group Home when able to access finances    Benign prostatic hyperplasia with urinary obstruction- (present on admission)  Assessment & Plan  Chronic indwelling Clement.    Follows outpatient urology every month.    Continue finasteride.  Tamsulosin discontinued due to orthostasis and falls.  Clement was changed on 2021 and  2021    Normocytic anemia- (present on admission)  Assessment & Plan  Ferritin and FeSat consistent with AOCD  Repeat CBC only if clinical change    Moderate episode of recurrent major depressive disorder (HCC)- (present on admission)  Assessment & Plan  Reports losing home and his wife  this year  Declined pharmacotherapy    Leukocytosis  Assessment & Plan  Resolved  Due to E faecalis CAUTI       VTE prophylaxis: enoxaparin ppx    I have performed a physical exam and reviewed and updated ROS and Plan today (2021). In review of yesterday's note (2021), there are no changes except as documented above.

## 2021-07-16 ENCOUNTER — PATIENT OUTREACH (OUTPATIENT)
Dept: HEALTH INFORMATION MANAGEMENT | Facility: OTHER | Age: 76
End: 2021-07-16

## 2021-07-16 NOTE — DISCHARGE PLANNING
JUWANW faxed COVID and Quantiferon results to  owner Morales @ [facility]. TREVON received fax number from previous charted note on 6/28/2021 from Ania Kaba, #[phone]. Fax confirmed.     TREVON Reynaga

## 2021-07-16 NOTE — PROGRESS NOTES
CHW Vinson called the patient to follow up but he did not answer. MISA left a voicemail.     MELINAW will attempt again at a later date.

## 2021-07-20 NOTE — PROGRESS NOTES
CHW Vinson called the patient in an attempt to follow up after DC.     Patient did not answer, CHW left a voicemail.     CHW will remove the patient from CCM caseload due to lack of contact after DC.

## 2021-08-10 ENCOUNTER — HOSPITAL ENCOUNTER (EMERGENCY)
Facility: MEDICAL CENTER | Age: 76
End: 2021-08-10
Attending: EMERGENCY MEDICINE
Payer: MEDICARE

## 2021-08-10 VITALS
DIASTOLIC BLOOD PRESSURE: 67 MMHG | RESPIRATION RATE: 20 BRPM | TEMPERATURE: 97.9 F | HEIGHT: 65 IN | WEIGHT: 118 LBS | OXYGEN SATURATION: 97 % | BODY MASS INDEX: 19.66 KG/M2 | HEART RATE: 78 BPM | SYSTOLIC BLOOD PRESSURE: 150 MMHG

## 2021-08-10 DIAGNOSIS — T83.9XXA PROBLEM WITH FOLEY CATHETER, INITIAL ENCOUNTER (HCC): ICD-10-CM

## 2021-08-10 LAB — GLUCOSE BLD-MCNC: 157 MG/DL (ref 65–99)

## 2021-08-10 PROCEDURE — 51702 INSERT TEMP BLADDER CATH: CPT

## 2021-08-10 PROCEDURE — 99283 EMERGENCY DEPT VISIT LOW MDM: CPT

## 2021-08-10 PROCEDURE — 51798 US URINE CAPACITY MEASURE: CPT

## 2021-08-10 PROCEDURE — 303105 HCHG CATHETER EXTRA

## 2021-08-10 PROCEDURE — 82962 GLUCOSE BLOOD TEST: CPT

## 2021-08-10 ASSESSMENT — FIBROSIS 4 INDEX: FIB4 SCORE: 1.27

## 2021-08-10 ASSESSMENT — PAIN DESCRIPTION - PAIN TYPE: TYPE: ACUTE PAIN

## 2021-08-11 NOTE — ED NOTES
Called and set up taxi transportation for patient with [place] yellow cab; eta 20 minutes. Facility and caregiver at [facility] aware and will be waiting for patient.

## 2021-08-11 NOTE — ED TRIAGE NOTES
"Chief Complaint   Patient presents with   • Urinary Catheter Problem     BIB [facility] from [facility] for lazo cath problem. pt states cath has not drained since this morning.      .BP (!) 175/110   Pulse 78   Temp 36.8 °C (98.2 °F) (Temporal)   Resp 20   Ht 1.651 m (5' 5\")   Wt 53.5 kg (118 lb)   SpO2 98%   BMI 19.64 kg/m²     "

## 2021-08-11 NOTE — ED NOTES
Patient tolerated lazo catheter replacement. Old catheter intact but not all the way in the bladder. Patient reports feeling relief after replacing with new catheter. Total output with new lazo catheter 500ml. MD notified and aware.

## 2021-08-11 NOTE — DISCHARGE PLANNING
Medical Social Work    MSANDRES was notified by RN that pt needs a ride back to his group home: [facility] [address].  MSW contacted Morales ([phone]) group home owner who states that pt is capable of riding home in a cab.  Morales states that pt is fully alert and oriented and has money is his room to pay for a cab when it arrives.  MSW updated bedside RN who will call [facility] for pt upon D/C.

## 2021-08-11 NOTE — ED NOTES
Patient ambulated with slow and steady gait with cane to wheelchair. Patient wheeled out to front of ER for taxi .

## 2021-08-11 NOTE — ED NOTES
Called [facility] Adan regarding patient discharge. Per staff will call admin to set up transportation for patient and will call back ER with details.

## 2021-08-11 NOTE — ED PROVIDER NOTES
"ED Provider Note    CHIEF COMPLAINT  Clement catheter problem    HPI  Hugh Núñez is a 76 y.o. male who presents to the emergency department for evaluation of a Clement catheter problem.  The patient states that his Clement catheter has stopped draining.  He states that it has had very minimal drainage throughout the day.  He is complain of lower abdominal pain mostly over the bladder as well.  He states that the Clement catheter was placed about 2 weeks ago for an enlarged prostate.  He denies any blood in the urine.  He is not any fevers.  He denies any nausea, vomiting, or diarrhea.  He has not had any chest pain or shortness of breath.    REVIEW OF SYSTEMS  See HPI for further details. All other systems are negative.     PAST MEDICAL HISTORY   has a past medical history of Fall, Polio (Ag of 5), and Type II or unspecified type diabetes mellitus without mention of complication, not stated as uncontrolled.    SOCIAL HISTORY  Social History     Tobacco Use   • Smoking status: Former Smoker     Years: 5.00     Types: Cigarettes     Start date: 1955     Quit date: 1960     Years since quittin.9   • Smokeless tobacco: Never Used   Vaping Use   • Vaping Use: Never used   Substance and Sexual Activity   • Alcohol use: Not Currently     Alcohol/week: 0.6 oz     Types: 1 Cans of beer per week   • Drug use: No   • Sexual activity: Not on file       SURGICAL HISTORY   has a past surgical history that includes appendectomy and tonsillectomy.    CURRENT MEDICATIONS  Home Medications    **Home medications have not yet been reviewed for this encounter**         ALLERGIES  No Known Allergies    PHYSICAL EXAM  VITAL SIGNS: /67   Pulse 66   Temp 36.8 °C (98.2 °F) (Temporal)   Resp 16   Ht 1.651 m (5' 5\")   Wt 53.5 kg (118 lb)   SpO2 97%   BMI 19.64 kg/m²    Constitutional: Alert and in no apparent distress.  There is a Clement catheter bag with a small amount of yellow urine and sediment present.  HENT: " Normocephalic atraumatic. Bilateral external ears normal. Nose normal. Mucous membranes are moist.  Eyes: Pupils are equal and reactive. Conjunctiva normal. Non-icteric sclera.   Neck: Normal range of motion without tenderness. Supple. No meningeal signs.  Cardiovascular: Regular rate and rhythm. No murmurs, gallops or rubs.  Thorax & Lungs: Breath sounds are clear to auscultation bilaterally. No wheezing, rhonchi or rales.  Abdomen: Soft, nontender and nondistended. No peritoneal signs noted.  Skin: Warm and dry. No rashes are noted.  Back: No bony tenderness, No CVA tenderness.   Extremities: 2+ peripheral pulses. Cap refill is less than 2 seconds. No edema, cyanosis, or clubbing.  Musculoskeletal: Good range of motion in all major joints. No tenderness to palpation or major deformities noted.   Neurologic: Alert and oriented ×3. The patient moves all 4 extremities and follows commands.  Psychiatric: Affect is normal. Judgment appears to be intact.    DIAGNOSTIC STUDIES / PROCEDURES    LABS  Results for orders placed or performed during the hospital encounter of 08/10/21   POCT glucose device results   Result Value Ref Range    Glucose - Accu-Ck 157 (H) 65 - 99 mg/dL     COURSE & MEDICAL DECISION MAKING  Pertinent Labs & Imaging studies reviewed. (See chart for details)    This is a 76-year-old male presenting to the emergency department for evaluation of a Clement catheter complication.  On initial evaluation, the patient did not appear to be in any acute distress.  His blood pressure was elevated but the remainder of his vital signs were normal.  His abdominal exam was benign.  The Clement catheter bag was noted to have a small amount of yellow urine with sediment in it.  The Clement catheter was flushed and still not draining.  The Clement catheter was replaced and the patient actually urinated about 200 cc when the initial one was removed.  After a new Clement catheter was placed, he was observed and had about 200 cc of  clear urine output.  At this time, I have low clinical suspicion for postobstructive diuresis.  Additionally, I have low clinical suspicion for UTI given his lack of fevers or other symptoms.  Accu-Chek was performed and 157.  I have low clinical suspicion for DKA at this time.  Upon reassessment after the patient was observed in the ED for some time, he denied any abdominal pain and stated that he felt much improved.  I do believe he is stable for discharge at this time.  He will follow up with his primary care physician return to the ED with any worsening signs or symptoms.    I verified that the patient was wearing a mask and I was wearing appropriate PPE every time I entered the room. The patient's mask was on the patient at all times during my encounter except for a brief view of the oropharynx.    FINAL IMPRESSION  1. Problem with Clement catheter, initial encounter (HCC)        PRESCRIPTIONS  New Prescriptions    No medications on file     FOLLOW UP  [facility], Emergency Dept  [address] [place] [id number]  [phone]  Go to   As needed      -DISCHARGE-  Electronically signed by: Chloe Anderson D.O., 8/10/2021 7:06 PM
